# Patient Record
Sex: MALE | Race: WHITE | NOT HISPANIC OR LATINO | Employment: OTHER | URBAN - METROPOLITAN AREA
[De-identification: names, ages, dates, MRNs, and addresses within clinical notes are randomized per-mention and may not be internally consistent; named-entity substitution may affect disease eponyms.]

---

## 2018-01-08 ENCOUNTER — HOSPITAL ENCOUNTER (EMERGENCY)
Facility: CLINIC | Age: 43
Discharge: HOME | End: 2018-01-09
Attending: EMERGENCY MEDICINE | Admitting: EMERGENCY MEDICINE
Payer: COMMERCIAL

## 2018-01-08 DIAGNOSIS — R07.9 CHEST PAIN, UNSPECIFIED TYPE: ICD-10-CM

## 2018-01-08 LAB
ANION GAP SERPL CALCULATED.3IONS-SCNC: 9 MMOL/L (ref 3–14)
BASOPHILS # BLD AUTO: 0.1 10E9/L (ref 0–0.2)
BASOPHILS NFR BLD AUTO: 1 %
BUN SERPL-MCNC: 7 MG/DL (ref 7–30)
CALCIUM SERPL-MCNC: 9.2 MG/DL (ref 8.5–10.1)
CHLORIDE SERPL-SCNC: 104 MMOL/L (ref 94–109)
CO2 SERPL-SCNC: 24 MMOL/L (ref 20–32)
CREAT SERPL-MCNC: 0.54 MG/DL (ref 0.66–1.25)
D DIMER PPP FEU-MCNC: 0.6 UG/ML FEU (ref 0–0.5)
DIFFERENTIAL METHOD BLD: ABNORMAL
EOSINOPHIL # BLD AUTO: 0.2 10E9/L (ref 0–0.7)
EOSINOPHIL NFR BLD AUTO: 1.6 %
ERYTHROCYTE [DISTWIDTH] IN BLOOD BY AUTOMATED COUNT: 12.9 % (ref 10–15)
GFR SERPL CREATININE-BSD FRML MDRD: >90 ML/MIN/1.7M2
GLUCOSE SERPL-MCNC: 140 MG/DL (ref 70–99)
HCT VFR BLD AUTO: 48.4 % (ref 40–53)
HGB BLD-MCNC: 16.5 G/DL (ref 13.3–17.7)
IMM GRANULOCYTES # BLD: 0.1 10E9/L (ref 0–0.4)
IMM GRANULOCYTES NFR BLD: 0.4 %
LYMPHOCYTES # BLD AUTO: 2.3 10E9/L (ref 0.8–5.3)
LYMPHOCYTES NFR BLD AUTO: 19.2 %
MCH RBC QN AUTO: 31.5 PG (ref 26.5–33)
MCHC RBC AUTO-ENTMCNC: 34.1 G/DL (ref 31.5–36.5)
MCV RBC AUTO: 92 FL (ref 78–100)
MONOCYTES # BLD AUTO: 0.7 10E9/L (ref 0–1.3)
MONOCYTES NFR BLD AUTO: 6.1 %
NEUTROPHILS # BLD AUTO: 8.5 10E9/L (ref 1.6–8.3)
NEUTROPHILS NFR BLD AUTO: 71.7 %
NRBC # BLD AUTO: 0 10*3/UL
NRBC BLD AUTO-RTO: 0 /100
PLATELET # BLD AUTO: 233 10E9/L (ref 150–450)
POTASSIUM SERPL-SCNC: 3.6 MMOL/L (ref 3.4–5.3)
RBC # BLD AUTO: 5.24 10E12/L (ref 4.4–5.9)
SODIUM SERPL-SCNC: 137 MMOL/L (ref 133–144)
TROPONIN I SERPL-MCNC: <0.015 UG/L (ref 0–0.04)
WBC # BLD AUTO: 11.9 10E9/L (ref 4–11)

## 2018-01-08 PROCEDURE — 25000128 H RX IP 250 OP 636: Performed by: EMERGENCY MEDICINE

## 2018-01-08 PROCEDURE — 80048 BASIC METABOLIC PNL TOTAL CA: CPT | Performed by: EMERGENCY MEDICINE

## 2018-01-08 PROCEDURE — 85379 FIBRIN DEGRADATION QUANT: CPT | Performed by: EMERGENCY MEDICINE

## 2018-01-08 PROCEDURE — 84484 ASSAY OF TROPONIN QUANT: CPT | Performed by: EMERGENCY MEDICINE

## 2018-01-08 PROCEDURE — 99285 EMERGENCY DEPT VISIT HI MDM: CPT | Mod: 25

## 2018-01-08 PROCEDURE — 96361 HYDRATE IV INFUSION ADD-ON: CPT

## 2018-01-08 PROCEDURE — 96360 HYDRATION IV INFUSION INIT: CPT | Mod: 59

## 2018-01-08 PROCEDURE — 93005 ELECTROCARDIOGRAM TRACING: CPT

## 2018-01-08 PROCEDURE — 25000132 ZZH RX MED GY IP 250 OP 250 PS 637: Performed by: EMERGENCY MEDICINE

## 2018-01-08 PROCEDURE — 85025 COMPLETE CBC W/AUTO DIFF WBC: CPT | Performed by: EMERGENCY MEDICINE

## 2018-01-08 RX ORDER — NITROGLYCERIN 0.4 MG/1
0.4 TABLET SUBLINGUAL EVERY 5 MIN PRN
Status: DISCONTINUED | OUTPATIENT
Start: 2018-01-08 | End: 2018-01-09 | Stop reason: HOSPADM

## 2018-01-08 RX ADMIN — SODIUM CHLORIDE 500 ML: 9 INJECTION, SOLUTION INTRAVENOUS at 23:34

## 2018-01-08 RX ADMIN — NITROGLYCERIN 0.4 MG: 0.4 TABLET SUBLINGUAL at 23:35

## 2018-01-08 ASSESSMENT — ENCOUNTER SYMPTOMS
RHINORRHEA: 1
COUGH: 1
FEVER: 0
NAUSEA: 0
CHILLS: 0
SHORTNESS OF BREATH: 1
VOMITING: 0
SORE THROAT: 0

## 2018-01-08 NOTE — ED AVS SNAPSHOT
[facility]    [address]    [facility] [address]    Phone:  [phone]    Fax:  [phone]                                       Sp Plasencia   MRN: [id number]    Department:  [facility]   Date of Visit:  1/8/2018           After Visit Summary Signature Page     I have received my discharge instructions, and my questions have been answered. I have discussed any challenges I see with this plan with the nurse or doctor.    ..........................................................................................................................................  Patient/Patient Representative Signature      ..........................................................................................................................................  Patient Representative Print Name and Relationship to Patient    ..................................................               ................................................  Date                                            Time    ..........................................................................................................................................  Reviewed by Signature/Title    ...................................................              ..............................................  Date                                                            Time

## 2018-01-08 NOTE — ED AVS SNAPSHOT
[facility] Emergency Department    [address]    [facility] [address]    Phone:  [phone]    Fax:  [phone]                                       Sp Plasencia   MRN: [id number]    Department:  [facility] Emergency Department   Date of Visit:  1/8/2018           Patient Information     Date Of Birth          1975        Your diagnoses for this visit were:     Chest pain, unspecified type        You were seen by Tawana Franco MD.      Follow-up Information     Follow up with Clinic, [facility] In 2 days.    Contact information:    [address] [place] [id number]  [phone]          Discharge Instructions       Please follow up closely with your regular physician.     Please return to the ED if your symptoms worsen or if you develop new or concerning symptoms.     Discharge Instructions  Chest Pain    You have been seen today for chest pain or discomfort.  At this time, your doctor has found no signs that your chest pain is due to a serious or life-threatening condition, (or you have declined more testing and/or admission to the hospital). However, sometimes there is a serious problem that does not show up right away. Your evaluation today may not be complete and you may need further testing and evaluation.     You need to follow-up with your regular doctor within 3 days.    Return to the Emergency Department if:    Your chest pain changes, gets worse, starts to happen more often, or comes with less activity.    You are short of breath.    You get very weak or tired.    You pass out or faint.    You have any new symptoms, like fever, cough, numb legs, or you cough up blood.    You have anything else that worries you.    Until you follow-up with your regular doctor please do the following:    Take one aspirin daily unless you have an allergy or are told not to by your doctor.    If a stress test appointment has been made, go to  the appointment.    If you have questions, contact your regular doctor.    If your doctor today has told you to follow-up with your regular doctor, it is very important that you make an appointment with your clinic and go to the appointment.  If you do not follow-up with your primary doctor, it may result in missing an important development which could result in permanent injury or disability and/or lasting pain.  If there is any problem keeping your appointment, call your doctor or return to the Emergency Department.    If you were given a prescription for medicine here today, be sure to read all of the information (including the package insert) that comes with your prescription.  This will include important information about the medicine, its side effects, and any warnings that you need to know about.  The pharmacist who fills the prescription can provide more information and answer questions you may have about the medicine.  If you have questions or concerns that the pharmacist cannot address, please call or return to the Emergency Department.     Opioid Medication Information    Pain medications are among the most commonly prescribed medicines, so we are including this information for all our patients. If you did not receive pain medication or get a prescription for pain medicine, you can ignore it.     You may have been given a prescription for an opioid (narcotic) pain medicine and/or have received a pain medicine while here in the Emergency Department. These medicines can make you drowsy or impaired. You must not drive, operate dangerous equipment, or engage in any other dangerous activities while taking these medications. If you drive while taking these medications, you could be arrested for DUI, or driving under the influence. Do not drink any alcohol while you are taking these medications.     Opioid pain medications can cause addiction. If you have a history of chemical dependency of any type, you are at  a higher risk of becoming addicted to pain medications.  Only take these prescribed medications to treat your pain when all other options have been tried. Take it for as short a time and as few doses as possible. Store your pain pills in a secure place, as they are frequently stolen and provide a dangerous opportunity for children or visitors in your house to start abusing these powerful medications. We will not replace any lost or stolen medicine.  As soon as your pain is better, you should flush all your remaining medication.     Many prescription pain medications contain Tylenol  (acetaminophen), including Vicodin , Tylenol #3 , Norco , Lortab , and Percocet .  You should not take any extra pills of Tylenol  if you are using these prescription medications or you can get very sick.  Do not ever take more than 3000 mg of acetaminophen in any 24 hour period.    All opioids tend to cause constipation. Drink plenty of water and eat foods that have a lot of fiber, such as fruits, vegetables, prune juice, apple juice and high fiber cereal.  Take a laxative if you don t move your bowels at least every other day. Miralax , Milk of Magnesia, Colace , or Senna  can be used to keep you regular.      Remember that you can always come back to the Emergency Department if you are not able to see your regular doctor in the amount of time listed above, if you get any new symptoms, or if there is anything that worries you.          24 Hour Appointment Hotline       To make an appointment at any [facility], call [phone] ([phone]). If you don't have a family doctor or clinic, we will help you find one. [place] clinics are conveniently located to serve the needs of you and your family.          ED Discharge Orders     Exercise Stress Echocardiogram       Administration of IV contrast will be tailored to this examination per the appropriate written protocol listed in the Echocardiography department Protocol Book, or  by the supervising Cardiologist. This may result in an order change.    Use of contrast is at the discretion of the supervising Cardiologist.                     Review of your medicines      Our records show that you are taking the medicines listed below. If these are incorrect, please call your family doctor or clinic.        Dose / Directions Last dose taken    ADVIL 200 MG tablet   Dose:  200 mg   Generic drug:  ibuprofen        Take 200 mg by mouth every 4 hours as needed   Refills:  0        allopurinol 300 MG tablet   Commonly known as:  ZYLOPRIM   Dose:  300 mg        Take 300 mg by mouth daily   Refills:  0        amitriptyline 10 MG tablet   Commonly known as:  ELAVIL   Quantity:  90 tablet        Take 1 tablet at bedtime x 1 week, then 2 tablets at bedtime x 1 week, then 3 tablets at bedtime. For pain   Refills:  1        amLODIPine 5 MG tablet   Commonly known as:  NORVASC   Dose:  5 mg        Take 5 mg by mouth daily   Refills:  0        DAILY MULTIVITAMIN PO        Take by mouth daily   Refills:  0        FISH OIL        daily   Refills:  0        indomethacin 50 MG capsule   Commonly known as:  INDOCIN        Take by mouth 2 times daily (with meals)   Refills:  0        lisinopril 40 MG tablet   Commonly known as:  PRINIVIL/ZESTRIL   Dose:  40 mg        Take 40 mg by mouth daily   Refills:  0        omeprazole 20 MG tablet   Dose:  20 mg        Take 20 mg by mouth 2 times daily   Refills:  0        TRAMADOL HCL   Dose:  50 mg        50 mg   Refills:  0                Procedures and tests performed during your visit     Procedure/Test Number of Times Performed    Basic metabolic panel (BMP) 1    CBC + differential 1    Chest CT, IV contrast only - PE protocol 1    D dimer quantitative 1    EKG 12 lead 1    Troponin I 2      Orders Needing Specimen Collection     None      Pending Results     Date and Time Order Name Status Description    1/8/2018 2301 EKG 12 lead Preliminary             Pending Culture  Results     No orders found for last 3 day(s).            Pending Results Instructions     If you had any lab results that were not finalized at the time of your Discharge, you can call the ED Lab Result RN at [phone]. You will be contacted by this team for any positive Lab results or changes in treatment. The nurses are available 7 days a week from 10A to 6:30P.  You can leave a message 24 hours per day and they will return your call.        Test Results From Your Hospital Stay        1/8/2018 11:35 PM      Component Results     Component Value Ref Range & Units Status    WBC 11.9 (H) 4.0 - 11.0 10e9/L Final    RBC Count 5.24 4.4 - 5.9 10e12/L Final    Hemoglobin 16.5 13.3 - 17.7 g/dL Final    Hematocrit 48.4 40.0 - 53.0 % Final    MCV 92 78 - 100 fl Final    MCH 31.5 26.5 - 33.0 pg Final    MCHC 34.1 31.5 - 36.5 g/dL Final    RDW 12.9 10.0 - 15.0 % Final    Platelet Count 233 150 - 450 10e9/L Final    Diff Method Automated Method  Final    % Neutrophils 71.7 % Final    % Lymphocytes 19.2 % Final    % Monocytes 6.1 % Final    % Eosinophils 1.6 % Final    % Basophils 1.0 % Final    % Immature Granulocytes 0.4 % Final    Nucleated RBCs 0 0 /100 Final    Absolute Neutrophil 8.5 (H) 1.6 - 8.3 10e9/L Final    Absolute Lymphocytes 2.3 0.8 - 5.3 10e9/L Final    Absolute Monocytes 0.7 0.0 - 1.3 10e9/L Final    Absolute Eosinophils 0.2 0.0 - 0.7 10e9/L Final    Absolute Basophils 0.1 0.0 - 0.2 10e9/L Final    Abs Immature Granulocytes 0.1 0 - 0.4 10e9/L Final    Absolute Nucleated RBC 0.0  Final         1/8/2018 11:53 PM      Component Results     Component Value Ref Range & Units Status    Sodium 137 133 - 144 mmol/L Final    Potassium 3.6 3.4 - 5.3 mmol/L Final    Chloride 104 94 - 109 mmol/L Final    Carbon Dioxide 24 20 - 32 mmol/L Final    Anion Gap 9 3 - 14 mmol/L Final    Glucose 140 (H) 70 - 99 mg/dL Final    Urea Nitrogen 7 7 - 30 mg/dL Final    Creatinine 0.54 (L) 0.66 - 1.25 mg/dL Final    GFR Estimate >90  >60 mL/min/1.7m2 Final    Non  GFR Calc    GFR Estimate If Black >90 >60 mL/min/1.7m2 Final    African American GFR Calc    Calcium 9.2 8.5 - 10.1 mg/dL Final         1/8/2018 11:53 PM      Component Results     Component Value Ref Range & Units Status    Troponin I ES <0.015 0.000 - 0.045 ug/L Final    The 99th percentile for upper reference range is 0.045 ug/L.  Troponin values   in the range of 0.045 - 0.120 ug/L may be associated with risks of adverse   clinical events.           1/8/2018 11:45 PM      Component Results     Component Value Ref Range & Units Status    D Dimer 0.6 (H) 0.0 - 0.50 ug/ml FEU Final    This D-dimer assay is intended for use in conjunction with a clinical pretest   probability assessment model to exclude pulmonary embolism (PE) and deep   venous thrombosis (DVT) in outpatients suspected of PE or DVT. The cut-off   value is 0.5 ug/mL FEU.           1/9/2018  2:10 AM      Narrative     CT CHEST WITH CONTRAST  1/9/2018 1:21 AM     HISTORY: Chest pain. Elevated D-dimer.    COMPARISON: None.    TECHNIQUE: Following the uneventful administration of 80 mL Isovue-370  intravenous contrast, helical sections were acquired through the lungs  according to the pulmonary embolism protocol. Coronal reconstructions  were generated. Radiation dose for this scan was reduced using  automated exposure control, adjustment of the mA and/or kV according  to the patient's size, or iterative reconstruction technique.    FINDINGS: No visualized pulmonary embolus. The thoracic aorta is  normal in caliber without dissection.    A few linear opacities in bilateral lung bases likely represent  atelectasis. The lungs are otherwise clear. No pleural or pericardial  effusion. A few borderline and mildly enlarged periaortic lymph nodes.  For example, there is a 1.4 x 1.1 cm lymph node posterior to the  descending thoracic aorta (series 4 image 108).    Scan through the upper abdomen is unremarkable.         Impression     IMPRESSION:  1. No visualized pulmonary embolus.  2. No evidence of active pulmonary disease.  3. A few nonspecific borderline and mildly enlarged thoracic lymph  nodes.    JEWEL DIAZ MD         1/9/2018  2:38 AM      Component Results     Component Value Ref Range & Units Status    Troponin I ES <0.015 0.000 - 0.045 ug/L Final    The 99th percentile for upper reference range is 0.045 ug/L.  Troponin values   in the range of 0.045 - 0.120 ug/L may be associated with risks of adverse   clinical events.                  Clinical Quality Measure: Blood Pressure Screening     Your blood pressure was checked while you were in the emergency department today. The last reading we obtained was  BP: 151/86 . Please read the guidelines below about what these numbers mean and what you should do about them.  If your systolic blood pressure (the top number) is less than 120 and your diastolic blood pressure (the bottom number) is less than 80, then your blood pressure is normal. There is nothing more that you need to do about it.  If your systolic blood pressure (the top number) is 120-139 or your diastolic blood pressure (the bottom number) is 80-89, your blood pressure may be higher than it should be. You should have your blood pressure rechecked within a year by a primary care provider.  If your systolic blood pressure (the top number) is 140 or greater or your diastolic blood pressure (the bottom number) is 90 or greater, you may have high blood pressure. High blood pressure is treatable, but if left untreated over time it can put you at risk for heart attack, stroke, or kidney failure. You should have your blood pressure rechecked by a primary care provider within the next 4 weeks.  If your provider in the emergency department today gave you specific instructions to follow-up with your doctor or provider even sooner than that, you should follow that instruction and not wait for up to 4 weeks for your  "follow-up visit.        Thank you for choosing [facility]       Thank you for choosing [facility] for your care. Our goal is always to provide you with excellent care. Hearing back from our patients is one way we can continue to improve our services. Please take a few minutes to complete the written survey that you may receive in the mail after you visit with us. Thank you!        [employer] Information     [employer] lets you send messages to your doctor, view your test results, renew your prescriptions, schedule appointments and more. To sign up, go to [email or web address] . Click on \"Log in\" on the left side of the screen, which will take you to the Welcome page. Then click on \"Sign up Now\" on the right side of the page.     You will be asked to enter the access code listed below, as well as some personal information. Please follow the directions to create your username and password.     Your access code is: [email or web address]  Expires: 2018  2:41 AM     Your access code will  in 90 days. If you need help or a new code, please call your [facility] clinic or [phone].        Care EveryWhere ID     This is your Care EveryWhere ID. This could be used by other organizations to access your [facility] medical records  [id number]        Equal Access to Services     ROSIE FUENTES : Esthela bacao Real, waaxda luqadaha, qaybta kaalmada adeegyada, katherine simmons. So [facility] [phone].    ATENCIÓN: Si habla español, tiene a darden disposición servicios gratuitos de asistencia lingüística. Llame al [phone].    We comply with applicable federal civil rights laws and [place] laws. We do not discriminate on the basis of race, color, national origin, age, disability, sex, sexual orientation, or gender identity.            After Visit Summary       This is your record. Keep this with you and show to your community pharmacist(s) and doctor(s) at your next visit.                  "

## 2018-01-09 ENCOUNTER — APPOINTMENT (OUTPATIENT)
Dept: CT IMAGING | Facility: CLINIC | Age: 43
End: 2018-01-09
Attending: EMERGENCY MEDICINE
Payer: COMMERCIAL

## 2018-01-09 VITALS
DIASTOLIC BLOOD PRESSURE: 99 MMHG | WEIGHT: 250 LBS | RESPIRATION RATE: 18 BRPM | OXYGEN SATURATION: 92 % | BODY MASS INDEX: 33.13 KG/M2 | TEMPERATURE: 99.1 F | SYSTOLIC BLOOD PRESSURE: 169 MMHG | HEART RATE: 114 BPM | HEIGHT: 73 IN

## 2018-01-09 LAB
INTERPRETATION ECG - MUSE: NORMAL
TROPONIN I SERPL-MCNC: <0.015 UG/L (ref 0–0.04)

## 2018-01-09 PROCEDURE — 25000128 H RX IP 250 OP 636: Performed by: EMERGENCY MEDICINE

## 2018-01-09 PROCEDURE — 71260 CT THORAX DX C+: CPT

## 2018-01-09 PROCEDURE — 84484 ASSAY OF TROPONIN QUANT: CPT | Performed by: EMERGENCY MEDICINE

## 2018-01-09 RX ORDER — IOPAMIDOL 755 MG/ML
500 INJECTION, SOLUTION INTRAVASCULAR ONCE
Status: COMPLETED | OUTPATIENT
Start: 2018-01-09 | End: 2018-01-09

## 2018-01-09 RX ADMIN — IOPAMIDOL 80 ML: 755 INJECTION, SOLUTION INTRAVENOUS at 01:09

## 2018-01-09 RX ADMIN — SODIUM CHLORIDE 90 ML: 9 INJECTION, SOLUTION INTRAVENOUS at 01:09

## 2018-01-09 NOTE — ED NOTES
"Educated pt on why we have to keep the blood pressure cuff on. Pt replied, \"I understand, it just gets tight and my arm feels like its going to fall off.\"  "

## 2018-01-09 NOTE — ED PROVIDER NOTES
"  History     Chief Complaint:  Chest Pain    HPI   Sp Plasencia is a 42-year-old male with a history of hypertension and muscular dystrophy who presents for evaluation of chest pain.  The patient reports that the chest pain started last night while he was watching TV.  He states \"it feels like my lungs are on fire and I cannot swallow.\"  The patient states that he was originally concerned for acid reflux so he took some Tums.  He states that this did not help so he decided to take some aspirin last night. He reports that today the pain was mostly better but still present throughout most of the day.  He states that again tonight the pain came back and was more intense.  He took some more Tums and 2 full aspirin and on presentation states that his pain is still a 4/10.  The patient is unsure if the pain radiates anywhere else because of his muscular dystrophy and states that he has constant pain in his arms, neck, back.  He adds that he had a similar episode to this 5-6 years ago and had a cardiac workup that was negative, and was told that his symptoms were due to acid reflux.    Of note, the patient states that he has a concurrent cold right now with a productive cough, runny nose, congestion.       Cardiac Risk Factors      Sex:     Male   Tobacco:    POSITIVE   Hypertension:    POSITIVE  Diabetes:    Negative  Hyperlipidemia:   Negative   Family History:   Negative       PE/DVT Risk Factors     Personal History:   Negative  Recent Travel:   Negative   Recent Surg/Hospitalization:  Negative  Tobacco:    POSITIVE   Family History:   Negative  Hormone Use:   Negative   Cancer:    Negative  Trauma:    Negative      Allergies:  No known drug allergies.      Medications:    Elavil   Lisinopril   Omeprazole   Tramadol   Amlodipine   Indocin   Zyloprim     Past Medical History:    Fascioscapulohumeral Muscular Dystrophy   GERD   Hypertension     Past Surgical History:    History reviewed. No pertinent past surgical " "history.     Family History:    Hypertension     Social History:  Marital Status: Single  Presents to the ED alone  Tobacco Use: Couple of cigarettes per day, states a pack per week   PCP: Ji Lugo PA-C      Review of Systems   Constitutional: Negative for chills and fever.   HENT: Positive for congestion and rhinorrhea. Negative for sore throat.    Respiratory: Positive for cough and shortness of breath (states that this again is intermittent and chronic due to MD).    Cardiovascular: Positive for chest pain.   Gastrointestinal: Negative for nausea and vomiting.   All other systems reviewed and are negative.    Physical Exam   Patient Vitals for the past 24 hrs:   BP Temp Pulse Heart Rate Resp SpO2 Height Weight   01/09/18 0245 (!) 169/99 - - 108 - 92 % - -   01/09/18 0230 156/87 - - 104 - 92 % - -   01/09/18 0100 151/86 - - 101 - - - -   01/08/18 2359 - - - - - 92 % - -   01/08/18 2345 136/79 - - 108 - 91 % - -   01/08/18 2330 161/90 - - 105 - 93 % - -   01/08/18 2311 - 99.1  F (37.3  C) - - - - - -   01/08/18 2305 (!) 200/118 - 114 - 18 94 % 1.854 m (6' 1\") 113.4 kg (250 lb)     Physical Exam  Constitutional: The patient is oriented to person, place, and time. Alert and cooperative.  HENT:   Right Ear: External ear normal.   Left Ear: External ear normal.   Nose: Nose normal.   Mouth/Throat: Uvula is midline, oropharynx is clear and moist and mucous membranes are normal. No posterior oropharyngeal edema or erythema.   Eyes: Conjunctivae, EOM and lids are normal. Pupils are equal, round, and reactive to light.   Neck: Trachea normal. Normal range of motion. Neck supple.   Cardiovascular: tachycardia, regular rhythm, normal heart sounds, and intact distal pulses.    Pulmonary/Chest: Effort normal and breath sounds equal bilaterally. No crackles or wheezing.   Abdominal: Soft. No tenderness. No rebound and no guarding.   Musculoskeletal: Normal range of motion.  No extremity tenderness or " edema.  Neurological: Alert and Oriented. Strength 5/5 in upper and lower extremities bilaterally. Sensation intact to light touch throughout.  Skin: Skin is dry. No rash noted.          Emergency Department Course   ECG:  @ 2303  Indication: Chest pain   Vent. Rate 117 bpm. NH interval 126 ms. QRS duration 88 ms. QT/QTc 336/468 ms. P-R-T axis 63 0 57.   Sinus tachycardia. Otherwise normal ECG.  No significant change when compared to previous ECG from 12/9/13   Read @ 2321 by Dr. Franco.     Imaging:  Chest CT, IV Contrast Only - PE Protocol  1. No visualized pulmonary embolus.  2. No evidence of active pulmonary disease.  3. A few nonspecific borderline and mildly enlarged thoracic lymph  nodes.  Report per radiology: Clayton Galvan MD (01/09/18 02:09:23)    Radiographic findings were communicated with the patient who voiced understanding of the findings.    Laboratory:  Blood:  Initial blood draw collected at 2310  CBC:  WBC 11.9, HGB 16.5, , otherwise WNL   BMP: Glc 140, Creatinine 0.54, otherwise WNL   Troponin I: <0.015 (WNL)    D Dimer quantitative: 0.6       Repeat blood draw collected at 0204  Troponin I: <0.015 (WNL)       Interventions:  (2334) Normal Saline, 0.5 liter, IV bolus   (2335) Nitroglycerin, 0.4 mg, SL     Emergency Department Course:  Nursing notes and vitals reviewed.    (2312) I entered the room with my scribe, obtained the history, and performed an exam of the patient as documented above.    EKG was done, interpretation as above.     A peripheral IV was established. Blood was drawn from the patient. This was sent for laboratory testing, findings above.      The patient was sent for a chest x-ray while in the emergency department, findings above.      (0235) I went to update the patient on the findings thus far.     (0241) Update the patient on the negative repeat troponin. Return precautions discussed. Answered questions prior to discharge.     Findings and plan explained to  the patient. Patient discharged home with instructions regarding supportive care, medications, and reasons to return. The importance of close follow-up was reviewed.     I personally reviewed the laboratory results with the patient and answered all related questions prior to discharge.       Impression & Plan      HEART Score  Background  Calculates the overall risk of adverse event in patient's presenting with chest pain.  Based on 5 criteria (each assigned 0-2 points) including suspiciousness of history, EKG, age, risk factors and troponin.    Data  42 year old male  has FSHD (facioscapulohumeral muscular dystrophy) (H) on his problem list.   reports that he has been smoking Cigarettes.  He has been smoking about 1.00 pack per day. He has never used smokeless tobacco.  Criteria   0-2 points for each of 5 items (maximum of 10 points):  Score 0- History slightly suspicious for coronary syndrome  Score 0- EKG Normal  Score 0- Age <45 years old  Score 1- One to 2 risk factors for atherosclerotic disease  Score 0- Within normal limits for troponin levels  Interpretation  Risk of adverse outcome  Heart Score: 1  Total Score 0-3- Adverse Outcome Risk 2.5% - Supports early discharge with appropriate follow-up    Medical Decision Making:  Sp Plasencia is a 42-year-old male with a history of fascioscapulohumeral muscular dystrophy who presents to the ED for evaluation of chest pain. Upon presentation to the ED, the patient is nontoxic appearing. He is hypertensive and tachycardic, though his vital signs are otherwise within normal limits and stable. On exam, he is well appearing. He is alert, oriented, and neuro exam is nonfocal. Cardiopulmonary exam is unremarkable. Abdomen is soft and nontender throughout. The rest of his exam is as mentioned above.  Differential includes, but is not limited to, ACS, PE, pneumonia, pneumothorax, aortic dissection, esophageal rupture, GERD, pericarditis, or musculoskeletal chest wall  pain.  EKG was obtained and demonstrates sinus rhythm.  There are no concerning acute ischemic changes.  Labs were obtained and are as mentioned above.  Given that the patient's d-dimer was mildly elevated, CT of the chest was obtained.  This demonstrates no evidence of pulmonary embolism, aortic dissection, or other active pulmonary disease.  These results were discussed with the patient and he notes understanding.  Given the unremarkable chest CT I have low suspicion for pneumonia, pneumothorax, PE, or aortic dissection.  Given the unremarkable EKG and negative troponin despite greater than 6 hours of symptoms, this essentially rules out ACS.  In addition, the patient is low risk for ACS by HEART score.  His history and presentation are not consistent with pericarditis or esophageal rupture, therefore I feel these diagnoses are less likely.  Overall, the patient's workup here is relatively unremarkable.  Given that he is well appearing and with an unremarkable workup, I do feel that he is safe for discharge home.  I did discuss with the patient that I recommend close follow-up with an outpatient stress test and his primary care physician.  The patient notes understanding and agreement with this plan.  Return instructions were given.  He was stable/improved at the time of discharge.    Diagnosis:    ICD-10-CM    1. Chest pain, unspecified type R07.9 Exercise Stress Echocardiogram     Disposition:  discharged to home          [facility] EMERGENCY DEPARTMENT      Scribe disclosure:  BRENT, Arpit Rollins, am serving as a scribe on 1/8/2018 at 11:12 PM to personally document services performed by Tawana Franco MD based on my observations and the provider's statements to me.                Tawana Franco MD  01/12/18 0027

## 2018-01-09 NOTE — DISCHARGE INSTRUCTIONS
Please follow up closely with your regular physician.     Please return to the ED if your symptoms worsen or if you develop new or concerning symptoms.     Discharge Instructions  Chest Pain    You have been seen today for chest pain or discomfort.  At this time, your doctor has found no signs that your chest pain is due to a serious or life-threatening condition, (or you have declined more testing and/or admission to the hospital). However, sometimes there is a serious problem that does not show up right away. Your evaluation today may not be complete and you may need further testing and evaluation.     You need to follow-up with your regular doctor within 3 days.    Return to the Emergency Department if:    Your chest pain changes, gets worse, starts to happen more often, or comes with less activity.    You are short of breath.    You get very weak or tired.    You pass out or faint.    You have any new symptoms, like fever, cough, numb legs, or you cough up blood.    You have anything else that worries you.    Until you follow-up with your regular doctor please do the following:    Take one aspirin daily unless you have an allergy or are told not to by your doctor.    If a stress test appointment has been made, go to the appointment.    If you have questions, contact your regular doctor.    If your doctor today has told you to follow-up with your regular doctor, it is very important that you make an appointment with your clinic and go to the appointment.  If you do not follow-up with your primary doctor, it may result in missing an important development which could result in permanent injury or disability and/or lasting pain.  If there is any problem keeping your appointment, call your doctor or return to the Emergency Department.    If you were given a prescription for medicine here today, be sure to read all of the information (including the package insert) that comes with your prescription.  This will include  important information about the medicine, its side effects, and any warnings that you need to know about.  The pharmacist who fills the prescription can provide more information and answer questions you may have about the medicine.  If you have questions or concerns that the pharmacist cannot address, please call or return to the Emergency Department.     Opioid Medication Information    Pain medications are among the most commonly prescribed medicines, so we are including this information for all our patients. If you did not receive pain medication or get a prescription for pain medicine, you can ignore it.     You may have been given a prescription for an opioid (narcotic) pain medicine and/or have received a pain medicine while here in the Emergency Department. These medicines can make you drowsy or impaired. You must not drive, operate dangerous equipment, or engage in any other dangerous activities while taking these medications. If you drive while taking these medications, you could be arrested for DUI, or driving under the influence. Do not drink any alcohol while you are taking these medications.     Opioid pain medications can cause addiction. If you have a history of chemical dependency of any type, you are at a higher risk of becoming addicted to pain medications.  Only take these prescribed medications to treat your pain when all other options have been tried. Take it for as short a time and as few doses as possible. Store your pain pills in a secure place, as they are frequently stolen and provide a dangerous opportunity for children or visitors in your house to start abusing these powerful medications. We will not replace any lost or stolen medicine.  As soon as your pain is better, you should flush all your remaining medication.     Many prescription pain medications contain Tylenol  (acetaminophen), including Vicodin , Tylenol #3 , Norco , Lortab , and Percocet .  You should not take any extra pills  of Tylenol  if you are using these prescription medications or you can get very sick.  Do not ever take more than 3000 mg of acetaminophen in any 24 hour period.    All opioids tend to cause constipation. Drink plenty of water and eat foods that have a lot of fiber, such as fruits, vegetables, prune juice, apple juice and high fiber cereal.  Take a laxative if you don t move your bowels at least every other day. Miralax , Milk of Magnesia, Colace , or Senna  can be used to keep you regular.      Remember that you can always come back to the Emergency Department if you are not able to see your regular doctor in the amount of time listed above, if you get any new symptoms, or if there is anything that worries you.

## 2018-01-09 NOTE — ED NOTES
Pt in with C/O mid sternal chest pain onset last evening while watching TV. Pt reports pain is intermittent. Pt states he has had a cough and congestion for the past 8 weeks. Pt A&O x 4 on arrival ABC's intact. Pt reports he took ASA 650mg PTA

## 2018-12-06 ENCOUNTER — TRANSFERRED RECORDS (OUTPATIENT)
Dept: HEALTH INFORMATION MANAGEMENT | Facility: CLINIC | Age: 43
End: 2018-12-06

## 2018-12-06 LAB — HEP C HIM: NORMAL

## 2018-12-10 LAB
ALT SERPL-CCNC: 69 U/L (ref 13–69)
AST SERPL-CCNC: 157 U/L (ref 12–35)
CREAT SERPL-MCNC: 0.5 MG/DL (ref 0.6–1.3)
GLUCOSE SERPL-MCNC: 111 MG/DL (ref 60–115)
POTASSIUM SERPL-SCNC: 3.5 MMOL/L (ref 3.5–4.9)
TSH SERPL-ACNC: 2.1 UIU/ML (ref 0.27–4.2)

## 2018-12-17 ENCOUNTER — TRANSFERRED RECORDS (OUTPATIENT)
Dept: HEALTH INFORMATION MANAGEMENT | Facility: CLINIC | Age: 43
End: 2018-12-17

## 2018-12-17 LAB
EJECTION FRACTION: 63
EJECTION FRACTION: NORMAL %

## 2018-12-28 ENCOUNTER — TRANSFERRED RECORDS (OUTPATIENT)
Dept: HEALTH INFORMATION MANAGEMENT | Facility: CLINIC | Age: 43
End: 2018-12-28

## 2019-01-04 ENCOUNTER — TRANSFERRED RECORDS (OUTPATIENT)
Dept: HEALTH INFORMATION MANAGEMENT | Facility: CLINIC | Age: 44
End: 2019-01-04

## 2019-01-04 ENCOUNTER — HOSPITAL ENCOUNTER (INPATIENT)
Facility: CLINIC | Age: 44
LOS: 2 days | Discharge: HOME | DRG: 683 | End: 2019-01-06
Attending: EMERGENCY MEDICINE | Admitting: HOSPITALIST
Payer: COMMERCIAL

## 2019-01-04 DIAGNOSIS — E87.5 HYPERKALEMIA: ICD-10-CM

## 2019-01-04 DIAGNOSIS — N17.9 AKI (ACUTE KIDNEY INJURY) (H): Primary | ICD-10-CM

## 2019-01-04 DIAGNOSIS — L60.0 INGROWN LEFT GREATER TOENAIL: ICD-10-CM

## 2019-01-04 DIAGNOSIS — E87.1 HYPONATREMIA: ICD-10-CM

## 2019-01-04 LAB
ALBUMIN SERPL-MCNC: 4.1 G/DL (ref 3.4–5)
ALBUMIN UR-MCNC: NEGATIVE MG/DL
ALP SERPL-CCNC: 150 U/L (ref 40–150)
ALT SERPL W P-5'-P-CCNC: 88 U/L (ref 0–70)
ANION GAP SERPL CALCULATED.3IONS-SCNC: 10 MMOL/L (ref 3–14)
ANION GAP SERPL CALCULATED.3IONS-SCNC: 11 MMOL/L (ref 6–17)
ANION GAP SERPL CALCULATED.3IONS-SCNC: 8 MMOL/L (ref 3–14)
ANION GAP SERPL CALCULATED.3IONS-SCNC: 8 MMOL/L (ref 6–17)
APPEARANCE UR: CLEAR
AST SERPL W P-5'-P-CCNC: 135 U/L (ref 0–45)
BASOPHILS # BLD AUTO: 0.2 10E9/L (ref 0–0.2)
BASOPHILS NFR BLD AUTO: 1.4 %
BILIRUB DIRECT SERPL-MCNC: 0.6 MG/DL (ref 0–0.2)
BILIRUB SERPL-MCNC: 1.4 MG/DL (ref 0.2–1.3)
BILIRUB UR QL STRIP: NEGATIVE
BUN SERPL-MCNC: 33 MG/DL (ref 5–24)
BUN SERPL-MCNC: 34 MG/DL (ref 5–24)
BUN SERPL-MCNC: 34 MG/DL (ref 7–30)
BUN SERPL-MCNC: 35 MG/DL (ref 7–30)
CA-I BLD-SCNC: 4.6 MG/DL (ref 4.4–5.2)
CA-I BLD-SCNC: 4.8 MG/DL (ref 4.4–5.2)
CALCIUM SERPL-MCNC: 9.3 MG/DL (ref 8.5–10.1)
CALCIUM SERPL-MCNC: 9.7 MG/DL (ref 8.5–10.1)
CHLORIDE BLD-SCNC: 87 MMOL/L (ref 94–109)
CHLORIDE BLD-SCNC: 87 MMOL/L (ref 94–109)
CHLORIDE SERPL-SCNC: 87 MMOL/L (ref 94–109)
CHLORIDE SERPL-SCNC: 92 MMOL/L (ref 94–109)
CO2 BLD-SCNC: 23 MMOL/L (ref 20–32)
CO2 BLD-SCNC: 26 MMOL/L (ref 20–32)
CO2 SERPL-SCNC: 23 MMOL/L (ref 20–32)
CO2 SERPL-SCNC: 23 MMOL/L (ref 20–32)
COLOR UR AUTO: YELLOW
CREAT BLD-MCNC: 1.5 MG/DL (ref 0.66–1.25)
CREAT BLD-MCNC: 1.6 MG/DL (ref 0.66–1.25)
CREAT SERPL-MCNC: 1.32 MG/DL (ref 0.66–1.25)
CREAT SERPL-MCNC: 1.41 MG/DL (ref 0.66–1.25)
CREAT SERPL-MCNC: 1.5 MG/DL (ref 0.6–1.3)
DIFFERENTIAL METHOD BLD: ABNORMAL
EOSINOPHIL # BLD AUTO: 0.2 10E9/L (ref 0–0.7)
EOSINOPHIL NFR BLD AUTO: 2.2 %
ERYTHROCYTE [DISTWIDTH] IN BLOOD BY AUTOMATED COUNT: 12.7 % (ref 10–15)
GFR SERPL CREATININE-BSD FRML MDRD: 48 ML/MIN/{1.73_M2}
GFR SERPL CREATININE-BSD FRML MDRD: 51 ML/MIN/{1.73_M2}
GFR SERPL CREATININE-BSD FRML MDRD: 61 ML/MIN/{1.73_M2}
GFR SERPL CREATININE-BSD FRML MDRD: 66 ML/MIN/{1.73_M2}
GLUCOSE BLD-MCNC: 104 MG/DL (ref 70–99)
GLUCOSE BLD-MCNC: 108 MG/DL (ref 70–99)
GLUCOSE SERPL-MCNC: 101 MG/DL (ref 70–99)
GLUCOSE SERPL-MCNC: 115 MG/DL (ref 60–115)
GLUCOSE SERPL-MCNC: 120 MG/DL (ref 70–99)
GLUCOSE UR STRIP-MCNC: NEGATIVE MG/DL
HCT VFR BLD AUTO: 47 % (ref 40–53)
HCT VFR BLD CALC: 45 %PCV (ref 40–53)
HCT VFR BLD CALC: 53 %PCV (ref 40–53)
HGB BLD CALC-MCNC: 15.3 G/DL (ref 13.3–17.7)
HGB BLD CALC-MCNC: 18 G/DL (ref 13.3–17.7)
HGB BLD-MCNC: 16.1 G/DL (ref 13.3–17.7)
HGB UR QL STRIP: NEGATIVE
IMM GRANULOCYTES # BLD: 0.1 10E9/L (ref 0–0.4)
IMM GRANULOCYTES NFR BLD: 0.5 %
KETONES UR STRIP-MCNC: NEGATIVE MG/DL
LEUKOCYTE ESTERASE UR QL STRIP: NEGATIVE
LYMPHOCYTES # BLD AUTO: 1.2 10E9/L (ref 0.8–5.3)
LYMPHOCYTES NFR BLD AUTO: 11.1 %
MCH RBC QN AUTO: 31 PG (ref 26.5–33)
MCHC RBC AUTO-ENTMCNC: 34.3 G/DL (ref 31.5–36.5)
MCV RBC AUTO: 91 FL (ref 78–100)
MONOCYTES # BLD AUTO: 0.8 10E9/L (ref 0–1.3)
MONOCYTES NFR BLD AUTO: 6.9 %
MUCOUS THREADS #/AREA URNS LPF: PRESENT /LPF
NEUTROPHILS # BLD AUTO: 8.6 10E9/L (ref 1.6–8.3)
NEUTROPHILS NFR BLD AUTO: 77.9 %
NITRATE UR QL: NEGATIVE
NRBC # BLD AUTO: 0 10*3/UL
NRBC BLD AUTO-RTO: 0 /100
PH UR STRIP: 6 PH (ref 5–7)
PHQ9 SCORE: 5
PLATELET # BLD AUTO: 217 10E9/L (ref 150–450)
POTASSIUM BLD-SCNC: 5.6 MMOL/L (ref 3.4–5.3)
POTASSIUM BLD-SCNC: 6.6 MMOL/L (ref 3.4–5.3)
POTASSIUM SERPL-SCNC: 4.7 MMOL/L (ref 3.4–5.3)
POTASSIUM SERPL-SCNC: 5.4 MMOL/L (ref 3.4–5.3)
POTASSIUM SERPL-SCNC: 6 MMOL/L (ref 3.4–5.3)
POTASSIUM SERPL-SCNC: 6.3 MMOL/L (ref 3.5–4.9)
PROT SERPL-MCNC: 9 G/DL (ref 6.8–8.8)
RBC # BLD AUTO: 5.19 10E12/L (ref 4.4–5.9)
RBC #/AREA URNS AUTO: <1 /HPF (ref 0–2)
SODIUM BLD-SCNC: 121 MMOL/L (ref 133–144)
SODIUM BLD-SCNC: 121 MMOL/L (ref 133–144)
SODIUM SERPL-SCNC: 120 MMOL/L (ref 133–144)
SODIUM SERPL-SCNC: 123 MMOL/L (ref 133–144)
SOURCE: ABNORMAL
SP GR UR STRIP: 1.01 (ref 1–1.03)
UROBILINOGEN UR STRIP-MCNC: 0 MG/DL (ref 0–2)
WBC # BLD AUTO: 11.1 10E9/L (ref 4–11)
WBC #/AREA URNS AUTO: 1 /HPF (ref 0–5)

## 2019-01-04 PROCEDURE — 25000132 ZZH RX MED GY IP 250 OP 250 PS 637: Performed by: HOSPITALIST

## 2019-01-04 PROCEDURE — 93005 ELECTROCARDIOGRAM TRACING: CPT

## 2019-01-04 PROCEDURE — 36415 COLL VENOUS BLD VENIPUNCTURE: CPT | Performed by: HOSPITALIST

## 2019-01-04 PROCEDURE — 81001 URINALYSIS AUTO W/SCOPE: CPT | Performed by: EMERGENCY MEDICINE

## 2019-01-04 PROCEDURE — 96361 HYDRATE IV INFUSION ADD-ON: CPT

## 2019-01-04 PROCEDURE — 96365 THER/PROPH/DIAG IV INF INIT: CPT

## 2019-01-04 PROCEDURE — 80048 BASIC METABOLIC PNL TOTAL CA: CPT | Performed by: HOSPITALIST

## 2019-01-04 PROCEDURE — 84132 ASSAY OF SERUM POTASSIUM: CPT | Performed by: EMERGENCY MEDICINE

## 2019-01-04 PROCEDURE — 99207 ZZC CDG-MDM COMPONENT: MEETS LOW - DOWN CODED: CPT | Performed by: HOSPITALIST

## 2019-01-04 PROCEDURE — 25000132 ZZH RX MED GY IP 250 OP 250 PS 637: Performed by: EMERGENCY MEDICINE

## 2019-01-04 PROCEDURE — 85014 HEMATOCRIT: CPT

## 2019-01-04 PROCEDURE — 99285 EMERGENCY DEPT VISIT HI MDM: CPT | Mod: 25

## 2019-01-04 PROCEDURE — 25000128 H RX IP 250 OP 636: Performed by: HOSPITALIST

## 2019-01-04 PROCEDURE — 80048 BASIC METABOLIC PNL TOTAL CA: CPT | Performed by: EMERGENCY MEDICINE

## 2019-01-04 PROCEDURE — 12000000 ZZH R&B MED SURG/OB

## 2019-01-04 PROCEDURE — 80076 HEPATIC FUNCTION PANEL: CPT | Performed by: EMERGENCY MEDICINE

## 2019-01-04 PROCEDURE — 80047 BASIC METABLC PNL IONIZED CA: CPT

## 2019-01-04 PROCEDURE — 85025 COMPLETE CBC W/AUTO DIFF WBC: CPT | Performed by: EMERGENCY MEDICINE

## 2019-01-04 PROCEDURE — 25000128 H RX IP 250 OP 636: Performed by: EMERGENCY MEDICINE

## 2019-01-04 PROCEDURE — 99223 1ST HOSP IP/OBS HIGH 75: CPT | Performed by: HOSPITALIST

## 2019-01-04 RX ORDER — NICOTINE POLACRILEX 4 MG
15-30 LOZENGE BUCCAL
Status: DISCONTINUED | OUTPATIENT
Start: 2019-01-04 | End: 2019-01-04

## 2019-01-04 RX ORDER — AMOXICILLIN 250 MG
2 CAPSULE ORAL 2 TIMES DAILY PRN
Status: DISCONTINUED | OUTPATIENT
Start: 2019-01-04 | End: 2019-01-06 | Stop reason: HOSPADM

## 2019-01-04 RX ORDER — SODIUM CHLORIDE 9 MG/ML
INJECTION, SOLUTION INTRAVENOUS CONTINUOUS
Status: DISCONTINUED | OUTPATIENT
Start: 2019-01-04 | End: 2019-01-06

## 2019-01-04 RX ORDER — CELECOXIB 200 MG/1
200 CAPSULE ORAL 2 TIMES DAILY
Status: ON HOLD | COMMUNITY
End: 2019-01-05

## 2019-01-04 RX ORDER — ESOMEPRAZOLE MAGNESIUM 40 MG/1
40 CAPSULE, DELAYED RELEASE ORAL DAILY
Status: ON HOLD | COMMUNITY
End: 2020-02-12

## 2019-01-04 RX ORDER — PANTOPRAZOLE SODIUM 20 MG/1
20 TABLET, DELAYED RELEASE ORAL DAILY
Status: DISCONTINUED | OUTPATIENT
Start: 2019-01-04 | End: 2019-01-06 | Stop reason: HOSPADM

## 2019-01-04 RX ORDER — DEXTROSE MONOHYDRATE 25 G/50ML
25 INJECTION, SOLUTION INTRAVENOUS ONCE
Status: DISCONTINUED | OUTPATIENT
Start: 2019-01-04 | End: 2019-01-04

## 2019-01-04 RX ORDER — ACETAMINOPHEN 325 MG/1
650 TABLET ORAL EVERY 4 HOURS PRN
Status: DISCONTINUED | OUTPATIENT
Start: 2019-01-04 | End: 2019-01-06 | Stop reason: HOSPADM

## 2019-01-04 RX ORDER — ONDANSETRON 4 MG/1
4 TABLET, ORALLY DISINTEGRATING ORAL EVERY 6 HOURS PRN
Status: DISCONTINUED | OUTPATIENT
Start: 2019-01-04 | End: 2019-01-06 | Stop reason: HOSPADM

## 2019-01-04 RX ORDER — PROCHLORPERAZINE MALEATE 5 MG
10 TABLET ORAL EVERY 6 HOURS PRN
Status: DISCONTINUED | OUTPATIENT
Start: 2019-01-04 | End: 2019-01-06 | Stop reason: HOSPADM

## 2019-01-04 RX ORDER — IBUPROFEN 200 MG
800 TABLET ORAL 3 TIMES DAILY PRN
Status: ON HOLD | COMMUNITY
End: 2019-01-05

## 2019-01-04 RX ORDER — NALOXONE HYDROCHLORIDE 0.4 MG/ML
.1-.4 INJECTION, SOLUTION INTRAMUSCULAR; INTRAVENOUS; SUBCUTANEOUS
Status: DISCONTINUED | OUTPATIENT
Start: 2019-01-04 | End: 2019-01-06 | Stop reason: HOSPADM

## 2019-01-04 RX ORDER — ONDANSETRON 2 MG/ML
4 INJECTION INTRAMUSCULAR; INTRAVENOUS EVERY 6 HOURS PRN
Status: DISCONTINUED | OUTPATIENT
Start: 2019-01-04 | End: 2019-01-06 | Stop reason: HOSPADM

## 2019-01-04 RX ORDER — PROCHLORPERAZINE 25 MG
25 SUPPOSITORY, RECTAL RECTAL EVERY 12 HOURS PRN
Status: DISCONTINUED | OUTPATIENT
Start: 2019-01-04 | End: 2019-01-06 | Stop reason: HOSPADM

## 2019-01-04 RX ORDER — SODIUM POLYSTYRENE SULFONATE 15 G/60ML
15 SUSPENSION ORAL; RECTAL ONCE
Status: COMPLETED | OUTPATIENT
Start: 2019-01-04 | End: 2019-01-04

## 2019-01-04 RX ORDER — LANOLIN ALCOHOL/MO/W.PET/CERES
3 CREAM (GRAM) TOPICAL
Status: DISCONTINUED | OUTPATIENT
Start: 2019-01-04 | End: 2019-01-06 | Stop reason: HOSPADM

## 2019-01-04 RX ORDER — BISACODYL 10 MG
10 SUPPOSITORY, RECTAL RECTAL DAILY PRN
Status: DISCONTINUED | OUTPATIENT
Start: 2019-01-04 | End: 2019-01-06 | Stop reason: HOSPADM

## 2019-01-04 RX ORDER — SODIUM POLYSTYRENE SULFONATE 15 G/60ML
15 SUSPENSION ORAL; RECTAL ONCE
Status: DISCONTINUED | OUTPATIENT
Start: 2019-01-04 | End: 2019-01-04

## 2019-01-04 RX ORDER — POLYETHYLENE GLYCOL 3350 17 G/17G
17 POWDER, FOR SOLUTION ORAL 2 TIMES DAILY PRN
Status: DISCONTINUED | OUTPATIENT
Start: 2019-01-04 | End: 2019-01-06 | Stop reason: HOSPADM

## 2019-01-04 RX ORDER — DEXTROSE MONOHYDRATE 25 G/50ML
25-50 INJECTION, SOLUTION INTRAVENOUS
Status: DISCONTINUED | OUTPATIENT
Start: 2019-01-04 | End: 2019-01-04

## 2019-01-04 RX ORDER — ALLOPURINOL 300 MG/1
300 TABLET ORAL DAILY
Status: DISCONTINUED | OUTPATIENT
Start: 2019-01-04 | End: 2019-01-06 | Stop reason: HOSPADM

## 2019-01-04 RX ORDER — AMOXICILLIN 250 MG
1 CAPSULE ORAL 2 TIMES DAILY PRN
Status: DISCONTINUED | OUTPATIENT
Start: 2019-01-04 | End: 2019-01-06 | Stop reason: HOSPADM

## 2019-01-04 RX ADMIN — PANTOPRAZOLE SODIUM 20 MG: 20 TABLET, DELAYED RELEASE ORAL at 20:13

## 2019-01-04 RX ADMIN — ACETAMINOPHEN 650 MG: 325 TABLET, FILM COATED ORAL at 20:13

## 2019-01-04 RX ADMIN — CALCIUM GLUCONATE 1 G: 98 INJECTION, SOLUTION INTRAVENOUS at 16:56

## 2019-01-04 RX ADMIN — SODIUM CHLORIDE 1000 ML: 9 INJECTION, SOLUTION INTRAVENOUS at 14:48

## 2019-01-04 RX ADMIN — SODIUM POLYSTYRENE SULFONATE 15 G: 15 SUSPENSION ORAL; RECTAL at 18:30

## 2019-01-04 RX ADMIN — SODIUM CHLORIDE: 9 INJECTION, SOLUTION INTRAVENOUS at 19:37

## 2019-01-04 RX ADMIN — ALLOPURINOL 300 MG: 300 TABLET ORAL at 20:13

## 2019-01-04 ASSESSMENT — ENCOUNTER SYMPTOMS
ABDOMINAL PAIN: 1
FEVER: 0
CHILLS: 0
DIARRHEA: 0
VOMITING: 1
NAUSEA: 1
SHORTNESS OF BREATH: 0

## 2019-01-04 ASSESSMENT — MIFFLIN-ST. JEOR
SCORE: 2058.84
SCORE: 2115.09

## 2019-01-04 ASSESSMENT — ACTIVITIES OF DAILY LIVING (ADL): ADLS_ACUITY_SCORE: 15

## 2019-01-04 NOTE — ED NOTES
[facility]  ED Nurse Handoff Report    Sp Plasencia is a 43 year old male   ED Chief complaint: Abnormal Labs  . ED Diagnosis:   Final diagnoses:   Hyponatremia   Hyperkalemia     Allergies: No Known Allergies    Code Status: Full Code  Activity level - Baseline/Home:  Independent. Activity Level - Current:   Independent. Lift room needed: No. Bariatric: No   Needed: No   Isolation: No. Infection: Not Applicable.     Vital Signs:   Vitals:    01/04/19 1415 01/04/19 1450 01/04/19 1500 01/04/19 1550   BP: 113/56 113/77 119/77 108/59   Pulse: 103  98    Resp:    11   Temp:       TempSrc:       SpO2: 95% 96% 95% 98%   Weight:       Height:           Cardiac Rhythm:  ,      Pain level: 0-10 Pain Scale: 0  Patient confused: No. Patient Falls Risk: Yes.   Elimination Status: Has voided   Patient Report - Initial ComplaintPatient started on hydrochlorothiazide about 2 weeks ago to help with leg swelling and hypertension.  Seen at clinic today and sodium was 120 and potassium was 6.3.       Sent for fluids to replace sodium.       ABCs intact.  Alert and oriented x 3.    Patient has lost 30 lbs stated in last two weeks since being put on Hydrochlorothiazide, and was stopped today due to weight loss by primary MD    . Focused Assessment:  Cardiac - Cardiac WDL: -WDL except (Patient presents with stated dizziness when walking back to room, denies SOB, denies Chest pain, CMS intact in BL LE & UE, states he does not feel like his heart is racing, states he has felt a little dizzy and weak for the last few days to include currently, up to walk well on his own, no edema in LE BL  Tests Performed: Labs, EKG. Abnormal Results:   Abnormal Labs Reviewed   CBC WITH PLATELETS DIFFERENTIAL - Abnormal; Notable for the following components:       Result Value    WBC 11.1 (*)     Absolute Neutrophil 8.6 (*)     All other components within normal limits   BASIC METABOLIC PANEL - Abnormal; Notable for the following  components:    Sodium 120 (*)     Potassium 5.4 (*)     Chloride 87 (*)     Glucose 101 (*)     Urea Nitrogen 34 (*)     Creatinine 1.41 (*)     All other components within normal limits   HEPATIC PANEL - Abnormal; Notable for the following components:    Bilirubin Direct 0.6 (*)     Bilirubin Total 1.4 (*)     Protein Total 9.0 (*)     ALT 88 (*)      (*)     All other components within normal limits   ISTAT BASIC MET ICA HCT POCT - Abnormal; Notable for the following components:    Sodium 121 (*)     Potassium 5.6 (*)     Chloride 87 (*)     Glucose 108 (*)     Urea Nitrogen 34 (*)     Creatinine 1.6 (*)     GFR Estimate 48 (*)     GFR Estimate If Black 58 (*)     Hemoglobin 18.0 (*)     All other components within normal limits   .   Treatments provided: Fluid  Family Comments: Wife at bedside and will be present up Admission  OBS brochure/video discussed/provided to patient:  No  ED Medications:   Medications   glucose gel 15-30 g (not administered)     Or   dextrose 50 % injection 25-50 mL (not administered)     Or   glucagon injection 1 mg (not administered)   dextrose 50 % injection 25 g (not administered)   insulin (regular) (HumuLIN R/NovoLIN R) injection 10 Units (not administered)   0.9% sodium chloride BOLUS (1,000 mLs Intravenous New Bag 1/4/19 9319)     Drips infusing:  No  For the majority of the shift, the patient's behavior Yellow. Interventions performed were Reassure patient why he need to stay, explained situation. Doing well, just does not want to be here he stated.     Severe Sepsis OR Septic Shock Diagnosis Present: No      ED Nurse Name/Phone Number: Clinton Salcedo,   3:57 PM  RECEIVING UNIT ED HANDOFF REVIEW    Above ED Nurse Handoff Report was reviewed: Yes  Reviewed by: Marli Tracey on January 4, 2019 at 6:57 PM

## 2019-01-04 NOTE — ED TRIAGE NOTES
Patient started on hydrochlorothiazide about 2 weeks ago to help with leg swelling and hypertension.  Seen at clinic today and sodium was 120 and potassium was 6.3.      Sent for fluids to replace sodium.      ABCs intact.  Alert and oriented x 3.

## 2019-01-04 NOTE — H&P
[facility]  Hospitalist H&P    Name: Sp Plasencia      MRN: [id number]  YOB: 1975    Age: 43 year old  Date of admission: 1/4/2019  Primary care provider: [facility]            Assessment and Plan:   Sp Plasencia is a 43 year old male with a history of hypertension, gout, GERD, muscular dystrophy who presents with hyponatremia, hyperkalemia, and acute kidney injury related to recent adjustment in antihypertensive regimen.    1.  Acute kidney injury: Was given 1 L of IV fluids in the emergency department.  We will continue normal saline at 75 cc/h.  Recheck basic metabolic panel in the morning.  Avoid further nephrotoxic agents.  This is likely related to hydrochlorothiazide and lisinopril combination with ongoing NSAID use.    2.  Hyperkalemia: Likely related to lisinopril and acute kidney injury.  Repeat potassium was 6.0 and appears to be trending up.  He was given calcium intravenously in the emergency department for stabilization of the cardiac membrane.  EKG does show peaked T waves but no other concerning abnormalities.  We will give him 15 g of Kayexalate now.  We will recheck potassium later tonight to ensure its trending in the proper direction.    3.  Hypovolemic hyponatremia: Not symptomatic but I think we need to treat this is chronic given the duration of his antihypertensive use.  Again we will start normal saline 75 cc/h and check sodium levels every 4 hours.  The goal will be raising sodium by no more than 1 mmol/L every 2 hours.    4.  Hypertension: Blood pressure is currently stable.  He developed previous lower extremity edema related to amlodipine.  I would not provide diuretics given his current metabolic issues with 25 pound weight loss and ACE inhibitors might also be relatively contraindicated.  We will not provide any antihypertensive medications overnight until his labs have stabilized.    Code status: Full.  Admit to inpatient  "status.  Prophylaxis: PCD's.  Disposition: Home 1-2 days.    Addendum: Discussed with pharmacy, appears the patient had been taking amlodipine, hydrochlorothiazide/triamterene, and lisinopril the past 2 weeks.            Chief Complaint:   Lab abnormalities.         History of Present Illness:   Sp Plasencia is a 43 year old male who presents with lab abnormalities.  History is obtained from discussion with the patient at the bedside.  I also discussed the case with the ED provider.  The EMR was also reviewed.    The patient developed lower extremity edema which was thought related to amlodipine to this medication was discontinued about 2 weeks ago.  Evidently he was started on hydrochlorothiazide and possibly lisinopril as well.  He is not exactly clear about the other antihypertensive medication that he has been taking but reported taking a \"combination pill\".  He returned to clinic today for follow-up lab values.  He was noted to have profound hyponatremia and hyperkalemia with acute kidney injury and was directed to the emergency department for evaluation.  He furthermore reports 25 pound weight loss.  He otherwise has no other complaints at this time.            Past Medical History:     Past Medical History:   Diagnosis Date     Acid reflux      HTN (hypertension)      Muscular dystrophy              Past Surgical History:   No past surgical history on file.          Social History:     Social History     Tobacco Use     Smoking status: Current Some Day Smoker     Packs/day: 1.00     Types: Cigarettes     Smokeless tobacco: Never Used   Substance Use Topics     Alcohol use: Not on file             Family History:   The family history was fully reviewed and non-contributory in this case.         Allergies:   No Known Allergies          Medications:     Prior to Admission medications    Medication Sig Last Dose Taking? Auth Provider   allopurinol (ZYLOPRIM) 300 MG tablet Take 300 mg by mouth daily   Reported, " "Patient   amitriptyline (ELAVIL) 10 MG tablet Take 1 tablet at bedtime x 1 week, then 2 tablets at bedtime x 1 week, then 3 tablets at bedtime. For pain   Maurilio Woodson MD   amLODIPine (NORVASC) 5 MG tablet Take 5 mg by mouth daily   Reported, Patient   FISH OIL daily   Reported, Patient   ibuprofen (ADVIL) 200 MG tablet Take 200 mg by mouth every 4 hours as needed   Reported, Patient   indomethacin (INDOCIN) 50 MG capsule Take by mouth 2 times daily (with meals)   Reported, Patient   lisinopril (PRINIVIL,ZESTRIL) 40 MG tablet Take 40 mg by mouth daily   Reported, Patient   Multiple Vitamin (DAILY MULTIVITAMIN PO) Take by mouth daily   Reported, Patient   omeprazole 20 MG tablet Take 20 mg by mouth 2 times daily   Reported, Patient   TRAMADOL HCL 50 mg   Reported, Patient             Review of Systems:   A Comprehensive greater than 10 system review of systems was carried out.  Pertinent positives and negatives are noted above.  Otherwise negative for contributory information.           Physical Exam:   Blood pressure 105/60, pulse 96, temperature 97  F (36.1  C), temperature source Temporal, resp. rate 18, height 1.854 m (6' 1\"), weight 116.1 kg (256 lb), SpO2 94 %.  Wt Readings from Last 1 Encounters:   01/04/19 116.1 kg (256 lb)     Exam:  GENERAL: No apparent distress. Awake, alert, and fully oriented.  HEENT: Normocephalic, atraumatic. Extraocular movements intact.  CARDIOVASCULAR: Regular rate and rhythm without murmurs or rubs. No S3.  PULMONARY: Clear to auscultation bilaterally.  ABDOMINAL: Soft, non-tender, non-distended. Bowel sounds normoactive.   EXTREMITIES: No cyanosis or clubbing. No appreciable edema.  NEUROLOGICAL: CN 2-12 grossly intact, no focal neurological deficits.  DERMATOLOGICAL: No rash, ulcer, bruising, nor jaundice.          Data:   EKG:  Personally reviewed.   Rate 103 bpm. NY interval 140. QRS duration 80. QT/QTc 324/424. P-R-T axes 57 0 44. Sinus tachycardia. Otherwise " normal ECG. No significant change compared to ECG dated earlier today 1/4/19.    Laboratory:  Recent Labs   Lab 01/04/19  1614 01/04/19  1428 01/04/19  1415   WBC  --   --  11.1*   HGB 15.3 18.0* 16.1   HCT  --   --  47.0   MCV  --   --  91   PLT  --   --  217     Recent Labs   Lab 01/04/19  1652 01/04/19  1614 01/04/19  1428 01/04/19  1415   NA  --  121* 121* 120*   POTASSIUM 6.0* 6.6* 5.6* 5.4*   CHLORIDE  --  87* 87* 87*   CO2  --   --   --  23   ANIONGAP  --  8 11 10   GLC  --  104* 108* 101*   BUN  --  33* 34* 34*   CR  --   --   --  1.41*   GFRESTIMATED  --  51* 48* 61   GFRESTBLACK  --  62 58* 70   JOSE CARLOS  --   --   --  9.7     No results for input(s): CULT in the last 168 hours.    Imaging:  No results found for this or any previous visit (from the past 24 hour(s)).    Arnie Bazzi DO MPH  [facility] Hospitalist  [address] [place] [address]  Pager: [phone]  01/04/2019

## 2019-01-04 NOTE — ED PROVIDER NOTES
History     Chief Complaint:  Abnormal Labs    HPI   Sp Plasencia is a 43 year old male who presents with abnormal labs. A few months ago, the patient reports his primary care physician doubled his Amlodipine dose, but he started experiencing significant leg swelling. Thus, about two weeks ago, the patient's primary care physician switched him to hydrochlorothiazide. Since then, the patient reports he has lost about 30 pounds of water weight. Today, he notes he went in to his primary care physician for a routine follow-up where he had some lab-work done and was found to have a critically low sodium value, so his physician sent him to the ED for further evaluation. Here in the ED, he denies any chest pain, shortness of breath, diarrhea, or other acute symptoms. Of note, the patient has also been experiencing abdominal pain, poor appetite, and vomiting for months now. He notes he last vomited yesterday and states this was mostly acid as it typically is. The patient has undergone an extensive work-up in the past month, findings below. The patient also reports his Advil was switched to Celebrex at the same time he was started on hydrochlorothiazide.    12/6/18 Laboratory  BNP: 153    12/10/18 Laboratory  CBC: WBC 10.7, HGB 15.2,   CMP: Sodium 142, Potassium 3.5, Chloride 100, Ionized calcium 1.11, CO2 27, Glucose 111, BUN 6, Creatinine 0.5, Total protein 8.2, Albumin 4.7, Total bilirubin 1.0 (H), Direct bilirubin 0.8 (H),  (H), ALT 69, Alkaline phosphatase 148  TSH with reflex: 2.1    12/17/18 US Bilateral Lower Extremity:  Normal bilateral lower extremity venous ultrasound, no sign of deep vein thrombus.  Reading per radiology.    12/17/18 US Abdomen Limited  1. Hepatomegaly and fatty infiltration of the liver.  2. Small volume right upper quadrant ascites.  3. Mild gallbladder wall thickening without calculi. This can be seen in the setting of ascites.    12/17/18 Echocardiogram  1. Normal LV size,  "borderline wall thickness, normal global systolic function with an estimated EF of 60-65%.  2. Grade 1 pattern of LV diastolic filling.  3. Right ventricular cavity size is normal, global systolic RV function is normal.    1/4/19 Laboratory  BMP: Sodium 120 (LL), Potassium 6.3 (HH), Chloride 87 (L), BUN 34 (H), Creatinine 1.5 (H)     Allergies:  No known drug allergies    Medications:    Allopurinol  Amitriptyline  Celebrex  Indomethacin  Lisinopril  Omeprazole  Tramadol  Hydrochlorothiazide    Past Medical History:    Acid reflux  Hypertension  Facioscapulohumeral muscular dystrophy    Past Surgical History:    History reviewed. No pertinent surgical history.    Family History:    Hypertension  Non-Hodgkin's lymphoma    Social History:  Smoking status: Yes, 1 pack per day  PCP: [facility]  Presents to the ED with his spouse  Marital Status:  [2]     Review of Systems   Constitutional: Negative for chills and fever.   Respiratory: Negative for shortness of breath.    Cardiovascular: Negative for chest pain and leg swelling.   Gastrointestinal: Positive for abdominal pain, nausea and vomiting. Negative for diarrhea.   All other systems reviewed and are negative.    Physical Exam     Patient Vitals for the past 24 hrs:   BP Temp Temp src Pulse Heart Rate Resp SpO2 Height Weight   01/04/19 1550 108/59 -- -- -- 100 11 98 % -- --   01/04/19 1500 119/77 -- -- 98 98 -- 95 % -- --   01/04/19 1450 113/77 -- -- -- 96 -- 96 % -- --   01/04/19 1415 113/56 -- -- 103 104 -- 95 % -- --   01/04/19 1346 114/64 97  F (36.1  C) Temporal 109 -- 20 98 % 1.854 m (6' 1\") 116.1 kg (256 lb)     Physical Exam  General: Resting comfortably on the gurney  Eyes:  The pupils are equal and round    Conjunctivae and sclerae are normal  ENT:    Moist mucous membranes  Neck:  Normal range of motion  CV:  Tachycardic rate and rhythm    Skin warm and well perfused   Resp:  Lungs are clear    Non-labored    No " rales    No wheezing   GI:  Abdomen is soft, there is no rigidity    No distension    No rebound tenderness     No abdominal tenderness  MS:  No leg swelling  Skin:  No rash or acute skin lesions noted  Neuro:   Awake, alert.      Speech is normal and fluent.    Face is symmetric.     Moves all extremities equally  Psych: Normal affect.  Appropriate interactions.    Emergency Department Course   ECG (14:09:05):  Rate 103 bpm. VA interval 140. QRS duration 80. QT/QTc 324/424. P-R-T axes 57 0 44. Sinus tachycardia. Otherwise normal ECG. No significant change compared to ECG dated earlier today 1/4/19. Interpreted at 1505 by Itzel Odell MD.    Laboratory:  ISTAT [facility] (1431): Sodium 121 (L), potassium 5.6 (H), chloride 87 (L), glucose 108 (H), BUN 34 (H), Creatinine 1.6 (H), GFR 48 (L), HGB 18.0 (H), o/w WNL  BMP (1513): Sodium 120 (L), potassium 5.4 (H), chloride 87 (L), glucose 101 (H), BUN 34 (H), Creatinine 1.41 (H), o/w WNL   Hepatic panel: Bilirubin direct 0.6 (H), bilirubin total 1.4 (H), albumin 4.1, protein total 9.0 (H), alkaline phosphatase 88 (H),  (H)  ISTAT [facility] (1618): Sodium 121 (L), potassium 6.6 (HH), chloride 87 (L), glucose 104 (H), BUN 33 (H), Creatinine 1.5 (H), GFR 51 (L), o/w WNL  Potassium (1652): 6.0  CBC: WBC 11.1 (H), o/w WNL (HGB 16.1, )  UA: Mucous present, o/w negative    Interventions:  1448: NS 1L IV Bolus  1656: 1 g Calcium gluconate in 100 mL D5W IV    Emergency Department Course:  Past medical records, nursing notes, and vitals reviewed.  1442: I performed an exam of the patient and obtained history, as documented above.  ECG performed, results above.  IV inserted and blood drawn. UA performed, results above.    1540: I rechecked the patient. Explained findings to the patient and his spouse.    1638: I spoke to Dr. Bazzi of the hospitalist service who accepts the patient for admission.     Findings and plan explained to the patient who consents to admission.  Discussed the patient with Dr. Bazzi, who will admit the patient to a medical bed with telemetry for further monitoring, evaluation, and treatment.     Impression & Plan    Medical Decision Making:  Sp Plasencia is a 43 year old male who presents to the ED for evaluation of abnormal labs. Labs repeated and still abnormal confirming clinic labs. Has hyponatremia and hyperkalemia. Likely hypovolemic causing ASHWINI and hyponatremia and given significant weight loss in last 2 weeks after starting on hydrochlorothiazide. Given IV fluids. Repeated potassium in ED and increased to 6.0 on lab potassium so given calcium gluconate. Hospitalist will admit and recommended kayexelate if potassium 6 or higher which it was so this ordered. Has chronic elevation of LFTs and already had abdominal US in last few weeks. No abdominal pain on exam, did not think repeat US indicated at this time.    Diagnosis:    ICD-10-CM   1. Hyponatremia E87.1   2. Hyperkalemia E87.5     Disposition: Admitted to medicine with telemetry    Deepti Lord  1/4/2019   [facility] EMERGENCY DEPARTMENT    I, Deepti Lord, am serving as a scribe at 2:22 PM on 1/4/2019 to document services personally performed by Itzel Odell MD based on my observations and the provider's statements to me.      Itzel Odell MD  01/04/19 1535

## 2019-01-04 NOTE — PHARMACY-ADMISSION MEDICATION HISTORY
Admission medication history interview status for this patient is complete. See [facility] admission navigator for allergy information, prior to admission medications and immunization status.     Medication history interview source(s):Patient  Medication history resources (including written lists, pill bottles, clinic record): Called [facility]  Primary pharmacy: [facility] [address]     Changes made to PTA medication list:  Added: Celebrex, Nexium  Deleted: amitriptyline, fish oil, indomethacin, omeprazole, tramadol  Changed: ibuprofen dose increased to 800 mg per pt    Actions taken by pharmacist (provider contacted, etc):None     Additional medication history information:   As per pt interview, there are multiple acute issues regarding home meds;  ---> Wants to stop Celebrex and resume taking high-dose ibuprofen (both listed on med list currently)  ---> Recently stopped triamterene-hydrochlorothiazide 37.5-25 mg, per his doctor's advice after untoward effect  ---> Last took amlodipine yesterday, but stated will stop this also [did NOT remove from list yet]  ---> Recently got new Rx for pantoprazole 40 mg daily, but stated since this did not work properly, he stopped this and changed to OTC Nexium. Of note, has also been prescribed omeprazole and lansoprazole in the past.      Medication reconciliation/reorder completed by provider prior to medication history? No    Do you take OTC medications (eg tylenol, ibuprofen, fish oil, eye/ear drops, etc)? Y(Y/N)    For patients on insulin therapy: N (Y/N)    Prior to Admission medications    Medication Sig Last Dose Taking? Auth Provider   allopurinol (ZYLOPRIM) 300 MG tablet Take 300 mg by mouth daily 1/3/2019 at Unknown time Yes Reported, Patient   amLODIPine (NORVASC) 5 MG tablet Take 5 mg by mouth daily 1/3/2019 at Unknown time Yes Reported, Patient   celecoxib (CELEBREX) 200 MG capsule Take 200 mg by mouth 2 times daily 1/3/2019 at Unknown time Yes Unknown, Entered By  History   esomeprazole (NEXIUM) 20 MG DR capsule Take 20 mg by mouth daily Take 30-60 minutes before eating. 1/3/2019 at Unknown time Yes Unknown, Entered By History   ibuprofen (ADVIL/MOTRIN) 200 MG tablet Take 800 mg by mouth 3 times daily as needed 1/3/2019 at Unknown time Yes Unknown, Entered By History   lisinopril (PRINIVIL,ZESTRIL) 40 MG tablet Take 40 mg by mouth daily 1/3/2019 at Unknown time Yes Reported, Patient   Multiple Vitamin (DAILY MULTIVITAMIN PO) Take by mouth daily Past Week at Unknown time Yes Reported, Patient

## 2019-01-05 ENCOUNTER — APPOINTMENT (OUTPATIENT)
Dept: ULTRASOUND IMAGING | Facility: CLINIC | Age: 44
DRG: 683 | End: 2019-01-05
Payer: COMMERCIAL

## 2019-01-05 LAB
ANION GAP SERPL CALCULATED.3IONS-SCNC: 10 MMOL/L (ref 3–14)
ANION GAP SERPL CALCULATED.3IONS-SCNC: 8 MMOL/L (ref 3–14)
ANION GAP SERPL CALCULATED.3IONS-SCNC: 9 MMOL/L (ref 3–14)
ANION GAP SERPL CALCULATED.3IONS-SCNC: 9 MMOL/L (ref 3–14)
BUN SERPL-MCNC: 35 MG/DL (ref 7–30)
BUN SERPL-MCNC: 36 MG/DL (ref 7–30)
CALCIUM SERPL-MCNC: 8.8 MG/DL (ref 8.5–10.1)
CALCIUM SERPL-MCNC: 8.8 MG/DL (ref 8.5–10.1)
CALCIUM SERPL-MCNC: 9 MG/DL (ref 8.5–10.1)
CALCIUM SERPL-MCNC: 9 MG/DL (ref 8.5–10.1)
CHLORIDE SERPL-SCNC: 90 MMOL/L (ref 94–109)
CHLORIDE SERPL-SCNC: 92 MMOL/L (ref 94–109)
CHLORIDE SERPL-SCNC: 94 MMOL/L (ref 94–109)
CHLORIDE SERPL-SCNC: 97 MMOL/L (ref 94–109)
CO2 SERPL-SCNC: 22 MMOL/L (ref 20–32)
CO2 SERPL-SCNC: 22 MMOL/L (ref 20–32)
CO2 SERPL-SCNC: 23 MMOL/L (ref 20–32)
CO2 SERPL-SCNC: 24 MMOL/L (ref 20–32)
CREAT SERPL-MCNC: 1.15 MG/DL (ref 0.66–1.25)
CREAT SERPL-MCNC: 1.22 MG/DL (ref 0.66–1.25)
CREAT SERPL-MCNC: 1.26 MG/DL (ref 0.66–1.25)
CREAT SERPL-MCNC: 1.32 MG/DL (ref 0.66–1.25)
ERYTHROCYTE [DISTWIDTH] IN BLOOD BY AUTOMATED COUNT: 12.9 % (ref 10–15)
GFR SERPL CREATININE-BSD FRML MDRD: 66 ML/MIN/{1.73_M2}
GFR SERPL CREATININE-BSD FRML MDRD: 70 ML/MIN/{1.73_M2}
GFR SERPL CREATININE-BSD FRML MDRD: 72 ML/MIN/{1.73_M2}
GFR SERPL CREATININE-BSD FRML MDRD: 78 ML/MIN/{1.73_M2}
GLUCOSE SERPL-MCNC: 100 MG/DL (ref 70–99)
GLUCOSE SERPL-MCNC: 100 MG/DL (ref 70–99)
GLUCOSE SERPL-MCNC: 109 MG/DL (ref 70–99)
GLUCOSE SERPL-MCNC: 114 MG/DL (ref 70–99)
HCT VFR BLD AUTO: 43.5 % (ref 40–53)
HGB BLD-MCNC: 14.6 G/DL (ref 13.3–17.7)
MCH RBC QN AUTO: 31.3 PG (ref 26.5–33)
MCHC RBC AUTO-ENTMCNC: 33.6 G/DL (ref 31.5–36.5)
MCV RBC AUTO: 93 FL (ref 78–100)
PLATELET # BLD AUTO: 185 10E9/L (ref 150–450)
POTASSIUM SERPL-SCNC: 4.6 MMOL/L (ref 3.4–5.3)
POTASSIUM SERPL-SCNC: 4.8 MMOL/L (ref 3.4–5.3)
POTASSIUM SERPL-SCNC: 5 MMOL/L (ref 3.4–5.3)
POTASSIUM SERPL-SCNC: 5.1 MMOL/L (ref 3.4–5.3)
RBC # BLD AUTO: 4.66 10E12/L (ref 4.4–5.9)
SODIUM SERPL-SCNC: 122 MMOL/L (ref 133–144)
SODIUM SERPL-SCNC: 125 MMOL/L (ref 133–144)
SODIUM SERPL-SCNC: 126 MMOL/L (ref 133–144)
SODIUM SERPL-SCNC: 127 MMOL/L (ref 133–144)
WBC # BLD AUTO: 8.4 10E9/L (ref 4–11)

## 2019-01-05 PROCEDURE — 25000132 ZZH RX MED GY IP 250 OP 250 PS 637: Performed by: HOSPITALIST

## 2019-01-05 PROCEDURE — 99207 ZZC CDG-MDM COMPONENT: MEETS LOW - DOWN CODED: CPT | Performed by: INTERNAL MEDICINE

## 2019-01-05 PROCEDURE — 36415 COLL VENOUS BLD VENIPUNCTURE: CPT | Performed by: INTERNAL MEDICINE

## 2019-01-05 PROCEDURE — 25000125 ZZHC RX 250: Performed by: INTERNAL MEDICINE

## 2019-01-05 PROCEDURE — 36415 COLL VENOUS BLD VENIPUNCTURE: CPT | Performed by: HOSPITALIST

## 2019-01-05 PROCEDURE — 80048 BASIC METABOLIC PNL TOTAL CA: CPT | Performed by: HOSPITALIST

## 2019-01-05 PROCEDURE — 25000128 H RX IP 250 OP 636: Performed by: HOSPITALIST

## 2019-01-05 PROCEDURE — 76770 US EXAM ABDO BACK WALL COMP: CPT

## 2019-01-05 PROCEDURE — 85027 COMPLETE CBC AUTOMATED: CPT | Performed by: HOSPITALIST

## 2019-01-05 PROCEDURE — 12000000 ZZH R&B MED SURG/OB

## 2019-01-05 PROCEDURE — 80048 BASIC METABOLIC PNL TOTAL CA: CPT | Performed by: INTERNAL MEDICINE

## 2019-01-05 PROCEDURE — 99232 SBSQ HOSP IP/OBS MODERATE 35: CPT | Performed by: INTERNAL MEDICINE

## 2019-01-05 PROCEDURE — 25000132 ZZH RX MED GY IP 250 OP 250 PS 637: Performed by: INTERNAL MEDICINE

## 2019-01-05 RX ORDER — SODIUM CHLORIDE 1 G/1
1 TABLET ORAL 2 TIMES DAILY WITH MEALS
Status: DISCONTINUED | OUTPATIENT
Start: 2019-01-05 | End: 2019-01-06

## 2019-01-05 RX ORDER — BACITRACIN ZINC AND POLYMYXIN B SULFATE 500; 1000 [USP'U]/G; [USP'U]/G
OINTMENT TOPICAL DAILY
Status: DISCONTINUED | OUTPATIENT
Start: 2019-01-05 | End: 2019-01-06 | Stop reason: HOSPADM

## 2019-01-05 RX ORDER — ACETAMINOPHEN 325 MG/1
650 TABLET ORAL EVERY 4 HOURS PRN
Start: 2019-01-05 | End: 2019-02-04

## 2019-01-05 RX ORDER — TRAMADOL HYDROCHLORIDE 50 MG/1
50 TABLET ORAL EVERY 12 HOURS PRN
Status: DISCONTINUED | OUTPATIENT
Start: 2019-01-05 | End: 2019-01-06 | Stop reason: HOSPADM

## 2019-01-05 RX ADMIN — MELATONIN TAB 3 MG 3 MG: 3 TAB at 02:09

## 2019-01-05 RX ADMIN — ACETAMINOPHEN 650 MG: 325 TABLET, FILM COATED ORAL at 10:15

## 2019-01-05 RX ADMIN — ACETAMINOPHEN 650 MG: 325 TABLET, FILM COATED ORAL at 00:50

## 2019-01-05 RX ADMIN — TRAMADOL HYDROCHLORIDE 50 MG: 50 TABLET, FILM COATED ORAL at 21:26

## 2019-01-05 RX ADMIN — SODIUM CHLORIDE TAB 1 GM 1 G: 1 TAB at 16:39

## 2019-01-05 RX ADMIN — PANTOPRAZOLE SODIUM 20 MG: 20 TABLET, DELAYED RELEASE ORAL at 08:19

## 2019-01-05 RX ADMIN — ALLOPURINOL 300 MG: 300 TABLET ORAL at 08:19

## 2019-01-05 RX ADMIN — SODIUM CHLORIDE: 9 INJECTION, SOLUTION INTRAVENOUS at 22:56

## 2019-01-05 RX ADMIN — ACETAMINOPHEN 650 MG: 325 TABLET, FILM COATED ORAL at 16:39

## 2019-01-05 RX ADMIN — BACITRACIN ZINC AND POLYMYXIN B SULFATE: 500; 10000 OINTMENT TOPICAL at 18:54

## 2019-01-05 ASSESSMENT — ACTIVITIES OF DAILY LIVING (ADL)
ADLS_ACUITY_SCORE: 13

## 2019-01-05 ASSESSMENT — MIFFLIN-ST. JEOR: SCORE: 2056.12

## 2019-01-05 NOTE — PLAN OF CARE
"/71 (BP Location: Left arm)   Pulse 98   Temp 97.9  F (36.6  C) (Oral)   Resp 16   Ht 1.854 m (6' 1\")   Wt 110.2 kg (243 lb)   SpO2 96%   BMI 32.06 kg/m      Neuro: WDL  Pain: Generalized body aches/pain - tylenol PRN given with relief  Resp: WDL  Cardiac: Tele SR  GI/: Renal US was normal - WDL  Diet: Regular   Skin/Mobility: IND/SBA   Plan: Lab checks q6hr - (last draw) Na 125, K 5.1.  MD spoke with pt about risks of leaving - Pt knows if he left today he would need to sign an AMA form. Will continue w/ POC and continue to monitor.       "

## 2019-01-05 NOTE — PLAN OF CARE
Presentation/Diagnosis:Hyponatremia, hyperkalemia (K 4.1),  every 4 hour checks, ASHWINI  History:HTN, Gout, macular dystrophy.   Vitals:VSS, complained of general body aches and pains 6/10 tylenol given pain resolved.   Telemetry:SR  Respiratory:Wdl   Neuro:Wdl  Skin:WDL   LDAs:PIV right arm   Diet:Regular   Activity:SBA  Teaching:Pt educated on plan of care  Plan:Discharge today or tomorrow depending how sodium levels.

## 2019-01-05 NOTE — PLAN OF CARE
Vitals stable. LS clear on all lobes bilaterally. A&O X4. 02 98% on room air. Assist of 1 to SBA to bathroom. On IV fluid sodium chloride @75ML/hr. Right peripheral IV intact and infusing. Regular diet. K 4.7, . C/O generalized pain. Gave tylenol, but patient states it did not help. Paged for some more pain pill. Left great big toe nail removed today at clinic visit. Dressing to be intact. Patient states dressing should not be removed until tomorrow morning per doctor's instruction. Will continue to monitor.

## 2019-01-05 NOTE — PROGRESS NOTES
[facility]    Hospitalist Progress Note      Assessment & Plan   Sp Plasencia is a 43 year old male who was admitted on 1/4/2019. PMH significant for muscular dystrophy, GERD, gout, and hypertension. Presented to the ER due to abnormal labs from clinic. Patient had recently started hydrochlorothiazide/triamterene in addition to lisinopril for the past 2 weeks. Patient had roughly 20 lb weight loss and was found to have hyponatremia with Na 120 and ASHWINI.   Patient admitted for further evaluation and treatment.     Hyponatremia, hypovolemic  Likely secondary to recent addition of hydrochlorothiazide, which has been held.   Continuing NS at 75 ml/hour at this time and have added salt tabs.   Patient hopeful that he will be able to discharge tomorrow, but will continue monitoring sodium closely overnight and further determine in the morning.     Acute kidney injury  Hyperkalemia  Likely multifactorial secondary to new hydrochlorothiazide / triamterene + lisinopril with NSAID use and dehydration.   Urinalysis unremarkable.  No evidence of hydronephrosis.  Continue IV fluids. Suspect improvement will be slow and patient can likely follow outpatient in coming days for repeat BMP.  K has improved.   Avoid nephrotoxins (including NSAIDs) and renally dose medications.     Hypertension  BP is currently stable on no medications.  Developed edema to amlodipine.  Now avoiding hydrochlorothiazide with hyponatremia.  Avoiding ACEi with ASHWINI.  Will follow blood pressure while inpatient, but currently no requirement in setting of ASHWINI / hypovolemia.     Muscular dystrophy  Chronic pain  PTA NSAIDs discontinued. Tylenol and PRN tramadol at this time.    GERD  Continue PPI.    Gout  Continue allopurinol at PTA dose given improvement in ASHWINI.     Recent toenail removal  Have ordered daily toe soaks in warm water with bacitracin to be applied.       DVT Prophylaxis: SCDs and ambulation  Code Status: Full Code  Disposition:  Hopeful for discharge tomorrow; continue to follow Na and Cr with salt tabs added to regimen.     Aissatou Muller MD FACP  Hospitalist Service  [facility]  Text Page (7am - 6pm)      Interval History   No acute events. Had wanted to be discharged in the morning, however, labs have not improved significantly enough for discharge. Ultimately patient was agreeable with plan to stay overnight.   Does have chronic pain for which he cannot currently receive his typical NSAIDs given ASHWINI.   Have ordered PRN tramadol.   Also ordered daily toe soaks following toenail removal.       -Data reviewed today: I reviewed all new labs over the last 24 hours.        Physical Exam   Temp: 97.9  F (36.6  C) Temp src: Oral BP: 115/71 Pulse: 98 Heart Rate: 89 Resp: 16 SpO2: 96 % O2 Device: None (Room air)    Vitals:    01/04/19 1346 01/04/19 1923 01/05/19 0723   Weight: 116.1 kg (256 lb) 110.5 kg (243 lb 9.6 oz) 110.2 kg (243 lb)     Vital Signs with Ranges  Temp:  [97.9  F (36.6  C)-98.2  F (36.8  C)] 97.9  F (36.6  C)  Pulse:  [90-98] 98  Heart Rate:  [] 89  Resp:  [11-18] 16  BP: (103-125)/(59-80) 115/71  SpO2:  [94 %-98 %] 96 %  I/O last 3 completed shifts:  In: 779 [I.V.:779]  Out: -     Constitutional: Alert and oriented x3. Patient sitting up in bed. No acute distress. Non-toxic. Wife in room with patient later in afternoon.   HEENT: NCAT. EOMI. Moist oral mucosa.  Respiratory: Clear to auscultation bilaterally. No crackles or wheezes.  Cardiovascular: Regular rate and rhythm. No murmur.  GI: Soft, nontender, nondistended.    Musculoskeletal: Left great toe with lateral part of toenail removed 1 day earlier. Dressing removed by patient. Small amount of dried blood. No purulence, drainage, warmth, or erythema noted. Otherwise no gross deformities.   Neurologic: Alert and oriented x3. No focal neurologic deficits.       Medications     sodium chloride 75 mL/hr at 01/05/19 0613       allopurinol  300 mg Oral  Daily     pantoprazole  20 mg Oral Daily       Data   Recent Labs   Lab 01/05/19  1335 01/05/19  0748 01/05/19  0215  01/04/19  1614 01/04/19  1428 01/04/19  1415   WBC  --  8.4  --   --   --   --  11.1*   HGB  --  14.6  --   --  15.3 18.0* 16.1   MCV  --  93  --   --   --   --  91   PLT  --  185  --   --   --   --  217   * 126* 122*   < > 121* 121* 120*   POTASSIUM 5.1 4.6 4.8   < > 6.6* 5.6* 5.4*   CHLORIDE 94 92* 90*   < > 87* 87* 87*   CO2 22 24 23   < >  --   --  23   BUN 35* 36* 35*   < > 33* 34* 34*   CR 1.22 1.32* 1.26*   < >  --   --  1.41*   ANIONGAP 9 10 9   < > 8 11 10   JOSE CARLOS 9.0 8.8 8.8   < >  --   --  9.7   * 100* 109*   < > 104* 108* 101*   ALBUMIN  --   --   --   --   --   --  4.1   PROTTOTAL  --   --   --   --   --   --  9.0*   BILITOTAL  --   --   --   --   --   --  1.4*   ALKPHOS  --   --   --   --   --   --  150   ALT  --   --   --   --   --   --  88*   AST  --   --   --   --   --   --  135*    < > = values in this interval not displayed.       Recent Results (from the past 24 hour(s))   US Renal Complete    Narrative    US RENAL COMPLETE 1/5/2019 9:25 AM    HISTORY:  Acute kidney insufficiency.     COMPARISON: None    FINDINGS: The right kidney measures 12.3 x 5.7 x 5.3  cm with a  cortical thickness of 1.6  cm.  The left kidney measures 13.7 x 4.7 x  7.3  cm with a cortical thickness of 1.4  cm.     The kidneys are normal in appearance for the patient's age.     The bladder is distended and shows no gross abnormality.      Impression    IMPRESSION: Normal renal ultrasound for the patient's age.    FRANK SANTIAGO MD

## 2019-01-06 VITALS
WEIGHT: 244.4 LBS | OXYGEN SATURATION: 98 % | BODY MASS INDEX: 32.39 KG/M2 | HEIGHT: 73 IN | TEMPERATURE: 98.3 F | DIASTOLIC BLOOD PRESSURE: 62 MMHG | SYSTOLIC BLOOD PRESSURE: 126 MMHG | RESPIRATION RATE: 16 BRPM | HEART RATE: 98 BPM

## 2019-01-06 LAB
ANION GAP SERPL CALCULATED.3IONS-SCNC: 6 MMOL/L (ref 3–14)
ANION GAP SERPL CALCULATED.3IONS-SCNC: 6 MMOL/L (ref 3–14)
BUN SERPL-MCNC: 29 MG/DL (ref 7–30)
BUN SERPL-MCNC: 32 MG/DL (ref 7–30)
CALCIUM SERPL-MCNC: 8.5 MG/DL (ref 8.5–10.1)
CALCIUM SERPL-MCNC: 8.5 MG/DL (ref 8.5–10.1)
CHLORIDE SERPL-SCNC: 101 MMOL/L (ref 94–109)
CHLORIDE SERPL-SCNC: 97 MMOL/L (ref 94–109)
CO2 SERPL-SCNC: 26 MMOL/L (ref 20–32)
CO2 SERPL-SCNC: 27 MMOL/L (ref 20–32)
CREAT SERPL-MCNC: 1.01 MG/DL (ref 0.66–1.25)
CREAT SERPL-MCNC: 1.07 MG/DL (ref 0.66–1.25)
GFR SERPL CREATININE-BSD FRML MDRD: 85 ML/MIN/{1.73_M2}
GFR SERPL CREATININE-BSD FRML MDRD: >90 ML/MIN/{1.73_M2}
GLUCOSE SERPL-MCNC: 134 MG/DL (ref 70–99)
GLUCOSE SERPL-MCNC: 96 MG/DL (ref 70–99)
INTERPRETATION ECG - MUSE: NORMAL
POTASSIUM SERPL-SCNC: 4.9 MMOL/L (ref 3.4–5.3)
POTASSIUM SERPL-SCNC: 5 MMOL/L (ref 3.4–5.3)
SODIUM SERPL-SCNC: 129 MMOL/L (ref 133–144)
SODIUM SERPL-SCNC: 134 MMOL/L (ref 133–144)

## 2019-01-06 PROCEDURE — 36415 COLL VENOUS BLD VENIPUNCTURE: CPT | Performed by: HOSPITALIST

## 2019-01-06 PROCEDURE — 25000132 ZZH RX MED GY IP 250 OP 250 PS 637: Performed by: INTERNAL MEDICINE

## 2019-01-06 PROCEDURE — 99239 HOSP IP/OBS DSCHRG MGMT >30: CPT | Performed by: INTERNAL MEDICINE

## 2019-01-06 PROCEDURE — 80048 BASIC METABOLIC PNL TOTAL CA: CPT | Performed by: HOSPITALIST

## 2019-01-06 PROCEDURE — 25000132 ZZH RX MED GY IP 250 OP 250 PS 637: Performed by: HOSPITALIST

## 2019-01-06 RX ORDER — TRAMADOL HYDROCHLORIDE 50 MG/1
50 TABLET ORAL EVERY 12 HOURS PRN
Qty: 14 TABLET | Refills: 0 | Status: SHIPPED | OUTPATIENT
Start: 2019-01-06 | End: 2019-01-09

## 2019-01-06 RX ORDER — BACITRACIN ZINC AND POLYMYXIN B SULFATE 500; 1000 [USP'U]/G; [USP'U]/G
OINTMENT TOPICAL DAILY
Start: 2019-01-07 | End: 2019-01-14

## 2019-01-06 RX ADMIN — MELATONIN TAB 3 MG 3 MG: 3 TAB at 00:54

## 2019-01-06 RX ADMIN — SODIUM CHLORIDE TAB 1 GM 1 G: 1 TAB at 07:59

## 2019-01-06 RX ADMIN — PANTOPRAZOLE SODIUM 20 MG: 20 TABLET, DELAYED RELEASE ORAL at 08:00

## 2019-01-06 RX ADMIN — ALLOPURINOL 300 MG: 300 TABLET ORAL at 08:00

## 2019-01-06 RX ADMIN — ACETAMINOPHEN 650 MG: 325 TABLET, FILM COATED ORAL at 06:06

## 2019-01-06 ASSESSMENT — ACTIVITIES OF DAILY LIVING (ADL)
ADLS_ACUITY_SCORE: 13
ADLS_ACUITY_SCORE: 10
ADLS_ACUITY_SCORE: 10

## 2019-01-06 ASSESSMENT — MIFFLIN-ST. JEOR: SCORE: 2062.47

## 2019-01-06 NOTE — DISCHARGE SUMMARY
[facility]    Discharge Summary  Hospitalist    Date of Admission:  1/4/2019  Date of Discharge:  1/6/2019  Discharging Provider: Aissatou Muller MD  Date of Service (when I saw the patient): 01/06/19    Discharge Diagnoses   Hyponatremia  Acute kidney injury  Hyperkalemia  Hypertension  Muscular dystrophy  Chronic pain  GERD  Gout  Recent toenail removal for ingrown toenail (as outpatient)      History of Present Illness   Sp Plasencia is a 43 year old man who was admitted on 1/4/2019. PMH significant for muscular dystrophy, GERD, gout, and hypertension. Presented to the ER due to abnormal labs from clinic. Patient had recently started hydrochlorothiazide/triamterene in addition to lisinopril for the past 2 weeks. Patient had roughly 20 lb weight loss and was found to have hyponatremia with Na 120 and ASHWINI.   Patient admitted for further evaluation and treatment.     Hospital Course      Hyponatremia  Likely secondary to recent addition of hydrochlorothiazide, which has been discontinued.  Small hypovolemic component in addition to hydrochlorothiazide.   Gave NS at 75 ml/hour during stay.   Also ordered salt tabs on 1/5/19.  Na has appropriately corrected to 134. Will stop corrective therapies at this time and should return to normal with cessation of hydrochlorothiazide.      Acute kidney injury  Hyperkalemia  Likely multifactorial secondary to new hydrochlorothiazide / triamterene + lisinopril with NSAID use and dehydration.   Urinalysis unremarkable.  No evidence of hydronephrosis.  Hydrated with IV fluids. Max Cr 1.6 has improved to 1.01 at discharge, which is still slightly above baseline Cr 0.4 - 0.7.   Continue to hold hydrochlorothiazide / triamterene, lisinopril, and NSAIDs (celebrex and ibuprofen) on discharge.  Patient to follow with PCP in next 4 days for repeat BMP.  Giving patient short-term Rx for PRN tramadol as breakthrough over tylenol while unable to take PTA NSAIDs. PCP to  follow renal function and adjust pain regimen as needed.   K has improved and no events on telemetry. K is still 5.0 at time of discharge, but should continue to improve with renal function.      Hypertension  BP is currently stable on no medications. Patient states this will not continue once regular stressors return. Recommended that he keep log of home BP measurements to take to appointments.  Developed edema to amlodipine and will not continue on discharge.  Avoiding hydrochlorothiazide with hyponatremia.  Avoiding ACEi with ASHWINI.  BPs in past 24 hours have ranged from 115-126 / 62 - 75.   Discharging on no medications and patient should follow with PCP in next 4 days.     Muscular dystrophy  Chronic pain  PTA NSAIDs discontinued. Tylenol and PRN tramadol at this time.  Giving patient short-term Rx for PRN tramadol as breakthrough over tylenol while unable to take PTA NSAIDs. PCP to follow renal function and adjust pain regimen as needed.      GERD  Continue PPI.     Gout  Continue allopurinol at PTA dose given improvement in ASHWINI.      Recent toenail removal for ingrown toenail (as outpatient)  Have ordered daily toe soaks in warm water with bacitracin to be applied. Follow up with primary care as needed. No sign of infection at this time and patient does not need to take cephalexin prescribed by PCP at this time. If any changes, patient should discuss with PCP and consider filling prescription.     Aissatou Muller MD FACP  Hospitalist Service  [facility]      Pending Results   None    Code Status   Full Code       Primary Care Physician   [facility]    Physical Exam   Temp: 98.3  F (36.8  C) Temp src: Oral BP: 126/62   Heart Rate: 87 Resp: 16 SpO2: 98 % O2 Device: None (Room air)    Vitals:    01/04/19 1923 01/05/19 0723 01/06/19 0601   Weight: 110.5 kg (243 lb 9.6 oz) 110.2 kg (243 lb) 110.9 kg (244 lb 6.4 oz)     Vital Signs with Ranges  Temp:  [97.9  F (36.6   C)-98.3  F (36.8  C)] 98.3  F (36.8  C)  Heart Rate:  [85-94] 87  Resp:  [16] 16  BP: (119-126)/(62-75) 126/62  SpO2:  [95 %-98 %] 98 %  I/O last 3 completed shifts:  In: 1457 [P.O.:300; I.V.:1157]  Out: -   Constitutional: Alert and oriented x3. Patient sitting up in bed. No acute distress. Non-toxic.   HEENT: NCAT. EOMI. Moist oral mucosa.  Respiratory: Clear to auscultation bilaterally. No crackles or wheezes.  Cardiovascular: Regular rate and rhythm. No murmur.  Musculoskeletal: Left great toe with lateral part of toenail removed 2 days earlier. Dressing removed by patient. No drainage or dried blood present. Minimal swelling. No warmth. Healing well.    Neurologic: Alert and oriented x3. No focal neurologic deficits.       Discharge Disposition   Discharged to home  Condition at discharge: Stable    Consultations This Hospital Stay   None    Time Spent on this Encounter   I, Aissatou Muller, personally saw the patient today and spent greater than 30 minutes discharging this patient.    Discharge Orders      Reason for your hospital stay    Low sodium and acute kidney injury with high potassium levels.     Follow-up and recommended labs and tests     Follow up with primary care provider, [facility], within 4 days for hospital follow-up. Review medications and blood pressure. Check BMP to follow sodium, potassium, and kidney function. Follow toenail removal as needed. Particularly review kidney function and determine if OK to resume NSAIDs. Have given patient a short-term prescription for tramadol while unable to take NSAIDs.     Activity    Your activity upon discharge: activity as tolerated     Wound care and dressings    Instructions to care for your wound at home: daily warm water soaks, bacitracin, and dressing applications to toe. Follow primary care directions.     Full Code     Diet    Follow this diet upon discharge: Regular Diet     Discharge Medications   Current  Discharge Medication List      START taking these medications    Details   acetaminophen (TYLENOL) 325 MG tablet Take 2 tablets (650 mg) by mouth every 4 hours as needed for pain    Associated Diagnoses: ASHWINI (acute kidney injury) (H)      bacitracin-polymyxin b (POLYSPORIN) [id number] UNIT/GM external ointment Apply topically daily for 7 days . Use tube supplied at discharge.    Associated Diagnoses: Ingrown left greater toenail      traMADol (ULTRAM) 50 MG tablet Take 1 tablet (50 mg) by mouth every 12 hours as needed for breakthrough pain  Qty: 14 tablet, Refills: 0    Associated Diagnoses: ASHWINI (acute kidney injury) (H)         CONTINUE these medications which have NOT CHANGED    Details   allopurinol (ZYLOPRIM) 300 MG tablet Take 300 mg by mouth daily    Associated Diagnoses: FSHD (facioscapulohumeral muscular dystrophy)      esomeprazole (NEXIUM) 20 MG DR capsule Take 20 mg by mouth daily Take 30-60 minutes before eating.      Multiple Vitamin (DAILY MULTIVITAMIN PO) Take by mouth daily    Associated Diagnoses: FSHD (facioscapulohumeral muscular dystrophy)         STOP taking these medications       amLODIPine (NORVASC) 5 MG tablet Comments:   Reason for Stopping:         celecoxib (CELEBREX) 200 MG capsule Comments:   Reason for Stopping:         ibuprofen (ADVIL/MOTRIN) 200 MG tablet Comments:   Reason for Stopping:         lisinopril (PRINIVIL,ZESTRIL) 40 MG tablet Comments:   Reason for Stopping:             Allergies   No Known Allergies  Data   Most Recent 3 CBC's:  Recent Labs   Lab Test 01/05/19  0748 01/04/19  1614 01/04/19  1428 01/04/19  1415 01/08/18  2310   WBC 8.4  --   --  11.1* 11.9*   HGB 14.6 15.3 18.0* 16.1 16.5   MCV 93  --   --  91 92     --   --  217 233      Most Recent 3 BMP's:  Recent Labs   Lab Test 01/06/19  0558 01/06/19  0008 01/05/19  1814    129* 127*   POTASSIUM 5.0 4.9 5.0   CHLORIDE 101 97 97   CO2 27 26 22   BUN 29 32* 35*   CR 1.01 1.07 1.15   ANIONGAP 6 6 8   JOSE CARLOS  8.5 8.5 9.0   GLC 96 134* 100*     Most Recent 2 LFT's:  Recent Labs   Lab Test 01/04/19  1415   *   ALT 88*   ALKPHOS 150   BILITOTAL 1.4*       Most Recent TSH, T4 and A1c Labs:No lab results found.  Results for orders placed or performed during the hospital encounter of 01/04/19   US Renal Complete    Narrative    US RENAL COMPLETE 1/5/2019 9:25 AM    HISTORY:  Acute kidney insufficiency.     COMPARISON: None    FINDINGS: The right kidney measures 12.3 x 5.7 x 5.3  cm with a  cortical thickness of 1.6  cm.  The left kidney measures 13.7 x 4.7 x  7.3  cm with a cortical thickness of 1.4  cm.     The kidneys are normal in appearance for the patient's age.     The bladder is distended and shows no gross abnormality.      Impression    IMPRESSION: Normal renal ultrasound for the patient's age.    FRANK SANTIAGO MD

## 2019-01-06 NOTE — DISCHARGE SUMMARY
A&Ox4. VSS. Went through all discharge paperwork/education/medication material with patient and MD. Pt discharge w/ wife and all belongings.

## 2019-01-06 NOTE — PLAN OF CARE
A&OX4. LS clear to bilateral upper and lower lung fields. SBA to independent in room. Regular diet. Left big toes soaked in warm water and bacitracin applied. Patient tolerated the procedure well. Oxygen 97% on room air. K 5.0 and . C/O generalized pain. Ultram given. Will continue to monitor.

## 2019-01-06 NOTE — PLAN OF CARE
A&Ox4, up ad tip  LDA: PIV infusing  Vitals: stable, RA  Pain: generalized, PRN tylenol x1  Tele: SR w/peaked Ts  Skin: Toe wound CDI  Q6h BMP, watching Na & K trend  Plan: DC home this am  Will continue to monitor

## 2020-01-20 ENCOUNTER — TRANSFERRED RECORDS (OUTPATIENT)
Dept: HEALTH INFORMATION MANAGEMENT | Facility: CLINIC | Age: 45
End: 2020-01-20

## 2020-01-20 LAB — PHQ9 SCORE: 6

## 2020-01-22 ENCOUNTER — TRANSFERRED RECORDS (OUTPATIENT)
Dept: HEALTH INFORMATION MANAGEMENT | Facility: CLINIC | Age: 45
End: 2020-01-22

## 2020-01-27 LAB
ALT SERPL-CCNC: 63 IU/L (ref 0–44)
AST SERPL-CCNC: 176 IU/L (ref 0–40)
CREAT SERPL-MCNC: 0.67 MG/DL (ref 0.76–1.27)
GFR SERPL CREATININE-BSD FRML MDRD: 118 ML/MIN/1.73
GLUCOSE SERPL-MCNC: 95 MG/DL (ref 65–99)
POTASSIUM SERPL-SCNC: 4.3 MMOL/L (ref 3.5–5.2)

## 2020-01-31 ENCOUNTER — TRANSFERRED RECORDS (OUTPATIENT)
Dept: HEALTH INFORMATION MANAGEMENT | Facility: CLINIC | Age: 45
End: 2020-01-31

## 2020-02-05 ENCOUNTER — TRANSFERRED RECORDS (OUTPATIENT)
Dept: HEALTH INFORMATION MANAGEMENT | Facility: CLINIC | Age: 45
End: 2020-02-05

## 2020-02-06 ENCOUNTER — TRANSFERRED RECORDS (OUTPATIENT)
Dept: HEALTH INFORMATION MANAGEMENT | Facility: CLINIC | Age: 45
End: 2020-02-06

## 2020-02-11 ENCOUNTER — APPOINTMENT (OUTPATIENT)
Dept: CT IMAGING | Facility: CLINIC | Age: 45
End: 2020-02-11
Attending: EMERGENCY MEDICINE
Payer: COMMERCIAL

## 2020-02-11 ENCOUNTER — APPOINTMENT (OUTPATIENT)
Dept: ULTRASOUND IMAGING | Facility: CLINIC | Age: 45
End: 2020-02-11
Attending: EMERGENCY MEDICINE
Payer: COMMERCIAL

## 2020-02-11 ENCOUNTER — HOSPITAL ENCOUNTER (OUTPATIENT)
Facility: CLINIC | Age: 45
Setting detail: OBSERVATION
Discharge: HOME | End: 2020-02-12
Attending: EMERGENCY MEDICINE | Admitting: INTERNAL MEDICINE
Payer: COMMERCIAL

## 2020-02-11 DIAGNOSIS — F51.01 PRIMARY INSOMNIA: ICD-10-CM

## 2020-02-11 DIAGNOSIS — E80.6 HYPERBILIRUBINEMIA: ICD-10-CM

## 2020-02-11 DIAGNOSIS — R18.8 OTHER ASCITES: Primary | ICD-10-CM

## 2020-02-11 DIAGNOSIS — G71.02 FSHD (FACIOSCAPULOHUMERAL MUSCULAR DYSTROPHY) (H): ICD-10-CM

## 2020-02-11 DIAGNOSIS — K70.31 ALCOHOLIC CIRRHOSIS OF LIVER WITH ASCITES (H): ICD-10-CM

## 2020-02-11 LAB
ALBUMIN FLD-MCNC: 0.3 G/DL
ALBUMIN SERPL-MCNC: 2.1 G/DL (ref 3.4–5)
ALBUMIN UR-MCNC: 20 MG/DL
ALP SERPL-CCNC: 317 U/L (ref 40–150)
ALT SERPL W P-5'-P-CCNC: 64 U/L (ref 0–70)
AMMONIA PLAS-SCNC: 25 UMOL/L (ref 10–50)
AMYLASE FLD-CCNC: 13 U/L
ANION GAP SERPL CALCULATED.3IONS-SCNC: 7 MMOL/L (ref 3–14)
APAP SERPL-MCNC: <5 UG/ML (ref 10–30)
APAP SERPL-MCNC: NORMAL MG/L (ref 10–20)
APPEARANCE FLD: NORMAL
APPEARANCE UR: ABNORMAL
AST SERPL W P-5'-P-CCNC: 177 U/L (ref 0–45)
BACTERIA #/AREA URNS HPF: ABNORMAL /HPF
BASOPHILS # BLD AUTO: 0.2 10E9/L (ref 0–0.2)
BASOPHILS NFR BLD AUTO: 1.3 %
BILIRUB SERPL-MCNC: 18.9 MG/DL (ref 0.2–1.3)
BILIRUB UR QL STRIP: ABNORMAL
BUN SERPL-MCNC: 18 MG/DL (ref 7–30)
CALCIUM SERPL-MCNC: 9.2 MG/DL (ref 8.5–10.1)
CHLORIDE SERPL-SCNC: 101 MMOL/L (ref 94–109)
CO2 SERPL-SCNC: 25 MMOL/L (ref 20–32)
COLOR FLD: YELLOW
COLOR UR AUTO: ABNORMAL
CREAT SERPL-MCNC: 1.17 MG/DL (ref 0.66–1.25)
DIFFERENTIAL METHOD BLD: ABNORMAL
EOSINOPHIL # BLD AUTO: 0.2 10E9/L (ref 0–0.7)
EOSINOPHIL NFR BLD AUTO: 1.3 %
ERYTHROCYTE [DISTWIDTH] IN BLOOD BY AUTOMATED COUNT: 18.1 % (ref 10–15)
ETHANOL SERPL-MCNC: <0.01 G/DL
GFR SERPL CREATININE-BSD FRML MDRD: 75 ML/MIN/{1.73_M2}
GLUCOSE FLD-MCNC: 104 MG/DL
GLUCOSE SERPL-MCNC: 102 MG/DL (ref 70–99)
GLUCOSE UR STRIP-MCNC: NEGATIVE MG/DL
GRAM STN SPEC: NORMAL
HCT VFR BLD AUTO: 40.5 % (ref 40–53)
HGB BLD-MCNC: 14.3 G/DL (ref 13.3–17.7)
HGB UR QL STRIP: NEGATIVE
HYALINE CASTS #/AREA URNS LPF: 1 /LPF (ref 0–2)
IMM GRANULOCYTES # BLD: 0.1 10E9/L (ref 0–0.4)
IMM GRANULOCYTES NFR BLD: 0.4 %
INR PPP: 1.56 (ref 0.86–1.14)
KETONES UR STRIP-MCNC: ABNORMAL MG/DL
LEUKOCYTE ESTERASE UR QL STRIP: NEGATIVE
LIPASE SERPL-CCNC: 200 U/L (ref 73–393)
LYMPHOCYTES # BLD AUTO: 1.1 10E9/L (ref 0.8–5.3)
LYMPHOCYTES NFR BLD AUTO: 9.2 %
LYMPHOCYTES NFR FLD MANUAL: 55 %
MCH RBC QN AUTO: 32.7 PG (ref 26.5–33)
MCHC RBC AUTO-ENTMCNC: 35.3 G/DL (ref 31.5–36.5)
MCV RBC AUTO: 93 FL (ref 78–100)
MIXED CELL CASTS #/AREA URNS LPF: 4 /LPF
MONOCYTES # BLD AUTO: 0.6 10E9/L (ref 0–1.3)
MONOCYTES NFR BLD AUTO: 4.9 %
MONOS+MACROS NFR FLD MANUAL: 24 %
MUCOUS THREADS #/AREA URNS LPF: PRESENT /LPF
NEUTROPHILS # BLD AUTO: 9.9 10E9/L (ref 1.6–8.3)
NEUTROPHILS NFR BLD AUTO: 82.9 %
NEUTS BAND NFR FLD MANUAL: 9 %
NITRATE UR QL: NEGATIVE
NRBC # BLD AUTO: 0 10*3/UL
NRBC BLD AUTO-RTO: 0 /100
OTHER CELLS FLD MANUAL: 12 %
PH UR STRIP: 6 PH (ref 5–7)
PLATELET # BLD AUTO: 205 10E9/L (ref 150–450)
POTASSIUM SERPL-SCNC: 3.5 MMOL/L (ref 3.4–5.3)
PROT SERPL-MCNC: 7.9 G/DL (ref 6.8–8.8)
RBC # BLD AUTO: 4.37 10E12/L (ref 4.4–5.9)
RBC #/AREA URNS AUTO: 1 /HPF (ref 0–2)
SODIUM SERPL-SCNC: 133 MMOL/L (ref 133–144)
SOURCE: ABNORMAL
SP GR UR STRIP: 1.02 (ref 1–1.03)
SPECIMEN SOURCE FLD: NORMAL
SPECIMEN SOURCE: NORMAL
SQUAMOUS #/AREA URNS AUTO: <1 /HPF (ref 0–1)
UROBILINOGEN UR STRIP-MCNC: 8 MG/DL (ref 0–2)
WBC # BLD AUTO: 11.9 10E9/L (ref 4–11)
WBC # FLD AUTO: 190 /UL
WBC #/AREA URNS AUTO: 5 /HPF (ref 0–5)

## 2020-02-11 PROCEDURE — 96365 THER/PROPH/DIAG IV INF INIT: CPT | Mod: 59

## 2020-02-11 PROCEDURE — G0378 HOSPITAL OBSERVATION PER HR: HCPCS

## 2020-02-11 PROCEDURE — 99220 ZZC INITIAL OBSERVATION CARE,LEVL III: CPT | Performed by: INTERNAL MEDICINE

## 2020-02-11 PROCEDURE — 82945 GLUCOSE OTHER FLUID: CPT | Performed by: EMERGENCY MEDICINE

## 2020-02-11 PROCEDURE — 76700 US EXAM ABDOM COMPLETE: CPT

## 2020-02-11 PROCEDURE — 80299 QUANTITATIVE ASSAY DRUG: CPT | Performed by: EMERGENCY MEDICINE

## 2020-02-11 PROCEDURE — 80320 DRUG SCREEN QUANTALCOHOLS: CPT | Performed by: EMERGENCY MEDICINE

## 2020-02-11 PROCEDURE — 83690 ASSAY OF LIPASE: CPT | Performed by: EMERGENCY MEDICINE

## 2020-02-11 PROCEDURE — P9047 ALBUMIN (HUMAN), 25%, 50ML: HCPCS | Performed by: INTERNAL MEDICINE

## 2020-02-11 PROCEDURE — 87075 CULTR BACTERIA EXCEPT BLOOD: CPT | Mod: XS | Performed by: EMERGENCY MEDICINE

## 2020-02-11 PROCEDURE — 80329 ANALGESICS NON-OPIOID 1 OR 2: CPT | Performed by: EMERGENCY MEDICINE

## 2020-02-11 PROCEDURE — 36415 COLL VENOUS BLD VENIPUNCTURE: CPT | Performed by: EMERGENCY MEDICINE

## 2020-02-11 PROCEDURE — 85025 COMPLETE CBC W/AUTO DIFF WBC: CPT | Performed by: EMERGENCY MEDICINE

## 2020-02-11 PROCEDURE — 87015 SPECIMEN INFECT AGNT CONCNTJ: CPT | Performed by: EMERGENCY MEDICINE

## 2020-02-11 PROCEDURE — 96375 TX/PRO/DX INJ NEW DRUG ADDON: CPT

## 2020-02-11 PROCEDURE — 82042 OTHER SOURCE ALBUMIN QUAN EA: CPT | Performed by: EMERGENCY MEDICINE

## 2020-02-11 PROCEDURE — 99285 EMERGENCY DEPT VISIT HI MDM: CPT | Mod: 25

## 2020-02-11 PROCEDURE — 80053 COMPREHEN METABOLIC PANEL: CPT | Performed by: EMERGENCY MEDICINE

## 2020-02-11 PROCEDURE — 25000128 H RX IP 250 OP 636: Performed by: EMERGENCY MEDICINE

## 2020-02-11 PROCEDURE — 87040 BLOOD CULTURE FOR BACTERIA: CPT | Performed by: EMERGENCY MEDICINE

## 2020-02-11 PROCEDURE — 27210190 US PARACENTESIS

## 2020-02-11 PROCEDURE — 36415 COLL VENOUS BLD VENIPUNCTURE: CPT

## 2020-02-11 PROCEDURE — 85610 PROTHROMBIN TIME: CPT | Performed by: EMERGENCY MEDICINE

## 2020-02-11 PROCEDURE — 74177 CT ABD & PELVIS W/CONTRAST: CPT

## 2020-02-11 PROCEDURE — 87070 CULTURE OTHR SPECIMN AEROBIC: CPT | Mod: XS | Performed by: EMERGENCY MEDICINE

## 2020-02-11 PROCEDURE — 80307 DRUG TEST PRSMV CHEM ANLYZR: CPT | Performed by: EMERGENCY MEDICINE

## 2020-02-11 PROCEDURE — 89051 BODY FLUID CELL COUNT: CPT | Performed by: EMERGENCY MEDICINE

## 2020-02-11 PROCEDURE — 82140 ASSAY OF AMMONIA: CPT | Performed by: EMERGENCY MEDICINE

## 2020-02-11 PROCEDURE — 25000125 ZZHC RX 250: Performed by: EMERGENCY MEDICINE

## 2020-02-11 PROCEDURE — 25000132 ZZH RX MED GY IP 250 OP 250 PS 637: Performed by: INTERNAL MEDICINE

## 2020-02-11 PROCEDURE — 81001 URINALYSIS AUTO W/SCOPE: CPT | Performed by: EMERGENCY MEDICINE

## 2020-02-11 PROCEDURE — 25000128 H RX IP 250 OP 636: Performed by: INTERNAL MEDICINE

## 2020-02-11 PROCEDURE — 82150 ASSAY OF AMYLASE: CPT | Performed by: EMERGENCY MEDICINE

## 2020-02-11 PROCEDURE — 87205 SMEAR GRAM STAIN: CPT | Performed by: EMERGENCY MEDICINE

## 2020-02-11 RX ORDER — HYDROMORPHONE HYDROCHLORIDE 1 MG/ML
0.5 INJECTION, SOLUTION INTRAMUSCULAR; INTRAVENOUS; SUBCUTANEOUS ONCE
Status: COMPLETED | OUTPATIENT
Start: 2020-02-11 | End: 2020-02-11

## 2020-02-11 RX ORDER — ZOLPIDEM TARTRATE 5 MG/1
5 TABLET ORAL
Status: DISCONTINUED | OUTPATIENT
Start: 2020-02-11 | End: 2020-02-12 | Stop reason: HOSPADM

## 2020-02-11 RX ORDER — LISINOPRIL 40 MG/1
40 TABLET ORAL DAILY PRN
Status: ON HOLD | COMMUNITY
End: 2020-04-10

## 2020-02-11 RX ORDER — FUROSEMIDE 20 MG
20 TABLET ORAL DAILY PRN
Status: ON HOLD | COMMUNITY
End: 2020-02-12

## 2020-02-11 RX ORDER — SPIRONOLACTONE 50 MG/1
50 TABLET, FILM COATED ORAL DAILY
Status: ON HOLD | COMMUNITY
End: 2020-02-12

## 2020-02-11 RX ORDER — POLYETHYLENE GLYCOL 3350 17 G/17G
17 POWDER, FOR SOLUTION ORAL DAILY PRN
Status: DISCONTINUED | OUTPATIENT
Start: 2020-02-11 | End: 2020-02-12 | Stop reason: HOSPADM

## 2020-02-11 RX ORDER — ACETAMINOPHEN 650 MG/1
650 SUPPOSITORY RECTAL EVERY 4 HOURS PRN
Status: DISCONTINUED | OUTPATIENT
Start: 2020-02-11 | End: 2020-02-11

## 2020-02-11 RX ORDER — NALOXONE HYDROCHLORIDE 0.4 MG/ML
.1-.4 INJECTION, SOLUTION INTRAMUSCULAR; INTRAVENOUS; SUBCUTANEOUS
Status: DISCONTINUED | OUTPATIENT
Start: 2020-02-11 | End: 2020-02-12 | Stop reason: HOSPADM

## 2020-02-11 RX ORDER — ONDANSETRON 2 MG/ML
4 INJECTION INTRAMUSCULAR; INTRAVENOUS ONCE
Status: COMPLETED | OUTPATIENT
Start: 2020-02-11 | End: 2020-02-11

## 2020-02-11 RX ORDER — FUROSEMIDE 40 MG
40 TABLET ORAL DAILY
Status: DISCONTINUED | OUTPATIENT
Start: 2020-02-11 | End: 2020-02-12 | Stop reason: HOSPADM

## 2020-02-11 RX ORDER — IBUPROFEN 200 MG
400 TABLET ORAL EVERY 6 HOURS PRN
Status: ON HOLD | COMMUNITY
End: 2020-04-10

## 2020-02-11 RX ORDER — ALLOPURINOL 300 MG/1
300 TABLET ORAL DAILY
Status: DISCONTINUED | OUTPATIENT
Start: 2020-02-11 | End: 2020-02-12 | Stop reason: HOSPADM

## 2020-02-11 RX ORDER — CEFTRIAXONE 1 G/1
1 INJECTION, POWDER, FOR SOLUTION INTRAMUSCULAR; INTRAVENOUS ONCE
Status: COMPLETED | OUTPATIENT
Start: 2020-02-11 | End: 2020-02-11

## 2020-02-11 RX ORDER — ZOLPIDEM TARTRATE 5 MG/1
5 TABLET ORAL
Status: DISCONTINUED | OUTPATIENT
Start: 2020-02-11 | End: 2020-02-11

## 2020-02-11 RX ORDER — IOPAMIDOL 755 MG/ML
500 INJECTION, SOLUTION INTRAVASCULAR ONCE
Status: COMPLETED | OUTPATIENT
Start: 2020-02-11 | End: 2020-02-11

## 2020-02-11 RX ORDER — ONDANSETRON 2 MG/ML
4 INJECTION INTRAMUSCULAR; INTRAVENOUS EVERY 6 HOURS PRN
Status: DISCONTINUED | OUTPATIENT
Start: 2020-02-11 | End: 2020-02-12 | Stop reason: HOSPADM

## 2020-02-11 RX ORDER — LIDOCAINE 40 MG/G
CREAM TOPICAL
Status: DISCONTINUED | OUTPATIENT
Start: 2020-02-11 | End: 2020-02-11

## 2020-02-11 RX ORDER — SPIRONOLACTONE 25 MG/1
100 TABLET ORAL DAILY
Status: DISCONTINUED | OUTPATIENT
Start: 2020-02-11 | End: 2020-02-12

## 2020-02-11 RX ORDER — IBUPROFEN 400 MG/1
400 TABLET, FILM COATED ORAL EVERY 6 HOURS PRN
Status: DISCONTINUED | OUTPATIENT
Start: 2020-02-11 | End: 2020-02-12 | Stop reason: HOSPADM

## 2020-02-11 RX ORDER — ONDANSETRON 4 MG/1
4 TABLET, ORALLY DISINTEGRATING ORAL EVERY 6 HOURS PRN
Status: DISCONTINUED | OUTPATIENT
Start: 2020-02-11 | End: 2020-02-12 | Stop reason: HOSPADM

## 2020-02-11 RX ORDER — PANTOPRAZOLE SODIUM 40 MG/1
40 TABLET, DELAYED RELEASE ORAL
Status: DISCONTINUED | OUTPATIENT
Start: 2020-02-11 | End: 2020-02-12 | Stop reason: HOSPADM

## 2020-02-11 RX ORDER — LIDOCAINE HYDROCHLORIDE 10 MG/ML
10 INJECTION, SOLUTION EPIDURAL; INFILTRATION; INTRACAUDAL; PERINEURAL ONCE
Status: DISCONTINUED | OUTPATIENT
Start: 2020-02-11 | End: 2020-02-11

## 2020-02-11 RX ORDER — ACETAMINOPHEN 325 MG/1
650 TABLET ORAL EVERY 4 HOURS PRN
Status: DISCONTINUED | OUTPATIENT
Start: 2020-02-11 | End: 2020-02-11

## 2020-02-11 RX ORDER — ALBUMIN (HUMAN) 12.5 G/50ML
12.5 SOLUTION INTRAVENOUS ONCE
Status: COMPLETED | OUTPATIENT
Start: 2020-02-11 | End: 2020-02-11

## 2020-02-11 RX ADMIN — PANTOPRAZOLE SODIUM 40 MG: 40 TABLET, DELAYED RELEASE ORAL at 18:43

## 2020-02-11 RX ADMIN — ALBUMIN HUMAN 12.5 G: 0.25 SOLUTION INTRAVENOUS at 17:18

## 2020-02-11 RX ADMIN — ALLOPURINOL 300 MG: 300 TABLET ORAL at 18:43

## 2020-02-11 RX ADMIN — SODIUM CHLORIDE 65 ML: 9 INJECTION, SOLUTION INTRAVENOUS at 12:01

## 2020-02-11 RX ADMIN — SPIRONOLACTONE 100 MG: 25 TABLET ORAL at 18:42

## 2020-02-11 RX ADMIN — ONDANSETRON 4 MG: 2 INJECTION INTRAMUSCULAR; INTRAVENOUS at 11:06

## 2020-02-11 RX ADMIN — FUROSEMIDE 40 MG: 40 TABLET ORAL at 18:43

## 2020-02-11 RX ADMIN — TRAMADOL HYDROCHLORIDE 50 MG: 50 TABLET ORAL at 19:33

## 2020-02-11 RX ADMIN — CEFTRIAXONE 1 G: 1 INJECTION, POWDER, FOR SOLUTION INTRAMUSCULAR; INTRAVENOUS at 13:19

## 2020-02-11 RX ADMIN — HYDROMORPHONE HYDROCHLORIDE 0.5 MG: 1 INJECTION, SOLUTION INTRAMUSCULAR; INTRAVENOUS; SUBCUTANEOUS at 11:07

## 2020-02-11 RX ADMIN — IOPAMIDOL 100 ML: 755 INJECTION, SOLUTION INTRAVENOUS at 12:00

## 2020-02-11 RX ADMIN — ZOLPIDEM TARTRATE 5 MG: 5 TABLET, COATED ORAL at 21:37

## 2020-02-11 ASSESSMENT — ENCOUNTER SYMPTOMS
COLOR CHANGE: 1
BLOOD IN STOOL: 0
DYSURIA: 0
FEVER: 0
ABDOMINAL PAIN: 1
ABDOMINAL DISTENTION: 1

## 2020-02-11 ASSESSMENT — MIFFLIN-ST. JEOR
SCORE: 2037.51
SCORE: 2082.87

## 2020-02-11 NOTE — PROGRESS NOTES
Paracentesis complete. Consent obtained with Dr. Merchant.  Pt tolerated well, drained 5800 mls straw colored fluid. Site at RUQ. Site covered with bandage. Reviewed discharge instructions with pt. Pt transferred on cart back to ER. Report called to ER RN.

## 2020-02-11 NOTE — ED PROVIDER NOTES
History     Chief Complaint:  Abdominal Pain       HPI   Sp Plasencia is a 44 year old male who presents with abdominal pain. The patient reports having jaundice for the past 6 weeks and was diagnosed with Cirrhosis as well as esophageal varicies. He states that he has stopped drinking alcohol since he was diagnosed with Cirrhosis.  He states undergoing workup at [facility].   The patient complains of lower abdominal pain and increasing abdominal distension for the past week. He took 4 Advil at home for his pain today without significant improvement. He denies any fever, vomiting, dysuria, black or bloody stools, scrotal swelling, or sick contacts.  No reported tylenol usage, recent travel.    CT abdomen/Pelvis W/ 95CC [id number] 1/22/2020  Impression:   Hepatosplenomegaly with intra-abdominal /intrapelvic ascites and multiple upper abdominal ascites consistent with cirrhosis/portal hypertension.   Diffuse gallbladder wall thickening likely related to chronic liver disease. No calcified gallstones or biliary obstruction.   Innumerable mildly prominent retroperitoneal and mesenteric lymph nodes related to chronic liver disease.   A small amount of ascitic fluid extends into the right inguinal canal.   Diffuse heterogeneity of the hepatic parenchyma related to cirrhosis without intrahepatic mass.   Diffuse fatty infiltration also noted.   Small left pleural effusion   Please note that all CT scans at this facility use dose modulation, iterative reconstruction, and/or weight-based dosing when appropriate to reduce radiation dose to as low as reasonably achievable.   Dictated by Jerrod Frye MD @ Jan 22 2020 12:10PM  Reading per radiology     US/ABD COMP  Impression:   1. Cirrhotic liver with portal hypertension and ascites, including splenomegaly.   2. Diffuse thickening of the gallbladder wall, likely related to chronic liver disease and ascites. No gallstones.   3. Diffuse heterogeneity of the hepatic  1. Perioperative management and restrictions as per the recommendation of the surgeon. 2. The patient is to avoid NSAID's (nonsteroidal anti-inflammatory drugs) aspirin, vitamins, and supplements for the week preceding surgery. 3. All other chronic medications are to be continued unless an alternative plan was advised.       Coricidin HBP "echotexture without intrahepatic mass.   4. Nonvisualization of the pancreas due to overlying bowel gas.   5. Mild prominence of the common bile duct measuring up to 7 mm. Previously, the common bile duct measured 5-6 mm. This is probably within normal limits, although consider HIDA scan for further evaluation as indicated.   Reading per radiology       Allergies:  No Known Allergies     Medications:    allopurinol  esomeprazole     Past Medical History:    Esophageal varices   Acid reflux   HTN (hypertension)   Muscular dystrophy (H)   Gout   Cirrhosis   ETOH abuse     Past Surgical History:    History reviewed. No pertinent surgical history.     Family History:    Hypertension     Social History:  The patient was accompanied to the ED by spouse.  Smoking Status: current smoker  Smokeless Tobacco: Never Used  Alcohol Use: not currently  Drug Use: No  [facility]: [facility]       Review of Systems   Constitutional: Negative for fever.   Gastrointestinal: Positive for abdominal distention and abdominal pain (lower). Negative for blood in stool.   Genitourinary: Negative for dysuria and scrotal swelling.   Skin: Positive for color change (jaundice).   All other systems reviewed and are negative.    Physical Exam     Patient Vitals for the past 24 hrs:   BP Temp Temp src Pulse Heart Rate Resp SpO2 Height Weight   02/11/20 1130 115/62 -- -- 91 -- -- 98 % -- --   02/11/20 1115 124/69 -- -- 93 -- -- 96 % -- --   02/11/20 1100 107/57 -- -- 95 -- -- 100 % -- --   02/11/20 1045 123/76 -- -- -- -- -- -- -- --   02/11/20 1038 119/69 97.5  F (36.4  C) Oral 94 94 16 99 % 1.854 m (6' 1\") 113.4 kg (250 lb)        Physical Exam  Nursing note and vitals reviewed.  Constitutional: Well nourished.   Eyes: Scleral icterus.  Pupils are equal, round, and reactive to light.   ENT: Nose normal. Mucous membranes pink and moist.    Neck: Normal range of motion.  CVS: Normal rate, regular rhythm.  Normal heart sounds.  " No murmur.  Pulmonary: Lungs clear to auscultation bilaterally. No wheezes/rales/rhonchi.  GI: Abdomen distended.  Suprapubic tenderness. No rigidity or guarding.  No CVA tenderness  MSK: No calf tenderness; +1 LE edema  Neuro: Alert. Follows simple commands. No tremor  Skin: Skin is warm and dry. No rash noted.   Psychiatric: Normal affect.       Emergency Department Course     Imaging:  Radiology findings were communicated with the patient who voiced understanding of the findings.    US Abdomen Complete  Preliminary Result  IMPRESSION:   1. Cirrhotic configuration of the liver and probable hepatomegaly.  2. Moderate splenomegaly is likely related to portal hypertension.  3. Moderate to large amount of ascites.  4. Nonvisualization of the pancreas.    Reading per radiology       Abd/pelvis CT,  IV  contrast only TRAUMA / AAA  Final Result  IMPRESSION:   1.  Airspace consolidation in the right middle lobe may represent  pneumonia.  2.  Enlarged, cirrhotic liver with a large volume of ascites. Portal  venous collaterals compatible with portal venous hypertension.  3.  Splenomegaly, likely secondary to portal venous hypertension.  NITO RUIZ MD  Reading per radiology     Laboratory:  Laboratory findings were communicated with the patient and spouse who voiced understanding of the findings.    UA with micro: UrineBilin large (A) Urineketone trace (A) Protein Albumin Urine 20 (A) Urobilinogen mg/dL 8.0 (A) Bacteria few (A) Mucous urine present (A) cellular cast 4 (A) o/w wnl/negative       CBC:  WBC 11.9 (H), HGB 14.3, , o/w WNL     CMP: Glucose 102 (H), Bilirubin total 18.9 (HH), Albumin 2.1 (L), Alkphos 317 (H),  (H), Creatinine 1.17, o/w wnl      Lipase: 200    INR: 1.56 (H)    Ammonia: 25       Alcohol ethyl: <0.01      Blood Culture x2: Pending     Interventions:  1106 zofran 4 mg IV   1107 Dilaudid 0.5 mg IV   Rocephin 1 g IV    Emergency Department Course:  Past medical records, nursing notes,  and vitals reviewed.    1052 I performed an exam of the patient as documented above.    IV was inserted and blood was drawn for laboratory testing, results above.     The patient provided a urine sample here in the emergency department. This was sent for laboratory testing, findings above.     The patient was sent for a CT abdomen pelvis and abdomen US while in the emergency department, results above.       1227 I spoke to lab and they said the tylenol level is Icteric and cannot be properly run.     1237 I spoke with Dr. Merchant of the Interventional Radiology service from [facility] regarding patient's presentation, findings, and plan of care.      1237 Patient rechecked and updated.      1255 I spoke with Dr. Nolasco of the Hospitalist service from [facility] regarding patient's presentation, findings, and plan of care.       Findings and plan explained to the Patient who consents to admission. Discussed the patient with Dr. Nolasco, who will admit the patient to a observation unit bed for further monitoring, evaluation, and treatment.      Impression & Plan       Medical Decision Making:  Sp Plasencia is a 44 year old male who presents to the emergency department today with abdominal pain/distention.  Patient with extensive recent work-up for cirrhosis thought to be alcohol at outside hospital.  He presents today nontoxic, clinically well-hydrated.  I reviewed patient's recent CT abdomen as well as ultrasound.  Labs without evidence to suggest infection today.  He is noted to have transaminitis consistent with cirrhosis.  His bilirubin however is up trending since previous lab draw.  CT abdomen today shows moderate to large amount of ascites.  There is no evidence to suggest cholecystitis or other intra-abdominal catastrophe at this time.  Patient found to have nonspecific right lung opacity read as possible pneumonia though clinically lower suspicion at this time given no fever/cough/clear lungs.  Patient will  undergo a diagnostic and therapeutic paracentesis by IR.  He was covered empirically with a dose of Rocephin given history of esophageal varices and increasing abdominal pain today.  There is no evidence to suggest severe sepsis and patient remained hemodynamically stable.  Patient's Tylenol level was unable to be run due to icteric sample though patient denies any significant Tylenol ingestion.  He was accepted by hospitalist for admission.      Discharge Diagnosis:    ICD-10-CM    1. Alcoholic cirrhosis of liver with ascites (H) K70.31 UA with Microscopic     Blood culture   2. Hyperbilirubinemia E80.6        Disposition:  Admitted to Dr. Nolasco.    Scribe Disclosure:  Demario KENNEDY, am serving as a scribe at 10:52 AM on 2/11/2020 to document services personally performed by Beth Montana DO based on my observations and the provider's statements to me.      2/11/2020   Beth Montana DO McDonald, Lindsey E, DO  02/11/20 9010

## 2020-02-11 NOTE — PLAN OF CARE
ROOM # 207    Living Situation (if not independent, order SW consult): Home family  Facility name:  : [facility] -[phone]    Activity level at baseline: ind  Activity level on admit: ind      Patient registered to observation; given Patient Bill of Rights; given the opportunity to ask questions about observation status and their plan of care.  Patient has been oriented to the observation room, bathroom and call light is in place.    Discussed discharge goals and expectations with patient/family.

## 2020-02-11 NOTE — ED TRIAGE NOTES
"Pt presents to ED with c/o right sided abdominal pain. Pt reports he was diagnosed with cirrhosis of the liver in January, then developed a hernia about 1 week ago, hernia is inoperable due to cirrhosis. Pain continues to get worse. \"feels like someone is shoving knives in my gut and twisting them.\"  4 ibuprofen around 8 AM.   "

## 2020-02-11 NOTE — ED NOTES
[facility]  ED Nurse Handoff Report    Sp Plasencia is a 44 year old male   ED Chief complaint: Abdominal Pain  . ED Diagnosis:   Final diagnoses:   Alcoholic cirrhosis of liver with ascites (H)   Hyperbilirubinemia     Allergies: No Known Allergies    Code Status: Full Code  Activity level - Baseline/Home:  Independent. Activity Level - Current:   Stand by Assist. Lift room needed: No. Bariatric: No   Needed: No   Isolation: No. Infection: Not Applicable.     Vital Signs:   Vitals:    02/11/20 1230 02/11/20 1245 02/11/20 1300 02/11/20 1315   BP: 115/67 119/67 120/74 126/75   Pulse:  91 90 94   Resp:       Temp:       TempSrc:       SpO2:  98% 95% 96%   Weight:       Height:           Cardiac Rhythm:  ,      Pain level: 0-10 Pain Scale: 4  Patient confused: No. Patient Falls Risk: Yes.   Elimination Status: Has voided   Patient Report - Initial Complaint: Abdomen Pain. Focused Assessment: Sp Plasencia is a 44 year old male who presents with abdominal pain. The patient reports having jaundice for the past 6 weeks and was diagnosed with Cirrhosis as well as esophageal varicies. He states that he has stopped drinking alcohol since he was diagnosed with Cirrhosis.  He states undergoing workup at [facility].   The patient complains of lower abdominal pain and increasing abdominal distension for the past week. He took 4 Advil at home for his pain today without significant improvement. He denies any fever, vomiting, dysuria, black or bloody stools, scrotal swelling, or sick contacts.  No reported tylenol usage, recent travel.    11:13 Gastrointestinal Gastrointestinal - GI WDL: -WDL except; GI symptoms; appearance/characteristics  Abdominal Appearance: distended; taut; rounded  GI Signs/Symptoms: abdominal pain; nausea  AD     11:12 HEENT HEENT - HEENT WDL: -WDL except; eye  Left Eye Symptoms: sclera yellow  Right Eye Symptoms: sclera yellow       Skin Color/Condition Skin - Skin  WDL: color  Skin Color/Characteristics: yellow (jaundice )       Tests Performed: Abdomen CT, Paracentesis, Blood Work . Abnormal Results:   Labs Ordered and Resulted from Time of ED Arrival Up to the Time of Departure from the ED   CBC WITH PLATELETS DIFFERENTIAL - Abnormal; Notable for the following components:       Result Value    WBC 11.9 (*)     RBC Count 4.37 (*)     RDW 18.1 (*)     Absolute Neutrophil 9.9 (*)     All other components within normal limits   COMPREHENSIVE METABOLIC PANEL - Abnormal; Notable for the following components:    Glucose 102 (*)     Bilirubin Total 18.9 (*)     Albumin 2.1 (*)     Alkaline Phosphatase 317 (*)      (*)     All other components within normal limits   INR - Abnormal; Notable for the following components:    INR 1.56 (*)     All other components within normal limits   ROUTINE UA WITH MICROSCOPIC - Abnormal; Notable for the following components:    Bilirubin Urine Large (*)     Ketones Urine Trace (*)     Protein Albumin Urine 20 (*)     Urobilinogen mg/dL 8.0 (*)     Bacteria Urine Few (*)     Mucous Urine Present (*)     Cellular Cast 4 (*)     All other components within normal limits   LIPASE   AMMONIA   ACETAMINOPHEN LEVEL   ALCOHOL ETHYL   BLOOD CULTURE   BLOOD CULTURE     US Abdomen Complete   Preliminary Result   IMPRESSION:    1. Cirrhotic configuration of the liver and probable hepatomegaly.   2. Moderate splenomegaly is likely related to portal hypertension.   3. Moderate to large amount of ascites.   4. Nonvisualization of the pancreas.        Abd/pelvis CT,  IV  contrast only TRAUMA / AAA   Final Result   IMPRESSION:    1.  Airspace consolidation in the right middle lobe may represent   pneumonia.   2.  Enlarged, cirrhotic liver with a large volume of ascites. Portal   venous collaterals compatible with portal venous hypertension.   3.  Splenomegaly, likely secondary to portal venous hypertension.      NITO RUIZ MD      US Paracentesis     (Results Pending)       Treatments provided: Medication (see MAR)  Family Comments: Wife in room  OBS brochure/video discussed/provided to patient:  Yes  ED Medications:   Medications   cefTRIAXone (ROCEPHIN) 1 g vial to attach to  mL bag for ADULTS or NS 50 mL bag for PEDS (1 g Intravenous New Bag 2/11/20 1319)   HYDROmorphone (PF) (DILAUDID) injection 0.5 mg (0.5 mg Intravenous Given 2/11/20 1107)   ondansetron (ZOFRAN) injection 4 mg (4 mg Intravenous Given 2/11/20 1106)   iopamidol (ISOVUE-370) solution 500 mL (100 mLs Intravenous Given 2/11/20 1200)   sodium chloride 0.9 % bag 500mL for CT scan flush use (65 mLs As instructed Given 2/11/20 1201)     Drips infusing:  No  For the majority of the shift, the patient's behavior Green. Interventions performed were NA.     Severe Sepsis OR Septic Shock Diagnosis Present: No      ED Nurse Name/Phone Number: Nichole Yates RN,   1:26 PM    RECEIVING UNIT ED HANDOFF REVIEW    Above ED Nurse Handoff Report was reviewed: Yes  Reviewed by: Leana Díaz RN on February 11, 2020 at 2:38 PM

## 2020-02-11 NOTE — PHARMACY-ADMISSION MEDICATION HISTORY
Admission medication history interview status for this patient is complete. See [facility] admission navigator for allergy information, prior to admission medications and immunization status.     Medication history interview source(s):Patient  Medication history resources (including written lists, pill bottles, clinic record):None  Primary pharmacy:[facility]    Changes made to PTA medication list:  Added: lisinopril, spironolactone, furosemide, fish oil, advil  Deleted: none  Changed: Nexium from 20 mg to 40 mg    Actions taken by pharmacist (provider contacted, etc):None     Additional medication history information:Per pt, an doctor told him to stop taking lisinopril scheduled and to only take it as needed for high blood pressure.     Medication reconciliation/reorder completed by provider prior to medication history? No    Do you take OTC medications (eg tylenol, ibuprofen, fish oil, eye/ear drops, etc)? Y(Y/N)    For patients on insulin therapy: n (Y/N)    Time spent in this activity: 10 min    Prior to Admission medications    Medication Sig Last Dose Taking? Auth Provider   allopurinol (ZYLOPRIM) 300 MG tablet Take 300 mg by mouth daily 2/10/2020 at Unknown time Yes Reported, Patient   esomeprazole (NEXIUM) 40 MG DR capsule Take 40 mg by mouth daily Take 30-60 minutes before eating.  Past Week at Unknown time Yes Unknown, Entered By History   furosemide (LASIX) 20 MG tablet Take 20 mg by mouth daily as needed  at prn Yes Unknown, Entered By History   ibuprofen (ADVIL/MOTRIN) 200 MG tablet Take 400 mg by mouth every 6 hours as needed for mild pain  at prn Yes Unknown, Entered By History   lisinopril (PRINIVIL/ZESTRIL) 40 MG tablet Take 40 mg by mouth daily as needed (if needed for high blood pressure) Past Month at Unknown time Yes Unknown, Entered By History   Multiple Vitamin (DAILY MULTIVITAMIN PO) Take by mouth daily 2/10/2020 at Unknown time Yes Reported, Patient   Omega-3 Fatty Acids (FISH OIL PO) Take 1 capsule by  mouth daily 2/10/2020 at Unknown time Yes Unknown, Entered By History   spironolactone (ALDACTONE) 50 MG tablet Take 50 mg by mouth daily 2/10/2020 at Unknown time Yes Unknown, Entered By History

## 2020-02-11 NOTE — H&P
"Admitted:     02/11/2020      CHIEF COMPLAINT:  Abdominal pain, abdominal distention, jaundice, worsening bilirubin.      HISTORY OF PRESENT ILLNESS:  Mr. Plasencia is a 44-year-old male with a recent diagnosis of cirrhosis.  He reports he was diagnosed with cirrhosis approximately 6 weeks ago.  At that time, he began to develop jaundice.  He pointed this out to his primary care provider.  Lab work was performed, notable for an elevated bilirubin of 7.  This was checked in early 01/2020.  He reports that 1 year ago his bilirubin was 1.9.  With his elevated bilirubin, the patient proceeded to have lab work performed and referral to a gastroenterologist in the outpatient setting.  He reports that he underwent an abdominal ultrasound, abdominal CT scan, and ultimately underwent an EGD.  Reportedly, varices were found, although I do not have actual report to clarify this.  The patient reports that he was seen in the [facility].      The patient reports that he has had some GI evaluation done to rule out alternative causes of cirrhosis, but ultimately he admits alcohol is likely playing a role in his liver disease.  He is hesitant to tell me how much alcohol he was drinking the past \"due to liability reasons.\"  However, he does admit he was a daily drinker, drinking at least a half a liter of alcohol a day.  The patient works as a  for the past 2 decades.  He reports that he has not had any alcohol for the past 6 weeks.  He is concerned that his bilirubin continues to rise since that time.  Bilirubin was 7 in 01/2020, now is elevated to 19.        He has also progressively developed worsening abdominal pain and distention.  On presentation to the ER today, was found to have ascites and underwent a paracentesis.  He has never had a paracentesis previously.      The patient was taking Lasix and Aldactone at a dose of 20 mg Lasix and Aldactone 50 mg daily.  He does not use acetaminophen.      When " I asked the patient if he has a family history of alcoholism, he reported that he does not believe in that term, but admits he probably has some family members who drink too much.      On arrival to the ER today, vital signs included a blood pressure 125/75 with heart rate of 95.  Afebrile.  Saturation 99% on room air.  Workup there included labs and imaging.  Lab work notable for CBC with a white cell count of 12.  Normal hemoglobin and platelet count.  Ammonia is 25.  INR is elevated at 1.5.  Lipase normal.  Bilirubin is 19.  Albumin is 2.1.  AST is 177, ALT 64.  Acetaminophen level was canceled.  Alcohol level is undetectable.  Urinalysis shows no evidence of infection.      Imaging included an abdominal pelvic CT scan showing enlarged cirrhotic liver with large volume of ascites and portal venous collaterals compatible with portal venous hypertension.  Splenomegaly present.  Airspace consolidation of the right middle lobe may represent pneumonia.  This is per Radiology read.  Abdominal ultrasound was done showing cirrhotic configuration of the liver and probable hepatomegaly with moderate to large amount of ascites.  Paracentesis was done via ultrasound guidance, with 5.8 liters of straw-colored fluid drained.  Fluid was sent to the lab with multiple labs currently pending on the fluid analysis, but initially looks like it is likely transudative ascites.      Cell count, differential and Gram stain all pending on ascites.      I spoke with Dr. Montana of the ER.  She is concerned with the patient's rising bilirubin level and is requesting admission to the hospital.      PAST MEDICAL HISTORY:   1.  History of hypertension.   2.  History of muscular dystrophy.   3.  Recent diagnosis of cirrhosis 01/2020, approximately 6 weeks ago.   4.  Apparent esophageal varices noted on EGD, although I do not have documentation to confirm this currently.  This is per verbal report.      CURRENT MEDICATIONS:   1.  Allopurinol.  "  2.  Nexium.   3.  Lasix 20 mg daily as needed.   4.  Ibuprofen.   5.  Lisinopril.   6.  Multivitamin.   7.  Omega-3 fatty acid.   8.  Spironolactone 50 mg daily.      Diuretics were started recently, was diagnosed with liver disease.      ALLERGIES:  NONE.      FAMILY HISTORY:  Reviewed.  When I asked the patient about alcoholism in the family, he reports he does not believe in the diagnosis.  However, he does admit that several family members \"probably drink too much.\"      SOCIAL HISTORY:  The patient smokes cigarettes rarely.  Denies any street drug use.  Admits to alcohol use as mentioned above, which he stopped 6 weeks ago.      REVIEW OF SYSTEMS:  See HPI for details.  No fevers.  No chills.  No shortness of breath.  No cough.  No infectious symptoms.  Comprehensive greater than 10-point review of systems is otherwise negative besides that detailed above.      PHYSICAL EXAMINATION:   VITAL SIGNS:  Blood pressure is currently 120/70 with a heart rate of 97.  Afebrile, saturation 96% on room air.   GENERAL:  The patient appears nontoxic, no acute distress.  Appears awake, alert and oriented x3.  Mood and affect are normal.   HEENT:  Head is atraumatic.  Sclerae are jaundiced.   NECK:  Supple.  No cervical or supraclavicular lymphadenopathy.   HEART:  Regular rate and rhythm.  No significant murmurs.  No lower extremity edema.   LUNGS:  Clear to auscultation bilaterally.  No intercostal retractions.  No conversational dyspnea.   ABDOMEN:  Distended, but soft.  I cannot detect any significant organomegaly.  Nontender.  Bowel sounds present.   SKIN:  Notable for jaundice.  No rashes.  Skin is dry to touch.   NEUROLOGIC:  Cranial nerves II-XII are intact.  Moves all extremities appropriately.  Sensation intact to light touch in the upper and lower extremities bilaterally.   PSYCHIATRIC:  The patient awake, alert and oriented x3.  Mood and affect are normal and appropriate.      LABORATORY AND IMAGING DATA:  " Reviewed above in HPI.      IMPRESSION AND PLAN:  Mr. Plasencia is a 44-year-old male with a history of hypertension and muscular dystrophy.  He has a recent diagnosis of cirrhosis.  This diagnosis was made when he began to notice jaundice symptoms 6 weeks ago.  He has been evaluated by an outpatient gastroenterologist.  He reports he stopped drinking 6 weeks ago.  Etiology of cirrhosis is suspected to be alcohol related.  He is concerned because despite stopping alcohol use, his bilirubin has risen from 7 to 19 over the past 6 weeks.  He presents to the hospital today for abdominal distention and pain and found to have significant ascites.  He underwent a paracentesis, with nearly 6 liters of fluid removed.  This is his first paracentesis.  He was admitted to the observation unit following paracentesis given concerns about rising bilirubin and worsening juandice.      1.  Cirrhosis, likely alcohol induced.  He reports an outpatient clinic evaluation with [facility] since his diagnosis 6 weeks ago.   2.  Ascites, secondary to cirrhosis.  The patient required his first paracentesis today.  I will double his Lasix dose from 20 to 40 mg daily and double spironolactone from 50 mg to 100 mg per day.   3.  Chronic liver disease with a rising bilirubin and worsening juandice the past month.  We will have Gastroenterology see the patient to evaluate and see if any further workup is recommended.  Unclear why his bilirubin continues to rise.  I note that his AST>ALT in a ratio that would suggest possible ongoing alcohol use, but he denies any use for the past 6 weeks.  Alcohol level is undetectable today.    4.  Reported varices on recent EGD, though I do not have documentation to confirm this.   5.  Pulmonary infiltrate:  CT scan notable for infiltrate of the right lower lobe.  No infectious symptoms, and I suspect this is likely atelectasis due decreased diaphragmatic movement in the setting of significant ascites and  underlying muscle weakness from his muscular dystrophy history.  Hold off on antibiotic therapy.      Anticipate possible discharge tomorrow after GI evaluation, assuming liver function tests stabilize and no evidence of SBP.         JUDITH DAWSON MD             D: 2020   T: 2020   MT: MÓNICA      Name:     ADELIA MCMILLAN   MRN:      [id number]        Account:      [id number]   :      1975        Admitted:     2020                   Document: [id number]       cc: Vinod Stewart MD

## 2020-02-12 VITALS
SYSTOLIC BLOOD PRESSURE: 92 MMHG | RESPIRATION RATE: 16 BRPM | TEMPERATURE: 97.1 F | OXYGEN SATURATION: 93 % | BODY MASS INDEX: 31.81 KG/M2 | HEART RATE: 96 BPM | DIASTOLIC BLOOD PRESSURE: 42 MMHG | HEIGHT: 73 IN | WEIGHT: 240 LBS

## 2020-02-12 LAB
ALBUMIN SERPL-MCNC: 1.9 G/DL (ref 3.4–5)
ALP SERPL-CCNC: 271 U/L (ref 40–150)
ALT SERPL W P-5'-P-CCNC: 50 U/L (ref 0–70)
ANION GAP SERPL CALCULATED.3IONS-SCNC: 7 MMOL/L (ref 3–14)
AST SERPL W P-5'-P-CCNC: 134 U/L (ref 0–45)
BASOPHILS # BLD AUTO: 0.1 10E9/L (ref 0–0.2)
BASOPHILS NFR BLD AUTO: 1.1 %
BILIRUB SERPL-MCNC: 16.4 MG/DL (ref 0.2–1.3)
BUN SERPL-MCNC: 20 MG/DL (ref 7–30)
CALCIUM SERPL-MCNC: 8.6 MG/DL (ref 8.5–10.1)
CHLORIDE SERPL-SCNC: 105 MMOL/L (ref 94–109)
CO2 SERPL-SCNC: 24 MMOL/L (ref 20–32)
CREAT SERPL-MCNC: 1.05 MG/DL (ref 0.66–1.25)
DIFFERENTIAL METHOD BLD: ABNORMAL
EOSINOPHIL # BLD AUTO: 0.3 10E9/L (ref 0–0.7)
EOSINOPHIL NFR BLD AUTO: 2.5 %
ERYTHROCYTE [DISTWIDTH] IN BLOOD BY AUTOMATED COUNT: 17.9 % (ref 10–15)
GFR SERPL CREATININE-BSD FRML MDRD: 86 ML/MIN/{1.73_M2}
GLUCOSE SERPL-MCNC: 92 MG/DL (ref 70–99)
HCT VFR BLD AUTO: 36.5 % (ref 40–53)
HGB BLD-MCNC: 12.8 G/DL (ref 13.3–17.7)
IMM GRANULOCYTES # BLD: 0.1 10E9/L (ref 0–0.4)
IMM GRANULOCYTES NFR BLD: 0.4 %
LYMPHOCYTES # BLD AUTO: 1.6 10E9/L (ref 0.8–5.3)
LYMPHOCYTES NFR BLD AUTO: 13.4 %
MCH RBC QN AUTO: 31.8 PG (ref 26.5–33)
MCHC RBC AUTO-ENTMCNC: 35.1 G/DL (ref 31.5–36.5)
MCV RBC AUTO: 91 FL (ref 78–100)
MONOCYTES # BLD AUTO: 0.7 10E9/L (ref 0–1.3)
MONOCYTES NFR BLD AUTO: 6 %
NEUTROPHILS # BLD AUTO: 9.1 10E9/L (ref 1.6–8.3)
NEUTROPHILS NFR BLD AUTO: 76.6 %
NRBC # BLD AUTO: 0 10*3/UL
NRBC BLD AUTO-RTO: 0 /100
PLATELET # BLD AUTO: 185 10E9/L (ref 150–450)
POTASSIUM SERPL-SCNC: 3.7 MMOL/L (ref 3.4–5.3)
PROT SERPL-MCNC: 6.8 G/DL (ref 6.8–8.8)
RBC # BLD AUTO: 4.03 10E12/L (ref 4.4–5.9)
SODIUM SERPL-SCNC: 136 MMOL/L (ref 133–144)
WBC # BLD AUTO: 11.9 10E9/L (ref 4–11)

## 2020-02-12 PROCEDURE — 85025 COMPLETE CBC W/AUTO DIFF WBC: CPT | Performed by: INTERNAL MEDICINE

## 2020-02-12 PROCEDURE — 25800030 ZZH RX IP 258 OP 636: Performed by: PHYSICIAN ASSISTANT

## 2020-02-12 PROCEDURE — 25000131 ZZH RX MED GY IP 250 OP 636 PS 637: Performed by: INTERNAL MEDICINE

## 2020-02-12 PROCEDURE — 25000132 ZZH RX MED GY IP 250 OP 250 PS 637: Performed by: INTERNAL MEDICINE

## 2020-02-12 PROCEDURE — 99217 ZZC OBSERVATION CARE DISCHARGE: CPT | Performed by: PHYSICIAN ASSISTANT

## 2020-02-12 PROCEDURE — 25000128 H RX IP 250 OP 636: Performed by: PHYSICIAN ASSISTANT

## 2020-02-12 PROCEDURE — 25000132 ZZH RX MED GY IP 250 OP 250 PS 637: Performed by: PHYSICIAN ASSISTANT

## 2020-02-12 PROCEDURE — 36415 COLL VENOUS BLD VENIPUNCTURE: CPT | Performed by: INTERNAL MEDICINE

## 2020-02-12 PROCEDURE — P9047 ALBUMIN (HUMAN), 25%, 50ML: HCPCS | Performed by: PHYSICIAN ASSISTANT

## 2020-02-12 PROCEDURE — G0378 HOSPITAL OBSERVATION PER HR: HCPCS

## 2020-02-12 PROCEDURE — 80053 COMPREHEN METABOLIC PANEL: CPT | Performed by: INTERNAL MEDICINE

## 2020-02-12 RX ORDER — SPIRONOLACTONE 50 MG/1
50 TABLET, FILM COATED ORAL DAILY
Qty: 30 TABLET | Refills: 0 | Status: ON HOLD | OUTPATIENT
Start: 2020-02-12 | End: 2020-04-08

## 2020-02-12 RX ORDER — PREDNISONE 20 MG/1
40 TABLET ORAL DAILY
Qty: 40 TABLET | Refills: 0 | Status: ON HOLD | OUTPATIENT
Start: 2020-02-12 | End: 2020-04-08

## 2020-02-12 RX ORDER — ZOLPIDEM TARTRATE 5 MG/1
5 TABLET ORAL
Qty: 10 TABLET | Refills: 0 | Status: SHIPPED | OUTPATIENT
Start: 2020-02-12 | End: 2020-04-22

## 2020-02-12 RX ORDER — ESOMEPRAZOLE MAGNESIUM 40 MG/1
40 CAPSULE, DELAYED RELEASE ORAL DAILY
Qty: 30 CAPSULE | Refills: 0 | Status: ON HOLD | OUTPATIENT
Start: 2020-02-12 | End: 2020-04-10

## 2020-02-12 RX ORDER — SPIRONOLACTONE 25 MG/1
50 TABLET ORAL DAILY
Status: DISCONTINUED | OUTPATIENT
Start: 2020-02-13 | End: 2020-02-12 | Stop reason: HOSPADM

## 2020-02-12 RX ORDER — TRAMADOL HYDROCHLORIDE 50 MG/1
25-50 TABLET ORAL EVERY 6 HOURS PRN
Qty: 30 TABLET | Refills: 0 | Status: ON HOLD | OUTPATIENT
Start: 2020-02-12 | End: 2020-04-08

## 2020-02-12 RX ORDER — HYDROXYZINE HYDROCHLORIDE 25 MG/1
25 TABLET, FILM COATED ORAL EVERY 6 HOURS PRN
Status: DISCONTINUED | OUTPATIENT
Start: 2020-02-12 | End: 2020-02-12 | Stop reason: HOSPADM

## 2020-02-12 RX ORDER — PREDNISOLONE 5 MG/1
40 TABLET ORAL DAILY
Status: DISCONTINUED | OUTPATIENT
Start: 2020-02-12 | End: 2020-02-12

## 2020-02-12 RX ORDER — FUROSEMIDE 20 MG
40 TABLET ORAL DAILY
Qty: 30 TABLET | Refills: 0 | Status: ON HOLD | OUTPATIENT
Start: 2020-02-12 | End: 2020-04-10

## 2020-02-12 RX ORDER — ALLOPURINOL 300 MG/1
300 TABLET ORAL DAILY
Qty: 30 TABLET | Refills: 0 | Status: ON HOLD | OUTPATIENT
Start: 2020-02-12 | End: 2020-04-10

## 2020-02-12 RX ORDER — HYDROXYZINE HYDROCHLORIDE 25 MG/1
25-50 TABLET, FILM COATED ORAL EVERY 6 HOURS PRN
Qty: 30 TABLET | Refills: 0 | Status: SHIPPED | OUTPATIENT
Start: 2020-02-12 | End: 2020-02-18

## 2020-02-12 RX ORDER — ALBUMIN (HUMAN) 12.5 G/50ML
25 SOLUTION INTRAVENOUS ONCE
Status: COMPLETED | OUTPATIENT
Start: 2020-02-12 | End: 2020-02-12

## 2020-02-12 RX ORDER — IODINE/SODIUM IODIDE 2 %
TINCTURE TOPICAL 3 TIMES DAILY PRN
Status: DISCONTINUED | OUTPATIENT
Start: 2020-02-12 | End: 2020-02-12 | Stop reason: HOSPADM

## 2020-02-12 RX ORDER — HYDROXYZINE HYDROCHLORIDE 50 MG/1
50 TABLET, FILM COATED ORAL EVERY 6 HOURS PRN
Status: DISCONTINUED | OUTPATIENT
Start: 2020-02-12 | End: 2020-02-12 | Stop reason: HOSPADM

## 2020-02-12 RX ADMIN — PANTOPRAZOLE SODIUM 40 MG: 40 TABLET, DELAYED RELEASE ORAL at 07:03

## 2020-02-12 RX ADMIN — FUROSEMIDE 40 MG: 40 TABLET ORAL at 08:08

## 2020-02-12 RX ADMIN — SPIRONOLACTONE 100 MG: 25 TABLET ORAL at 08:07

## 2020-02-12 RX ADMIN — ALLOPURINOL 300 MG: 300 TABLET ORAL at 08:08

## 2020-02-12 RX ADMIN — SODIUM CHLORIDE 500 ML: 9 INJECTION, SOLUTION INTRAVENOUS at 12:10

## 2020-02-12 RX ADMIN — PREDNISOLONE SODIUM PHOSPHATE 40 MG: 15 TABLET, ORALLY DISINTEGRATING ORAL at 16:00

## 2020-02-12 RX ADMIN — ALBUMIN HUMAN 25 G: 0.25 SOLUTION INTRAVENOUS at 14:18

## 2020-02-12 RX ADMIN — HYDROXYZINE HYDROCHLORIDE 50 MG: 50 TABLET ORAL at 14:13

## 2020-02-12 RX ADMIN — TRAMADOL HYDROCHLORIDE 50 MG: 50 TABLET ORAL at 04:19

## 2020-02-12 NOTE — PLAN OF CARE
PRIMARY DIAGNOSIS: ASCITES  OUTPATIENT/OBSERVATION GOALS TO BE MET BEFORE DISCHARGE:  1. ADLs back to baseline: Yes    2. Activity and level of assistance: Ambulating independently.    3. Pain status: Improved-controlled with oral pain medications.    4. Return to near baseline physical activity: Yes    Temp: 98.4  F (36.9  C) Temp src: Oral BP: 107/51 Pulse: 94 Heart Rate: 94 Resp: 16 SpO2: 97 % O2 Device: None (Room air)        Patient is alert and oriented x 4. Vital signs stable. Complains of some abdominal pain. PRN tramadol given. Skin and sclera of eyes are jaundiced. Independent for transfers and cares. Abdomen is still slightly distended and rounded. Had paracentesis today which removed 5800 mL fluid. The fluid labs are still pending at this time.     Discharge Planner Nurse   Safe discharge environment identified: Yes  Barriers to discharge: No       Entered by: Sandrine Donald 02/11/2020 10:38 PM     Please review provider order for any additional goals.   Nurse to notify provider when observation goals have been met and patient is ready for discharge.

## 2020-02-12 NOTE — PLAN OF CARE
PRIMARY DIAGNOSIS: ASCITES  OUTPATIENT/OBSERVATION GOALS TO BE MET BEFORE DISCHARGE:  1. ADLs back to baseline: Yes    2. Activity and level of assistance: Ambulating independently.    3. Pain status: Improved-controlled with oral pain medications.    4. Return to near baseline physical activity: Yes    Temp: 97.1  F (36.2  C) Temp src: Oral BP: 108/65 Pulse: 94 Heart Rate: 96 Resp: 16 SpO2: 97 % O2 Device: None (Room air)        Patient is alert and oriented x 4. Vital signs stable. Complains of some abdominal pain. PRN tramadol given. Skin and sclera of eyes are jaundiced. Independent for transfers and cares. Abdomen is still slightly distended and rounded. Had paracentesis yesterday which removed 5800 mL fluid. The fluid labs are still pending at this time. Patient complaining of general body itching. On-call MD ordered calamine lotion TID for itching. Plan is to meet with GI this morning and discharge after if LFT's stabilize.     Discharge Planner Nurse   Safe discharge environment identified: Yes  Barriers to discharge: No       Entered by: Sandrine Donald 02/12/2020 4:27 AM     Please review provider order for any additional goals.   Nurse to notify provider when observation goals have been met and patient is ready for discharge.

## 2020-02-12 NOTE — PLAN OF CARE
PRIMARY DIAGNOSIS: ASCITES  OUTPATIENT/OBSERVATION GOALS TO BE MET BEFORE DISCHARGE:  1. ADLs back to baseline: Yes    2. Activity and level of assistance: Ambulating independently.    3. Pain status: Improved-controlled with oral pain medications.    4. Return to near baseline physical activity: Yes    Temp: 96.8  F (36  C) Temp src: Oral BP: 104/54 Pulse: 96 Heart Rate: 96 Resp: 16 SpO2: 96 % O2 Device: None (Room air)        Patient is alert and oriented x 4. Vital signs stable. Complains of some abdominal pain. PRN tramadol given. Skin and sclera of eyes are jaundiced. Independent for transfers and cares. Abdomen is still slightly distended and rounded. Had paracentesis yesterday which removed 5800 mL fluid. The fluid labs are still pending at this time. Patient complaining of general body itching. On-call MD ordered calamine lotion TID for itching. Plan is to meet with GI this morning and discharge after if LFT's stabilize.     Discharge Planner Nurse   Safe discharge environment identified: Yes  Barriers to discharge: No       Entered by: Leana Díaz 02/12/2020     VSS and on room air. Patient denies pain this morning. IV saline locked. Jaundice, sclera still yellow this AM. Bili-16.4, AST-134. Tolerating regular diet well. GI consult pending. Will continue to monitor and provide cares.     Please review provider order for any additional goals. Nurse to notify provider when observation goals have been met and patient is ready for discharge.

## 2020-02-12 NOTE — DISCHARGE SUMMARY
[facility]  Discharge Summary        Sp Plasencia MRN# [id number]   YOB: 1975 Age: 44 year old     Date of Admission:  2/11/2020  Date of Discharge:  2/12/2020  5:45 PM  Admitting Physician:  Ramiro Nolasco MD  Discharge Physician: Fariba Alba PA-C  Discharging Service: Hospitalist     Primary Provider: Vinod Stewart  Primary Care Physician Phone Number: [phone]         Discharge Diagnoses/Problem Oriented Hospital Course (Providers):    Sp Plasencia was admitted on 2/11/2020 by Ramiro Nolasco MD and I would refer you to their history and physical.  The following problems were addressed during his hospitalization:    1.  Abdominal pain in the setting of newly diagnosed liver cirrhosis.   2.  Significant development of intra-abdominal ascites contributing to #1, status post paracentesis of 5800 cc removal  3.  Cultures negative for intra-abdominal infection  4.  Rising bilirubin in the setting of liver disease, initiating steroid treatment as per GI.          Code Status:      Full Code        Brief Hospital Stay Summary Sent Home With Patient in AVS:        Reason for your hospital stay      You were admitted for concerns of increasing abdominal pain in the   setting of liver cirrhosis. You underwent paracentesis for fluid removal.   The fluid did not show any evidence of infection. Your diuretics have been   adjusted to help with fluid retention.   You will now take double dose of Lasix from 20 to 40 mg. Keep all your   other medication as is.   You were seen by Gastroenterology and they recommend that you be started   on oral steroids to help with the liver flare. You will continue this   medication until you are seen by the Liver Team of [facility].   They will set you up for next steps.    In terms of primary care physicians, I recommend that you see an Internal   Medicine physician, some to consider are: 1. Deanna Barahona ([facility] in    [place]), Dr. Laura Quiroz ([facility] in [place]).          Mr. Plasencia is a 44-year-old male with a history of hypertension and muscular dystrophy.  He has a recent diagnosis of cirrhosis.  This diagnosis was made when he began to notice jaundice symptoms 6 weeks ago.  He has been evaluated by an outpatient gastroenterologist.  He reports he stopped drinking 6 weeks ago.  Etiology of cirrhosis is suspected to be alcohol related.     EGD approximately 1 week ago with Dr. Prater and Dr. Bullard's group showed small esophageal varices, portal gastropathy. Also had lab work negative for HCV, HBV, HAV (immune), relatively normal ferritin (low 400s), negative LYDIA/ASMA. Did not see alpha-1 antitrypsin testing in recent labs.     He is concerned because despite stopping alcohol use, his bilirubin has risen from 7 to 19 over the past 6 weeks.  He presents to the hospital today for abdominal distention and pain and found to have significant ascites.  He underwent a paracentesis, with nearly 6 liters of fluid removed.  This is his first paracentesis.  He was admitted to the observation unit following paracentesis given concerns about rising bilirubin and worsening juandice.   On this admission, bilirubin elevated at 18.9. INR 1.56. AST/ALT low elevation in 2:1 pattern. DF 48, MELD 24. Had paracentesis with about 6 liters fluid removed, negative for SBP. Ultrasound with signs of cirrhosis, no vascular occlusive disease.      1.  Cirrhosis, likely alcohol induced.  He reports an outpatient clinic evaluation with [facility] since his diagnosis 6 weeks ago.  Patient was seen by gastroenterology.  They feel that he likely has progressive hepatitis.  He was started on 40 mg of prednisone with close follow-up of the liver team in 1 week.    2.  Ascites, secondary to cirrhosis.    Initially doubled his Lasix and spironolactone but he patient was somewhat hypotensive with systolic in the 80s.  Instead we increase his Lasix to 40  mg daily from 20 and A spironolactone at 50 mg daily.     3.  Reported varices on recent EGD, though I do not have documentation to confirm this.   4.  Pulmonary infiltrate:  CT scan notable for infiltrate of the right lower lobe.  No infectious symptoms, and I suspect this is likely atelectasis due decreased diaphragmatic movement in the setting of significant ascites and underlying muscle weakness from his muscular dystrophy history.    No significant pulmonary symptoms while he was in the hospital.  No antibiotic was indicated  Patient also does not have a strong internal medicine support.  Referral to internal medicine was made.         Important Results:      Recent Labs   Lab 02/12/20  0637 02/11/20  1057   WBC 11.9* 11.9*   HGB 12.8* 14.3   HCT 36.5* 40.5   MCV 91 93    205     Recent Labs   Lab 02/12/20  0637 02/11/20  1057    133   POTASSIUM 3.7 3.5   CHLORIDE 105 101   CO2 24 25   ANIONGAP 7 7   GLC 92 102*   BUN 20 18   CR 1.05 1.17   GFRESTIMATED 86 75   GFRESTBLACK >90 87   JOSE CARLOS 8.6 9.2     Recent Labs   Lab 02/11/20  1430 02/11/20  1318 02/11/20  1313   CULT Culture negative monitoring continues  Culture negative monitoring continues No growth after 2 days No growth after 2 days     Recent Labs   Lab 02/12/20  0637 02/11/20  1057   * 177*   ALT 50 64   ALKPHOS 271* 317*   BILITOTAL 16.4* 18.9*   NANDO  --  25     Recent Labs   Lab 02/11/20  1057   INR 1.56*       Recent Labs   Lab 02/11/20  1120   COLOR Dark Yellow   APPEARANCE Slightly Cloudy   URINEGLC Negative   URINEBILI Large*   URINEKETONE Trace*   SG 1.025   UBLD Negative   URINEPH 6.0   PROTEIN 20*   NITRITE Negative   LEUKEST Negative   RBCU 1   WBCU 5              Pending Results:        Unresulted Labs Ordered in the Past 30 Days of this Admission     Date and Time Order Name Status Description    2/11/2020 1420 Anaerobic bacterial culture Preliminary     2/11/2020 1420 Fluid Culture Aerobic Bacterial Preliminary      2/11/2020 1315 Blood culture Preliminary     2/11/2020 1233 Blood culture Preliminary             Discharge Instructions and Follow-Up:      Follow-up Appointments     Follow-up and recommended labs and tests       Follow up with primary care provider, Vinod Stewart, within 7 days for   hospital follow- up.    Follow up with [facility] and their liver specialist as directed.               Discharge Disposition:      Discharged to home         Discharge Medications:        Current Discharge Medication List      START taking these medications    Details   hydrOXYzine (ATARAX) 25 MG tablet Take 1-2 tablets (25-50 mg) by mouth every 6 hours as needed for itching or other (adjuvant pain)  Qty: 30 tablet, Refills: 0    Associated Diagnoses: Other ascites      predniSONE (DELTASONE) 20 MG tablet Take 2 tablets (40 mg) by mouth daily  Qty: 40 tablet, Refills: 0    Associated Diagnoses: Other ascites      traMADol (ULTRAM) 50 MG tablet Take 0.5-1 tablets (25-50 mg) by mouth every 6 hours as needed for moderate pain  Qty: 30 tablet, Refills: 0    Associated Diagnoses: Other ascites         CONTINUE these medications which have CHANGED    Details   allopurinol (ZYLOPRIM) 300 MG tablet Take 1 tablet (300 mg) by mouth daily  Qty: 30 tablet, Refills: 0    Associated Diagnoses: FSHD (facioscapulohumeral muscular dystrophy) (H)      esomeprazole (NEXIUM) 40 MG DR capsule Take 1 capsule (40 mg) by mouth daily Take 30-60 minutes before eating.  Qty: 30 capsule, Refills: 0    Associated Diagnoses: Other ascites      furosemide (LASIX) 20 MG tablet Take 2 tablets (40 mg) by mouth daily  Qty: 30 tablet, Refills: 0    Associated Diagnoses: Other ascites      spironolactone (ALDACTONE) 50 MG tablet Take 1 tablet (50 mg) by mouth daily  Qty: 30 tablet, Refills: 0    Associated Diagnoses: Other ascites         CONTINUE these medications which have NOT CHANGED    Details   ibuprofen (ADVIL/MOTRIN) 200 MG tablet Take 400 mg by mouth every 6  "hours as needed for mild pain      lisinopril (PRINIVIL/ZESTRIL) 40 MG tablet Take 40 mg by mouth daily as needed (if needed for high blood pressure)      Multiple Vitamin (DAILY MULTIVITAMIN PO) Take by mouth daily    Associated Diagnoses: FSHD (facioscapulohumeral muscular dystrophy) (H)      Omega-3 Fatty Acids (FISH OIL PO) Take 1 capsule by mouth daily               Allergies:       No Known Allergies        Consultations This Hospital Stay:      Consultation during this admission received from gastroenterology         Condition and Physical on Discharge:      Discharge condition: Stable   Vitals: Blood pressure 92/42, pulse 96, temperature 97.1  F (36.2  C), temperature source Oral, resp. rate 16, height 1.854 m (6' 1\"), weight 108.9 kg (240 lb), SpO2 93 %.  240 lbs 0 oz    GENERAL:  The patient appears nontoxic, no acute distress.  Appears awake, alert and oriented x3.  Mood and affect are normal.   HEENT:  Head is atraumatic.  Sclerae are jaundiced.   NECK:  Supple.  No cervical or supraclavicular lymphadenopathy.   HEART:  Regular rate and rhythm.  No significant murmurs.  No lower extremity edema.   LUNGS:  Clear to auscultation bilaterally.  No intercostal retractions.  No conversational dyspnea.   ABDOMEN:  Distended, but soft.  I cannot detect any significant organomegaly.  Nontender.  Bowel sounds present. No Capute medusa  SKIN:  Notable for jaundice.  No rashes.  Skin is dry to touch.   NEUROLOGIC:  Cranial nerves II-XII are intact.  Moves all extremities appropriately.  Sensation intact to light touch in the upper and lower extremities bilaterally.   PSYCHIATRIC:  The patient awake, alert and oriented x3.  Mood and affect are normal and appropriate.         Discharge Time:      <30mins        Image Results From This Hospital Stay (For Non-EPIC Providers):        Results for orders placed or performed during the hospital encounter of 02/11/20   Abd/pelvis CT,  IV  contrast only TRAUMA / AAA    " Narrative    CT ABDOMEN AND PELVIS WITH CONTRAST 2/11/2020 12:13 PM    CLINICAL HISTORY: Abdominal pain. History of cirrhosis.    TECHNIQUE: CT scan of the abdomen and pelvis was performed following  injection of IV contrast. Multiplanar reformats were obtained. Dose  reduction techniques were used.  CONTRAST: 100 mL Isovue-[id number]    COMPARISON: None.    FINDINGS:   LOWER CHEST: Degraded by respiratory motion. There is a small right  pleural effusion. Airspace consolidation with air bronchograms in the  right middle lobe.    HEPATOBILIARY: The liver is enlarged at 25.6 cm in length. There is  heterogeneous hepatic attenuation and a nodular surface, compatible  with cirrhosis. There is a moderate to large volume of ascites. No  focal liver lesions are demonstrated on this study performed with a  single phase of contrast enhancement. Portal venous collateral vessels  noted. The gallbladder is unremarkable.    PANCREAS: Normal.    SPLEEN: Enlarged at 18.8 cm in length.    ADRENAL GLANDS: Normal.    KIDNEYS/BLADDER: Normal.    BOWEL: Normal.    PELVIC ORGANS: Normal.    ADDITIONAL FINDINGS: None.    MUSCULOSKELETAL: Normal.      Impression    IMPRESSION:   1.  Airspace consolidation in the right middle lobe may represent  pneumonia.  2.  Enlarged, cirrhotic liver with a large volume of ascites. Portal  venous collaterals compatible with portal venous hypertension.  3.  Splenomegaly, likely secondary to portal venous hypertension.    NITO RUIZ MD   US Abdomen Complete    Narrative    ULTRASOUND ABDOMEN COMPLETE  2/11/2020 12:36 PM    HISTORY: Cirrhosis of the liver. Common duct dilatation reportedly  noted on outside ultrasound.    COMPARISON: CT of the abdomen and pelvis performed earlier today.    FINDINGS: Nodularity of the liver contour suggests cirrhosis of the  liver. The liver also appears enlarged. The hepatic parenchyma is  heterogeneous; however, no focal hepatic lesions are identified. The  gallbladder is  unremarkable, without evidence of cholelithiasis.  Patient is nontender over the gallbladder. No intra or extrahepatic  bile duct dilatation. The common duct could not be visualized in its  entirety. The pancreas is obscured by overlying bowel gas, and could  not be evaluated. The spleen is enlarged, measuring 15.9 cm in length.  Both kidneys are unremarkable. No hydronephrosis. Abdominal aorta and  IVC are segmentally seen, and are of normal caliber where visualized.  Moderate to large amount of ascites.      Impression    IMPRESSION:   1. Cirrhotic configuration of the liver and probable hepatomegaly.  2. Moderate splenomegaly is likely related to portal hypertension.  3. Moderate to large amount of ascites.  4. Nonvisualization of the pancreas.      CLINTON MALONEY MD   US Paracentesis    Narrative    US PARACENTESIS 2/11/2020 3:03 PM     HISTORY: High volume paracentesis with or without diagnostic fluid  analysis with labs to be drawn if ordered.    FINDINGS: Limited preprocedure ultrasound was performed, images show a  sufficient amount of ascites for paracentesis. An image was archived.  Written and oral informed consent was obtained. A pause for the cause  procedure to verify the correct patient and correct procedure. The  skin overlying the right lower quadrant was prepped and draped in the  usual sterile fashion. The subcutaneous tissues were anesthetized with  10 mL 1% lidocaine. Under direct ultrasound guidance the catheter was  advanced into the peritoneal space and 5.8 L of  straw colored fluid  was drained. The catheter was removed and a sterile dressing was  applied. There were no immediate complications. Ultrasound images were  permanently stored.  Patient left the ultrasound suite in satisfactory  condition.      Impression    IMPRESSION: Technically successful paracentesis without immediate  complications.    ONI BRADSHAW DO

## 2020-02-12 NOTE — PLAN OF CARE
"PRIMARY DIAGNOSIS: ASCITES  OUTPATIENT/OBSERVATION GOALS TO BE MET BEFORE DISCHARGE:  1. ADLs back to baseline: Yes    2. Activity and level of assistance: Ambulating independently.    3. Pain status: Improved-controlled with oral pain medications.    4. Return to near baseline physical activity: Yes    BP 92/42 (BP Location: Left arm)   Pulse 96   Temp 97.1  F (36.2  C) (Oral)   Resp 16   Ht 1.854 m (6' 1\")   Wt 108.9 kg (240 lb)   SpO2 93%   BMI 31.66 kg/m      Discharge Planner Nurse   Safe discharge environment identified: Yes  Barriers to discharge: No       Entered by: Leana Díaz 02/12/2020     VSS and on room air. Patient denies pain this morning. IV saline locked. Jaundice, sclera still yellow this AM. Bili-16.4, AST-134. Tolerating regular diet well. GI consulted, note in chart. Will continue to monitor and provide cares.     Please review provider order for any additional goals. Nurse to notify provider when observation goals have been met and patient is ready for discharge.   "

## 2020-02-12 NOTE — PLAN OF CARE
PRIMARY DIAGNOSIS: ASCITES  OUTPATIENT/OBSERVATION GOALS TO BE MET BEFORE DISCHARGE:  1. ADLs back to baseline: Yes    2. Activity and level of assistance: Ambulating independently.    3. Pain status: Improved-controlled with oral pain medications.    4. Return to near baseline physical activity: Yes    Temp: 97.5  F (36.4  C) Temp src: Oral BP: 108/75 Pulse: 94 Heart Rate: 93 Resp: 16 SpO2: 92 % O2 Device: None (Room air)        Patient is alert and oriented x 4. Vital signs stable. Complains of some abdominal pain. PRN tramadol given. Skin and sclera of eyes are jaundiced. Independent for transfers and cares. Abdomen is still slightly distended and rounded. Had paracentesis today which removed 5800 mL fluid. The fluid labs are still pending at this time.     Discharge Planner Nurse   Safe discharge environment identified: Yes  Barriers to discharge: No       Entered by: Sandrine Donald 02/11/2020 11:36 PM     Please review provider order for any additional goals.   Nurse to notify provider when observation goals have been met and patient is ready for discharge.

## 2020-02-12 NOTE — CONSULTS
GASTROENTEROLOGY CONSULTATION      Sp Plasencia  [address] [facility] [address]  44 year old male     Admission Date/Time: 2/11/2020  Primary Care Provider: Vinod Stewart     We were asked to see the patient in consultation by Dr. Nolasco for evaluation of liver cirrhosis, elevated liver enzymes.    CC: abdominal pain     HPI:  Sp Plasencia is a 44 year old male with past medical history of recently diagnosed liver cirrhosis felt secondary to alcohol, chronic alcohol use (sober over the last 6 weeks reportedly), admitted with lower abdominal pain and increasing abdominal distension.     Patient reportedly developed jaundice approximately 6 weeks ago. Reports imaging was done that showed ascites and liver cirrhosis. He was evaluated with Dr. Prater (gastroenterologist, non [facility]) and underwent biochemical workup for alternative causes for liver disease but alcohol was suspected as cause. He does not recall all the results but they did bring a copy of the labs that were drawn. This included iron levels, smooth muscle Ab, antimitochondrial Ab, hepatitis A/B/C serology. He underwent an EGD for variceal screening through Dr. Prater about 1 week ago. They brought a copy of the report showing small esophageal varices, too small to band and portal gastropathy. He was started on furosemide 20mg and spironolactone 50mg. Not following low sodium diet.     Patient reports he began experiencing progressively worsening abdominal pain recently, ongoing jaundice. Denies lower extremity edema, pruritis, rectal bleeding, melena, constipation, or confusion. He reports being sober for the last 6 weeks but has a 20 year history of consuming 1/2 liter of hard liquor a day.     Apparently bilirubin was in the 7 range in January. On presentation to the hospital it was significantly more elevated at 18.9 with elevated transaminases (alk phos 317, ) but notably normal ALT. INR elevated 1.56. HGB and platelets normal.  Creatinine is normal.     Abdominal ultrasound shows nodular appearing liver consistent with cirrhosis along with hepatomegaly, moderate splenomegaly, moderate to large volume ascites. 5.8L of ascitic removed on paracentesis 2/11. Fluid sent for gram stain/cell count. CT A/P with IV contrast showed similar findings as ultrasound with nodular liver, ascites, and splenomegaly. No liver lesions noted.        PAST MEDICAL HISTORY:  Patient Active Problem List    Diagnosis Date Noted     Ascites 02/11/2020     Priority: Medium     ASHWINI (acute kidney injury) (H) 01/04/2019     Priority: Medium     FSHD (facioscapulohumeral muscular dystrophy) (H) 12/11/2014     Priority: Medium          ROS: A comprehensive ten point review of systems was negative aside from those in mentioned in the HPI.       MEDICATIONS:   Prior to Admission medications    Medication Sig Start Date End Date Taking? Authorizing Provider   allopurinol (ZYLOPRIM) 300 MG tablet Take 300 mg by mouth daily   Yes Reported, Patient   esomeprazole (NEXIUM) 40 MG DR capsule Take 40 mg by mouth daily Take 30-60 minutes before eating.    Yes Unknown, Entered By History   furosemide (LASIX) 20 MG tablet Take 20 mg by mouth daily as needed   Yes Unknown, Entered By History   ibuprofen (ADVIL/MOTRIN) 200 MG tablet Take 400 mg by mouth every 6 hours as needed for mild pain   Yes Unknown, Entered By History   lisinopril (PRINIVIL/ZESTRIL) 40 MG tablet Take 40 mg by mouth daily as needed (if needed for high blood pressure)   Yes Unknown, Entered By History   Multiple Vitamin (DAILY MULTIVITAMIN PO) Take by mouth daily   Yes Reported, Patient   Omega-3 Fatty Acids (FISH OIL PO) Take 1 capsule by mouth daily   Yes Unknown, Entered By History   spironolactone (ALDACTONE) 50 MG tablet Take 50 mg by mouth daily   Yes Unknown, Entered By History        ALLERGIES: No Known Allergies     SOCIAL HISTORY:  Social History     Tobacco Use     Smoking status: Current Some Day Smoker  "    Packs/day: 1.00     Types: Cigarettes     Smokeless tobacco: Never Used   Substance Use Topics     Alcohol use: None     Drug use: None        FAMILY HISTORY:  No family history on file.     PHYSICAL EXAM:   /54 (BP Location: Right arm)   Pulse 96   Temp 96.8  F (36  C) (Oral)   Resp 16   Ht 1.854 m (6' 1\")   Wt 108.9 kg (240 lb)   SpO2 96%   BMI 31.66 kg/m       PHYSICAL EXAM:  General: alert, oriented, NAD  SKIN: no suspicious lesions, rashes, jaundice, or spider angiomas  HEAD: Normocephalic. No masses, lesions, tenderness or abnormalities  NECK: Neck supple. No adenopathy. Thyroid symmetric, normal size.  EYES: No scleral icterus  ENT: ENT exam normal, no neck nodes or sinus tenderness  RESPIRATORY: negative, Good diaphragmatic excursion. Lungs clear  CARDIOVASCULAR: negative, PMI normal. No lifts, heaves, or thrills. RRR. No murmurs, clicks gallops or rub  GASTROINTESTINAL: +BS, soft, NT, ND, no HSM, no masses/guarding/rebound  JOINT/EXTREMITIES: extremities normal- no gross deformities noted, gait normal and normal muscle tone  NEURO: Reflexes grossly normal and symmetric. Sensation grossly WNL.  PSYCH: no abnormal anxiety/depression  LYMPH: No anterior cervical, posterior cervical, or supraclavicular adenopathy     LABS:  I reviewed the patient's new clinical lab test results.   Recent Labs   Lab Test 02/12/20  0637 02/11/20  1057 01/05/19  0748   WBC 11.9* 11.9* 8.4   HGB 12.8* 14.3 14.6   MCV 91 93 93    205 185   INR  --  1.56*  --      Recent Labs   Lab Test 02/12/20  0637 02/11/20  1057 01/06/19  0558    133 134   POTASSIUM 3.7 3.5 5.0   CHLORIDE 105 101 101   CO2 24 25 27   BUN 20 18 29   ANIONGAP 7 7 6   JOSE CARLOS 8.6 9.2 8.5     Recent Labs   Lab Test 02/12/20  0637 02/11/20  1120 02/11/20  1057 01/04/19  1542 01/04/19  1415   ALBUMIN 1.9*  --  2.1*  --  4.1   BILITOTAL 16.4*  --  18.9*  --  1.4*   ALT 50  --  64  --  88*   *  --  177*  --  135*   ALKPHOS 271*  --  317*  " --  150   PROTEIN  --  20*  --  Negative  --    LIPASE  --   --  200  --   --         IMAGING  I personally reviewed the patient's new imaging results.    CT ABDOMEN AND PELVIS WITH CONTRAST 2/11/2020 12:13 PM     CLINICAL HISTORY: Abdominal pain. History of cirrhosis.     TECHNIQUE: CT scan of the abdomen and pelvis was performed following  injection of IV contrast. Multiplanar reformats were obtained. Dose  reduction techniques were used.  CONTRAST: 100 mL Isovue-370     COMPARISON: None.     FINDINGS:   LOWER CHEST: Degraded by respiratory motion. There is a small right  pleural effusion. Airspace consolidation with air bronchograms in the  right middle lobe.     HEPATOBILIARY: The liver is enlarged at 25.6 cm in length. There is  heterogeneous hepatic attenuation and a nodular surface, compatible  with cirrhosis. There is a moderate to large volume of ascites. No  focal liver lesions are demonstrated on this study performed with a  single phase of contrast enhancement. Portal venous collateral vessels  noted. The gallbladder is unremarkable.     PANCREAS: Normal.     SPLEEN: Enlarged at 18.8 cm in length.     ADRENAL GLANDS: Normal.     KIDNEYS/BLADDER: Normal.     BOWEL: Normal.     PELVIC ORGANS: Normal.     ADDITIONAL FINDINGS: None.     MUSCULOSKELETAL: Normal.                                                                      IMPRESSION:   1.  Airspace consolidation in the right middle lobe may represent  pneumonia.  2.  Enlarged, cirrhotic liver with a large volume of ascites. Portal  venous collaterals compatible with portal venous hypertension.  3.  Splenomegaly, likely secondary to portal venous hypertension.    ULTRASOUND ABDOMEN COMPLETE  2/11/2020 12:36 PM     HISTORY: Cirrhosis of the liver. Common duct dilatation reportedly  noted on outside ultrasound.     COMPARISON: CT of the abdomen and pelvis performed earlier today.     FINDINGS: Nodularity of the liver contour suggests cirrhosis of  the  liver. The liver also appears enlarged. The hepatic parenchyma is  heterogeneous; however, no focal hepatic lesions are identified. The  gallbladder is unremarkable, without evidence of cholelithiasis.  Patient is nontender over the gallbladder. No intra or extrahepatic  bile duct dilatation. The common duct could not be visualized in its  entirety. The pancreas is obscured by overlying bowel gas, and could  not be evaluated. The spleen is enlarged, measuring 15.9 cm in length.  Both kidneys are unremarkable. No hydronephrosis. Abdominal aorta and  IVC are segmentally seen, and are of normal caliber where visualized.  Moderate to large amount of ascites.                                                                      IMPRESSION:   1. Cirrhotic configuration of the liver and probable hepatomegaly.  2. Moderate splenomegaly is likely related to portal hypertension.  3. Moderate to large amount of ascites.  4. Nonvisualization of the pancreas.      US PARACENTESIS 2/11/2020 3:03 PM      HISTORY: High volume paracentesis with or without diagnostic fluid  analysis with labs to be drawn if ordered.     FINDINGS: Limited preprocedure ultrasound was performed, images show a  sufficient amount of ascites for paracentesis. An image was archived.  Written and oral informed consent was obtained. A pause for the cause  procedure to verify the correct patient and correct procedure. The  skin overlying the right lower quadrant was prepped and draped in the  usual sterile fashion. The subcutaneous tissues were anesthetized with  10 mL 1% lidocaine. Under direct ultrasound guidance the catheter was  advanced into the peritoneal space and 5.8 L of  straw colored fluid  was drained. The catheter was removed and a sterile dressing was  applied. There were no immediate complications. Ultrasound images were  permanently stored.  Patient left the ultrasound suite in satisfactory  condition.                                                                   IMPRESSION: Technically successful paracentesis without immediate  Complications.      Alcohol hepatitis scores:  AH Scores for your patient   Brianrachel Discriminant Function:48.1  Severe disease ?32    MELD: 24  Severe disease ?21    Prognosis   From Maddrey and Alka scores   28 day survival: 78% steroid treated, 52% untreated   84 day survival: 59% steroid treated, 38% untreated     From ABIC (steroid treated)   90 day survival: 67.5%     90 day survival by MELD   61%        CONSULTATION ASSESSMENT AND PLAN:    44 year old male with past medical history of recently diagnosed liver cirrhosis felt secondary to alcohol, chronic alcohol use (sober over the last 6 weeks reportedly), admitted with lower abdominal pain and increasing abdominal distension. Evaluation showing liver cirrhosis by imaging, ascites, and significantly elevated total bilirubin with AST>ALT pattern.     Has undergone outpatient evaluation with outside GI provider, Dr. Prater for liver disease with EGD approximately 1 week ago showing small esophageal varices, portal gastropathy and lab evaluation. Now s/p paracentesis.     1. Liver cirrhosis with signs of decompensation - ascites, portal gastropathy, esophageal varices. Clinical history most concerning for alcohol related cirrhosis. Total bilirubin has significantly elevated on admission at 18.9. Potentially this could still represent alcohol related hepatitis despite his reported sobriety over the last 6 weeks. Portal venous thrombosis also considered. DF 48, MELD 24.   --Is a candidate for steroid for treatment of ETOH hepatitis based on DF.   --Possible US with Doppler of hepatic vessels. Will discuss with Dr. Armendariz.   --Will need liver clinic outpatient close follow up after discharge. Patient has appt with [facility] for second opinion later this month. Otherwise, can follow up with Dr. Prater.   --Follow LFTs/INR.   --Continue to avoid alcohol.     2. Ascites likely  related to #1. Paracentesis 2/11 with 5.8 L removed. He did receive IV albumin x1. Creatinine normal. Previously started on low dose furosemide and spironolactone outpatient by Dr. Prater.   --Continue diuretics, but increase furosemide to 40mg/day and spironolactone to 100mg/day.   --Counseled on low sodium diet.   --Cell count pending to r/o SBP.   --Repeat paracentesis as needed.     3. Esophageal varices per endoscopy report. No signs of GI bleeding.     Dr. Armendariz with see patient independently and provide additional recommendations if needed.     Thank you for asking us to participate in the care of this patient.    Gabriella Nowak, PAC  [facility] ([facility])

## 2020-02-12 NOTE — PLAN OF CARE
"PRIMARY DIAGNOSIS: CIRRHOSIS OF LIVER/VARICES  OUTPATIENT/OBSERVATION GOALS TO BE MET BEFORE DISCHARGE:  ADLs back to baseline: Yes    Activity and level of assistance: Ambulating independently.    Pain status: Pain free.    Return to near baseline physical activity: Yes     Discharge Planner Nurse   Safe discharge environment identified: Yes  Barriers to discharge: No       Entered by: Leana Díaz 02/11/2020     /70   Pulse 94   Temp 97.3  F (36.3  C) (Oral)   Resp 18   Ht 1.854 m (6' 1\")   Wt 108.9 kg (240 lb)   SpO2 96%   BMI 31.66 kg/m      VSS and on room air. Patient had parecentesis 5800 pulled off abdomen. Patient up independent. Regular diet. ALbumin given, tolerated well. See Dr. Nolasco's note. Will continue to monitor and provide cares.     Please review provider order for any additional goals. Nurse to notify provider when observation goals have been met and patient is ready for discharge.  "

## 2020-02-16 ENCOUNTER — NURSE TRIAGE (OUTPATIENT)
Dept: NURSING | Facility: CLINIC | Age: 45
End: 2020-02-16

## 2020-02-16 ENCOUNTER — HOSPITAL ENCOUNTER (EMERGENCY)
Facility: CLINIC | Age: 45
Discharge: HOME | End: 2020-02-16
Attending: EMERGENCY MEDICINE | Admitting: EMERGENCY MEDICINE
Payer: COMMERCIAL

## 2020-02-16 VITALS
TEMPERATURE: 100 F | DIASTOLIC BLOOD PRESSURE: 89 MMHG | BODY MASS INDEX: 30.95 KG/M2 | WEIGHT: 234.57 LBS | OXYGEN SATURATION: 100 % | RESPIRATION RATE: 16 BRPM | SYSTOLIC BLOOD PRESSURE: 158 MMHG

## 2020-02-16 DIAGNOSIS — Z51.89 VISIT FOR WOUND CHECK: ICD-10-CM

## 2020-02-16 LAB
BACTERIA SPEC CULT: NO GROWTH
SPECIMEN SOURCE: NORMAL

## 2020-02-16 PROCEDURE — 12020 TX SUPFC WND DEHSN SMPL CLSR: CPT

## 2020-02-16 PROCEDURE — 99283 EMERGENCY DEPT VISIT LOW MDM: CPT | Mod: 25

## 2020-02-16 RX ORDER — LIDOCAINE HYDROCHLORIDE 10 MG/ML
INJECTION, SOLUTION INFILTRATION; PERINEURAL
Status: DISCONTINUED
Start: 2020-02-16 | End: 2020-02-16 | Stop reason: HOSPADM

## 2020-02-16 ASSESSMENT — ENCOUNTER SYMPTOMS
WOUND: 1
ABDOMINAL PAIN: 0

## 2020-02-16 NOTE — TELEPHONE ENCOUNTER
Wife is caller ; reports that patient had a paracentesis done at [facility] 4 days ago   Today is leaking fluid from site after  Glue has come loose;   Fluid is clear and straw colored without odor   Triage protocol reviewed   Patient is transferring acare  To [facility] but has not been seen yet    Post paracentesis discharge instructions  Provided in Epic master instructions cited;    When to seek medical advice  Call your healthcare provider if you have any of the following after the procedure:    A fever of 100.4 F (38 C) or higher    Trouble breathing    Pain that doesn't go away even after taking pain medicine    Belly pain not caused by having the skin punctured    Bleeding from the puncture site    More than a small amount of fluid leaking from the puncture site    Swollen belly    Signs of infection at the puncture site. These include increased pain, redness, or swelling, warmth, or bad-smelling drainage.    Blood in your urine    Feeling dizzy or lightheaded, or fainting   ----------------------------------    Advised that small amount as they describe is normal and may continue; Apply  Gauze over site to  Catch fluid   Travis lif  Amount increases greatly an or if Signs of infection     Follow up  With [facility] in 48 hrs as planned    Wife understands ane will  Call or go to ED for any new or worsening symptoms    Yesica Colindres RN  FNA         Additional Information    Negative: Sounds like a life-threatening emergency to the triager    [1] Post-op AND [2] incision symptoms or questions    Negative: [1] Major abdominal surgical incision AND [2] wound gaping open AND [3] visible internal organs    Negative: Sounds like a life-threatening emergency to the triager    Negative: [1] Bleeding from incision AND [2] won't stop after 10 minutes of direct pressure    Negative: [1] Widespread rash AND [2] bright red, sunburn-like    Negative: Severe pain in the incision    Negative: [1] Suture came out early AND [2] wound  gaping AND [3] < 48 hours since sutures placed    Negative: [1] Incision gaping open AND [2] length of opening > 2 inches (5 cm)    Negative: Patient sounds very sick or weak to the triager    Negative: Sounds like a serious complication to the triager    Negative: Fever > 100.5 F (38.1 C)    Negative: [1] Incision looks infected (spreading redness, pain) AND [2] fever > 99.5 F (37.5 C)    Negative: [1] Incision looks infected (spreading redness, pain) AND [2] large red area (> 2 in. or 5 cm)    Negative: [1] Incision looks infected (spreading redness, pain) AND [2] face wound    Negative: [1] Red streak runs from the incision AND [2] longer than 1 inch (2.5 cm)    Negative: [1] Pus or bad-smelling fluid draining from incision AND [2] no fever    Negative: [1] Post-op pain AND [2] not controlled with pain medications    Negative: Dressing soaked with blood or body fluid (e.g., drainage)    Negative: [1] Caller has URGENT question AND [2] triager unable to answer question    Negative: [1] Small red area or streak AND [2] no fever    Negative: [1] Clear or blood-tinged fluid draining from wound AND [2] no fever    Negative: [1] 48 hours since surgery AND [2] wound is becoming more tender    Negative: [1] Incision gaping open AND [2] on the face AND [3] > 48 hours since sutures placed    Negative: [1] Incision gaping open AND [3] length of opening > 1/2 inch (1 cm) AND [3] > 48 hours since sutures placed    Negative: Suture or staple removal is overdue    Negative: [1] Suture or staple came out early AND [2] caller wants wound checked    Negative: [1] Caller has NON-URGENT question AND [2] triager unable to answer question    Negative: Pimple where a stitch comes through the skin    Negative: Suture or staple came out early    Negative: Suture removal date, questions about    Negative: Skin tape (e.g., Steri-strips) removal, questions about    Negative: Sutured or stapled surgical incision, questions about    Negative:  Surgical incision after sutures or staples removed, questions about    Negative: Numbness at incision site, questions about    Negative: Wet to dry dressing (packing), questions about    Commented on: All Negative - Home Care     Leaking from paracentesis  site    Protocols used: POST-HOSPITALIZATION FOLLOW-UP CALL-A-AH, POST-OP INCISION SYMPTOMS-A-AH

## 2020-02-17 LAB
BACTERIA SPEC CULT: NO GROWTH
BACTERIA SPEC CULT: NO GROWTH
SPECIMEN SOURCE: NORMAL
SPECIMEN SOURCE: NORMAL

## 2020-02-17 NOTE — ED PROVIDER NOTES
History     Chief Complaint:    Wound Check     HPI   Sp Plasencia is a 44 year old male who presents with wound check. Per chart review the patient was admitted on 02/11/2020 for abdominal pain, which was related to his liver cirrhosis. Results listed below. He reports to have had serous drainage and left his bandage on until removing it yesterday . He states that it has continued to drain since removal of the bandage. He notes that his pain and discomfort is far lower than before the drainage.        02/11/2020 Diagnosis from ED  1.  Abdominal pain in the setting of newly diagnosed liver cirrhosis.   2.  Significant development of intra-abdominal ascites contributing to #1, status post paracentesis of 5800 cc removal  3.  Cultures negative for intra-abdominal infection  4.  Rising bilirubin in the setting of liver disease, initiating steroid treatment as per GI.        Allergies:  No Known Drug Allergies    Medications:    Zyloprim  Nexium   Lasix  Atarax  Prinivil   Deltasone  Aldactone  Ultram   Ambien     Past Medical History:    Acid reflux  Hypertension   (SH) Muscular dystrophy   Ascites  Acute kidney injury  Liver cirrhosis     Past Surgical History:    Surgical history reviewed. No pertinent surgical history.     Family History:    Family history reviewed. No pertinent family history.     Social History:  The patient was accompanied to the ED by his wife.  Smoking Status: Current Everyday Smoker   Type: Cigarettes    Packs/Day: 1.00  Smokeless Tobacco: Never Used  PCP: Vinod Stewart  Marital Status:       Review of Systems   Gastrointestinal: Negative for abdominal pain.   Skin: Positive for wound.         Physical Exam   First Vitals:   Patient Vitals for the past 24 hrs:   BP Temp Temp src Heart Rate Resp SpO2 Weight   02/16/20 1854 (!) 158/89 100  F (37.8  C) Tympanic 91 16 100 % 106.4 kg (234 lb 9.1 oz)       Physical Exam  Constitutional:       Appearance: He is well-developed.    Cardiovascular:      Rate and Rhythm: Normal rate and regular rhythm.      Heart sounds: Normal heart sounds. No murmur. No friction rub. No gallop.    Pulmonary:      Effort: Pulmonary effort is normal. No respiratory distress.      Breath sounds: Normal breath sounds. No wheezing or rales.   Abdominal:      General: Bowel sounds are normal. There is distension.      Palpations: Abdomen is soft. There is no mass.      Tenderness: There is no abdominal tenderness.      Comments: RLQ puncture wound draining yellow fluid.   Skin:     General: Skin is warm and dry.      Findings: No rash.   Neurological:      Mental Status: He is alert.   Psychiatric:         Mood and Affect: Mood normal.           Emergency Department Course     [facility]    -Laceration Repair  Date/Time: 2/16/2020 8:37 PM  Performed by: Toyin Pearson MD  Authorized by: Toyin Pearson MD       ANESTHESIA (see MAR for exact dosages):     Anesthesia method:  Local infiltration    Local anesthetic:  Lidocaine 1% WITH epi  LACERATION DETAILS     Location:  Trunk    Trunk location:  RLQ abd    Length (cm):  0.5    REPAIR TYPE:     Repair type:  Simple        TREATMENT:     Area cleansed with:  Betadine    Amount of cleaning:  Standard    SKIN REPAIR     Repair method:  Sutures    Suture size:  4-0    Wound skin closure material used: vicryl.    Suture technique:  Simple interrupted (2)    APPROXIMATION     Approximation:  Close    POST-PROCEDURE DETAILS     Dressing:  Non-adherent dressing      PROCEDURE   Patient Tolerance:  Patient tolerated the procedure well with no immediate complications              Emergency Department Course:    1904 Nursing notes and vitals reviewed.    1918 I performed an exam of the patient as documented above.     2000 I performed the wound closure procedure as documented above.    2058 Findings and plan explained to the Patient. Patient discharged home with instructions regarding supportive care,  medications, and reasons to return. The importance of close follow-up was reviewed.       Impression & Plan     Medical Decision Making:  Patient came in for continual leakage through repair periorchitis of abdomen. I did place two micro 4x4 stiches there and across pattern to stop the oozing. Initial first stitch did not stop it completely. We then placed 2 sutures over it and made sure that it stopped oozing afterwards. The patient is comfortable with the plan to follow up. I did instruct him that this is an absorbable suture so it does not need to come out and he is to keep the dressing on until tomorrow. He already has follow up with primary doctor as well as his a new GI physician. He is asked to return with any concerns.      Diagnosis:    ICD-10-CM    1. Visit for wound check Z51.89        Disposition:  The patient is discharged to home.      Scribe Disclosure:  I, Andre Dumont, am serving as a scribe at 7:41 PM on 2/16/2020 to document services personally performed by Toyin Pearson MD based on my observations and the provider's statements to me.        [facility] EMERGENCY DEPARTMENT       Toyin Pearson MD  02/17/20 0035

## 2020-02-17 NOTE — ED TRIAGE NOTES
Pt was here Tuesday for a paracentesis.  Removed the bandage as directed yesterday and since then pt having leaking from wound site.  Has soaked several shirts and towels.

## 2020-02-18 ENCOUNTER — OFFICE VISIT (OUTPATIENT)
Dept: INTERNAL MEDICINE | Facility: CLINIC | Age: 45
End: 2020-02-18
Payer: COMMERCIAL

## 2020-02-18 VITALS
WEIGHT: 235 LBS | HEIGHT: 73 IN | HEART RATE: 73 BPM | BODY MASS INDEX: 31.14 KG/M2 | SYSTOLIC BLOOD PRESSURE: 102 MMHG | RESPIRATION RATE: 18 BRPM | OXYGEN SATURATION: 100 % | DIASTOLIC BLOOD PRESSURE: 50 MMHG | TEMPERATURE: 98 F

## 2020-02-18 DIAGNOSIS — K70.31 ASCITES DUE TO ALCOHOLIC CIRRHOSIS (H): Primary | ICD-10-CM

## 2020-02-18 DIAGNOSIS — R49.0 HOARSE VOICE QUALITY: ICD-10-CM

## 2020-02-18 DIAGNOSIS — R18.8 OTHER ASCITES: ICD-10-CM

## 2020-02-18 DIAGNOSIS — H93.8X2 EAR PRESSURE, LEFT: ICD-10-CM

## 2020-02-18 LAB
BACTERIA SPEC CULT: NORMAL
Lab: NORMAL
SPECIMEN SOURCE: NORMAL

## 2020-02-18 PROCEDURE — 99495 TRANSJ CARE MGMT MOD F2F 14D: CPT | Performed by: INTERNAL MEDICINE

## 2020-02-18 PROCEDURE — 80053 COMPREHEN METABOLIC PANEL: CPT | Performed by: INTERNAL MEDICINE

## 2020-02-18 PROCEDURE — 36415 COLL VENOUS BLD VENIPUNCTURE: CPT | Performed by: INTERNAL MEDICINE

## 2020-02-18 RX ORDER — TRAMADOL HYDROCHLORIDE 300 MG/1
1 CAPSULE ORAL EVERY 24 HOURS
COMMUNITY
End: 2020-02-18

## 2020-02-18 RX ORDER — LISINOPRIL 40 MG/1
40 TABLET ORAL
Status: ON HOLD | COMMUNITY
Start: 2016-09-09 | End: 2020-04-08

## 2020-02-18 RX ORDER — HYDROXYZINE HYDROCHLORIDE 25 MG/1
25-50 TABLET, FILM COATED ORAL EVERY 6 HOURS PRN
Qty: 90 TABLET | Refills: 0 | Status: ON HOLD | OUTPATIENT
Start: 2020-02-18 | End: 2020-04-10

## 2020-02-18 RX ORDER — ALLOPURINOL 300 MG/1
300 TABLET ORAL
Status: ON HOLD | COMMUNITY
Start: 2016-04-11 | End: 2020-04-08

## 2020-02-18 ASSESSMENT — MIFFLIN-ST. JEOR: SCORE: 2014.83

## 2020-02-18 NOTE — NURSING NOTE
"/40   Pulse 73   Temp 98  F (36.7  C) (Oral)   Resp 18   Ht 1.854 m (6' 1\")   Wt 106.6 kg (235 lb)   SpO2 100%   BMI 31.00 kg/m    Patient is being seen for a hospital follow up for liver problems.  "

## 2020-02-18 NOTE — PROGRESS NOTES
Subjective     Sp Plasencia is a 44 year old male who presents to clinic with his wife today for the following health issues:    Patient is present with his wife today.  Patient is being seen for a hospital follow up for liver problems.  HPI     Hospital Follow-up Visit:    Hospital/Nursing Home/IP Rehab Facility: [facility]  Date of Admission: 02/11/2020  Date of Discharge: 02/12/2020  Reason(s) for Admission: liver problems            Problems taking medications regularly:  None       Medication changes since discharge:     Details   hydrOXYzine (ATARAX) 25 MG tablet Take 1-2 tablets (25-50 mg) by mouth every 6 hours as needed for itching or other (adjuvant pain)  Qty: 30 tablet, Refills: 0     Associated Diagnoses: Other ascites       predniSONE (DELTASONE) 20 MG tablet Take 2 tablets (40 mg) by mouth daily  Qty: 40 tablet, Refills: 0     Associated Diagnoses: Other ascites       traMADol (ULTRAM) 50 MG tablet Take 0.5-1 tablets (25-50 mg) by mouth every 6 hours as needed for moderate pain  Qty: 30 tablet, Refills: 0     Associated Diagnoses: Other ascites            Problems adhering to non-medication therapy:  None    Summary of hospitalization:  [facility] discharge summary reviewed  Diagnostic Tests/Treatments reviewed.  Follow up needed: Gastroenterology   Other Healthcare Providers Involved in Patient s Care:         None  Update since discharge: Stable.     Post Discharge Medication Reconciliation: discharge medications reconciled and changed, per note/orders (see AVS).  Plan of care communicated with patient and family     Coding guidelines for this visit:  Type of Medical   Decision Making Face-to-Face Visit       within 7 Days of discharge Face-to-Face Visit        within 14 days of discharge   Moderate Complexity [id number]   High Complexity [id number]          Patient here for hospital follow-up due to abdominal pain in the setting of newly diagnosed liver cirrhosis. He  "underwent paracentesis of 5800 cc removal due to significant development of intra-abdominal ascites. His diuretics were adjusted and he started steroids. He returned to the emergency room on  due to wound check and had two stiches placed.     Wt Readings from Last 4 Encounters:   20 106.6 kg (235 lb)   20 106.4 kg (234 lb 9.1 oz)   20 108.9 kg (240 lb)   19 110.9 kg (244 lb 6.4 oz)     Patient has lost fifteen pounds since the paracentesis. He continues to have a lot of generalized abdominal pain. Earlier today, the pain was at a 9.5/10. Currently, the pain is at a 6/10. In addition, his skin is very itchy, which makes it difficult to sleep.     Prior to his diagnosis of liver cirrhosis, he was drinking a \"fair amount.\" He has stopped drinking and is doing well with this.  Denies tylenol use. Of note, his father  two weeks ago due to liver and kidney failure.     BP Readings from Last 3 Encounters:   20 102/50   20 (!) 158/89   20 92/42     Patient with low blood pressure today. Of note, he restarted the lisinopril on his own due to elevated blood pressure.     Ear Pressure   Patient's left ear feels plugged for fourteen hours per day. In addition, he has a hoarse voice. These symptoms started the same time of his liver issues.     Recent Labs   Lab Test 20  0637 20  1057  19  1415  12/10/18   ALT 50 64  --  88*  --  69   CR 1.05 1.17   < > 1.41*   < > 0.5*   GFRESTIMATED 86 75   < > 61  --   --    GFRESTBLACK >90 87   < > 70  --   --    POTASSIUM 3.7 3.5   < > 5.4*   < > 3.5   TSH  --   --   --   --   --  2.100    < > = values in this interval not displayed.      Reviewed and updated as needed this visit by Provider  Meds         Review of Systems   ROS COMP: CONSTITUTIONAL:POSITIVE  for weight loss   INTEGUMENTARY/SKIN: POSITIVE for itchiness   ENT/MOUTH: POSITIVE for left ear pain and voice changes   GI: as noted above, POSITIVE for abdominal " "pain   PSYCHIATRIC: NEGATIVE for changes in mood or affect    This document serves as a record of the services and decisions personally performed and made by Deanna Barahona MD. It was created on her behalf by Franny Gonzalez, a trained medical scribe. The creation of this document is based on the provider's statements to the medical scribe.  Franny Gonzalez 2:55 PM February 18, 2020      Objective    /50   Pulse 73   Temp 98  F (36.7  C) (Oral)   Resp 18   Ht 1.854 m (6' 1\")   Wt 106.6 kg (235 lb)   SpO2 100%   BMI 31.00 kg/m    Body mass index is 31 kg/m .  Physical Exam   GENERAL: healthy, alert and no distress  HENT: ear canals and TM's normal, nose and mouth without ulcers or lesions  NECK: no adenopathy, no asymmetry, masses, or scars  RESP: lungs clear to auscultation - no rales, rhonchi or wheezes  CV: regular rate and rhythm, normal S1 S2, no S3 or S4, no murmur, click or rub, no peripheral edema and peripheral pulses strong  ABDOMEN: soft, nontender, no hepatosplenomegaly, no masses and bowel sounds normal  SKIN: jaundice    Diagnostic Test Results:  Labs reviewed in Epic  No results found for this or any previous visit (from the past 24 hour(s)).        Assessment & Plan     (K70.31) Ascites due to alcoholic cirrhosis (H)  (primary encounter diagnosis)  Comment: Patient has a follow-up scheduled with gastroenterology. Rechecking labs today.  Plan: Comprehensive metabolic panel  -counseled patient to monitor his weight for fluid gain  Monitor for worsening abdominal pain, fevers, chills  -recommend hydroxyzine for itching  -congratulated patient on his sobriety and told him to contact our clinic if he needs help staying sober          (H93.8X2) Ear pressure, left  Comment: Normal exam today. Referred to ENT.   Plan: OTOLARYNGOLOGY REFERRAL          (R49.0) Hoarse voice quality  Comment: Normal exam today. Referred to ENT.   Plan: OTOLARYNGOLOGY REFERRAL          (R18.8) Other " "ascites  Comment/Plan: hydrOXYzine 25 MG PO tablet     Recommended he take the Ambien to help with sleep. Recommended patient HOLD the lisinopril, which he restarted on his own, and continue to monitor his blood pressure at home. Information provided about counseling.        Tobacco Cessation:   reports that he has been smoking cigarettes. He has been smoking about 1.00 pack per day. He has never used smokeless tobacco.  Tobacco Cessation Action Plan: Information offered: Patient not interested at this time      BMI:   Estimated body mass index is 31 kg/m  as calculated from the following:    Height as of this encounter: 1.854 m (6' 1\").    Weight as of this encounter: 106.6 kg (235 lb).   Weight management plan: Discussed healthy diet and exercise guidelines    FUTURE APPOINTMENTS:       - Follow-up visit in:   Return in about 6 months (around 8/18/2020) for Routine Visit.    The information in this document, created by the medical scribe for me, accurately reflects the services I personally performed and the decisions made by me. I have reviewed and approved this document for accuracy prior to leaving the patient care area.  February 18, 2020 3:19 PM    Deanna Barahona MD  [facility]        "

## 2020-02-19 LAB
ALBUMIN SERPL-MCNC: 2.3 G/DL (ref 3.4–5)
ALP SERPL-CCNC: 336 U/L (ref 40–150)
ALT SERPL W P-5'-P-CCNC: 83 U/L (ref 0–70)
ANION GAP SERPL CALCULATED.3IONS-SCNC: 9 MMOL/L (ref 3–14)
AST SERPL W P-5'-P-CCNC: 191 U/L (ref 0–45)
BILIRUB SERPL-MCNC: 14.3 MG/DL (ref 0.2–1.3)
BUN SERPL-MCNC: 18 MG/DL (ref 7–30)
CALCIUM SERPL-MCNC: 9.4 MG/DL (ref 8.5–10.1)
CHLORIDE SERPL-SCNC: 98 MMOL/L (ref 94–109)
CO2 SERPL-SCNC: 25 MMOL/L (ref 20–32)
CREAT SERPL-MCNC: 0.93 MG/DL (ref 0.66–1.25)
GFR SERPL CREATININE-BSD FRML MDRD: >90 ML/MIN/{1.73_M2}
GLUCOSE SERPL-MCNC: 107 MG/DL (ref 70–99)
POTASSIUM SERPL-SCNC: 4.1 MMOL/L (ref 3.4–5.3)
PROT SERPL-MCNC: 7.8 G/DL (ref 6.8–8.8)
SODIUM SERPL-SCNC: 132 MMOL/L (ref 133–144)

## 2020-02-20 ENCOUNTER — TELEPHONE (OUTPATIENT)
Dept: INTERNAL MEDICINE | Facility: CLINIC | Age: 45
End: 2020-02-20

## 2020-02-20 NOTE — TELEPHONE ENCOUNTER
Patient called to get the lab results from 2/18/20. He said it is ok to share these with his wife also because she helps him keep track of his medical stuff.

## 2020-02-26 ENCOUNTER — TRANSFERRED RECORDS (OUTPATIENT)
Dept: HEALTH INFORMATION MANAGEMENT | Facility: CLINIC | Age: 45
End: 2020-02-26

## 2020-02-26 ENCOUNTER — MEDICAL CORRESPONDENCE (OUTPATIENT)
Dept: HEALTH INFORMATION MANAGEMENT | Facility: CLINIC | Age: 45
End: 2020-02-26

## 2020-02-27 ENCOUNTER — MYC MEDICAL ADVICE (OUTPATIENT)
Dept: INTERNAL MEDICINE | Facility: CLINIC | Age: 45
End: 2020-02-27

## 2020-03-02 ENCOUNTER — TRANSFERRED RECORDS (OUTPATIENT)
Dept: HEALTH INFORMATION MANAGEMENT | Facility: CLINIC | Age: 45
End: 2020-03-02

## 2020-03-30 ENCOUNTER — MEDICAL CORRESPONDENCE (OUTPATIENT)
Dept: HEALTH INFORMATION MANAGEMENT | Facility: CLINIC | Age: 45
End: 2020-03-30

## 2020-03-31 DIAGNOSIS — K70.9: Primary | ICD-10-CM

## 2020-04-01 ENCOUNTER — HOSPITAL ENCOUNTER (OUTPATIENT)
Dept: LAB | Facility: CLINIC | Age: 45
End: 2020-04-01
Attending: INTERNAL MEDICINE
Payer: COMMERCIAL

## 2020-04-01 ENCOUNTER — HOSPITAL ENCOUNTER (OUTPATIENT)
Facility: CLINIC | Age: 45
Discharge: HOME | End: 2020-04-01
Admitting: PHYSICIAN ASSISTANT
Payer: COMMERCIAL

## 2020-04-01 ENCOUNTER — HOSPITAL ENCOUNTER (OUTPATIENT)
Dept: ULTRASOUND IMAGING | Facility: CLINIC | Age: 45
End: 2020-04-01
Attending: INTERNAL MEDICINE
Payer: COMMERCIAL

## 2020-04-01 VITALS
SYSTOLIC BLOOD PRESSURE: 128 MMHG | OXYGEN SATURATION: 94 % | HEART RATE: 109 BPM | RESPIRATION RATE: 16 BRPM | TEMPERATURE: 97.8 F | DIASTOLIC BLOOD PRESSURE: 46 MMHG

## 2020-04-01 DIAGNOSIS — K70.9: ICD-10-CM

## 2020-04-01 DIAGNOSIS — K70.9 ALCOHOLIC LIVER DISEASE (H): ICD-10-CM

## 2020-04-01 LAB
AFP SERPL-MCNC: 3.5 UG/L (ref 0–8)
ALBUMIN FLD-MCNC: 0.2 G/DL
ALBUMIN SERPL-MCNC: 2.2 G/DL (ref 3.4–5)
ALP SERPL-CCNC: 414 U/L (ref 40–150)
ALT SERPL W P-5'-P-CCNC: 66 U/L (ref 0–70)
ANION GAP SERPL CALCULATED.3IONS-SCNC: 7 MMOL/L (ref 3–14)
APPEARANCE FLD: NORMAL
AST SERPL W P-5'-P-CCNC: 135 U/L (ref 0–45)
BILIRUB SERPL-MCNC: 20.7 MG/DL (ref 0.2–1.3)
BUN SERPL-MCNC: 18 MG/DL (ref 7–30)
CALCIUM SERPL-MCNC: 8.4 MG/DL (ref 8.5–10.1)
CHLORIDE SERPL-SCNC: 98 MMOL/L (ref 94–109)
CO2 SERPL-SCNC: 24 MMOL/L (ref 20–32)
COLOR FLD: YELLOW
CREAT SERPL-MCNC: 1 MG/DL (ref 0.66–1.25)
ERYTHROCYTE [DISTWIDTH] IN BLOOD BY AUTOMATED COUNT: 16.6 % (ref 10–15)
GFR SERPL CREATININE-BSD FRML MDRD: >90 ML/MIN/{1.73_M2}
GLUCOSE SERPL-MCNC: 110 MG/DL (ref 70–99)
HCT VFR BLD AUTO: 37.7 % (ref 40–53)
HGB BLD-MCNC: 13.4 G/DL (ref 13.3–17.7)
INR PPP: 1.64 (ref 0.86–1.14)
LYMPHOCYTES NFR FLD MANUAL: 35 %
MCH RBC QN AUTO: 32.2 PG (ref 26.5–33)
MCHC RBC AUTO-ENTMCNC: 35.5 G/DL (ref 31.5–36.5)
MCV RBC AUTO: 91 FL (ref 78–100)
MONOS+MACROS NFR FLD MANUAL: 16 %
NEUTS BAND NFR FLD MANUAL: 17 %
OTHER CELLS FLD MANUAL: 32 %
PLATELET # BLD AUTO: 180 10E9/L (ref 150–450)
POTASSIUM SERPL-SCNC: 3.7 MMOL/L (ref 3.4–5.3)
PROT SERPL-MCNC: 6.3 G/DL (ref 6.8–8.8)
RBC # BLD AUTO: 4.16 10E12/L (ref 4.4–5.9)
SODIUM SERPL-SCNC: 129 MMOL/L (ref 133–144)
SPECIMEN SOURCE FLD: NORMAL
SPECIMEN SOURCE FLD: NORMAL
WBC # BLD AUTO: 11.5 10E9/L (ref 4–11)
WBC # FLD AUTO: 186 /UL

## 2020-04-01 PROCEDURE — 25000128 H RX IP 250 OP 636: Performed by: INTERNAL MEDICINE

## 2020-04-01 PROCEDURE — 87070 CULTURE OTHR SPECIMN AEROBIC: CPT

## 2020-04-01 PROCEDURE — 82103 ALPHA-1-ANTITRYPSIN TOTAL: CPT | Performed by: INTERNAL MEDICINE

## 2020-04-01 PROCEDURE — 89051 BODY FLUID CELL COUNT: CPT

## 2020-04-01 PROCEDURE — 25000125 ZZHC RX 250: Performed by: PHYSICIAN ASSISTANT

## 2020-04-01 PROCEDURE — 82105 ALPHA-FETOPROTEIN SERUM: CPT | Performed by: INTERNAL MEDICINE

## 2020-04-01 PROCEDURE — 36415 COLL VENOUS BLD VENIPUNCTURE: CPT | Performed by: INTERNAL MEDICINE

## 2020-04-01 PROCEDURE — 49083 ABD PARACENTESIS W/IMAGING: CPT

## 2020-04-01 PROCEDURE — 40000863 ZZH STATISTIC RADIOLOGY XRAY, US, CT, MAR, NM

## 2020-04-01 PROCEDURE — 85610 PROTHROMBIN TIME: CPT | Performed by: INTERNAL MEDICINE

## 2020-04-01 PROCEDURE — 80053 COMPREHEN METABOLIC PANEL: CPT | Performed by: INTERNAL MEDICINE

## 2020-04-01 PROCEDURE — P9047 ALBUMIN (HUMAN), 25%, 50ML: HCPCS | Performed by: INTERNAL MEDICINE

## 2020-04-01 PROCEDURE — 85027 COMPLETE CBC AUTOMATED: CPT | Performed by: INTERNAL MEDICINE

## 2020-04-01 PROCEDURE — 99207 ZZC NON-BILLABLE SERV PER CHARTING: CPT | Performed by: NURSE PRACTITIONER

## 2020-04-01 PROCEDURE — 82042 OTHER SOURCE ALBUMIN QUAN EA: CPT

## 2020-04-01 RX ORDER — NICOTINE POLACRILEX 4 MG
15-30 LOZENGE BUCCAL
Status: CANCELLED | OUTPATIENT
Start: 2020-04-01

## 2020-04-01 RX ORDER — LIDOCAINE 40 MG/G
CREAM TOPICAL
Status: DISCONTINUED | OUTPATIENT
Start: 2020-04-01 | End: 2020-04-01 | Stop reason: HOSPADM

## 2020-04-01 RX ORDER — ALBUMIN (HUMAN) 12.5 G/50ML
25 SOLUTION INTRAVENOUS ONCE
Status: COMPLETED | OUTPATIENT
Start: 2020-04-01 | End: 2020-04-01

## 2020-04-01 RX ORDER — NICOTINE POLACRILEX 4 MG
15-30 LOZENGE BUCCAL
Status: DISCONTINUED | OUTPATIENT
Start: 2020-04-01 | End: 2020-04-01 | Stop reason: HOSPADM

## 2020-04-01 RX ORDER — DEXTROSE MONOHYDRATE 25 G/50ML
25-50 INJECTION, SOLUTION INTRAVENOUS
Status: DISCONTINUED | OUTPATIENT
Start: 2020-04-01 | End: 2020-04-01 | Stop reason: HOSPADM

## 2020-04-01 RX ORDER — DEXTROSE MONOHYDRATE 25 G/50ML
25-50 INJECTION, SOLUTION INTRAVENOUS
Status: CANCELLED | OUTPATIENT
Start: 2020-04-01

## 2020-04-01 RX ORDER — LIDOCAINE HYDROCHLORIDE 10 MG/ML
10 INJECTION, SOLUTION EPIDURAL; INFILTRATION; INTRACAUDAL; PERINEURAL ONCE
Status: COMPLETED | OUTPATIENT
Start: 2020-04-01 | End: 2020-04-01

## 2020-04-01 RX ORDER — ALBUMIN (HUMAN) 12.5 G/50ML
12.5 SOLUTION INTRAVENOUS ONCE
Status: CANCELLED | OUTPATIENT
Start: 2020-04-01 | End: 2020-04-01

## 2020-04-01 RX ORDER — ALBUMIN (HUMAN) 12.5 G/50ML
12.5 SOLUTION INTRAVENOUS ONCE
Status: DISCONTINUED | OUTPATIENT
Start: 2020-04-01 | End: 2020-04-01 | Stop reason: HOSPADM

## 2020-04-01 RX ORDER — LIDOCAINE 40 MG/G
CREAM TOPICAL
Status: CANCELLED | OUTPATIENT
Start: 2020-04-01

## 2020-04-01 RX ADMIN — ALBUMIN HUMAN 25 G: 0.25 SOLUTION INTRAVENOUS at 16:00

## 2020-04-01 RX ADMIN — ALBUMIN HUMAN 25 G: 0.25 SOLUTION INTRAVENOUS at 15:41

## 2020-04-01 RX ADMIN — LIDOCAINE HYDROCHLORIDE 10 ML: 10 INJECTION, SOLUTION EPIDURAL; INFILTRATION; INTRACAUDAL; PERINEURAL at 14:51

## 2020-04-01 NOTE — DISCHARGE INSTRUCTIONS

## 2020-04-01 NOTE — PROGRESS NOTES
"Care Suites Procedure Nursing Note    Patient Information  Name: Sp Plasencia  Age: 44 year old    Procedure  Procedure: Paracentesis   Procedure start time: 1410  Procedure complete time:   Concerns/abnormal assessment: Increased ascites per patient. Abdomen \"tight\"   INR 1.64   Plts.180  Plan/Other: Paracentesis as ordered- Albumin PRN.     Nivia Lomeli RN     "

## 2020-04-01 NOTE — PROGRESS NOTES
Care Suites Procedure Nursing Note    Patient Information  Name: Sp Plasencia  Age: 44 year old    Procedure  Procedure: Paracentesis  Procedure start time: 14:55  Procedure complete time: 15:18  Concerns/abnormal assessment: None    Pt tolerated well. VSS. 6,000 cc austin fluid removed from abdomen w/o difficulty. Dermabond and bandaid applied to site - CDI.     Will give Albumin 50 g per [facility] order.    Pt back to Care Suites. Detailed report given to RN.

## 2020-04-01 NOTE — PROGRESS NOTES
Patient fell/tripped on his way out of the hospital.  I was right beside him and he fell onto his knees and hands.  He said he has muscular dystrophy and has trouble with his left foot at times.  He had declined a wheel chair ride when I asked him earlier.  Nurse practitioner came to assess patient (please see note).  Patient said he felt fine, was not dizzy.  Discharged patient via wheelchair to his wife's vehicle.

## 2020-04-01 NOTE — PROGRESS NOTES
Care Suites Admission Nursing Note    Patient Information  Name: Sp Plasencia  Age: 44 year old  Reason for admission: Paracentesis  Care Suites arrival time: ~1300    Patient Admission/Assessment   Pre-procedure assessment complete: Yes  If abnormal assessment/labs, provider notified: No  NPO: N/A  Medications held per instructions/orders: Yes  Consent: deferred  Patient oriented to room: Yes  Education/questions answered: Yes, discharge instructions reviewed and questions answered.    Plan/other: Procedure ~1400    Discharge Planning  Accompanied by:Spouse-waiting here in the car  Discharge location: [place]    Gricelda Fields RN         PATIENT WELLNESS SCREENING      1. In the last month, have you been in contact with someone who was confirmed or suspected to have Coronavirus/COVID-19? No    2. Do you have the following symptoms?   Fever? No   Cough? No   Shortness of breath? No   Skin rash? No              Nausea, vomiting, diarrhea? No    3. Have you traveled internationally in the last month? No

## 2020-04-01 NOTE — PROGRESS NOTES
Care Suites Discharge Nursing Note    Patient Information  Name: Sp Plasencia  Age: 44 year old    Discharge Education:  Discharge instructions reviewed: Yes  Patient/patient representative verbalizes understanding: Yes  Patient discharging on new medications: No  Medication education completed: Yes    Discharge Plans:   Discharge location: home  Discharge ride contacted: Yes  Approximate discharge time: 16:20    Discharge Criteria:  Discharge criteria met and vital signs stable: Yes    Patient Belongs:  Patient belongings returned to patient: Yes

## 2020-04-01 NOTE — PROVIDER NOTIFICATION
Called Dr. Mcnally's office to give an update on labs ordered. Genia the pt coordinator used the communication messenger to alert Dr. Mcnally the bilirubin results.

## 2020-04-01 NOTE — PROGRESS NOTES
Brief House NP Note  4/1/2020  9996    This patient was in the care suites post paracentesis (6L removed, w/ albumin infused post procedure). He was transferring to the wheelchair on discharge when he stumbled (per patient, d/t his underlying muscular dystrophy he typically has issues with his left foot). He kneeled down onto his knees, did not lose consciousness, nor hit his head. Nursing was beside the patient when he fell. He denies any preceding dizziness/lightheadedness, chest pain, dyspnea.     On my arrival, the patient is alert, awake, conversant with no injuries or pain. He deferred a formal exam and opted to leave (wife was waiting to drive home). I offered the patient evaluated in the ED, which he declined. He was able to easily to stand and ambulated independently to the wheelchair prior to leaving. I encouraged the patient to seek emergency care should any injuries become apparent later in the evening.     Non billing visit.      YVONNE Kumari [facility] [place]  Hospitalist Service  House Officer  Pager: [phone] (5o - 6p)

## 2020-04-02 LAB — A1AT SERPL-MCNC: 257 MG/DL (ref 90–200)

## 2020-04-06 LAB
BACTERIA SPEC CULT: NO GROWTH
SPECIMEN SOURCE: NORMAL

## 2020-04-08 ENCOUNTER — HOSPITAL ENCOUNTER (INPATIENT)
Facility: CLINIC | Age: 45
LOS: 2 days | Discharge: HOME | DRG: 433 | End: 2020-04-10
Attending: EMERGENCY MEDICINE | Admitting: INTERNAL MEDICINE
Payer: COMMERCIAL

## 2020-04-08 DIAGNOSIS — G71.02 FSHD (FACIOSCAPULOHUMERAL MUSCULAR DYSTROPHY) (H): ICD-10-CM

## 2020-04-08 DIAGNOSIS — R11.2 NON-INTRACTABLE VOMITING WITH NAUSEA, UNSPECIFIED VOMITING TYPE: ICD-10-CM

## 2020-04-08 DIAGNOSIS — K76.82 HEPATIC ENCEPHALOPATHY (H): ICD-10-CM

## 2020-04-08 DIAGNOSIS — R18.8 OTHER ASCITES: ICD-10-CM

## 2020-04-08 LAB
ALBUMIN SERPL-MCNC: 2.5 G/DL (ref 3.4–5)
ALP SERPL-CCNC: 450 U/L (ref 40–150)
ALT SERPL W P-5'-P-CCNC: 65 U/L (ref 0–70)
AMMONIA PLAS-SCNC: 182 UMOL/L (ref 10–50)
ANION GAP SERPL CALCULATED.3IONS-SCNC: 9 MMOL/L (ref 3–14)
AST SERPL W P-5'-P-CCNC: 133 U/L (ref 0–45)
BASOPHILS # BLD AUTO: 0.1 10E9/L (ref 0–0.2)
BASOPHILS NFR BLD AUTO: 0.8 %
BILIRUB SERPL-MCNC: 24.9 MG/DL (ref 0.2–1.3)
BUN SERPL-MCNC: 39 MG/DL (ref 7–30)
CALCIUM SERPL-MCNC: 9.3 MG/DL (ref 8.5–10.1)
CHLORIDE SERPL-SCNC: 95 MMOL/L (ref 94–109)
CO2 SERPL-SCNC: 22 MMOL/L (ref 20–32)
CREAT SERPL-MCNC: 2.38 MG/DL (ref 0.66–1.25)
DIFFERENTIAL METHOD BLD: ABNORMAL
EOSINOPHIL # BLD AUTO: 0.2 10E9/L (ref 0–0.7)
EOSINOPHIL NFR BLD AUTO: 1.7 %
ERYTHROCYTE [DISTWIDTH] IN BLOOD BY AUTOMATED COUNT: 15.5 % (ref 10–15)
ETHANOL SERPL-MCNC: <0.01 G/DL
GFR SERPL CREATININE-BSD FRML MDRD: 32 ML/MIN/{1.73_M2}
GLUCOSE SERPL-MCNC: 115 MG/DL (ref 70–99)
HCT VFR BLD AUTO: 38.6 % (ref 40–53)
HGB BLD-MCNC: 13.9 G/DL (ref 13.3–17.7)
IMM GRANULOCYTES # BLD: 0.1 10E9/L (ref 0–0.4)
IMM GRANULOCYTES NFR BLD: 0.6 %
INR PPP: 1.44 (ref 0.86–1.14)
LACTATE BLD-SCNC: 1.8 MMOL/L (ref 0.7–2)
LIPASE SERPL-CCNC: 205 U/L (ref 73–393)
LYMPHOCYTES # BLD AUTO: 1.8 10E9/L (ref 0.8–5.3)
LYMPHOCYTES NFR BLD AUTO: 13.9 %
MCH RBC QN AUTO: 32.5 PG (ref 26.5–33)
MCHC RBC AUTO-ENTMCNC: 36 G/DL (ref 31.5–36.5)
MCV RBC AUTO: 90 FL (ref 78–100)
MONOCYTES # BLD AUTO: 0.9 10E9/L (ref 0–1.3)
MONOCYTES NFR BLD AUTO: 6.7 %
NEUTROPHILS # BLD AUTO: 9.9 10E9/L (ref 1.6–8.3)
NEUTROPHILS NFR BLD AUTO: 76.3 %
NRBC # BLD AUTO: 0 10*3/UL
NRBC BLD AUTO-RTO: 0 /100
PLATELET # BLD AUTO: 207 10E9/L (ref 150–450)
POTASSIUM SERPL-SCNC: 3.9 MMOL/L (ref 3.4–5.3)
PROCALCITONIN SERPL-MCNC: 0.38 NG/ML
PROT SERPL-MCNC: 6.6 G/DL (ref 6.8–8.8)
RBC # BLD AUTO: 4.28 10E12/L (ref 4.4–5.9)
SODIUM SERPL-SCNC: 126 MMOL/L (ref 133–144)
WBC # BLD AUTO: 13 10E9/L (ref 4–11)

## 2020-04-08 PROCEDURE — 96375 TX/PRO/DX INJ NEW DRUG ADDON: CPT

## 2020-04-08 PROCEDURE — 99285 EMERGENCY DEPT VISIT HI MDM: CPT | Mod: 25

## 2020-04-08 PROCEDURE — 25000128 H RX IP 250 OP 636: Performed by: INTERNAL MEDICINE

## 2020-04-08 PROCEDURE — 96374 THER/PROPH/DIAG INJ IV PUSH: CPT

## 2020-04-08 PROCEDURE — 84145 PROCALCITONIN (PCT): CPT | Performed by: INTERNAL MEDICINE

## 2020-04-08 PROCEDURE — 99223 1ST HOSP IP/OBS HIGH 75: CPT | Mod: AI | Performed by: INTERNAL MEDICINE

## 2020-04-08 PROCEDURE — 80320 DRUG SCREEN QUANTALCOHOLS: CPT | Performed by: EMERGENCY MEDICINE

## 2020-04-08 PROCEDURE — 83605 ASSAY OF LACTIC ACID: CPT | Performed by: INTERNAL MEDICINE

## 2020-04-08 PROCEDURE — 36415 COLL VENOUS BLD VENIPUNCTURE: CPT | Performed by: INTERNAL MEDICINE

## 2020-04-08 PROCEDURE — 83690 ASSAY OF LIPASE: CPT | Performed by: EMERGENCY MEDICINE

## 2020-04-08 PROCEDURE — 96361 HYDRATE IV INFUSION ADD-ON: CPT

## 2020-04-08 PROCEDURE — 25000132 ZZH RX MED GY IP 250 OP 250 PS 637: Performed by: INTERNAL MEDICINE

## 2020-04-08 PROCEDURE — 25000132 ZZH RX MED GY IP 250 OP 250 PS 637: Performed by: EMERGENCY MEDICINE

## 2020-04-08 PROCEDURE — 12000000 ZZH R&B MED SURG/OB

## 2020-04-08 PROCEDURE — 85610 PROTHROMBIN TIME: CPT | Performed by: EMERGENCY MEDICINE

## 2020-04-08 PROCEDURE — 85025 COMPLETE CBC W/AUTO DIFF WBC: CPT | Performed by: EMERGENCY MEDICINE

## 2020-04-08 PROCEDURE — 25800030 ZZH RX IP 258 OP 636: Performed by: INTERNAL MEDICINE

## 2020-04-08 PROCEDURE — C9113 INJ PANTOPRAZOLE SODIUM, VIA: HCPCS | Performed by: INTERNAL MEDICINE

## 2020-04-08 PROCEDURE — 93005 ELECTROCARDIOGRAM TRACING: CPT

## 2020-04-08 PROCEDURE — 82140 ASSAY OF AMMONIA: CPT | Performed by: EMERGENCY MEDICINE

## 2020-04-08 PROCEDURE — 80053 COMPREHEN METABOLIC PANEL: CPT | Performed by: EMERGENCY MEDICINE

## 2020-04-08 PROCEDURE — 25000128 H RX IP 250 OP 636: Performed by: EMERGENCY MEDICINE

## 2020-04-08 PROCEDURE — 84145 PROCALCITONIN (PCT): CPT | Performed by: EMERGENCY MEDICINE

## 2020-04-08 PROCEDURE — 25800030 ZZH RX IP 258 OP 636: Performed by: EMERGENCY MEDICINE

## 2020-04-08 RX ORDER — LACTULOSE 10 G/15ML
20 SOLUTION ORAL 2 TIMES DAILY
Status: DISCONTINUED | OUTPATIENT
Start: 2020-04-08 | End: 2020-04-09

## 2020-04-08 RX ORDER — SPIRONOLACTONE 50 MG/1
50 TABLET, FILM COATED ORAL
Status: ON HOLD | COMMUNITY
End: 2020-04-10

## 2020-04-08 RX ORDER — LIDOCAINE 40 MG/G
CREAM TOPICAL
Status: DISCONTINUED | OUTPATIENT
Start: 2020-04-08 | End: 2020-04-10 | Stop reason: HOSPADM

## 2020-04-08 RX ORDER — ONDANSETRON 2 MG/ML
4 INJECTION INTRAMUSCULAR; INTRAVENOUS ONCE
Status: COMPLETED | OUTPATIENT
Start: 2020-04-08 | End: 2020-04-08

## 2020-04-08 RX ORDER — SODIUM CHLORIDE 9 MG/ML
INJECTION, SOLUTION INTRAVENOUS CONTINUOUS
Status: DISCONTINUED | OUTPATIENT
Start: 2020-04-08 | End: 2020-04-10

## 2020-04-08 RX ORDER — PROCHLORPERAZINE MALEATE 10 MG
10 TABLET ORAL EVERY 6 HOURS PRN
Status: DISCONTINUED | OUTPATIENT
Start: 2020-04-08 | End: 2020-04-10 | Stop reason: HOSPADM

## 2020-04-08 RX ORDER — TRAMADOL HYDROCHLORIDE 50 MG/1
50 TABLET ORAL DAILY
COMMUNITY
End: 2020-07-10

## 2020-04-08 RX ORDER — CEFTRIAXONE 1 G/1
1 INJECTION, POWDER, FOR SOLUTION INTRAMUSCULAR; INTRAVENOUS ONCE
Status: COMPLETED | OUTPATIENT
Start: 2020-04-08 | End: 2020-04-08

## 2020-04-08 RX ORDER — LACTULOSE 10 G/15ML
20 SOLUTION ORAL ONCE
Status: COMPLETED | OUTPATIENT
Start: 2020-04-08 | End: 2020-04-08

## 2020-04-08 RX ORDER — CEFTRIAXONE 2 G/1
2 INJECTION, POWDER, FOR SOLUTION INTRAMUSCULAR; INTRAVENOUS EVERY 24 HOURS
Status: DISCONTINUED | OUTPATIENT
Start: 2020-04-09 | End: 2020-04-10 | Stop reason: HOSPADM

## 2020-04-08 RX ORDER — ALLOPURINOL 300 MG/1
300 TABLET ORAL DAILY
Status: DISCONTINUED | OUTPATIENT
Start: 2020-04-09 | End: 2020-04-10 | Stop reason: HOSPADM

## 2020-04-08 RX ORDER — PROCHLORPERAZINE 25 MG
25 SUPPOSITORY, RECTAL RECTAL EVERY 12 HOURS PRN
Status: DISCONTINUED | OUTPATIENT
Start: 2020-04-08 | End: 2020-04-10 | Stop reason: HOSPADM

## 2020-04-08 RX ORDER — NALOXONE HYDROCHLORIDE 0.4 MG/ML
.1-.4 INJECTION, SOLUTION INTRAMUSCULAR; INTRAVENOUS; SUBCUTANEOUS
Status: DISCONTINUED | OUTPATIENT
Start: 2020-04-08 | End: 2020-04-10 | Stop reason: HOSPADM

## 2020-04-08 RX ORDER — HYDROXYZINE HYDROCHLORIDE 25 MG/1
25-50 TABLET, FILM COATED ORAL EVERY 6 HOURS PRN
Status: DISCONTINUED | OUTPATIENT
Start: 2020-04-08 | End: 2020-04-10 | Stop reason: HOSPADM

## 2020-04-08 RX ORDER — PANTOPRAZOLE SODIUM 40 MG/1
40 TABLET, DELAYED RELEASE ORAL DAILY
Status: DISCONTINUED | OUTPATIENT
Start: 2020-04-09 | End: 2020-04-10 | Stop reason: HOSPADM

## 2020-04-08 RX ADMIN — CEFTRIAXONE 1 G: 1 INJECTION, POWDER, FOR SOLUTION INTRAMUSCULAR; INTRAVENOUS at 16:08

## 2020-04-08 RX ADMIN — SODIUM CHLORIDE: 9 INJECTION, SOLUTION INTRAVENOUS at 17:48

## 2020-04-08 RX ADMIN — PANTOPRAZOLE SODIUM 40 MG: 40 INJECTION, POWDER, FOR SOLUTION INTRAVENOUS at 17:41

## 2020-04-08 RX ADMIN — SODIUM CHLORIDE 500 ML: 9 INJECTION, SOLUTION INTRAVENOUS at 15:17

## 2020-04-08 RX ADMIN — SODIUM CHLORIDE 250 ML: 9 INJECTION, SOLUTION INTRAVENOUS at 20:12

## 2020-04-08 RX ADMIN — LACTULOSE 20 G: 20 SOLUTION ORAL at 15:23

## 2020-04-08 RX ADMIN — ONDANSETRON 4 MG: 2 INJECTION INTRAMUSCULAR; INTRAVENOUS at 15:18

## 2020-04-08 RX ADMIN — CEFTRIAXONE 1 G: 1 INJECTION, POWDER, FOR SOLUTION INTRAMUSCULAR; INTRAVENOUS at 17:42

## 2020-04-08 RX ADMIN — LACTULOSE 20 G: 20 SOLUTION ORAL at 20:13

## 2020-04-08 ASSESSMENT — ENCOUNTER SYMPTOMS
WEAKNESS: 1
SORE THROAT: 0
SHORTNESS OF BREATH: 0
FEVER: 0
NAUSEA: 1
VOMITING: 1
ABDOMINAL PAIN: 1
FATIGUE: 1
CONSTIPATION: 0
COUGH: 0

## 2020-04-08 ASSESSMENT — ACTIVITIES OF DAILY LIVING (ADL): ADLS_ACUITY_SCORE: 12

## 2020-04-08 NOTE — PLAN OF CARE
A&O x4. VSS. HR: 116. Denies pain. Skin tact. Jaundice in color. LS: Clear. BS: audible and normoactive. Abdomen displays significant ascites. Pt denies nausea and vomiting. No edema noted. Radial and dorsalis pulses 2+. WBC: 13.0. Na: 126. Cr: 2.38. NS infusing @ 50 mL hour. ABX and IV protonix administered. GI consult requested. Paracentesis scheduled for tomorrow AM. 2Gr Na diet. Up Ax2 with GB. Discharge TBD. Will continue with plan of care.

## 2020-04-08 NOTE — ED TRIAGE NOTES
Pt arrives c/o nausea, vomiting, and abdominal pain. Pt reports abdominal pain is from paracentesis. No c/o SOB. Pt also notes he was sent here by primary care doctor to get labs done.

## 2020-04-08 NOTE — PHARMACY-ADMISSION MEDICATION HISTORY
Admission medication history interview status for this patient is complete. See [facility] admission navigator for allergy information, prior to admission medications and immunization status.     Medication history interview source(s):Patient  Medication history resources (including written lists, pill bottles, clinic record):None  Primary pharmacy: [facility]    Changes made to PTA medication list:  Added: none  Deleted: Prednisone 20mg  Changed: Omega-3 every day to once weekly, Spironolactone 50mg every day to twice weekly, Tramadol 50mg Q6H to once daily    Actions taken by pharmacist (provider contacted, etc):None     Additional medication history information:None    Medication reconciliation/reorder completed by provider prior to medication history?  No     For patients on insulin therapy: No    Prior to Admission medications    Medication Sig Last Dose Taking? Auth Provider   allopurinol (ZYLOPRIM) 300 MG tablet Take 1 tablet (300 mg) by mouth daily 4/7/2020 at Unknown time Yes Fariba Alba PA-C   esomeprazole (NEXIUM) 40 MG DR capsule Take 1 capsule (40 mg) by mouth daily Take 30-60 minutes before eating. 4/7/2020 at Unknown time Yes Fariba Alba PA-C   furosemide (LASIX) 20 MG tablet Take 2 tablets (40 mg) by mouth daily 4/7/2020 at Unknown time Yes Fariba Alba PA-C   hydrOXYzine 25 MG PO tablet Take 1-2 tablets (25-50 mg) by mouth every 6 hours as needed for itching or other (adjuvant pain) 4/7/2020 at Unknown time Yes Deanna Barahona MD   ibuprofen (ADVIL/MOTRIN) 200 MG tablet Take 400 mg by mouth every 6 hours as needed for mild pain  at PRN Yes Unknown, Entered By History   lisinopril (PRINIVIL/ZESTRIL) 40 MG tablet Take 40 mg by mouth daily as needed (if needed for high blood pressure)  at Most days Yes Unknown, Entered By History   Multiple Vitamin (DAILY MULTIVITAMIN PO) Take 1 tablet by mouth daily  4/7/2020 at Unknown time Yes Reported, Patient   Omega-3 Fatty Acids (FISH  OIL PO) Take 1 capsule by mouth Once weekly Past Week at Unknown time Yes Unknown, Entered By History   spironolactone (ALDACTONE) 50 MG tablet Take 50 mg by mouth Twice weekly Past Week at Unknown time Yes Unknown, Entered By History   traMADol (ULTRAM) 50 MG tablet Take 50 mg by mouth daily 4/7/2020 at Unknown time Yes Unknown, Entered By History   zolpidem (AMBIEN) 5 MG tablet Take 1 tablet (5 mg) by mouth nightly as needed for sleep 4/7/2020 at Unknown time Yes Fariba Alba PA-C

## 2020-04-08 NOTE — H&P
[facility]  Hospitalist Admission Note  Name: Sp Plasencia    MRN: [id number]  YOB: 1975    Age: 44 year old  Date of admission: 4/8/2020  Primary care provider: Vinod Stewart    Chief Complaint:  Abdominal pain, vomiting    Assessment and Plan:   Abdominal pain, vomiting, possible SBP: 2-3 days of some diffuse abdominal pain along with numerous episodes of vomiting.  Does report able to keep down some food and water.  Has had some bowel movements during this time so doubt obstruction.  Gastroenteritis possible, but denies diarrhea.  Does have leukocytosis and did have paracentesis on 4/1/2020 so possibility of SBP.  He does not have any significant tenderness on exam or peritoneal findings, although has significant amount of ascites.  Overall feels very fatigued and worn out.  Does not have any new cough or shortness of breath to suspect COVID19.  Does have history of esophageal varices, but denies any hematemesis and he is not anemic.  Does not have any history of gallstones and despite worsening liver function I do think we need to repeat imaging at this time, but could consider CT if worsening abdominal pain or decompensating.  -Ordered diagnostic and therapeutic paracentesis with labs, this will not be done till tomorrow  -In the meantime empiric ceftriaxone 2 g every 24 hours in case of SBP  -Dose of IV Protonix now followed by his oral PPI tomorrow  -Compazine for nausea as needed which may have less effect on his prolonged QTC which is close to 500  -No acetaminophen now given liver failure.  If having severe pain later on could try low-dose oxycodone versus Dilaudid  -Add on lipase and procalcitonin    Hepatic encephalopathy, cirrhosis secondary to alcohol: Slowed responses in the ED.  Oriented to time and location.  At this time appears mildly encephalopathic and he does report some foggy thinking.  Ammonia level elevated at 182.  Also possibility of SBP up there.  He is not  on lactulose or rifaximin at baseline.  He does have known cirrhosis secondary to alcohol.  Also has esophageal varices.  He was started on daily steroids  2 months ago he said he completed 2 weeks and then was tapered off after follow-up with [facility].  Unfortunately liver labs looking much worse currently with a bilirubin of 24.9 up from 20.7, , albumin 2.5, INR pending.  He also has ASHWINI.  Possibility of SBP as etiology for worsening liver failure as above.  Denies any alcohol use since January 1.  He is taking Lasix 40 mg daily and spironolactone 50 mg daily at home.  -Consult [facility]  -Paracentesis as above and empiric SBP treatment until then  -Start lactulose for presumed mild hepatic encephalopathy, titrate to 3-4 stools  -Daily CMP and INR    ASHWINI, hyponatremia: Creatinine up to 2.38 from baseline of 1.  BUN also elevated and sodium low at 126.  Suspect ASHWINI due to hypovolemia as he has been vomiting and still taking his Lasix and Aldactone.  -Received 500 mL normal saline in the ED.  Continue with low rate of normal saline at 50 ml/hr. if not improving will stop IV fluids and start albumin  -Daily BMP  -Hold spironolactone and Aldactone    HTN: PTA on lisinopril 40 mg daily.  Blood pressure here 104-120 systolic in the ED.  Looks hypovolemic  And also has ASHWINI so holding lisinopril.    Gout: resume pta allopurinol.    Tobacco dependence: Only smoking a few cigarettes here and there.  Declines nicotine replacement.    Facioscapulohumeral muscular dystrophy: Appears to have a prescription for tramadol.  Currently mildly encephalopathic so holding off on this for now.      DVT Prophylaxis: Pneumatic Compression Devices  Code Status: Full Code  FEN: 2 g restriction, normal saline at 50 ml/hr  Discharge Dispo: Home  Estimated Disch Date / # of Days until Disch: Admit inpatient for hepatic cephalopathy and possible SBP.  Anticipate minimal 2-3 night hospitalization      History of  Present Illness:  Sp Plasencia is a 44 year old male with PMH including cirrhosis secondary to alcohol, esophageal varices, HTN, tobacco dependence, and fascioscapulohumeral muscular dystrophy who presents with vomiting and abdominal pain.  The patient was hospitalized 2 months ago with alcohol hepatitis and was started on steroids at that time.  He underwent paracentesis and followed up with [facility].  He reports taking steroids for 2 weeks and then tapering off.  He underwent a paracentesis 7 days ago and had 6 L removed.  Cultures negative for SBP at that time.  He said he is been getting paracentesis whenever he feels like his ascites is filling up.  For the past 2 to 3 days he has had numerous episodes of vomiting along with some diffuse abdominal pain that is moderate in intensity.  He has vomited up to 8-10 times per day.  He feels quite weak and fatigued overall for the past 48 hours.  Denies any hematemesis.  He has been having bowel movements, but has not had any diarrhea, melena, hematochezia.  He is not chronically on lactulose or rifaximin.  He does feel like his thinking is a little bit foggy.  Denies any new cough or shortness of breath from baseline.  Denies any sick contacts or positive contact with people who have been exposed to or tested positive for COVID19.  He has not noticed any decrease in urine output.  He has been able to eat and drink some despite the vomiting and take his medications.    History obtained from patient, medical record, and from Dr. Martinez in the emergency department.  Blood pressure 104//60.  Tachycardic to the 110s.  Temperature 97.7  F.  Oxygen 99% on room air.  Patient is mildly encephalopathic.  He has a leukocytosis to 13.0, hemoglobin is 13.9, and platelets are 207.  His ammonia level is quite elevated at 182 and he was given lactulose for this.  He has an ASHWINI with creatinine 2.38, BUN 39, and sodium 126.  He received 500 mL normal  saline.  LFTs worsening with bilirubin 24.9, albumin 2.5, alk phos 450, and .  He also received Zofran for nausea.  EKG does show prolonged QTC at 500 along with sinus tachycardia.  Recommend admission for hepatic encephalopathy and possible SBP.  I recommend he receive IV ceftriaxone.  Too late in the day for IR to do paracentesis so this will be done tomorrow.  [facility] to be consulted.     Past Medical History reviewed:  Past Medical History:   Diagnosis Date     Acid reflux      Ascites      Esophageal varices (H)      Gout      HTN (hypertension)      Jaundice      Liver cirrhosis (H)      Muscular dystrophy (H)      Smoker      Past Surgical History reviewed:  No past surgical history on file.  Social History reviewed:  Social History     Tobacco Use     Smoking status: Current Some Day Smoker     Packs/day: 1.00     Types: Cigarettes     Smokeless tobacco: Never Used   Substance Use Topics     Alcohol use: Not Currently     Social History     Social History Narrative     Not on file     Family History reviewed:  Family History   Problem Relation Age of Onset     Hypertension Father      Allergies:  No Known Allergies  Medications:  Prior to Admission medications    Medication Sig Last Dose Taking? Auth Provider   allopurinol (ZYLOPRIM) 300 MG tablet Take 1 tablet (300 mg) by mouth daily   Fariba Alba PA-C   allopurinol 300 MG PO tablet Take 300 mg by mouth   Reported, Patient   esomeprazole (NEXIUM) 40 MG DR capsule Take 1 capsule (40 mg) by mouth daily Take 30-60 minutes before eating.   Fariba Alba PA-C   furosemide (LASIX) 20 MG tablet Take 2 tablets (40 mg) by mouth daily   Fariba Alba PA-C   hydrOXYzine 25 MG PO tablet Take 1-2 tablets (25-50 mg) by mouth every 6 hours as needed for itching or other (adjuvant pain)   Deanna Barahona MD   ibuprofen (ADVIL/MOTRIN) 200 MG tablet Take 400 mg by mouth every 6 hours as needed for mild pain   Unknown, Entered By History    lisinopril (PRINIVIL/ZESTRIL) 40 MG tablet Take 40 mg by mouth daily as needed (if needed for high blood pressure)   Unknown, Entered By History   lisinopril 40 MG PO tablet Take 40 mg by mouth   Reported, Patient   Multiple Vitamin (DAILY MULTIVITAMIN PO) Take by mouth daily   Reported, Patient   Omega-3 Fatty Acids (FISH OIL PO) Take 1 capsule by mouth daily   Unknown, Entered By History   predniSONE (DELTASONE) 20 MG tablet Take 2 tablets (40 mg) by mouth daily   Alba, Fariba Hogan PA-C   spironolactone (ALDACTONE) 50 MG tablet Take 1 tablet (50 mg) by mouth daily   Alba, Fariba Hogan PA-C   traMADol (ULTRAM) 50 MG tablet Take 0.5-1 tablets (25-50 mg) by mouth every 6 hours as needed for moderate pain   Alba, Fariba Hogan PA-C   zolpidem (AMBIEN) 5 MG tablet Take 1 tablet (5 mg) by mouth nightly as needed for sleep   Alba, Fariba Hogan PA-C     Review of Systems:  A Comprehensive greater than 10 system review of systems was carried out.  Pertinent positives and negatives are noted above.  Otherwise negative.     Physical Exam:  Blood pressure 104/56, pulse 107, temperature 97.7  F (36.5  C), temperature source Oral, resp. rate 18, SpO2 97 %.  Wt Readings from Last 1 Encounters:   02/18/20 106.6 kg (235 lb)     Exam:  Constitutional: Awake, NAD   Eyes: Scleral icterus present  HEENT: atraumatic, dry mucous membranes  Respiratory: no respiratory distress, lungs cta bilaterally, no crackles or wheeze  Cardiovascular: Regular tachycardia.  1/6 systolic murmur heard best over right upper sternal border  GI: At least moderate distention, nontender to palpation in all quadrants without guarding or rebound tenderness, bowel sounds faint  Skin: Jaundiced  Musculoskeletal/extremities: atraumatic, no major deformities.  No significant lower extremity edema  Neurologic: Alert, oriented to place and time, very slow to answer questions and taking a long time to think about them.  Appears mildly encephalopathic.  No  tremor.  Mild asterixis present  Psychiatric: calm, cooperative, flat affect    Lab and imaging data personally reviewed:  Labs:  Recent Labs   Lab 04/08/20  1351   WBC 13.0*   HGB 13.9   HCT 38.6*   MCV 90        Recent Labs   Lab 04/08/20  1351   *   POTASSIUM 3.9   CHLORIDE 95   CO2 22   ANIONGAP 9   *   BUN 39*   CR 2.38*   GFRESTIMATED 32*   GFRESTBLACK 37*   JOSE CARLOS 9.3   PROTTOTAL 6.6*   ALBUMIN 2.5*   BILITOTAL 24.9*   ALKPHOS 450*   *   ALT 65     Ammonia 182    Imaging:  None obtained    Anam Castillo MD  Hospitalist  [facility]

## 2020-04-08 NOTE — ED NOTES
[facility]  ED Nurse Handoff Report    Sp Plasencia is a 44 year old male   ED Chief complaint: Abdominal Pain and Nausea & Vomiting  . ED Diagnosis:   Final diagnoses:   Hepatic encephalopathy (H)   Non-intractable vomiting with nausea, unspecified vomiting type     Allergies: No Known Allergies    Code Status: Prior  Activity level - Baseline/Home:  Independent. Activity Level - Current:   Stand by Assist. Lift room needed: No. Bariatric: No   Needed: No   Isolation: No. Infection: Not Applicable.     Vital Signs:   Vitals:    04/08/20 1339 04/08/20 1341 04/08/20 1500   BP:  126/60 104/56   Pulse:   107   Resp: 18     Temp: 97.7  F (36.5  C)     TempSrc: Oral     SpO2: 99%  97%       Cardiac Rhythm:  ,      Pain level:    Patient confused: No. Patient Falls Risk: Yes.   Elimination Status: Has voided   Patient Report - Initial Complaint: Pt arrives c/o nausea, vomiting, and abdominal pain. Pt reports abdominal pain is from paracentesis. No c/o SOB. Pt also notes he was sent here by primary care doctor to get labs done. . Focused Assessment: Gastrointestinal - GI WDL: -WDL except; GI symptoms; nausea and vomiting  Nausea/Vomiting Signs/Symptoms: nausea continuous  GI Signs/Symptoms: abdominal discomfort; nausea; vomiting    Tests Performed: Labs. Abnormal Results:   Labs Ordered and Resulted from Time of ED Arrival Up to the Time of Departure from the ED   COMPREHENSIVE METABOLIC PANEL - Abnormal; Notable for the following components:       Result Value    Sodium 126 (*)     Glucose 115 (*)     Urea Nitrogen 39 (*)     Creatinine 2.38 (*)     GFR Estimate 32 (*)     GFR Estimate If Black 37 (*)     Bilirubin Total 24.9 (*)     Albumin 2.5 (*)     Protein Total 6.6 (*)     Alkaline Phosphatase 450 (*)      (*)     All other components within normal limits   CBC WITH PLATELETS DIFFERENTIAL - Abnormal; Notable for the following components:    WBC 13.0 (*)     RBC Count 4.28 (*)      Hematocrit 38.6 (*)     RDW 15.5 (*)     Absolute Neutrophil 9.9 (*)     All other components within normal limits   AMMONIA - Abnormal; Notable for the following components:    Ammonia 182 (*)     All other components within normal limits   ALCOHOL ETHYL   INR   LIPASE   PERIPHERAL IV CATHETER     .   Treatments provided: Fluids, Antibiotics, Lactulose, Antinausea   Family Comments: NA  OBS brochure/video discussed/provided to patient:  Yes  ED Medications:   Medications   0.9% sodium chloride BOLUS (500 mLs Intravenous New Bag 4/8/20 1517)   cefTRIAXone (ROCEPHIN) 1 g vial to attach to  mL bag for ADULTS or NS 50 mL bag for PEDS (has no administration in time range)   ondansetron (ZOFRAN) injection 4 mg (4 mg Intravenous Given 4/8/20 1518)   lactulose (CHRONULAC) solution 20 g (20 g Oral Given 4/8/20 1523)     Drips infusing:  No  For the majority of the shift, the patient's behavior Green. Interventions performed were NA.    Sepsis treatment initiated: No       ED Nurse Name/Phone Number: Shala Whiteside RN,   3:45 PM  RECEIVING UNIT ED HANDOFF REVIEW    Above ED Nurse Handoff Report was reviewed: No  Reviewed by: Alivia Boyd RN on April 8, 2020 at 4:43 PM

## 2020-04-08 NOTE — ED PROVIDER NOTES
History     Chief Complaint:  Abdominal Pain; Nausea & Vomiting    HPI   Sp Plasencia is a 44 year old male with a history of HTN, liver cirrhosis, ASHWINI who presents to the emergency department for evaluation of abdominal pain, nausea, and vomiting. He states he had a paracentesis performed on 4/1 and has since experienced some vague abdominal pain. In the last 2 days, he's developed nausea and vomiting as well. The patient contacted his clinic regarding this pain today and was instructed to present to the ED. He denies any shortness of breath, fever, cough, or rhinorrhea, as well as any constipation. He further reports generalized weakness and fatigue the past 1.5-2 days.    Allergies:  NKDA     Medications:    Allopurinol  Nexium  Lasix  Hydroxyzine  Lisinopril  Deltasone  Aldactone  Ultram  Ambien    Past Medical History:    HTN  Jaundice  Liver cirrhosis  Muscular dystrophy  ASHWINI  FSHD    Past Surgical History:    The patient does not have any pertinent past surgical history.     Family History:    HTN    Social History:  Presents with family.  Current every day smoker, 1 ppd.  Negative for alcohol use.  Marital Status:   [2]     Review of Systems   Constitutional: Positive for fatigue. Negative for fever.   HENT: Negative for sore throat.    Respiratory: Negative for cough and shortness of breath.    Gastrointestinal: Positive for abdominal pain, nausea and vomiting. Negative for constipation.   Neurological: Positive for weakness (generalized).   All other systems reviewed and are negative.      Physical Exam     Patient Vitals for the past 24 hrs:   BP Temp Temp src Pulse Heart Rate Resp SpO2   04/08/20 1600 112/55 -- -- 105 107 -- 99 %   04/08/20 1530 111/53 -- -- 110 110 -- 100 %   04/08/20 1500 104/56 -- -- 107 105 -- 97 %   04/08/20 1341 126/60 -- -- -- -- -- --   04/08/20 1339 -- 97.7  F (36.5  C) Oral -- 61 18 99 %     Physical Exam  Nursing note and vitals reviewed.  Constitutional:  Cooperative.   HENT:   Mouth/Throat: Moist mucous membranes.   Eyes: EOMI, nonicteric sclera  Cardiovascular: tachycardic, regular rhythm, no murmurs, rubs, or gallops  Pulmonary/Chest: Effort normal and breath sounds normal. No respiratory distress. No wheezes. No rales.   Abdominal: Soft. Nontender, distended, no guarding or rigidity.   Musculoskeletal: Normal range of motion.   Neurological: Alert. Mildly encephalopathic -> slow to respond.  Moves all extremities spontaneously.   Skin: Skin is warm and dry. No rash noted.       Emergency Department Course   ECG:  Time: 1342  Vent. Rate 110 bpm. TN interval 120. QRS duration 92. QT/QTc 374/506. P-R-T axis 61 -6 17.  Sinus tachycardia with premature atrial complexes.  Otherwise normal ECG.  Read time: 1350    Laboratory:  Laboratory findings were communicated with the patient who voiced understanding of the findings.    CBC: WBC: 13.0 (H), HGB: 13.9, PLT: 207  CMP: Glucose 115 (H), Na 126 (L), Urea Nitrogen 39 (H), Creatinine 2.38 (H), GFR Estimate 32 (L), Bilirubin Total 24.9 (HH), Albumin 2.5 (L), Protein Total 6.6 (L), Alkaline Phosphatase 450 (H),  (H) o/w WNL     Ammonia: 182 (HH)    Lipase: 205    INR: 1.44 (H)    Alcohol level blood: <0.01    Interventions:  1517  mL IV Bolus  1518 Zofran 4 mg IV  1523 Chronulac 20 g PO  1608 Rocephin 1 g IV    Emergency Department Course:  Past medical records, nursing notes, and vitals reviewed.    1430 I performed an exam of the patient as documented above.     EKG obtained in the ED, see results above.     IV was inserted and blood was drawn for laboratory testing, results above.    1540 I spoke with Dr. Castillo, hospitalist, who agreed to admit the patient.    1550 I rechecked the patient and discussed the results of his workup thus far.     Findings and plan explained to the Patient who consents to admission. Discussed the patient with Dr. Castillo, who will admit the patient to a med/surg bed for further  monitoring, evaluation, and treatment.    I personally reviewed the laboratory results with the Patient and answered all related questions prior to admission.     Impression & Plan     Medical Decision Making:  Sp Plasencia is a 44 year old male who presents with nausea and vomiting.  He also appears encephalopathic on exam.  His abdominal exam is negative for tenderness, guarding, or rigidity.  I doubt SBP at this time.  Would not repeat imaging given lack of abdominal findings.  For now, will treat symptomatically.  Ammonia also elevated and lactulose started.  Given electrolyte derangement, ASHWINI, and encephalopathy, admission indicated.  Dr. Castillo from our hospitalist service accepts  For admission.  All patient's questions were answered and he is in agreement with the plan.  Patient admitted in stable condition.    Diagnosis:    ICD-10-CM   1. Hepatic encephalopathy (H)  K72.90   2. Non-intractable vomiting with nausea, unspecified vomiting type  R11.2       Disposition:  Admitted to Dr. Castillo.    Scribe Disclosure:  I, Will Bravo, am serving as a scribe at 1:51 PM on 4/8/2020 to document services personally performed by Ryan Martinez MD based on my observations and the provider's statements to me.     [facility] EMERGENCY DEPARTMENT     Ryan Martinez MD  04/08/20 4003

## 2020-04-09 ENCOUNTER — APPOINTMENT (OUTPATIENT)
Dept: ULTRASOUND IMAGING | Facility: CLINIC | Age: 45
DRG: 433 | End: 2020-04-09
Attending: INTERNAL MEDICINE
Payer: COMMERCIAL

## 2020-04-09 LAB
ALBUMIN FLD-MCNC: 0.2 G/DL
ALBUMIN SERPL-MCNC: 2.4 G/DL (ref 3.4–5)
ALP SERPL-CCNC: 404 U/L (ref 40–150)
ALT SERPL W P-5'-P-CCNC: 63 U/L (ref 0–70)
AMMONIA PLAS-SCNC: 39 UMOL/L (ref 10–50)
ANION GAP SERPL CALCULATED.3IONS-SCNC: 9 MMOL/L (ref 3–14)
APPEARANCE FLD: NORMAL
AST SERPL W P-5'-P-CCNC: 124 U/L (ref 0–45)
BASOPHILS # BLD AUTO: 0.2 10E9/L (ref 0–0.2)
BASOPHILS NFR BLD AUTO: 1.1 %
BILIRUB SERPL-MCNC: 22.3 MG/DL (ref 0.2–1.3)
BUN SERPL-MCNC: 35 MG/DL (ref 7–30)
CALCIUM SERPL-MCNC: 9.1 MG/DL (ref 8.5–10.1)
CHLORIDE SERPL-SCNC: 99 MMOL/L (ref 94–109)
CO2 SERPL-SCNC: 22 MMOL/L (ref 20–32)
COLOR FLD: YELLOW
CREAT SERPL-MCNC: 1.8 MG/DL (ref 0.66–1.25)
DIFFERENTIAL METHOD BLD: ABNORMAL
EOSINOPHIL # BLD AUTO: 0.3 10E9/L (ref 0–0.7)
EOSINOPHIL NFR BLD AUTO: 2 %
ERYTHROCYTE [DISTWIDTH] IN BLOOD BY AUTOMATED COUNT: 15.8 % (ref 10–15)
GFR SERPL CREATININE-BSD FRML MDRD: 45 ML/MIN/{1.73_M2}
GLUCOSE SERPL-MCNC: 108 MG/DL (ref 70–99)
HCT VFR BLD AUTO: 37.7 % (ref 40–53)
HGB BLD-MCNC: 13.6 G/DL (ref 13.3–17.7)
IMM GRANULOCYTES # BLD: 0.1 10E9/L (ref 0–0.4)
IMM GRANULOCYTES NFR BLD: 0.7 %
INR PPP: 1.61 (ref 0.86–1.14)
INTERPRETATION ECG - MUSE: NORMAL
LYMPHOCYTES # BLD AUTO: 1 10E9/L (ref 0.8–5.3)
LYMPHOCYTES NFR BLD AUTO: 7.9 %
LYMPHOCYTES NFR FLD MANUAL: 32 %
MCH RBC QN AUTO: 31.8 PG (ref 26.5–33)
MCHC RBC AUTO-ENTMCNC: 36.1 G/DL (ref 31.5–36.5)
MCV RBC AUTO: 88 FL (ref 78–100)
MONOCYTES # BLD AUTO: 1.1 10E9/L (ref 0–1.3)
MONOCYTES NFR BLD AUTO: 8.3 %
MONOS+MACROS NFR FLD MANUAL: 33 %
NEUTROPHILS # BLD AUTO: 10.5 10E9/L (ref 1.6–8.3)
NEUTROPHILS NFR BLD AUTO: 80 %
NEUTS BAND NFR FLD MANUAL: 16 %
NRBC # BLD AUTO: 0 10*3/UL
NRBC BLD AUTO-RTO: 0 /100
OTHER CELLS FLD MANUAL: 19 %
PLATELET # BLD AUTO: 217 10E9/L (ref 150–450)
POTASSIUM SERPL-SCNC: 3.6 MMOL/L (ref 3.4–5.3)
PROT FLD-MCNC: 0.6 G/DL
PROT SERPL-MCNC: 6.5 G/DL (ref 6.8–8.8)
RBC # BLD AUTO: 4.28 10E12/L (ref 4.4–5.9)
SODIUM SERPL-SCNC: 130 MMOL/L (ref 133–144)
SPECIMEN SOURCE FLD: NORMAL
WBC # BLD AUTO: 13.1 10E9/L (ref 4–11)
WBC # FLD AUTO: 180 /UL

## 2020-04-09 PROCEDURE — 36415 COLL VENOUS BLD VENIPUNCTURE: CPT | Performed by: INTERNAL MEDICINE

## 2020-04-09 PROCEDURE — 87015 SPECIMEN INFECT AGNT CONCNTJ: CPT | Performed by: INTERNAL MEDICINE

## 2020-04-09 PROCEDURE — 85610 PROTHROMBIN TIME: CPT | Performed by: INTERNAL MEDICINE

## 2020-04-09 PROCEDURE — 25000132 ZZH RX MED GY IP 250 OP 250 PS 637: Performed by: INTERNAL MEDICINE

## 2020-04-09 PROCEDURE — 84157 ASSAY OF PROTEIN OTHER: CPT | Performed by: INTERNAL MEDICINE

## 2020-04-09 PROCEDURE — 12000000 ZZH R&B MED SURG/OB

## 2020-04-09 PROCEDURE — 25800030 ZZH RX IP 258 OP 636: Performed by: INTERNAL MEDICINE

## 2020-04-09 PROCEDURE — 99233 SBSQ HOSP IP/OBS HIGH 50: CPT | Performed by: INTERNAL MEDICINE

## 2020-04-09 PROCEDURE — 89051 BODY FLUID CELL COUNT: CPT | Performed by: INTERNAL MEDICINE

## 2020-04-09 PROCEDURE — 25000125 ZZHC RX 250: Performed by: RADIOLOGY

## 2020-04-09 PROCEDURE — 82140 ASSAY OF AMMONIA: CPT | Performed by: INTERNAL MEDICINE

## 2020-04-09 PROCEDURE — 0W9G3ZZ DRAINAGE OF PERITONEAL CAVITY, PERCUTANEOUS APPROACH: ICD-10-PCS | Performed by: RADIOLOGY

## 2020-04-09 PROCEDURE — 49083 ABD PARACENTESIS W/IMAGING: CPT

## 2020-04-09 PROCEDURE — 25000128 H RX IP 250 OP 636: Performed by: INTERNAL MEDICINE

## 2020-04-09 PROCEDURE — 85025 COMPLETE CBC W/AUTO DIFF WBC: CPT | Performed by: INTERNAL MEDICINE

## 2020-04-09 PROCEDURE — 80053 COMPREHEN METABOLIC PANEL: CPT | Performed by: INTERNAL MEDICINE

## 2020-04-09 PROCEDURE — 87205 SMEAR GRAM STAIN: CPT | Performed by: INTERNAL MEDICINE

## 2020-04-09 PROCEDURE — 87070 CULTURE OTHR SPECIMN AEROBIC: CPT | Performed by: INTERNAL MEDICINE

## 2020-04-09 PROCEDURE — 82042 OTHER SOURCE ALBUMIN QUAN EA: CPT | Performed by: INTERNAL MEDICINE

## 2020-04-09 RX ORDER — NICOTINE POLACRILEX 4 MG
15-30 LOZENGE BUCCAL
Status: DISCONTINUED | OUTPATIENT
Start: 2020-04-09 | End: 2020-04-10 | Stop reason: HOSPADM

## 2020-04-09 RX ORDER — DEXTROSE MONOHYDRATE 25 G/50ML
25-50 INJECTION, SOLUTION INTRAVENOUS
Status: DISCONTINUED | OUTPATIENT
Start: 2020-04-09 | End: 2020-04-10 | Stop reason: HOSPADM

## 2020-04-09 RX ORDER — TRAMADOL HYDROCHLORIDE 50 MG/1
50 TABLET ORAL DAILY
Status: DISCONTINUED | OUTPATIENT
Start: 2020-04-09 | End: 2020-04-10

## 2020-04-09 RX ORDER — LACTULOSE 10 G/15ML
20 SOLUTION ORAL DAILY
Status: DISCONTINUED | OUTPATIENT
Start: 2020-04-10 | End: 2020-04-10 | Stop reason: HOSPADM

## 2020-04-09 RX ADMIN — LACTULOSE 20 G: 20 SOLUTION ORAL at 08:24

## 2020-04-09 RX ADMIN — TRAMADOL HYDROCHLORIDE 50 MG: 50 TABLET, FILM COATED ORAL at 16:38

## 2020-04-09 RX ADMIN — Medication 1 MG: at 01:50

## 2020-04-09 RX ADMIN — HYDROXYZINE HYDROCHLORIDE 25 MG: 25 TABLET, FILM COATED ORAL at 01:51

## 2020-04-09 RX ADMIN — HYDROXYZINE HYDROCHLORIDE 25 MG: 25 TABLET, FILM COATED ORAL at 20:47

## 2020-04-09 RX ADMIN — CEFTRIAXONE 2 G: 2 INJECTION, POWDER, FOR SOLUTION INTRAMUSCULAR; INTRAVENOUS at 16:09

## 2020-04-09 RX ADMIN — PANTOPRAZOLE SODIUM 40 MG: 40 TABLET, DELAYED RELEASE ORAL at 08:24

## 2020-04-09 RX ADMIN — LIDOCAINE HYDROCHLORIDE 10 ML: 10 INJECTION, SOLUTION EPIDURAL; INFILTRATION; INTRACAUDAL; PERINEURAL at 13:15

## 2020-04-09 RX ADMIN — SODIUM CHLORIDE: 9 INJECTION, SOLUTION INTRAVENOUS at 10:03

## 2020-04-09 RX ADMIN — ALLOPURINOL 300 MG: 300 TABLET ORAL at 08:24

## 2020-04-09 ASSESSMENT — ACTIVITIES OF DAILY LIVING (ADL)
ADLS_ACUITY_SCORE: 13
ADLS_ACUITY_SCORE: 15
ADLS_ACUITY_SCORE: 15
ADLS_ACUITY_SCORE: 13
ADLS_ACUITY_SCORE: 15
ADLS_ACUITY_SCORE: 13

## 2020-04-09 NOTE — PLAN OF CARE
A&O, slow to respond, flat affect. BP soft-250 mL bolus given per orders, tachycardic, other VSS. LS clear. Abdominal distention and discomfort present, denies pain and nausea this shift. Had BM this shift, lactulose given. NS infusing at 50 mL/hr. Up with assist of 1 with gait belt and walker, unsteady gait with ambulation. GI following. Plan for paracentesis tomorrow. GI following.     /42   Pulse 107   Temp 97.6  F (36.4  C) (Oral)   Resp 20   Wt 109 kg (240 lb 4.8 oz)   SpO2 96%   BMI 31.70 kg/m

## 2020-04-09 NOTE — PROCEDURES
[facility]    Procedure: US paracentesis    Date/Time: 4/9/2020 2:41 PM  Performed by: Gerry Berg MD  Authorized by: Gerry Breg MD     UNIVERSAL PROTOCOL   Site Marked: NA  Prior Images Obtained and Reviewed:  Yes  Required items: Required blood products, implants, devices and special equipment available    Patient identity confirmed:  Verbally with patient, arm band, provided demographic data and hospital-assigned identification number  Patient was reevaluated immediately before administering moderate or deep sedation or anesthesia  Confirmation Checklist:  Patient's identity using two indicators, relevant allergies, procedure was appropriate and matched the consent or emergent situation and correct equipment/implants were available  Time out: Immediately prior to the procedure a time out was called    Universal Protocol: the Joint Commission Universal Protocol was followed    Preparation: Patient was prepped and draped in usual sterile fashion    ESBL (mL):  3         ANESTHESIA    Anesthesia: Local infiltration  Local Anesthetic:  Lidocaine 1% without epinephrine      SEDATION    Patient Sedated: No    See dictated procedure note for full details.  Findings: US paracentesis left abdomen.    Specimens: none    Complications: None    Condition: Stable    PROCEDURE   Patient Tolerance:  Patient tolerated the procedure well with no immediate complications    Length of time physician/provider present for 1:1 monitoring during sedation: 0

## 2020-04-09 NOTE — CONSULTS
[facility] Consultation    We were asked to see this patient in consultation by Dr. Castillo for evaluation of ascites.    HPI: Sp Plasencia is a 44 year old male with EtOH cirrhosis and ascites admitted on 4/8/2020 with abdominal pain and vomiting. His last paracentesis was on 4/1/20 with removal of 6 L. He noticed worsening abdominal distension. He denies diarrhea, melena, hematochezia, fever or cough. He quit EtOH in Jan 2020. He was recently treated with a tapering course of steroids for alcoholic hepatitis. He was hospitalized in Feb. He was started on lactulose and ceftriaxone. He is scheduled for paracentesis today. His diuretics were held due to ASHWINI. He denies abdominal pain today. He has been tolerating diet with no N/V. He had a regular BM today.       ROS:   no chest pain, no SOB, all other ROS negative except for what is noted in HPI  Past Medical History:   Diagnosis Date     Acid reflux      Ascites      Esophageal varices (H)      Gout      HTN (hypertension)      Jaundice      Liver cirrhosis (H)      Muscular dystrophy (H)      Smoker      SHx:  Smokes tobacco, no EtOH since Jan 2020  FHx:  No fh/o CRC  Home medications:   Medications Prior to Admission   Medication Sig Dispense Refill Last Dose     allopurinol (ZYLOPRIM) 300 MG tablet Take 1 tablet (300 mg) by mouth daily 30 tablet 0 4/7/2020 at Unknown time     esomeprazole (NEXIUM) 40 MG DR capsule Take 1 capsule (40 mg) by mouth daily Take 30-60 minutes before eating. 30 capsule 0 4/7/2020 at Unknown time     furosemide (LASIX) 20 MG tablet Take 2 tablets (40 mg) by mouth daily 30 tablet 0 4/7/2020 at Unknown time     hydrOXYzine 25 MG PO tablet Take 1-2 tablets (25-50 mg) by mouth every 6 hours as needed for itching or other (adjuvant pain) 90 tablet 0 4/7/2020 at Unknown time     ibuprofen (ADVIL/MOTRIN) 200 MG tablet Take 400 mg by mouth every 6 hours as needed for mild pain    at PRN     lisinopril (PRINIVIL/ZESTRIL) 40 MG tablet  Take 40 mg by mouth daily as needed (if needed for high blood pressure)    at Most days     Multiple Vitamin (DAILY MULTIVITAMIN PO) Take 1 tablet by mouth daily    4/7/2020 at Unknown time     Omega-3 Fatty Acids (FISH OIL PO) Take 1 capsule by mouth Once weekly   Past Week at Unknown time     spironolactone (ALDACTONE) 50 MG tablet Take 50 mg by mouth Twice weekly   Past Week at Unknown time     traMADol (ULTRAM) 50 MG tablet Take 50 mg by mouth daily   4/7/2020 at Unknown time     zolpidem (AMBIEN) 5 MG tablet Take 1 tablet (5 mg) by mouth nightly as needed for sleep 10 tablet 0 4/7/2020 at Unknown time     Allergies:No Known Allergies    PHYSICAL EXAM:   Appearance:   well developed well nourished male in no acute distress, alert and oriented X 3  /54 (BP Location: Right arm)   Pulse 107   Temp 95  F (35  C) (Oral)   Resp 18   Wt 105.8 kg (233 lb 4.8 oz)   SpO2 98%   BMI 30.78 kg/m    Eyes:    + slceral icterus  Ears, nose, throat:  within normal limits for age  Lymph nodes (cervical):   not enlarged, nontender  Respirations:   unlabored  Lungs:  Clear to auscultation  Heart:  regular rate and rhythm  Musculo-skelatal (lower extremities):   no edema, no joint swelling, no erythema, no increased warmth  Gastrointestinal:   distended, +BS, soft, nt, + ascites  Skin:   clear, without lesions  Neuro: no asterixis   Ext: b/l pitting edema    LABS:    [unfilled]  [unfilled]  [unfilled]  [unfilled]  Radiology:     Consultation Assessment & Plan:     Decompensated EtOH cirrhosis with ascites and hepatic encephalopathy now with abdominal pain, vomiting, worsening ascites, leukocytosis, ASHWINI, jaundice and encephalopathy.     1- Decompensated cirrhosis: Quit EtOH in Jan 2020. MELD 32. No evidence of overt GI bleeding. HE with elevated ammonia, improving. He is on lactulose. No evidence of liver mass in most recent imaging. EGD 2 months ago showed small esophageal varices and PHG. Hyperbilirubinemia  and jaundice are due to decompensation of liver disease.   2- Ascites : 2/2 portal hypertension. Last paracentesis was on 4-1-20 with removal of 6 L. There is concern for SBP now d/t current presentation, HE and ASHWINI. He is scheduled for paracentesis today. He is on ceftriaxone.   3- ASHWINI: Pre-renal vs ATN vs HRS.  4- Abdominal pain, vomiting: ? Gastroenteritis, improving today.     -- Continue to hold diuretics.  -- Proceed with paracentesis to r/o SBP.  -- Monitor kidney function. Check urine studies and FeNa. Consider nephrology consult.   -- Continue lactulose, titrate to achieve 2 soft BMs a day.  -- Sodium restricted diet < 2 gr, protein < 0.6 gr/kg  -- Check AFP.  -- No steroids ( possible underlying systemic infection or SBP).   -- Evaluate for liver transplantation as outpatient.        Thank you for the opportunity in letting us participate in the care of Sp Plasencia.    Bryan Riggins MD

## 2020-04-09 NOTE — PLAN OF CARE
VSS tachycardic (low 100's) afebrile A&Ox4 forgetful and impulsive. Ax1 w/ walker & gb unsteady gait. Pt does not use call light and frequently sets off alarms. Slow to respond w/ flat affect. Pt stated several times that he wants to go home. Abdominal distention present, denies pain and nausea this shift. . Plan for paracentesis today. Will cont POC.   Destini Davalos RN on 4/9/2020 at 6:39 AM

## 2020-04-09 NOTE — PROGRESS NOTES
Patient tolerated paracentesis well.  5600 mL of straw colored fluid drained done by Dr Berg  Dressing clean dry and intact with no drainage at time of transfer.  NO albumin given. Patient back to nursing unit via cart, report called to receiving RN.  Samples obtained and sent to lab.

## 2020-04-09 NOTE — PROGRESS NOTES
[facility]  Hospitalist Progress Note  Anam Castillo MD 04/09/20    Reason for Stay (Diagnosis): Hepatic encephalopathy, ASHWINI, possible SBP         Assessment and Plan:      Summary of Stay: Sp Plasencia is a 44 year old male with PMH including cirrhosis secondary to alcohol, esophageal varices, HTN, tobacco dependence, and fascioscapulohumeral muscular dystrophy who presented with vomiting and abdominal pain for 48 hours and appeared encephalopathic on exam.  He was admitted on 4/8/2020 with ASHWINI, hyponatremia, leukocytosis concerning for SBP, and hepatic encephalopathy.  His bilirubin is higher than previous at 24.  He was started on IV ceftriaxone empirically for possible SBP.  His diuretics were held and he is receiving some gentle IV hydration for his ASHWINI and hyponatremia which are improving.  [facility] was consulted.  IR consulted today for diagnostic and therapeutic paracentesis.  Started on lactulose for hepatic encephalopathy which seems to be improving.    Problem List/Assessment and Plan:   Abdominal pain, vomiting, possible SBP: 2-3 days of some diffuse abdominal pain along with numerous episodes of vomiting.  Does report able to keep down some food and water.  Has had some bowel movements during this time so doubt obstruction.  Gastroenteritis possible, but denies diarrhea.  Does have leukocytosis and did have paracentesis on 4/1/2020 so possibility of SBP.  He does not have any significant tenderness on exam or peritoneal findings, although has significant amount of ascites.  Overall feels very fatigued and worn out.  Does not have any new cough or shortness of breath to suspect COVID19.  Does have history of esophageal varices, but denies any hematemesis and he is not anemic.  Does not have any history of gallstones and despite worsening liver function I do think we need to repeat imaging at this time, but could consider CT if worsening abdominal pain or decompensating.  -IR  consulted for diagnostic and therapeutic paracentesis with labs   -empiric ceftriaxone 2 g every 24 hours in case of SBP  -Continue oral Nexium daily  -Compazine for nausea as needed which may have less effect on his prolonged QTC which is close to 500  -No acetaminophen now given liver failure.  If having severe pain later on could try low-dose oxycodone versus Dilaudid     Hepatic encephalopathy, cirrhosis secondary to alcohol: Slowed responses in the ED.  Oriented to time and location.  At this time appears mildly encephalopathic and he does report some foggy thinking.  Ammonia level was elevated at 182.  Also possibility of SBP.  He is not on lactulose or rifaximin at baseline.  He does have known cirrhosis secondary to alcohol.  Also has esophageal varices.  He was started on daily steroids  2 months ago he said he completed 2 weeks and then was tapered off after follow-up with [facility].  Unfortunately liver labs looking much worse currently with a bilirubin of 24.9 up from 20.7, , albumin 2.5, INR elevated at 1.6.  He also has ASHWINI.  Possibility of SBP as etiology for worsening liver failure as above.  Denies any alcohol use since January 1.  He is taking Lasix 40 mg daily and spironolactone 50 mg daily at home.  -Initial MELD score of 31, down to 29 today  -Consulted [facility]  -Paracentesis as above and empiric SBP treatment until then  -Started lactulose for hepatic encephalopathy, titrate to 2-3 stools.  Encephalopathy improving  -Daily CMP and INR  -Holding diuretics for ASHWINI     ASHWINI, hyponatremia: Creatinine up to 2.38 from baseline of 1.  BUN also elevated and sodium low at 126.  Suspect ASHWINI due to hypovolemia as he has been vomiting and still taking his Lasix and Aldactone.  -Continue with low rate of normal saline at 50 ml/hr and hold PTA diuretics  -Daily BMP     HTN: PTA on lisinopril 40 mg daily.  Blood pressure here 104-120 systolic in the ED.  Looks hypovolemic  And also  has ASHWINI so holding lisinopril.     Gout: resumed pta allopurinol.     Tobacco dependence: Only smoking a few cigarettes here and there.  Declines nicotine replacement.     Facioscapulohumeral muscular dystrophy: Appears to have a prescription for tramadol.  Currently mildly encephalopathic so holding off on this for now.        DVT Prophylaxis: Pneumatic Compression Devices  Code Status: Full Code  FEN: 2 g restriction, normal saline at 50 ml/hr  Discharge Dispo: Home  Estimated Disch Date / # of Days until Disch:  Patient already hoping for discharge, however not ready medically.  1-2 days if labs and encephalopathy improving           Interval History (Subjective):      Encephalopathy a little bit better today.  He denies any abdominal pain.  Nausea improved and no vomiting overnight.  Oral intake remains poor.  Afebrile.  Agreeable to paracentesis today.                  Physical Exam:      Last Vital Signs:  /54 (BP Location: Right arm)   Pulse 107   Temp 95  F (35  C) (Oral)   Resp 18   Wt 105.8 kg (233 lb 4.8 oz)   SpO2 98%   BMI 30.78 kg/m        Intake/Output Summary (Last 24 hours) at 4/9/2020 1138  Last data filed at 4/9/2020 0943  Gross per 24 hour   Intake 419.17 ml   Output 350 ml   Net 69.17 ml       Constitutional: Awake, NAD   Eyes: Scleral icterus present  HEENT:  MMM  Respiratory:  lungs cta bilaterally, no crackles or wheeze  Cardiovascular: Regular tachycardia.  1/6 systolic murmur heard best over right upper sternal border  GI: At least moderate distention, nontender to palpation in all quadrants without guarding or rebound tenderness, bowel sounds faint  Skin: Jaundiced  Musculoskeletal/extremities:   No significant lower extremity edema  Neurologic: Alert, oriented to place and time, seems less encephalopathic  Psychiatric: calm, cooperative, flat affect         Medications:      All current medications were reviewed with changes reflected in problem list.         Data:      All  new lab and imaging data was reviewed.   Labs:  No results for input(s): CULT in the last 168 hours.  Recent Labs   Lab 04/09/20  0723 04/08/20  1351   WBC 13.1* 13.0*   HGB 13.6 13.9   HCT 37.7* 38.6*   MCV 88 90    207     Recent Labs   Lab 04/09/20  0723 04/08/20  1351   * 126*   POTASSIUM 3.6 3.9   CHLORIDE 99 95   CO2 22 22   ANIONGAP 9 9   * 115*   BUN 35* 39*   CR 1.80* 2.38*   GFRESTIMATED 45* 32*   GFRESTBLACK 52* 37*   JOSE CARLOS 9.1 9.3   PROTTOTAL 6.5* 6.6*   ALBUMIN 2.4* 2.5*   BILITOTAL 22.3* 24.9*   ALKPHOS 404* 450*   * 133*   ALT 63 65     Lactic acid 1.8  Ammonia level 39    Imaging:   Ultrasound paracentesis today      Anam Castillo MD

## 2020-04-10 VITALS
BODY MASS INDEX: 29.47 KG/M2 | SYSTOLIC BLOOD PRESSURE: 132 MMHG | OXYGEN SATURATION: 99 % | WEIGHT: 223.4 LBS | HEART RATE: 108 BPM | DIASTOLIC BLOOD PRESSURE: 56 MMHG | RESPIRATION RATE: 18 BRPM | TEMPERATURE: 97.5 F

## 2020-04-10 LAB
ALBUMIN SERPL-MCNC: 2.3 G/DL (ref 3.4–5)
ALP SERPL-CCNC: 354 U/L (ref 40–150)
ALT SERPL W P-5'-P-CCNC: 58 U/L (ref 0–70)
ANION GAP SERPL CALCULATED.3IONS-SCNC: 7 MMOL/L (ref 3–14)
AST SERPL W P-5'-P-CCNC: 101 U/L (ref 0–45)
BILIRUB SERPL-MCNC: 21.2 MG/DL (ref 0.2–1.3)
BUN SERPL-MCNC: 25 MG/DL (ref 7–30)
CALCIUM SERPL-MCNC: 8.6 MG/DL (ref 8.5–10.1)
CHLORIDE SERPL-SCNC: 102 MMOL/L (ref 94–109)
CO2 SERPL-SCNC: 21 MMOL/L (ref 20–32)
CREAT SERPL-MCNC: 1.17 MG/DL (ref 0.66–1.25)
ERYTHROCYTE [DISTWIDTH] IN BLOOD BY AUTOMATED COUNT: 15.8 % (ref 10–15)
GFR SERPL CREATININE-BSD FRML MDRD: 75 ML/MIN/{1.73_M2}
GLUCOSE SERPL-MCNC: 87 MG/DL (ref 70–99)
HCT VFR BLD AUTO: 37.6 % (ref 40–53)
HGB BLD-MCNC: 13.2 G/DL (ref 13.3–17.7)
LACTATE BLD-SCNC: 1.3 MMOL/L (ref 0.7–2)
MCH RBC QN AUTO: 31.7 PG (ref 26.5–33)
MCHC RBC AUTO-ENTMCNC: 35.1 G/DL (ref 31.5–36.5)
MCV RBC AUTO: 90 FL (ref 78–100)
PLATELET # BLD AUTO: 181 10E9/L (ref 150–450)
POTASSIUM SERPL-SCNC: 3.4 MMOL/L (ref 3.4–5.3)
PROT SERPL-MCNC: 6.3 G/DL (ref 6.8–8.8)
RBC # BLD AUTO: 4.16 10E12/L (ref 4.4–5.9)
SODIUM SERPL-SCNC: 130 MMOL/L (ref 133–144)
WBC # BLD AUTO: 11.3 10E9/L (ref 4–11)

## 2020-04-10 PROCEDURE — 25000132 ZZH RX MED GY IP 250 OP 250 PS 637: Performed by: INTERNAL MEDICINE

## 2020-04-10 PROCEDURE — 36415 COLL VENOUS BLD VENIPUNCTURE: CPT | Performed by: INTERNAL MEDICINE

## 2020-04-10 PROCEDURE — 99239 HOSP IP/OBS DSCHRG MGMT >30: CPT | Performed by: INTERNAL MEDICINE

## 2020-04-10 PROCEDURE — 25800030 ZZH RX IP 258 OP 636: Performed by: INTERNAL MEDICINE

## 2020-04-10 PROCEDURE — 83605 ASSAY OF LACTIC ACID: CPT | Performed by: INTERNAL MEDICINE

## 2020-04-10 PROCEDURE — 80053 COMPREHEN METABOLIC PANEL: CPT | Performed by: INTERNAL MEDICINE

## 2020-04-10 PROCEDURE — 85027 COMPLETE CBC AUTOMATED: CPT | Performed by: INTERNAL MEDICINE

## 2020-04-10 RX ORDER — HYDROXYZINE HYDROCHLORIDE 25 MG/1
25-50 TABLET, FILM COATED ORAL EVERY 6 HOURS PRN
Qty: 90 TABLET | Refills: 0 | Status: SHIPPED | OUTPATIENT
Start: 2020-04-10 | End: 2020-09-21

## 2020-04-10 RX ORDER — ESOMEPRAZOLE MAGNESIUM 40 MG/1
40 CAPSULE, DELAYED RELEASE ORAL DAILY
Qty: 30 CAPSULE | Refills: 0 | Status: SHIPPED | OUTPATIENT
Start: 2020-04-10 | End: 2021-07-05

## 2020-04-10 RX ORDER — ALLOPURINOL 300 MG/1
300 TABLET ORAL DAILY
Qty: 30 TABLET | Refills: 0 | Status: SHIPPED | OUTPATIENT
Start: 2020-04-10 | End: 2021-01-04

## 2020-04-10 RX ORDER — TRAMADOL HYDROCHLORIDE 50 MG/1
50 TABLET ORAL 2 TIMES DAILY PRN
Status: DISCONTINUED | OUTPATIENT
Start: 2020-04-10 | End: 2020-04-10 | Stop reason: HOSPADM

## 2020-04-10 RX ORDER — ZOLPIDEM TARTRATE 5 MG/1
5 TABLET ORAL
Status: DISCONTINUED | OUTPATIENT
Start: 2020-04-10 | End: 2020-04-10 | Stop reason: HOSPADM

## 2020-04-10 RX ADMIN — ZOLPIDEM TARTRATE 5 MG: 5 TABLET, COATED ORAL at 00:52

## 2020-04-10 RX ADMIN — ALLOPURINOL 300 MG: 300 TABLET ORAL at 08:18

## 2020-04-10 RX ADMIN — LACTULOSE 20 G: 20 SOLUTION ORAL at 08:18

## 2020-04-10 RX ADMIN — HYDROXYZINE HYDROCHLORIDE 50 MG: 25 TABLET, FILM COATED ORAL at 10:24

## 2020-04-10 RX ADMIN — HYDROXYZINE HYDROCHLORIDE 25 MG: 25 TABLET, FILM COATED ORAL at 04:06

## 2020-04-10 RX ADMIN — PANTOPRAZOLE SODIUM 40 MG: 40 TABLET, DELAYED RELEASE ORAL at 08:18

## 2020-04-10 RX ADMIN — SODIUM CHLORIDE: 9 INJECTION, SOLUTION INTRAVENOUS at 05:50

## 2020-04-10 ASSESSMENT — ACTIVITIES OF DAILY LIVING (ADL)
ADLS_ACUITY_SCORE: 15
ADLS_ACUITY_SCORE: 14

## 2020-04-10 NOTE — PROGRESS NOTES
Cross Cx:   Patient requesting home Ambien. It was held on admission due to hepatic encephalopathy. Discussed with nursing. Patent's mentation is improving and is significantly better tahn yesterday. Will resume Ambien PTA with day team to assess if medication should be continued.

## 2020-04-10 NOTE — PLAN OF CARE
A&O x4. VSS. Pt c/o generalized pain 6/10. Tramadol administered with relief of pain. PRN atarax administered for itching. NS infusing @ 50 mL/hr. Cr: 1.80. Na: 130. LS: clear. Paracentesis insertion site clean, dry, and intact. Up Ax1 with GB. 2 Gr Na diet. GI and IR following. See notes. Expected discharge in 2-3 days. Will continue with plan of care.

## 2020-04-10 NOTE — PLAN OF CARE
dischged after instructions  Reviewed with understanding and acceptance of plan acknowledged . Written papers given of instructions and education.  Filled meds given to pt

## 2020-04-10 NOTE — DISCHARGE SUMMARY
[facility]  Discharge Summary  Name: Sp Plasencia    MRN: [id number]  YOB: 1975    Age: 44 year old  Date of Discharge:  4/10/2020  1:53 PM  Date of Admission: 2020  Primary Care Provider: Vinod Stewart  Discharge Physician:  Anam Castillo MD  Discharging Service:  Hospitalist      Discharge Diagnoses:  Hepatic encephalopathy  ASHWINI  Hyponatremia  Leukocytosis  Cirrhosis secondary to alcohol  HTN  Gout  Tobacco dependence  Chronic pain secondary to fascioscapulohumeral muscular dystrophy     Hospital Course:  Summary of Stay:  Sp Plasencia is a 44 year old male with PMH including cirrhosis secondary to alcohol, esophageal varices, HTN, tobacco dependence, and fascioscapulohumeral muscular dystrophy who presented with vomiting and abdominal pain for 48 hours and appeared encephalopathic on exam.  He was admitted on 2020 with ASHWINI, hyponatremia, leukocytosis concerning for SBP, and hepatic encephalopathy.  His bilirubin is higher than previous at 24.  He was started on IV ceftriaxone empirically for possible SBP.  His diuretics were held and he is receiving some gentle IV hydration for his ASHWINI and hyponatremia which are improving.  [facility] was consulted.  IR consulted and he underwent paracentesis on  with 5.6 L fluid removal.  Fluid studies not suggestive of SBP so IV ceftriaxone was stopped.  Hepatic encephalopathy improved with lactulose and a few bowel movements.  ASHWINI resolved and hyponatremia improving up to 130 on day of discharge with holding his diuretics and lisinopril.  Discontinuing lisinopril as blood pressure remains around 120.  Diuretics discontinued for now per GI recommendations.  He will follow-up in GI/liver clinic next week.  Prescribed lactulose on discharge and discussed how to use this medication with the patient and his spouse to prevent encephalopathy.     Problem List/Assessment and Plan:   Abdominal pain, vomitin-3 days of some diffuse  abdominal pain along with numerous episodes of vomiting.  Had some bowel movements during this time so doubt obstruction.  Gastroenteritis possible, but denies diarrhea.  He did have a leukocytosis and did have paracentesis on 4/1/2020 so possibility of SBP and empirically received IV ceftriaxone for this.  Paracentesis was obtained that showed only 180 WBCs, 16% neutrophils so antibiotics stopped as this was not consistent with SBP.  Does not have any new cough or shortness of breath to suspect COVID19.  Does have history of esophageal varices, but denies any hematemesis and he is not anemic.  Symptoms resolved and tolerating regular diet.    Hepatic encephalopathy, cirrhosis secondary to alcohol: Slowed responses in the ED.  Oriented to time and location.  On admission he was mildly encephalopathic and he does report some foggy thinking.  Ammonia level was elevated at 182.  SBP was ruled out.  He is not on lactulose or rifaximin at baseline.  He does have known cirrhosis secondary to alcohol.  Also has esophageal varices.  He was started on daily steroids  2 months ago he said he completed 2 weeks and then was tapered off after follow-up with [facility].  Unfortunately liver labs looking much worse currently with a bilirubin of 24.9 up from 20.7, , albumin 2.5, INR elevated at 1.6.  He also has ASHWINI.  Possibility of SBP as etiology for worsening liver failure as above.  Denies any alcohol use since January 1.  He is taking Lasix 40 mg daily and spironolactone 50 mg daily at home.  Initial MELD score of 31, down to 29 next day.  -Underwent  paracentesis with 5.6 L removal on 4/9.  WBCs 180 and 16% neutrophils not consistent with SBP so ceftriaxone stopped.  -Consulted [facility], recommend stopping spironolactone and Lasix for now and he will follow-up in liver clinic next week.  Also recommended to continue lactulose initially prescribed at twice daily.  I discussed the role of lactulose  with the patient and his spouse and to titrate to 3 stools per day if able.  If worsening confusion recommend increasing lactulose, his wife voiced understanding over the phone.  -Refilled his hydroxyzine for itching     ASHWINI, hyponatremia: Creatinine up to 2.38 from baseline of 1.  BUN also elevated and sodium low at 126.  Suspect ASHWINI due to hypovolemia as he has been vomiting and still taking his Lasix and Aldactone.  -Sodium up to 130 on discharge and creatinine at baseline.  Lisinopril, furosemide, and spironolactone discontinued for now     HTN: PTA on lisinopril 40 mg daily.  Blood pressure here 104-120 systolic in the ED.  Looks hypovolemic lisinopril and diuretics held here.  Blood pressure remains 120 systolic or lower.  Discontinued lisinopril and diuretics on discharge.     Gout: resumed pta allopurinol.     Tobacco dependence: Only smoking a few cigarettes here and there.  Declines nicotine replacement.     Facioscapulohumeral muscular dystrophy: uses tramadol as needed at home, he can continue this for now as long as he is not becoming more confused, however I declined refilling this medication for chronic use.        Discharge Disposition:  Discharged to home     Allergies:  No Known Allergies     Discharge Medications:   Discharge Medication List as of 4/10/2020  1:13 PM      START taking these medications    Details   lactulose (CEPHULAC) 20 GM packet Take 1 packet (20 g) by mouth 2 times daily, Disp-60 packet,R-1, E-Prescribe         CONTINUE these medications which have CHANGED    Details   allopurinol (ZYLOPRIM) 300 MG tablet Take 1 tablet (300 mg) by mouth daily, Disp-30 tablet,R-0, E-Prescribe      esomeprazole (NEXIUM) 40 MG DR capsule Take 1 capsule (40 mg) by mouth daily Take 30-60 minutes before eating., Disp-30 capsule,R-0, E-Prescribe      hydrOXYzine (ATARAX) 25 MG tablet Take 1-2 tablets (25-50 mg) by mouth every 6 hours as needed for itching or other (adjuvant pain), Disp-90 tablet,R-0,  E-Prescribe         CONTINUE these medications which have NOT CHANGED    Details   Multiple Vitamin (DAILY MULTIVITAMIN PO) Take 1 tablet by mouth daily , Historical      Omega-3 Fatty Acids (FISH OIL PO) Take 1 capsule by mouth Once weekly, Historical      traMADol (ULTRAM) 50 MG tablet Take 50 mg by mouth daily, Historical      zolpidem (AMBIEN) 5 MG tablet Take 1 tablet (5 mg) by mouth nightly as needed for sleep, Disp-10 tablet, R-0, Local Print         STOP taking these medications       furosemide (LASIX) 20 MG tablet Comments:   Reason for Stopping:         ibuprofen (ADVIL/MOTRIN) 200 MG tablet Comments:   Reason for Stopping:         lisinopril (PRINIVIL/ZESTRIL) 40 MG tablet Comments:   Reason for Stopping:         spironolactone (ALDACTONE) 50 MG tablet Comments:   Reason for Stopping:                Condition on Discharge:  Discharge condition: Stable   Discharge vitals: Blood pressure 132/56, pulse 108, temperature 97.5  F (36.4  C), temperature source Axillary, resp. rate 18, weight 101.3 kg (223 lb 6.4 oz), SpO2 99 %.   Code status on discharge: Full Code     History of Illness:  See detailed admission note for full details.    Physical Exam:  Blood pressure 132/56, pulse 108, temperature 97.5  F (36.4  C), temperature source Axillary, resp. rate 18, weight 101.3 kg (223 lb 6.4 oz), SpO2 99 %.  Wt Readings from Last 1 Encounters:   04/10/20 101.3 kg (223 lb 6.4 oz)     Constitutional: Awake, NAD   Eyes: Scleral icterus present  HEENT:  MMM  Respiratory: no respiratory distress, lungs cta bilaterally, no crackles or wheeze  Cardiovascular: Slight regular tachycardia without murmur  GI: Moderately distended, nontender to palpation in all quadrants, bowel sounds present  Skin: Jaundiced  Musculoskeletal/extremities: Trace bilateral lower extremity edema  Neurologic: Alert, oriented to place and time, speech is clear although slow at times.  Improved from admission.  Psychiatric: calm, cooperative, flat  affect    Procedures other than Imaging:  Paracentesis - 5.6 L removed     Imaging:  Results for orders placed or performed during the hospital encounter of 04/08/20   US Paracentesis    Narrative    ULTRASOUND PARACENTESIS April 9, 2020 1:48 PM       HISTORY: High volume paracentesis with or without diagnostic fluid  analysis with labs to be drawn if ordered. Total paracentesis volume  as much as possible.       PROCEDURE: Written informed consent was obtained from the patient  prior to the procedure. The risks and benefits including bleeding,  infection and abdominal organ injury were discussed and the patient  wished to continue. Initial ultrasound images demonstrated a large  left lower quadrant fluid collection. A permanent image was saved. The  skin overlying this collection was marked, prepped, draped and  anesthetized in usual sterile fashion utilizing 10 mL of lidocaine 1%.  The paracentesis catheter was then placed into the peritoneal fluid  collection with return of 5600 mL of clear yellow fluid. Estimated  blood loss during the procedure was less than 5 mL. No specimens  collected. Patient tolerated the procedure well. The patient will  receive intravenous albumin on the usual sliding scale as needed per  protocol.      With continuous ultrasound guidance, an 8 French needle and catheter  was advanced into the ascites of the left lower quadrant and  ultrasound image stored for documentation.      Impression    IMPRESSION: Ultrasound-guided paracentesis. 5.6 L removed.    DUSTY JACK MD        Consultations:  Consultation during this admission received from gastroenterology and interventional radiology.       Recent Lab Results:  Recent Labs   Lab 04/10/20  0618 04/09/20  0723 04/08/20  1351   WBC 11.3* 13.1* 13.0*   HGB 13.2* 13.6 13.9   HCT 37.6* 37.7* 38.6*   MCV 90 88 90    217 207     Recent Labs   Lab 04/09/20  1320   CULT Culture negative monitoring continues     Recent Labs   Lab  04/10/20  0618 04/09/20  0723 04/08/20  1351   * 130* 126*   POTASSIUM 3.4 3.6 3.9   CHLORIDE 102 99 95   CO2 21 22 22   ANIONGAP 7 9 9   GLC 87 108* 115*   BUN 25 35* 39*   CR 1.17 1.80* 2.38*   GFRESTIMATED 75 45* 32*   GFRESTBLACK 87 52* 37*   JOSE CARLOS 8.6 9.1 9.3   PROTTOTAL 6.3* 6.5* 6.6*   ALBUMIN 2.3* 2.4* 2.5*   BILITOTAL 21.2* 22.3* 24.9*   ALKPHOS 354* 404* 450*   * 124* 133*   ALT 58 63 65     Recent Labs   Lab 04/09/20  0723 04/08/20  1351   INR 1.61* 1.44*     Peritoneal fluid:     Ref. Range 4/9/2020 13:20   Body Fluid Analysis Source Unknown Paracentesis   Color Fluid Unknown Yellow   Appearance Fluid Unknown Cloudy   WBC Fluid Latest Units: /uL 180   % Lymphocytes Fluid Latest Units: % 32   % Mono/Macro Fluid Latest Units: % 33   % Neutrophils Fluid Latest Units: % 16   % Other Cells Fluid Latest Units: % 19   Albumin Fluid Latest Units: g/dL 0.2   Protein Total Fluid Latest Units: g/dL 0.6   Protein Total Fluid Source Unknown Paracentesis          Pending Results:    Unresulted Labs Ordered in the Past 30 Days of this Admission     Date and Time Order Name Status Description    4/8/2020 1713 Fluid Culture Aerobic Bacterial Preliminary          These results will be followed up by patient's primary care provider.    Discharge Instructions and Follow-Up:   Discharge Procedure Orders   Reason for your hospital stay   Order Comments: You were hospitalized for an acute kidney injury that was likely from dehydration and taking her diuretics.  Initial concern for possibly infected ascites so you were started on IV antibiotics.  You underwent a paracentesis had 5.6 L removed, but this did not show signs of infection so antibiotics have been stopped.  Your kidney injury is improved with holding diuretics and your lisinopril and you will continue to hold these until follow-up in liver clinic next week.  You had some confusion initially that is improved with starting lactulose which works by  stimulating bowel movements.  This confusion is due to the liver not functioning properly.  You were prescribed lactulose on discharge and you should take this at least once a day, but up to 3 times per day to stimulate 3 bowel movements per day to prevent confusion.  As your diuretics have been stopped you should stick to a less than 2000 mg sodium diet per day to prevent rapid reaccumulation of fluid in your belly.     Follow-up and recommended labs and tests    Order Comments: Follow-up in [facility] liver clinic to be arranged for next week.     Activity   Order Comments: Your activity upon discharge: activity as tolerated     Order Specific Question Answer Comments   Is discharge order? Yes      Full Code     Order Specific Question Answer Comments   Code status determined by: Discussion with patient/legal decision maker      Diet   Order Comments: Follow this diet upon discharge: Orders Placed This Encounter      2 Gram Sodium Diet     Order Specific Question Answer Comments   Is discharge order? Yes          I, Anam Castillo MD, personally saw the patient today and spent greater than 30 minutes discharging this patient.    Anam Castillo MD

## 2020-04-10 NOTE — PROVIDER NOTIFICATION
Pt normally takes Ambien at night, and would like it tonight. He had a hard time sleeping last night. Thank you.   Destini Davalos RN on 4/10/2020 at 00:35 AM

## 2020-04-10 NOTE — PLAN OF CARE
VSS tachycardic (low 100's) afebrile A&Ox4 forgetful at times & impulsive, but improving. Ax1 w/ walker & gb unsteady gait. Slow to respond w/ flat affect. Abdominal distention present, denies pain and nausea this shift. 5mg of Ambien given before bed. Pt states that he wants to go home. Possible discharge today/tomorrow contingent upon labs improving. Will cont POC.

## 2020-04-10 NOTE — PROGRESS NOTES
GI progress note:     Decompensated EtOH cirrhosis with ascites and hepatic encephalopathy now with abdominal pain, vomiting, worsening ascites, leukocytosis, ASHWINI, jaundice and encephalopathy.        1- Ascites : 2/2 portal hypertension. Underwent paracentesis yesterday with removal of 5 L. Fluid analysis is not consistent with SBP.   3- ASHWINI: Resolved ( Cr: 2.38 --> 1.17)  4- Abdominal pain, vomiting: resolved.      -- Can be discharged home today.  -- I will set up follow up with liver clinic next week ( I spoke with Dr. Mcnally).   -- Continue to hold diuretics.   -- Would continue lactulose BID.   -- Sodium restricted diet < 2 gr, protein < 0.6 gr/kg  -- Will start evaluation for liver transplantation as outpatient.   GI will sign off.                          S:Feels better today, denies abdominal pain or vomiting.     Physical Exam:  GEN:NAD  HEENT: Scelera icteric  CVS: RRR  Lungs: non-labored respiration  Abd: mid distension, NT  Ext: No deformity.         Bryan Riggins MD  GI

## 2020-04-12 ENCOUNTER — HOSPITAL ENCOUNTER (EMERGENCY)
Facility: CLINIC | Age: 45
Discharge: HOME | End: 2020-04-12
Attending: PHYSICIAN ASSISTANT | Admitting: PHYSICIAN ASSISTANT
Payer: COMMERCIAL

## 2020-04-12 VITALS
TEMPERATURE: 98.5 F | RESPIRATION RATE: 16 BRPM | OXYGEN SATURATION: 98 % | HEART RATE: 101 BPM | SYSTOLIC BLOOD PRESSURE: 140 MMHG | DIASTOLIC BLOOD PRESSURE: 80 MMHG

## 2020-04-12 DIAGNOSIS — Z51.89 VISIT FOR WOUND CHECK: ICD-10-CM

## 2020-04-12 LAB
GRAM STN SPEC: ABNORMAL
SPECIMEN SOURCE: ABNORMAL

## 2020-04-12 PROCEDURE — 99283 EMERGENCY DEPT VISIT LOW MDM: CPT

## 2020-04-12 PROCEDURE — 12001 RPR S/N/AX/GEN/TRNK 2.5CM/<: CPT

## 2020-04-12 RX ORDER — LIDOCAINE HYDROCHLORIDE AND EPINEPHRINE 10; 10 MG/ML; UG/ML
INJECTION, SOLUTION INFILTRATION; PERINEURAL
Status: DISCONTINUED
Start: 2020-04-12 | End: 2020-04-12 | Stop reason: HOSPADM

## 2020-04-12 ASSESSMENT — ENCOUNTER SYMPTOMS
COUGH: 1
WOUND: 1
ABDOMINAL PAIN: 1
DIARRHEA: 1

## 2020-04-12 NOTE — ED TRIAGE NOTES
Patient states he is here because he has draining from parencentisis site that was drained a few days ago. ABC intact alert and no distress. Patient states he has symptoms of cough abdominal pain and diarrhea but its related to his chronic illness.

## 2020-04-12 NOTE — ED AVS SNAPSHOT
[facility] Emergency Department  [address]  [facility] [address]  Phone:  [phone]  Fax:  [phone]                                    Sp Plasencia   MRN: [id number]    Department:  [facility] Emergency Department   Date of Visit:  4/12/2020           After Visit Summary Signature Page    I have received my discharge instructions, and my questions have been answered. I have discussed any challenges I see with this plan with the nurse or doctor.    ..........................................................................................................................................  Patient/Patient Representative Signature      ..........................................................................................................................................  Patient Representative Print Name and Relationship to Patient    ..................................................               ................................................  Date                                   Time    ..........................................................................................................................................  Reviewed by Signature/Title    ...................................................              ..............................................  Date                                               Time          22EPIC Rev 08/18

## 2020-04-12 NOTE — ED PROVIDER NOTES
History     Chief Complaint:  Wound Check     The history is provided by the patient.      Sp Plasencia is a 44 year old male with a history of hepatic encephalopathy s/p paracentesis who presents to the emergency department for evaluation of a wound check. Three days ago, the patient underwent a paracentesis. Since then, he has had draining from the incision spot. Patient reports a cough, abdominal pain, and diarrhea but notes this is due to his chronic illness and no recent change. Reports abdominal pain has improved since paracentesis.     Allergies:  No Known Drug Allergies     Medications:    Zyloprim  Nexium  Atarax  Lactulose  Ultram  Ambien     Past Medical History:    Hepatic encephalopathy  Ascites  ASHWINI  FSHD   GERD  Gout  Hypertension  Liver cirrhosis     Past Surgical History:    The patient does not have any pertinent past surgical history.    Family History:    Hypertension     Social History:  Positive for tobacco use.  Negative for alcohol use.  Negative for drug use.  Marital Status:        Review of Systems   Respiratory: Positive for cough (not new).    Gastrointestinal: Positive for abdominal pain (not new) and diarrhea (not new).   Skin: Positive for wound.   All other systems reviewed and are negative.      Physical Exam     Patient Vitals for the past 24 hrs:   BP Temp Temp src Pulse Resp SpO2   04/12/20 1443 (!) 158/83 98.5  F (36.9  C) Oral 106 20 100 %       Physical Exam  General: Alert, interactive. Jaundice.   Head:  Scalp is atraumatic.  Eyes:  No scleral icterus.             Neck:  Normal range of motion. There is no rigidity.   CV:  Tachycardic rate and regular rhythm. No murmur. 2+ radial pulses  Resp:  Breath sounds are clear bilaterally. Non-labored, no retractions or accessory muscle use.  GI:  Abdomen is distended, no tenderness. LLQ puncture wound draining yellow fluid, no surrounding erythema. Areas of surrounding skin removed from recent surgical tape.    MS:  Normal range of motion.   Skin:  Warm and dry. See GI  Neuro:  Strength and sensation grossly intact.   Psych:  Awake. Alert.  Appropriate interactions.     Emergency Department Course     Procedure: Laceration Repair        WOUND: A puncture wound to the LLQ.       ANESTHESIA:  Local using lidocaine 1% w/ epinephrine total of 1 mLs      PREPARATION:  Alcohol swab      CLOSURE:  Wound was closed with one layer.  Skin closed with 2 x 5.0 Vicryl Rapide Sutures using interrupted sutures. Draining resolved.     Emergency Department Course:  Past medical records, nursing notes, and vitals reviewed.    1500 I performed an exam of the patient as documented above.     1600 I rechecked the patient and discussed the results of his workup thus far.     Findings and plan explained to the Patient. Patient discharged home with instructions regarding supportive care, medications, and reasons to return. The importance of close follow-up was reviewed.     I personally answered all related questions prior to discharge.     Impression & Plan   Medical Decision Making:  Sp Plasencia is a 44 year old male with a medical history including cirrhosis status post recent paracentesis presents emergency department with the paracentesis site oozing yellow liquid.  He has no other new concerns and reports abdominal pain is improved since paracentesis.  2 Vicryl rapide sutures were placed and bruising resolved.  Discussed that these sutures will absorb on their own.  There is no surrounding erythema to suggest cellulitis.  Plan for close follow-up with his GI provider and return for any new/patient agrees with this plan all questions and concerns addressed.    Diagnosis:    ICD-10-CM    1. Visit for wound check  Z51.89        Disposition:  Discharged to home.    Scribe Disclosure:  IAlma, am serving as a scribe at 2:56 PM on 4/12/2020 to document services personally performed by Pina Peterson PA-C, based on my  observations and the provider's statements to me.      Pina Peterson PA-C  04/12/20 1821

## 2020-04-14 ENCOUNTER — TRANSFERRED RECORDS (OUTPATIENT)
Dept: HEALTH INFORMATION MANAGEMENT | Facility: CLINIC | Age: 45
End: 2020-04-14

## 2020-04-15 ENCOUNTER — TRANSFERRED RECORDS (OUTPATIENT)
Dept: HEALTH INFORMATION MANAGEMENT | Facility: CLINIC | Age: 45
End: 2020-04-15

## 2020-04-15 ENCOUNTER — REFERRAL (OUTPATIENT)
Dept: TRANSPLANT | Facility: CLINIC | Age: 45
End: 2020-04-15

## 2020-04-15 ENCOUNTER — MEDICAL CORRESPONDENCE (OUTPATIENT)
Dept: HEALTH INFORMATION MANAGEMENT | Facility: CLINIC | Age: 45
End: 2020-04-15

## 2020-04-15 DIAGNOSIS — K70.31 ALCOHOLIC CIRRHOSIS OF LIVER WITH ASCITES (H): Primary | ICD-10-CM

## 2020-04-15 DIAGNOSIS — K70.30 ALCOHOLIC CIRRHOSIS (H): ICD-10-CM

## 2020-04-15 NOTE — LETTER
LIVER TRANSPLANT  EVALUATION SCHEDULE    Patient:   Sp Plasencia  MR#:    [id number]  Coordinator:  Marlen      [phone]  :     Perla                 [phone]  [facility]  Date(s):    May 5, 2020    This is your evaluation schedule, please follow dates and times.  You will   receive reminder phone calls for other tests, but please follow this schedule  only!  If you have any questions about dates and times, please call us on  number listed above.  Thank you, Transplant Services         Day/Date:    Tuesday, May 5, 2020  Time Location Activity   9:00 AM Telephone Visit- A Telephone Call will be placed to:  [phone] Appointment with Ophelia Almodovar     10:00 AM Telephone Visit- A Telephone Call will be placed to:  [phone] Appointment with Dr. Winters,  Hepatologist

## 2020-04-15 NOTE — LETTER
4/22/2020    Sp Plasencia  [address] [place] [id number]          Dear Sp,    Thank you for your interest in the Transplant Center at [facility], [facility]. We look forward to being a part of your care team and assisting you through the transplant process.    As we discussed, your transplant coordinator is Marlen Quesada (Liver).  You may call your coordinator at any time with questions or concerns call [phone].    Please complete the following.    1. Fill out and return the enclosed forms    Authorization for Electronic Communication    Authorization to Discuss Protected Health Information    Authorization for Release of Protected Health Information    Authorization for Care Everywhere Release of Information    2. Sign up for:    [employer], access to your electronic medical record (see enclosed pamphlet)    [email or web address], a transplant education website    You can use these tools to learn more about your transplant, communicate with your care team, and track your medical details      Sincerely,  Solid Organ Transplant  [facility], [facility]    cc: Referring Physician and PCP

## 2020-04-16 ENCOUNTER — TELEPHONE (OUTPATIENT)
Dept: MEDSURG UNIT | Facility: CLINIC | Age: 45
End: 2020-04-16

## 2020-04-16 LAB
BACTERIA SPEC CULT: NO GROWTH
SPECIMEN SOURCE: NORMAL

## 2020-04-16 RX ORDER — ALBUMIN (HUMAN) 12.5 G/50ML
12.5 SOLUTION INTRAVENOUS ONCE
Status: CANCELLED | OUTPATIENT
Start: 2020-04-16 | End: 2020-04-16

## 2020-04-16 RX ORDER — LIDOCAINE 40 MG/G
CREAM TOPICAL
Status: CANCELLED | OUTPATIENT
Start: 2020-04-16

## 2020-04-16 RX ORDER — NICOTINE POLACRILEX 4 MG
15-30 LOZENGE BUCCAL
Status: CANCELLED | OUTPATIENT
Start: 2020-04-16

## 2020-04-16 RX ORDER — DEXTROSE MONOHYDRATE 25 G/50ML
25-50 INJECTION, SOLUTION INTRAVENOUS
Status: CANCELLED | OUTPATIENT
Start: 2020-04-16

## 2020-04-17 ENCOUNTER — HOSPITAL ENCOUNTER (OUTPATIENT)
Facility: CLINIC | Age: 45
Discharge: HOME | End: 2020-04-17
Admitting: RADIOLOGY
Payer: COMMERCIAL

## 2020-04-17 ENCOUNTER — HOSPITAL ENCOUNTER (OUTPATIENT)
Dept: ULTRASOUND IMAGING | Facility: CLINIC | Age: 45
End: 2020-04-17
Attending: INTERNAL MEDICINE
Payer: COMMERCIAL

## 2020-04-17 VITALS
DIASTOLIC BLOOD PRESSURE: 54 MMHG | TEMPERATURE: 98.6 F | RESPIRATION RATE: 18 BRPM | SYSTOLIC BLOOD PRESSURE: 116 MMHG | HEART RATE: 111 BPM | OXYGEN SATURATION: 98 %

## 2020-04-17 DIAGNOSIS — K70.9 ALCOHOLIC LIVER DISEASE (H): ICD-10-CM

## 2020-04-17 PROCEDURE — 27210282 US PARACENTESIS

## 2020-04-17 PROCEDURE — 40000863 ZZH STATISTIC RADIOLOGY XRAY, US, CT, MAR, NM

## 2020-04-17 PROCEDURE — 25000125 ZZHC RX 250: Performed by: RADIOLOGY

## 2020-04-17 PROCEDURE — 25000128 H RX IP 250 OP 636: Performed by: INTERNAL MEDICINE

## 2020-04-17 PROCEDURE — P9047 ALBUMIN (HUMAN), 25%, 50ML: HCPCS | Performed by: INTERNAL MEDICINE

## 2020-04-17 RX ORDER — LIDOCAINE HYDROCHLORIDE 10 MG/ML
10 INJECTION, SOLUTION EPIDURAL; INFILTRATION; INTRACAUDAL; PERINEURAL ONCE
Status: COMPLETED | OUTPATIENT
Start: 2020-04-17 | End: 2020-04-17

## 2020-04-17 RX ORDER — ALBUMIN (HUMAN) 12.5 G/50ML
12.5 SOLUTION INTRAVENOUS DAILY PRN
Status: DISCONTINUED | OUTPATIENT
Start: 2020-04-17 | End: 2020-04-17 | Stop reason: HOSPADM

## 2020-04-17 RX ORDER — OMEGA-3 FATTY ACIDS/FISH OIL 300-1000MG
600 CAPSULE ORAL 3 TIMES DAILY
COMMUNITY
End: 2021-07-05

## 2020-04-17 RX ADMIN — LIDOCAINE HYDROCHLORIDE 10 ML: 10 INJECTION, SOLUTION EPIDURAL; INFILTRATION; INTRACAUDAL; PERINEURAL at 10:07

## 2020-04-17 RX ADMIN — ALBUMIN HUMAN 12.5 G: 0.25 SOLUTION INTRAVENOUS at 10:36

## 2020-04-17 RX ADMIN — ALBUMIN HUMAN 12.5 G: 0.25 SOLUTION INTRAVENOUS at 10:49

## 2020-04-17 NOTE — DISCHARGE INSTRUCTIONS

## 2020-04-17 NOTE — PROGRESS NOTES
Care Suites Discharge Nursing Note    Patient Information  Name: Sp Plasencia  Age: 44 year old    Discharge Education:  Discharge instructions reviewed: Yes  Additional education/resources provided: NA  Patient/patient representative verbalizes understanding: Yes  Patient discharging on new medications: N/A  Medication education completed: N/A    Discharge Plans:   Discharge location: home  Discharge ride contacted: Yes  Approximate discharge time: ~1120    Discharge Criteria:  Discharge criteria met and vital signs stable: Yes    Patient Belongs:  Patient belongings returned to patient: Yes    Gricelda Fields RN

## 2020-04-17 NOTE — IR NOTE
ULTRASOUND GUIDED PARACENTESIS   4/17/2020 11:12 AM     HISTORY: Alcoholic liver disease.    PROCEDURE:   Informed consent was obtained from the patient prior to the procedure with discussion including the possible risks of bleeding, infection and organ injury . Using 5 mL of 1% lidocaine for local anesthesia, sterile technique, and sonographic guidance with permanent image documentation, I placed an 8F paracentesis catheter into the peritoneal fluid collection. This was used to aspirate 5400 mL of yellow, serous fluid in vacuum bottles, and some of this was sent for any laboratory studies that had been ordered. There were no immediate complications.  Intravenous albumen replacement was performed according to protocol. As requested by patient, stitch was placed at the catheter entry site using 3-0 Vicryl. This was supplemented with Dermabond.    IMPRESSION:  Ultrasound guided paracentesis.    REJI NASCIMENTO MD

## 2020-04-17 NOTE — PROGRESS NOTES
.PATIENT WELLNESS SCREENING      1. In the last month, have you been in contact with someone who was confirmed or suspected to have Coronavirus/COVID-19? No    2. Do you have the following symptoms?   Fever? No   Cough? No   Shortness of breath? Yes: Pt abdomen is full/distended and this is why pt is SOB.  Pt baseline.     Skin rash? No    3. Have you traveled internationally in the last month? No  Care Suites Admission Nursing Note    Patient Information  Name: Sp Plasencia  Age: 44 year old  Reason for admission: Paracentesis  Care Suites arrival time: ~0915    Patient Admission/Assessment   Pre-procedure assessment complete: Yes  If abnormal assessment/labs, provider notified: No  NPO: N/A  Medications held per instructions/orders: Yes  Consent: deferred  Patient oriented to room: Yes  Education/questions answered: Yes  Plan/other: Procedure ~0950    Discharge Planning  Accompanied by: Self  Discharge name/phone number:Dorene is waiting here in the car.  Discharge location: home    Gricelda Fields RN

## 2020-04-17 NOTE — PROGRESS NOTES
Care Suites Post Procedure Note    Patient Information  Name: Sp Plasencia  Age: 44 year old    Post Procedure  Procedure: paracentesis  Time patient returned to Care Suites:   Concerns/abnormal assessment: none  If abnormal assessment, provider notified: N/A    Paracentesis complete.  5,400 ml removed from mid abdomen med austin in color.  Tolerated procedure well, VSS.  Sutures placed by Dr Parr per pt request.  Pt reports leaking resulting in ED visit for sutures placed.  Dermabond also placed to site.  No dressing as pt had skin breakdown with prev dressing/band aid.      Jocelynn Dean RN

## 2020-04-20 ENCOUNTER — MYC MEDICAL ADVICE (OUTPATIENT)
Dept: INTERNAL MEDICINE | Facility: CLINIC | Age: 45
End: 2020-04-20

## 2020-04-20 VITALS — WEIGHT: 225 LBS | BODY MASS INDEX: 29.82 KG/M2 | HEIGHT: 73 IN

## 2020-04-20 ASSESSMENT — MIFFLIN-ST. JEOR: SCORE: 1969.47

## 2020-04-20 NOTE — TELEPHONE ENCOUNTER
Patient was asked the following questions during liver intake call.     Referring Provider: Dr Carin Mcnally   Referring Diagnosis: Alcoholic Cirrhosis of the Liver   PCP: Dr. Daenna Crabtree     1)Do you know why you have liver disease: Yes             If Alcoholic Cirrhosis is present when was your last drink: 1/2020             Have you ever been through treatment for alcohol: No  2) Presence of Ascites: Yes Paracentesis: Yes  3) Presence of Hepatic Encephalopathy: Yes Medications: Yes  4) History of GI Bleeding: No  5) Oxygen Use: None  6) EGD: Yes Where: [facility] in [place] When: 3/2020  7) Colonoscopy: No  8) MELD Score: 25  9) Insurance information: [employer]       Policy simmons: Self      Subscriber/policy/ID number: [id number]      Group Number: [id number]    Referral intake process completed.  Patient is aware that after financial approval is received, medical records will be requested.   Patient confirmed for a callback from transplant coordinator on 4/22/2020.  Tentative evaluation date TBD.    Confirmed coordinator will discuss evaluation process in more detail at the time of their call.   Patient is aware of the need to arrange age appropriate cancer screening, vaccinations, and dental care.  Reminded patient to complete questionnaire, complete medical records release, and review packet prior to evaluation visit .   Assessed patient for special needs (ie--wheelchair, assistance, guardian, and ):  No   Patient instructed to call [phone] with questions.     XIOMARA Angulo, LPN   Solid Organ Transplant

## 2020-04-22 ENCOUNTER — TRANSFERRED RECORDS (OUTPATIENT)
Dept: HEALTH INFORMATION MANAGEMENT | Facility: CLINIC | Age: 45
End: 2020-04-22

## 2020-04-22 ENCOUNTER — TELEPHONE (OUTPATIENT)
Dept: INTERNAL MEDICINE | Facility: CLINIC | Age: 45
End: 2020-04-22

## 2020-04-22 ENCOUNTER — MEDICAL CORRESPONDENCE (OUTPATIENT)
Dept: HEALTH INFORMATION MANAGEMENT | Facility: CLINIC | Age: 45
End: 2020-04-22

## 2020-04-22 ENCOUNTER — VIRTUAL VISIT (OUTPATIENT)
Dept: INTERNAL MEDICINE | Facility: CLINIC | Age: 45
End: 2020-04-22
Payer: COMMERCIAL

## 2020-04-22 DIAGNOSIS — F51.01 PRIMARY INSOMNIA: ICD-10-CM

## 2020-04-22 DIAGNOSIS — F41.1 GAD (GENERALIZED ANXIETY DISORDER): ICD-10-CM

## 2020-04-22 DIAGNOSIS — F32.0 MILD MAJOR DEPRESSION (H): Primary | ICD-10-CM

## 2020-04-22 DIAGNOSIS — F41.1 GAD (GENERALIZED ANXIETY DISORDER): Primary | ICD-10-CM

## 2020-04-22 DIAGNOSIS — G71.02 FSHD (FACIOSCAPULOHUMERAL MUSCULAR DYSTROPHY) (H): ICD-10-CM

## 2020-04-22 PROBLEM — K70.30 ALCOHOLIC CIRRHOSIS (H): Status: ACTIVE | Noted: 2020-04-22

## 2020-04-22 LAB
ALT SERPL-CCNC: 43 U/L (ref 0–78)
AST SERPL-CCNC: 87 U/L (ref 0–37)
CREAT SERPL-MCNC: 1.49 MG/DL (ref 0.7–1.3)
GLUCOSE SERPL-MCNC: 122 MG/DL (ref 74–100)
INR PPP: 1.4 (ref 0.9–1.1)

## 2020-04-22 PROCEDURE — 99214 OFFICE O/P EST MOD 30 MIN: CPT | Mod: TEL | Performed by: INTERNAL MEDICINE

## 2020-04-22 RX ORDER — TRAMADOL HYDROCHLORIDE 50 MG/1
50 TABLET ORAL DAILY
Status: CANCELLED | OUTPATIENT
Start: 2020-04-22

## 2020-04-22 RX ORDER — ZOLPIDEM TARTRATE 5 MG/1
5 TABLET ORAL
Qty: 30 TABLET | Refills: 0 | Status: SHIPPED | OUTPATIENT
Start: 2020-04-22 | End: 2021-03-03

## 2020-04-22 RX ORDER — ESCITALOPRAM OXALATE 5 MG/1
5 TABLET ORAL DAILY
Qty: 30 TABLET | Refills: 0 | Status: SHIPPED | OUTPATIENT
Start: 2020-04-22 | End: 2020-10-13

## 2020-04-22 RX ORDER — ALLOPURINOL 300 MG/1
300 TABLET ORAL DAILY
Qty: 30 TABLET | Refills: 0 | Status: CANCELLED | OUTPATIENT
Start: 2020-04-22

## 2020-04-22 NOTE — TELEPHONE ENCOUNTER
"Referring Provider: Dr Carin Mcnally   PCP: Dr. Vinod Stewart, [facility]    ED 1/2020 ASHWINI followed up with PCP 1/4/20 noted elevated LFT with subsequent imaging on 1/22/20 showing cirrhosis/portal HTN.   Hospitalized 2/11/20 abd/cirrhosis and 4/8/20-ASHWINI and HE    45 year old with a Laennec cirrhosis diagnosed in 1/2020, steroids in 2/20 and tapered off. Quit on his own, will get chem dep eval but adamantly says \"Its a done deal\", not going to drink again. Reports never been told not to drink.     MELD 28 from 4/10/20. 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 Not currently working.  ADL's: independent  But restricted 100 ft if something to hold onto, legs would get too weak     PMH:  Fascioscapulohumeral Muscular Dystrophy, GERD ,Hypertension ,Acute kidney injury , gout     Surgical History: None    Nicotine:  8 yrs history and less than daily smoking     Alcohol/non prescribed drugs: Quit early 1/2020, reports a DUI 15-20 yrs ago and went to a 6 hr class.    Plan: Will contact patient with appointment times once confirmed.    Contacted patient and introduced myself as their Transplant Coordinator, also introduced the role of the Transplant Coordinator in the transplant process.  Explained the purpose of this call including reviewing next steps and answering questions.  Confirmed Referring Provider, Dialysis Center, and Primary Care Physician. Notified patient of the importance of continued communication with referring providers and primary care physicians.  Reviewed components of transplant evaluation process " including necessary appointments, tests, and procedures.    Answered questions for patient regarding evaluation, provided my name and contact information and requested they call with any additional questions.  Notified patient that the schedule will be mailed.

## 2020-04-22 NOTE — TELEPHONE ENCOUNTER
Please tell patient I will start him on lexapro 5mg and I checked with the pharmacist.    He needs to follow up in 1 month

## 2020-04-22 NOTE — TELEPHONE ENCOUNTER
Left message asking Sp for him and his wife to be available on Tuesday 5/5/20 for hepatology and  visit. He will be contacted with the exact time and instructions for virtual appointments. Ask to call this RN to set up CD assessment which will be needed.    He is encouraged to call with any questions or concerns.    Orders placed

## 2020-04-22 NOTE — PROGRESS NOTES
"Sp Plasencia is a 45 year old male who is being evaluated via a billable telephone visit.      The patient has been notified of following:     \"This telephone visit will be conducted via a call between you and your physician/provider. We have found that certain health care needs can be provided without the need for a physical exam.  This service lets us provide the care you need with a short phone conversation.  If a prescription is necessary we can send it directly to your pharmacy.  If lab work is needed we can place an order for that and you can then stop by our lab to have the test done at a later time.    Telephone visits are billed at different rates depending on your insurance coverage. During this emergency period, for some insurers they may be billed the same as an in-person visit.  Please reach out to your insurance provider with any questions.    If during the course of the call the physician/provider feels a telephone visit is not appropriate, you will not be charged for this service.\"    Patient has given verbal consent for Telephone visit?  Yes    How would you like to obtain your AVS? Mail a copy    Subjective     Sp Plasencia is a 45 year old male who presents to clinic today for the following health issues:    HPI  Patient is being considered for a liver transplant.   He is interested in seeing a counselor at this time.  He is also interested in a medication for anxiety and depression.   He has not tried medication in the past for anxiety and depression.  The patient reports that his family is not on a medication.      He is hoping to have an anti-anxiety.  He is not sure if he feels depressed.   ORLANDO today of   7-4-2-0-0-0-3-1= 7      PHQ9 today of   1-1-3-3-1-0-1-1-1-0= 12  No thoughts of harming self      Cirrhosis.  Following with hepatology regularly and having paracentesis every 1-2 weeks.  He reports that his abdomen is swelling again.  He was on lasix and spirnolactone in the past and " stopped secondary to kidney function.     Insomnia.  He is taking ambien, which is helping him sleep.  Goes to bed and wakes up at the same time.  Not exercising regularly.   Drinking caffeine- 1 cup every few days.  No alcohol use.  Sober since January 1, 2020.             Patient Active Problem List   Diagnosis     FSHD (facioscapulohumeral muscular dystrophy) (H)     ASHWINI (acute kidney injury) (H)     Ascites     Hepatic encephalopathy (H)     Chest pain, unspecified     Gout, unspecified     HTN (hypertension)     Smoker     History reviewed. No pertinent surgical history.    Social History     Tobacco Use     Smoking status: Former Smoker     Packs/day: 1.00     Types: Cigarettes     Smokeless tobacco: Never Used   Substance Use Topics     Alcohol use: Not Currently     Comment: 1/1/2020     Family History   Problem Relation Age of Onset     Hypertension Father            Reviewed and updated as needed this visit by Provider         Review of Systems   ROS COMP: CONSTITUTIONAL: NEGATIVE for fever, chills, change in weight  RESP: NEGATIVE for significant cough or SOB  CV: NEGATIVE for chest pain, palpitations or peripheral edema  Psych: POS anxiety and depression       Objective   Reported vitals:  There were no vitals taken for this visit.   Recently 132/56 on 4/10 in ER  Patient has not weighed himself  healthy, alert and no distress  PSYCH: Alert and oriented times 3; coherent speech, normal   rate and volume, able to articulate logical thoughts, able   to abstract reason, no tangential thoughts, no hallucinations   or delusions  His affect is normal and pleasant  RESP: No cough, no audible wheezing, able to talk in full sentences  Remainder of exam unable to be completed due to telephone visits    Diagnostic Test Results:  Labs reviewed in Epic        Assessment/Plan:    (F32.0) Mild major depression (H)  (primary encounter diagnosis)  Comment: no suicidal ideation  Plan:lexapro 5mg- had checked with pharmacy  on dosing  Frances counselor    (F41.1) ORLANDO (generalized anxiety disorder)  (primary encounter diagnosis)  Comment:   Plan: lexapro 5mg-checked with pharmacy on dosing  Recommend appt with counselor            (F51.01) Primary insomnia  Comment:   Plan: zolpidem (AMBIEN) 5 MG tablet          Cirrhosis.  Patient following with hepatology team.          Recommend follow up in 1 month    No follow-ups on file.      Phone call duration:  28 minutes    Deanna Barahona MD

## 2020-04-22 NOTE — TELEPHONE ENCOUNTER
Please help me with patient who has cirrhosis with ascites finding ant-anxiety/depression medication that is safe for him.    uptodate said zoloft recommended not to use    prozac did not comment on cirrhosis with ascites (only without ascites)    Please help with safe med and dose    Is lexapro a safe one for cirrhosis?  And what dose would I start? lexapro 5mg?

## 2020-04-23 NOTE — TELEPHONE ENCOUNTER
Left a voicemail asking patient to call the clinic back. Please advise patient of message below, and assist in scheduling a follow up visit.

## 2020-04-28 ENCOUNTER — MEDICAL CORRESPONDENCE (OUTPATIENT)
Dept: HEALTH INFORMATION MANAGEMENT | Facility: CLINIC | Age: 45
End: 2020-04-28

## 2020-04-28 NOTE — TELEPHONE ENCOUNTER
Patient advised.  He states he has the escitalopram in his cupboard but is not ready to take it yet.  He will follow up as directed but states he is not yet ready to start taking medication for anxiety or depression but will let MD know when he is.  Patient was not forthcoming with further information. RN did advise him that medication can take several weeks to start working so he shouldn't wait until symptoms get bad.  LOWELL Monk R.N.

## 2020-04-29 ENCOUNTER — HOSPITAL ENCOUNTER (OUTPATIENT)
Dept: ULTRASOUND IMAGING | Facility: CLINIC | Age: 45
End: 2020-04-29
Attending: INTERNAL MEDICINE
Payer: COMMERCIAL

## 2020-04-29 ENCOUNTER — TRANSFERRED RECORDS (OUTPATIENT)
Dept: HEALTH INFORMATION MANAGEMENT | Facility: CLINIC | Age: 45
End: 2020-04-29

## 2020-04-29 VITALS — HEART RATE: 108 BPM | DIASTOLIC BLOOD PRESSURE: 60 MMHG | RESPIRATION RATE: 18 BRPM | SYSTOLIC BLOOD PRESSURE: 129 MMHG

## 2020-04-29 DIAGNOSIS — I10 ESSENTIAL (PRIMARY) HYPERTENSION: ICD-10-CM

## 2020-04-29 DIAGNOSIS — Z71.3 DIETARY COUNSELING AND SURVEILLANCE: ICD-10-CM

## 2020-04-29 DIAGNOSIS — K70.9 ALCOHOLIC LIVER DISEASE (H): ICD-10-CM

## 2020-04-29 LAB
ALBUMIN SERPL-MCNC: 2.2 G/DL (ref 3.4–5)
ALP SERPL-CCNC: 274 U/L (ref 40–150)
ALT SERPL W P-5'-P-CCNC: 50 U/L (ref 0–70)
ANION GAP SERPL CALCULATED.3IONS-SCNC: 7 MMOL/L (ref 3–14)
AST SERPL W P-5'-P-CCNC: 93 U/L (ref 0–45)
BILIRUB SERPL-MCNC: 10.5 MG/DL (ref 0.2–1.3)
BUN SERPL-MCNC: 16 MG/DL (ref 7–30)
CALCIUM SERPL-MCNC: 8.5 MG/DL (ref 8.5–10.1)
CHLORIDE SERPL-SCNC: 93 MMOL/L (ref 94–109)
CO2 SERPL-SCNC: 25 MMOL/L (ref 20–32)
CREAT SERPL-MCNC: 0.89 MG/DL (ref 0.66–1.25)
GFR SERPL CREATININE-BSD FRML MDRD: >90 ML/MIN/{1.73_M2}
GLUCOSE SERPL-MCNC: 93 MG/DL (ref 70–99)
INR PPP: 1.55 (ref 0.86–1.14)
POTASSIUM SERPL-SCNC: 3.7 MMOL/L (ref 3.4–5.3)
PROT SERPL-MCNC: 6.3 G/DL (ref 6.8–8.8)
SODIUM SERPL-SCNC: 125 MMOL/L (ref 133–144)

## 2020-04-29 PROCEDURE — P9047 ALBUMIN (HUMAN), 25%, 50ML: HCPCS

## 2020-04-29 PROCEDURE — 85610 PROTHROMBIN TIME: CPT | Performed by: INTERNAL MEDICINE

## 2020-04-29 PROCEDURE — 49083 ABD PARACENTESIS W/IMAGING: CPT

## 2020-04-29 PROCEDURE — 76705 ECHO EXAM OF ABDOMEN: CPT

## 2020-04-29 PROCEDURE — 80053 COMPREHEN METABOLIC PANEL: CPT | Performed by: INTERNAL MEDICINE

## 2020-04-29 PROCEDURE — 25000125 ZZHC RX 250: Performed by: RADIOLOGY

## 2020-04-29 PROCEDURE — 25000128 H RX IP 250 OP 636

## 2020-04-29 RX ORDER — ALBUMIN (HUMAN) 12.5 G/50ML
50 SOLUTION INTRAVENOUS ONCE
Status: COMPLETED | OUTPATIENT
Start: 2020-04-29 | End: 2020-04-29

## 2020-04-29 RX ORDER — ALBUMIN (HUMAN) 12.5 G/50ML
SOLUTION INTRAVENOUS
Status: COMPLETED
Start: 2020-04-29 | End: 2020-04-29

## 2020-04-29 RX ADMIN — ALBUMIN (HUMAN) 25 G: 12.5 SOLUTION INTRAVENOUS at 08:50

## 2020-04-29 RX ADMIN — ALBUMIN HUMAN 25 G: 0.25 SOLUTION INTRAVENOUS at 08:50

## 2020-04-29 RX ADMIN — LIDOCAINE HYDROCHLORIDE 10 ML: 10 INJECTION, SOLUTION EPIDURAL; INFILTRATION; INTRACAUDAL; PERINEURAL at 08:50

## 2020-04-29 NOTE — PROGRESS NOTES
Patient tolerated paracentesis well.  6400 mL of clear austin fluid drained done by Dr Merchant  Dressing dermabond and sutures per Dr. Merchant with no drainage.  25 grams albumin given. Patient states understanding discharge instructions, taking P.O and home per wheelchair in stable condition.

## 2020-04-29 NOTE — TELEPHONE ENCOUNTER
RECORDS RECEIVED FROM: Internal - PRE LIVER EVAL   Appt Date: 05.05.2020   NOTES STATUS DETAILS   OFFICE NOTE from referring provider Internal  04.15.2020 Consult Marlen Quesada RN [facility]     OFFICE NOTES from other specialists Internal 02.18.2020 Deanna Barahona MD    DISCHARGE SUMMARY from hospital Internal 04.17.2020 04.08.2020 02.11.2020   MEDICATION LIST Internal / CE    LIVER BIOSPY (IF APPLICABLE)      PATHOLOGY REPORTS  N/A    IMAGING     ENDOSCOPY (IF AVAILABLE) N/A    COLONOSCOPY (IF AVAILABLE) N/A    ULTRASOUND LIVER Internal  Received 02.11.2020 01.22.2020 [facility]   CT OF ABDOMEN Internal  Received 02.11.2020 01.22.2020 [facility]   MRI OF LIVER N/A    FIBROSCAN, US ELASTOGRAPHY, FIBROSIS SCAN, MR ELASTOGRAPHY N/A    LABS     HEPATIC PANEL (LIVER PANEL) Internal 01.04.2019   BASIC METABOLIC PANEL Internal 01.06.2019   COMPLETE METABOLIC PANEL Internal 04.10.2020   COMPLETE BLOOD COUNT (CBC) Internal 04.10.2020   INTERNATIONAL NORMALIZED RATIO (INR) Internal 04.29.2020   HEPATITIS C ANTIBODY Received 12.06.2018   HEPATITIS C VIRAL LOAD/PCR N/A    HEPATITIS C GENOTYPE N/A    HEPATITIS B SURFACE ANTIGEN N/A    HEPATITIS B SURFACE ANTIBODY N/A    HEPATITIS B DNA QUANT LEVEL N/A    HEPATITIS B CORE ANTIBODY N/A

## 2020-04-29 NOTE — PROCEDURES
[facility]    Procedure: Paracentesis    Date/Time: 4/29/2020 9:06 AM  Performed by: Jocelynn Merchant DO  Authorized by: Jocelynn Merchant DO     UNIVERSAL PROTOCOL   Site Marked: Yes  Prior Images Obtained and Reviewed:  Yes  Required items: Required blood products, implants, devices and special equipment available    Patient identity confirmed:  Verbally with patient, arm band, provided demographic data and hospital-assigned identification number  Patient was reevaluated immediately before administering moderate or deep sedation or anesthesia  Confirmation Checklist:  Patient's identity using two indicators, relevant allergies, procedure was appropriate and matched the consent or emergent situation and correct equipment/implants were available  Time out: Immediately prior to the procedure a time out was called    Universal Protocol: the Joint Commission Universal Protocol was followed    Preparation: Patient was prepped and draped in usual sterile fashion           ANESTHESIA    Anesthesia: Local infiltration  Local Anesthetic:  Lidocaine 1% without epinephrine      SEDATION    Patient Sedated: No    See dictated procedure note for full details.  Findings: paracentesis    Specimens: none    Complications: None    Condition: Stable    Plan: Repeat as needed    PROCEDURE   Patient Tolerance:  Patient tolerated the procedure well with no immediate complications    Length of time physician/provider present for 1:1 monitoring during sedation: 0

## 2020-04-30 ENCOUNTER — HOSPITAL ENCOUNTER (OUTPATIENT)
Dept: BEHAVIORAL HEALTH | Facility: CLINIC | Age: 45
Discharge: HOME | End: 2020-04-30
Attending: PSYCHIATRY & NEUROLOGY | Admitting: PSYCHIATRY & NEUROLOGY
Payer: COMMERCIAL

## 2020-04-30 PROCEDURE — 90791 PSYCH DIAGNOSTIC EVALUATION: CPT | Mod: 95 | Performed by: COUNSELOR

## 2020-04-30 ASSESSMENT — PATIENT HEALTH QUESTIONNAIRE - PHQ9: SUM OF ALL RESPONSES TO PHQ QUESTIONS 1-9: 7

## 2020-04-30 NOTE — PROGRESS NOTES
"The patient has been notified of the following:     \"We have found that certain health care needs can be provided without the need for a face to face visit.  This service lets us provide the care you need with a phone conversation.      I will have full access to your [facility] medical record during this entire phone call.   I will be taking notes for your medical record.     Since this is like an office visit, we will bill your insurance company for this service.  If your insurance denies the charge we will appeal and/or write off the cost of the service.  The Governor's executive order may result in expanded health insurance coverage for this service, which would be paid by your insurance.       There are potential benefits and risks of telephone visits (e.g. limits to patient confidentiality) that differ from in-person visits.?  Confidentiality still applies for telephone services, and nobody will record the visit.  It is important to be in a quiet, private space that is free of distractions (including cell phone or other devices) during the visit.??     If during the course of the call I believe a telephone visit is not appropriate, you will not be charged for this service\"    Consent has been obtained for this service by care team member: Yes    Phone visit start time:  10:04am  Phone visit end time:  10:57am    Adult Dual Diagnosis Day Treatment  Evaluator Name:  KASANDRA Vallejo, [facility]      PATIENT'S NAME: Sp Plasencia  PREFERRED NAME: Sp  PREFERRED PRONOUNS:   He/Him    MRN:   [id number]  :   1975   ACCT. NUMBER: [id number]  DATE OF SERVICE: 20  START TIME: 10:04am  END TIME: 10:57am  PREFERRED PHONE: [phone]    May we leave a program related message: Yes    STANDARD ADULT DIAGNOSTIC ASSESSMENT    Identifying Information:  Patient is a 45 year old, .  The pronoun use throughout this assessment reflects the patient's chosen pronoun.  Patient was referred for an " "assessment by [facility] Liver Transplant Team. Patient attended the session alone.      Chief Complaint:   The reason for seeking services at this time is: \" I'm not sure \". He is not sure what this evaluation is for because he has several evaluations today. He has an appointment next Wednesday for the transplant group, which he thinks will help.  explained this evaluation was for dual diagnosis program for alcohol use and adjustment to his medical diagnosis.  The problem(s) began: He stated he didn't know what problem the  was asking about. The only diagnosis he is aware of is liver failure. He stated that alcohol or mood or anxiety diagnoses don't actually exist. He stated he prefers to spend his time on things that are real, not made up. He stated there is no point in treating something he doesn't have. He wants that physical therapy for muscular dystrophy because that is real and it is a good use of time. He stated he trusts his doctors for the medications they have him on (Ambien and Tramadol) and he prefers to follow what his doctors say rather than a hypothetical program and recommendation, meaning he won't stop taking Tramadol or Ambien because they are prescribed by his doctor for actual medical concerns.  did not ask about the current prescriptions of Lexapro and Hydroxyzine (which were recently prescribed).    Does the client have any condition that is currently presenting as a potential to harm themselves or others (severe withdrawal, serious medical condition, severe emotional/behavioral problem)? No.  Proceed with assessment.    Social/Family History:  Patient reported they grew up in [place] and he was raised by biological parents, who were  for 30 or 40 years until his father's recent death. It was around the same time he was told he was having problems with alcohol. Patient is the oldest and has one brother and two sisters. Patient reported that childhood was good. " "He denied all forms of abuse.     The patient describes their cultural background as [place].  Cultural influences and impact on patient's life structure, values, norms, and healthcare: None.  Contextual influences on patient's health include: None.    These factors will be addressed in the Preliminary Treatment plan.  Patient identified their preferred language to be English. Patient reported they does not need the assistance of an  or other support involved in therapy.     Patient reported had no significant delays in developmental tasks.   Patient's highest education level was BS in Marketing and Economics. Patient identified the following learning problems: none reported.  Modifications will not be used to assist communication in therapy. Patient reports they are  able to understand written materials.    Patient's current relationship status is  for about 15 years. Patient identified their sexual orientation as heterosexual.  Patient reported having three child(sven), approximately ages 12, 10, and 6. His wife drinks \"socially,\" or probably weekly.     Patient's current living/housing situation involves staying in own home/apartment.  He lives with his wife and 3 children and housing is stable. Patient identified probably his wife as part of their support system, stating he doesn't need a support system, but his wife has \"been on me\" about taking medications.     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 Patient reports their finances are obtained through employment and spouse.  Patient does identify finances as a current stressor due to the \"Communist walls and the shutdown.\"      Patient reported he got a DUI in 2001, and he completed a 6 hour alcohol course to get his license back. About 14 years ago, he was in [facility] and arrested, but it was thrown out of court.  Patient denies being on probation / parole / " under the jurisdiction of the court.    Patient's Strengths and Limitations:  Patient identified the following strengths or resources that will help them succeed in treatment: work ethic, intelligence. Things that may interfere with the patient's success in treatment include: he doesn't think he needs treatment.   _______________________________________________  Personal and Family Medical History:   Patient did not report a family history of mental health concerns.  Patient reports family history includes Hypertension in his father..     Patient reported the following previous diagnoses which include(s): None. Patient has not received mental health services in the past and is not currently receiving any. Psychiatric Hospitalizations: None.  Patient denies a history of civil commitment.      Patient has had a physical exam to rule out medical causes for current symptoms.  Date of last physical exam was within the past year. Client was encouraged to follow up with PCP if symptoms were to develop. The patient has a [place] Primary Care Provider, who is named Deanna Barahona.. Patient reports the following current medical concerns: muscular dystrophy.  There are not significant appetite / nutritional concerns / weight changes.   Patient does not report a history of head injury / trauma / cognitive impairment.      Current Outpatient Medications   Medication     allopurinol (ZYLOPRIM) 300 MG tablet     escitalopram (LEXAPRO) 5 MG tablet     esomeprazole (NEXIUM) 40 MG DR capsule     hydrOXYzine (ATARAX) 25 MG tablet     ibuprofen (ADVIL/MOTRIN) 200 MG capsule     lactulose (CEPHULAC) 20 GM packet     Multiple Vitamin (DAILY MULTIVITAMIN PO)     Omega-3 Fatty Acids (FISH OIL PO)     traMADol (ULTRAM) 50 MG tablet     zolpidem (AMBIEN) 5 MG tablet     Medication Adherence:   Patient reports started on Tramadol about 6 months ago. He has muscular dystrophy that causes pain. Prior to 6 months ago, he felt he was taking  too many Advil. So his doctor put him on the lowest dose of Tramadol, he thinks it 5mg or 10mg. He stated he generally takes one pil and seldom takes a second pill. He was prescribed Ambien about 3 months ago. He takes it most nights to get to sleep. The liver problems cause confusion of night and day, so he needs Ambien to sleep. He stated once the liver transplant is occurs, then he thinks his medications will change. He stated he is on the transplant list and it should occur in a few months.     Patient Allergies:    Allergies   Allergen Reactions     Adhesive Tape Blisters     Skin breakdown, open sores       Medical History:    Past Medical History:   Diagnosis Date     Acid reflux      Ascites      Esophageal varices (H)      FSHD (facioscapulohumeral muscular dystrophy) (H)      Gout      HTN (hypertension)      Jaundice      Liver cirrhosis (H)      Smoker        Current Mental Status Exam:   Appearance:  Unable to assess   Eye Contact:  Unable to assess   Psychomotor:  Unable to assess       Gait / station:  Unable to assess   Attitude / Demeanor: Guarded  Danis  Speech      Rate / Production: Normal/ Responsive      Volume:  Normal  volume      Language:  intact  Mood:   Irritable   Affect:   Unable to assess    Thought Content: Clear   Thought Process: Logical       Associations: No loosening of associations  Insight:   Poor  and Denial of Disorder  Judgment:  Intact   Orientation:  All  Attention/concentration: Good    Rating Scales:    PHQ 4/30/2020   PHQ-9 Total Score 7   Q9: Thoughts of better off dead/self-harm past 2 weeks Not at all     Clinical Global Impressions  First:  Considering your total clinical experience with this particular patient population, how severe are the patient's symptoms at this time?: 5 (4/30/2020 10:30 AM)  Most recent:  Compared to the patient's condition at the START of treatment, this patient's condition is: 4 (4/30/2020 10:30 AM)    Substance Use:  Patient reported no  "family history of chemical health issues. Patient stated his father drank, but he never saw his father drunk. Patient remembers bring a beer to his grandfather.  His brother stopped drinking 4 years ago. His sisters drink like a normal person. Patient has not ever been to detox. In 2001, he got a DUI and sat through a 6 hour class to get his license back. Patient is not currently receiving any chemical dependency treatment.     Patient stated he snuck his father's beers in high school, and he started drinking more in college. In 2003, he bought a bar and started drinking almost daily until he was told about the health concerns. He initially drank beer, but then didn't taste good so he changed to cocktails. He realized at some point he started to worry about diabetes, so he switched to zero calorie pop and vodka, because it was healthier. He reported drinking about 6 drinks per day. He bartended in the mornings, so he didn't drink then. When he finished bartending and the other employees came in, then patient sat drank with clients from about approximately Noon to 5pm, consuming about one drink a hour. Then when he got home and put the kids in bed at 9pm, he'd have one drink before bed. Last Use: 01/01/2020. He stated that drinks fit in well with work and was \"mandatory\" for work; it was very accepted and expected. He still owns the bar. In 01/2020, he let his staff know he would not be around much for the next 6 months, but he plans to go back to the bar \"in a limited capacity.\"   Patient smokes a pack per month.   Patient denies using marijuana.  Patient started drinking coffee about once or twice per week in the past 6 months.  Patient denies cocaine/crack use.  Patient denies meth/amphetamine use.  Patient denies use of heroin  Patient reported Tramadol was prescribed about 6 months ago. He reported taking one per day.   Patient denies inhalant use  Patient denies use of benzodiazepines.  Patient denies use of " "hallucinogens.  Patient was prescribed Ambien about 3 months ago. He takes it most nights to get to sleep. The liver problems cause confusion of night and day, so he needs Ambien to sleep  Patient denies use of over the counter drugs.  Patient denies use of other substances.    CAGE-AID Total Score 4/30/2020   Total Score 1     Patient reported the following problems as a result of their substance use: None.  Patient is not concerned about substance use. When administering the CAGE, patient stated that at times he thought he was drinking too much, so he regulated and cut down. He denied being criticized by others because it was part of the social environment at the bar. At one point, he realized he was drinking away profits, but he denied feeling guilty about his alcohol consumption.     Patient stated, \"I have a good handle on it.\" When his doctor told him to stop drinking, he stopped. He stated he drank more than he planned as often as he drank less than he planned. He stated he wouldn't drink if he needed to  kids or had an appointment after work. He reported this current period is his longest period of abstinence. When  suggested tolerance, he denied tolerance, stating the reason he switched to vodka was due to lessening caloric intake. He stated it discourteous to make him do this. He will talk to doctors about the problem that doesn't exist.  stated she will talk with Marlen, on the [facility] Transplant team. Patient stated Marlen  is not qualified to discuss anything that he and  just talked about because she has only talked with him over the phone and it wasn't about alcohol.      Patient denied experiencing withdrawal symptoms within the past 12 months. Patient denied urges to use Alcohol.  Patient reports he has had successful attempts to cut down or control use of Alcohol, like when he realized he was drinking away profits.  Patient reports longest period of abstinence is currently. " He denied he will return to alcohol use after the liver transplant. Patient denied needing to use more Alcohol to achieve the same effect and denied a diminished effect with use of same amount of Alcohol.       Patient does report a great deal of time is spent in activities necessary to obtain, use, or recover from Alcohol effects, but it was part of his job  Patient does not report important social, occupational, or recreational activities are given up or reduced because of Alcohol use.  He denied alcohol use was continued despite knowledge of having a persistent or recurrent physical or psychological problem that is likely to have caused or exacerbated by use.      Patient does not have other addictive behaviors he is concerned about.    Significant Losses / Trauma / Abuse / Neglect Issues:   Patient did not serve in the . Concerns for possible neglect are not present. There denied significant loss, trauma, abuse or neglect issues, but his father  a few months ago.    Safety Assessment:   Current Safety Concerns:    [place] Suicide Severity Rating (Short Version) 2020   Over the past 2 weeks have you felt down, depressed, or hopeless? no no no no   Over the past 2 weeks have you had thoughts of killing yourself? no no no no   Have you ever attempted to kill yourself? no no no no     Patient denies current homicidal ideation and behaviors.  Patient denies current self-injurious ideation and behaviors.     Patient denied risk behaviors associated with substance use.  Patient denies any high risk behaviors associated with mental health symptoms.  Patient reports the following current concerns for their personal safety: None.    Patient reports there are firearms in the house. The firearms are secured in a locked space.     History of Safety Concerns:  Patient denied a history of homicidal ideation.     Patient denied a history of personal safety concerns.    Patient  denied a history of assaultive behaviors.    Patient denied a history of assaultive behaviors.     Patient reported a history unsafe motor vehicle operation associated with substance use.  Patient denies any history of high risk behaviors associated with mental health symptoms.    Patient reports the following protective factors: forward/future oriented thinking, abstinence from substances, effective problem-solving skills, committment to well-being, healthy fear of risky behaviors or pain, strong sense of self-worth/esteem and sense of personal control or determination    Risk Plan:  See Preliminary Treatment Plan for Safety and Risk Management Plan    Review of Symptoms per patient report:  Depression: Change in sleep, Lack of interest, Change in energy level and Feeling sad, down, or depressed  Tosin:  No Symptoms  Psychosis: No Symptoms  Anxiety: No Symptoms  Panic:  No symptoms  Post Traumatic Stress Disorder:  No Symptoms   Eating Disorder: No Symptoms  ADD / ADHD:  No symptoms  Conduct Disorder: No symptoms  Autism Spectrum Disorder: No symptoms  Obsessive Compulsive Disorder: No Symptoms  Substance Use:  daily use     Patient reports the following compulsive behaviors and treatment history: None, other than he buys extra spices for cooking.    Diagnostic Criteria:     A. The development of emotional or behavioral symptoms in response to an identifiable stressor(s) occurring within 3 months of the onset of the stressor(s)  B. These symptoms or behaviors are clinically significant, as evidenced by one or both of the following:       - Significant impairment in social, occupational, or other important areas of functioning  C. The stress-related disturbance does not meet criteria for another disorder & is not not an exacerbation of another mental disorder  D. The symptoms do not represent normal bereavement  E. Once the stressor or its consequences have terminated, the symptoms do not persist for more than an  additional 6 months   OP BEH BELKIS CRITERIA: Substance is often taken in larger amounts or over a longer period than was intended.  Met for:  Alcohol, There is persistent desire or unsuccessful efforts to cut down or control use of the substance.  Met for:  Alcohol,  A great deal of time is spent in activities necessary to obtain the substance, use the substance, or recover from its effects.  Met for:  Alcohol, Recurrent use of the substance in which it is physically hazardous.  Met for:  Alcohol, Tolerance:  either a need for markedly increased amounts of the substance to achieve the desired effect or a markedly diminished effect with continued use of the dame amount of the substance.  Met for:  Alcohol    Functional Status:  Patient reports the following functional impairments: chronic disease management, social interactions and work / vocational responsibilities.       WHODAS 2.0 Total Score 4/30/2020   Total Score 25     Clinical Summary:  1. Reason for assessment: [facility] liver transplant team.  2. Psychosocial, Cultural and Contextual Factors: None identified  .  3. As evidenced by self report and criteria, client meets the following DSM5 Diagnoses:   (Sustained by DSM5 Criteria Listed Above)  Adjustment Disorders  309.0 (F43.21) With depressed mood.  Other Diagnoses that is relevant to services: Substance-Related & Addictive Disorders Alcohol Use Disorder   303.90 (F10.20) Severe In early remission, .  4. R/O: 296.21 (F32.0) Major Depressive Disorder, Single Episode, Mild due to previous diagnoses and medications listed in medical record.  5. Provisional Diagnosis:  None .  6. Prognosis: Maintain Current Status / Prevent Deterioration.  7. Likely consequences of symptoms if not treated: Unknown.  8. 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 He is good at trivia and solving problems. People don't like to play him in trivia. He is raising his children to be independent and teaches them to garden,  raise animals, and they do their own zeina.    Recommendations:     1. Plan for Safety and Risk Management:Recommended that patient call 911 or go to the local ED should there be a change in any of these risk factors..  Report to child / adult protection services was NA.     2. Patient's identified no concerns with a cultural influence.     3. Initial Treatment will focus on: Adjustment Difficulties related to: medical diagnoses, and Alcohol Use.     4. Resources/Service Plan:       services are not indicated.     Modifications to assist communication are not indicated.     Additional disability accommodations are not indicated.      5. Collaboration:  Collaboration / coordination of treatment will be initiated with the following support professionals: [facility] Transplant Team     6.  Referrals:  The following referral(s) will be initiated: Intensive Outpatient Chemical Health Treatment . Next Scheduled Appointment: None.    7. BELKIS: Recommendations:  Continue to abstain from alcohol and complete co-occurring substance use treatment.  Patient reports they are not willing to follow the recommendation of treatment, but is willing to continue to abstain from drinking. Patient does not have a history of opiate use.    8. Records were reviewed at time of assessment.  Information in this assessment was obtained from the medical record and provided by patient who is a good historian.   Patient will have open access to their mental health medical record.      Eval type:  Dual Diagnosis.

## 2020-05-01 ENCOUNTER — MEDICAL CORRESPONDENCE (OUTPATIENT)
Dept: HEALTH INFORMATION MANAGEMENT | Facility: CLINIC | Age: 45
End: 2020-05-01

## 2020-05-04 ENCOUNTER — MYC MEDICAL ADVICE (OUTPATIENT)
Dept: INTERNAL MEDICINE | Facility: CLINIC | Age: 45
End: 2020-05-04

## 2020-05-05 ENCOUNTER — COMMITTEE REVIEW (OUTPATIENT)
Dept: TRANSPLANT | Facility: CLINIC | Age: 45
End: 2020-05-05

## 2020-05-05 ENCOUNTER — PRE VISIT (OUTPATIENT)
Dept: GASTROENTEROLOGY | Facility: CLINIC | Age: 45
End: 2020-05-05

## 2020-05-05 ENCOUNTER — TELEPHONE (OUTPATIENT)
Dept: TRANSPLANT | Facility: CLINIC | Age: 45
End: 2020-05-05

## 2020-05-05 ENCOUNTER — VIRTUAL VISIT (OUTPATIENT)
Dept: BEHAVIORAL HEALTH | Facility: CLINIC | Age: 45
End: 2020-05-05
Payer: COMMERCIAL

## 2020-05-05 ENCOUNTER — VIRTUAL VISIT (OUTPATIENT)
Dept: GASTROENTEROLOGY | Facility: CLINIC | Age: 45
End: 2020-05-05
Attending: INTERNAL MEDICINE
Payer: COMMERCIAL

## 2020-05-05 ENCOUNTER — VIRTUAL VISIT (OUTPATIENT)
Dept: TRANSPLANT | Facility: CLINIC | Age: 45
End: 2020-05-05
Attending: INTERNAL MEDICINE
Payer: COMMERCIAL

## 2020-05-05 DIAGNOSIS — Z91.199 NO-SHOW FOR APPOINTMENT: Primary | ICD-10-CM

## 2020-05-05 DIAGNOSIS — Z76.82 ORGAN TRANSPLANT CANDIDATE: Primary | ICD-10-CM

## 2020-05-05 DIAGNOSIS — G71.02 FASCIOSCAPULOHUMERAL MUSCULAR DYSTROPHY (H): ICD-10-CM

## 2020-05-05 DIAGNOSIS — K70.31 ALCOHOLIC CIRRHOSIS OF LIVER WITH ASCITES (H): ICD-10-CM

## 2020-05-05 DIAGNOSIS — K70.9 ALCOHOL LIVER DAMAGE (H): Primary | ICD-10-CM

## 2020-05-05 DIAGNOSIS — K70.30 ALCOHOLIC CIRRHOSIS (H): ICD-10-CM

## 2020-05-05 DIAGNOSIS — K70.31 ALCOHOLIC CIRRHOSIS OF LIVER WITH ASCITES (H): Primary | ICD-10-CM

## 2020-05-05 RX ORDER — ACETAMINOPHEN 325 MG/1
325-650 TABLET ORAL EVERY 6 HOURS PRN
COMMUNITY
End: 2020-10-13

## 2020-05-05 RX ORDER — ONDANSETRON 4 MG/1
TABLET, FILM COATED ORAL
COMMUNITY
Start: 2020-04-16 | End: 2020-10-13

## 2020-05-05 NOTE — LETTER
5/5/2020         RE: Sp Plasencia  [address]  [facility] [address]        Dear Colleague,    Thank you for referring your patient, Sp Plasencia, to the [facility] SOLID ORGAN TRANSPLANT. Please see a copy of my visit note below.    Psychosocial Assessment  Sp Plasencia participated in a telephone interview as part of his evaluation as a potential liver transplant recipient.  His wife also participated in the call.   Living Situation: Sp Plasencia is a [age] year old man who lives in [place], [place] with his wife, Dorene, and their three children, who are 13,11 & 6 years old. Their house has two stories. There is a bedroom on the main level. He is currently using the living room, as he cannot navigate the stairs.   Education/Employment:  Mr. Plasencia has a degree in business. 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   Financial /Income: Mr. Plasencia is currently furloughed, as his restaurant is closed due to the coronavirus. He has applied for financial assistance from the government for his business, as well as unemployment benefits. He is also working on an application for Social Security Disability. His wife provides additional income from her employment as a .   Health Insurance:  Mr. Plasencia has [employer] insurance. The family out-of-pocket maximum is $4,500, which includes medications. He also has an HSA. This writer talked with Sp and Dorene about the financial risks of transplant, particularly about the high cost of transplant related medications and the importance of maintaining adequate health insurance coverage.  Family/Social Support: Mr. Plasencia's wife of fifteen years, Dorene, is his main caregiver. He also reported that his three siblings and his children are helpful. This writer stressed the importance of having a stable and involved support network before and after transplant.  Provided Sp  with education about the relationship  "between a stable support system and better surgical and post-transplant outcomes compared to patients with a limited support system.  I will e-mail him a copy of our caregiver agreement to review.   Functional Status: Mr. Plasencia has muscular dystrophy, and reports that his mobility has been progressively declining since the age of [age]. He has a walker that he rarely uses. He has a lift chair at home to help him stand from sitting. He has an ATV/golf cart for getting around his yard. He was driving himself to work, but could only stand for a few hours. As previously mentioned, he is unable to navigate the stairs to go to the second level of his house.   Chemical Dependency:  Mr. Plasencia informed me that he stopped drinking alcohol in January 2020 when diagnosed with liver disease. He plans to continue being abstinent, as he can practice \"mind over matter\". His prior use pattern was consuming about five mixed drinks per day, often with customers at his bar.He has already completed a substance use assessment that can be reviewed in his medical record. It recommended that he complete an outpatient treatment program, which he has refused to do. This is in part because it would involve a group five days a week, and he prefers to be treated as an individual.   Mental Health: Mr. Plasencia informed me that he has been prescribed Lexapro and has filled the prescription, but is not taking it. He is taking Ambien to help him sleep. He was seen in an emergency room on 3/16 for a panic attack. Mr. Plasencia has been seen by the psychiatrist at [facility], Dr. Machado once, as well as Gerry Quintana, a health psychologist. Please see their notes for additional information.   Adjustment to Illness:  Mr. Plasencia has been coping with muscular dystrophy since he was a teenager. He has managed to have a normal live and a family. Recently he has had more difficulty coping as his health has changed, including a new diagnosis of liver disease. He is " an independent person, and wishes to maintain as much independence as possible. He has difficulty sleeping, and has complaints related to his mobility and muscle tone. His current goal is to focus on his physical health and strengthening, not mental and/or chemical health concerns. This writer provided Sp  with supportive counseling throughout this interview.  This writer also encouraged him to attend the liver transplant support group for additional support and encouragement.   Impression/Recommendations:   Sp and Dorene verbalized understanding the psychosocial risks of transplant and teaching provided during this evaluation.  Finances are adequate, but tight. Once he begins to receive unemployment or disability benefits, this will improve his financial situation.   His wife is his main support person. It will be important for them to mobilize others, such as his siblings, for caregiver support and respite.   Mr. Plasencia has been abstinent from alcohol for four months, so does not yet meet transplant listing criteria from this perspective. He has completed a substance use evaluation, which recommended that he participate in an outpatient treatment program. Mr. Plasencia is not in agreement with doing so. This is partly due to the time involved with group sessions. He may be willing to participate in a more individualized program.    would benefit from ongoing psychotherapy, either with  or a clinician closer to his home. This would be useful for his ongoing adjustment to illness needs, as well anxiety, for which he has been seen in the ED.   Mr. Plasencia does not currently meet transplant criteria from a psychosocial perspective. Once he has maintained abstinence for six months, begun a substance use treatment program and continued psychotherapy, an updated psychosocial assessment will be important. He would likely meet criteria at such a time.    Teaching completed during assessment:  1.      Housing and relocation needs post transplant.  2.     Caregiver needs post transplant.  3.     Financial issues related to transplant.  4.     Risks of alcohol use post transplant.  5.     Common psychosocial stressors pre/post transplant.        6.     Liver Transplant support group availability.        7.     [employer] Directive             Psychosocial Risks of Transplant Reviewed:  1.     Increased stress related to your emotional, family, social, employment, or   financial situation.  2.     Affect on work and/or disability benefits.  3.     Affect on future health and life insurance.  4.     Transplant outcome expectations may not be met.  5.     Mental Health risks: anxiety, depression, PTSD, guilt, grief and chronic fatigue.     KASANDRA Rodriguez, LICДМИТРИЙ    Again, thank you for allowing me to participate in the care of your patient.        Sincerely,        LATA Russell

## 2020-05-05 NOTE — TELEPHONE ENCOUNTER
Left message letting Sp know he will be contacted to set up an in clinic appointment with the transplant surgeon and to see neurology. He should call this RN as needed.

## 2020-05-05 NOTE — PROGRESS NOTES
"Sp Plasencia is a 45 year old male who is being evaluated via a billable telephone visit during the COVID-19 pandemic and clinic response to limit in-person visits.  The patient has been notified of following:   \"This telephone visit will be conducted via a call between you and your physician/provider. We have found that certain health care needs can be provided without the need for a physical exam.  This service lets us provide the care you need with a short phone conversation.  If a prescription is necessary we can send it directly to your pharmacy.  If lab work is needed we can place an order for that and you can then stop by our lab to have the test done at a later time.  If during the course of the call the physician/provider feels a telephone visit is not appropriate, you will not be charged for this service.\"   Consent has been obtained for this service by 1 care team member: Yes  I have reviewed and updated the patient's Past Medical History, Social History, Family History and Medication List.      Phone call contact time  Call Started at 10:10  Call Ended at 11:25  On phone patient    [facility] Liver Transplant Evaluation    Requesting Provider and Health System: Carin Mcnally MD , [place]. Vinod Stewart MD, PCP [place]   Liver Disease:  ETOH cirrhosis    A/P  45 year old male with ETOH cirrhosis. MELD 29 ABO unknown    CIRRHOSIS. 2/2 ETOH. Sober for 4 months  ASCITES.Refractory. Unable to be on diuretics 2/2 kidney function.  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 UTD US 2/11/20  VARICEAL SCREENING. UTD EGD 1/2020 small varices    FASCIOSCAPULOHUMERAL MUSCULAR DYSTROPHY LV with neuro 2013. Will request he see neurology here    INGUINAL HERNIA Unable to examine but severe per patient. Discussed with him that would not be operable under current medical condition.    SOCIAL SUPPORT. Good per his report  FUNCTIONAL STATUS. Impairment with weakness. Independent in ADLs  LIVER " "TRANSPLANT CANDIDACY.   Will need to see in person.  May get better with longer sobriety.  Issues are  1. Insight into alcohol use  2. Fascioscapulohumeral Muscular Dystrophy    Perla Winters MD  Hepatology/Liver Transplantation  Medical Director, Liver Transplantation  [facility]  ========================================================================    Subjective:  45 y M with ETOH cirrhosis. First diagnosed with liver disease in January 2020 when he presented with ASHWINI and elevated LFTs. His wife noted that he was jaundiced.     Weakness  He complains of muscle mass loss, feels weaker. Sometimes needs help getting up from a chair. He has a lift chair. He is getting help from his kids and wife. He has lost about 25-30 pounds currently 225. He is driving. He drives a pickup and can get in and out without assistance but has a touch time. He cannot crouch down to pick something up due to weakness, transition from crouching to standing. No PT. \"I have a really big mobility problem.\"  Hospitalized  1/4-1/6/19 ASHWINI thought 2/2 ACE + hydrochlorothiazide/triamterene + NSAIDs  2/11-2/12/20 new ascites, AH treated with prednisone for a couple weeks. BIli peaked at 19 at that time.   4/8-4/10/20 ASHWINI creat 2.4 -> 1.2 HE.  Bili peak 25 on 4/8/20.  AUD  Last ETOH 1/1/20  DUI 2001  Was about 6 drinks/d  Saw CD evaluator here 4/30/20. Her note to our transplant team: he denied having a problem with alcohol, depression, and anxiety (\"those are made up diagnoses\")  Ascites  Para q 10 d  No diuretics: stopped 2/2 concern for kidneys  Last time he was on diuretics was beginning of April  Creat at that time was 2.38, now 0.89  Follows low Na diet. He said he will back off   HE  On lactulose and rifaximin. One hospitalization for this and one episode last week he got confused at night after no BM for 36 hours.    HCC screening US 4/29/20  EGD 1/2020 small varices  COLONOSCOPY no record  KIDNEY FUNCTION 2 episodes of " ASHWINI  BONE DENSITY no record    Portal vein assessment: decreased flow US 4/29/20  Diabetes: no  Abdominal surgery: no  HCV neg  HBV neg  HIV neg    MELD-Na score: 29 at 4/29/2020  8:47 AM  MELD score: 21 at 4/29/2020  8:47 AM  Calculated from:  Serum Creatinine: 0.89 mg/dL (Rounded to 1 mg/dL) at 4/29/2020  8:47 AM  Serum Sodium: 125 mmol/L at 4/29/2020  8:47 AM  Total Bilirubin: 10.5 mg/dL at 4/29/2020  8:47 AM  INR(ratio): 1.55 at 4/29/2020  8:47 AM  Age: 45 years     Lab Test 04/10/20  0618   WBC 11.3*   RBC 4.16*   HGB 13.2*   HCT 37.6*   MCV 90   MCH 31.7   MCHC 35.1   RDW 15.8*        Lab Test 04/29/20  0847   PROTTOTAL 6.3*   ALBUMIN 2.2*   BILITOTAL 10.5*   ALKPHOS 274*   AST 93*   ALT 50     PAST MEDICAL HISTORY  Fascioscapulohumeral Muscular Dystrophy LV with neuro 2013  GERD  HTN  Gout  SOCIAL HISTORY    Lives with wife and 3 children  Currently is not working. Owns a bar/restaurant On disability   Smoking: Has smoked for about 8 years  Alcohol: see above  FAMILY HISTORY  M alive and well, lymphoma 25 years  F d liver failure last year age 67  3 sibs A&W  3 kids A&W  No known family members with fascioscapulohumeral muscular dystrophy  ROS trouble sleeping, change in appetite. 10 point ROS neg other than the symptoms noted above in the HPI.

## 2020-05-05 NOTE — PROGRESS NOTES
"Sp Plasencia is a 45 year old male who is being evaluated via a billable telephone visit.      The patient has been notified of following:     \"This telephone visit will be conducted via a call between you and your physician/provider. We have found that certain health care needs can be provided without the need for a physical exam.  This service lets us provide the care you need with a short phone conversation.  If a prescription is necessary we can send it directly to your pharmacy.  If lab work is needed we can place an order for that and you can then stop by our lab to have the test done at a later time.    Telephone visits are billed at different rates depending on your insurance coverage. During this emergency period, for some insurers they may be billed the same as an in-person visit.  Please reach out to your insurance provider with any questions.    If during the course of the call the physician/provider feels a telephone visit is not appropriate, you will not be charged for this service.\"    Patient has given verbal consent for Telephone visit?  Yes    What phone number would you like to be contacted at? [phone]    How would you like to obtain your AVS? MyChart    Phone call duration: *** minutes    {signature options:301761}      Originating Location (pt. Location): {video visit patient location:677395::\"Home\"}    Distant Location (provider location):  [facility]     Platform used for Video Visit: {Virtual Visit Platforms:045018::\"AmWell\"}    {signature options:968179}      "

## 2020-05-05 NOTE — COMMITTEE REVIEW
Abdominal Patient Discussion Note Transplant Coordinator: Marlen Quesada  Transplant Surgeon:       Referring Physician: Carin Mcnally    Committee Review Members:  Nutrition Sandrine Haddad RD   Pharmacy Yimi Najera, [facility]   Transplant Arnie Patel MD, Jr Stephane Saha RN, Perla Winters MD, Tahira Norman, APRN CNP, Marlen Quesada RN, Darren Adkins MD, Clinton Welch MD, Sasha Guzman RN, Alma Norris MD, Mando Raymond MD, Thomas M. Leventhal, MD, Sandra Puckett MD, MD       Additional Discussion Notes and Findings:   -Patient has Fascioscapulohumeral Muscular Dystrophy with extreme weakness and muscle wasting. To see surgeon in clinic and neurology.  - CD assessment recommended extensive outpatient program. Patient needs to work on PT,  to check on other program options

## 2020-05-08 ENCOUNTER — TELEPHONE (OUTPATIENT)
Dept: TRANSPLANT | Facility: CLINIC | Age: 45
End: 2020-05-08

## 2020-05-08 ENCOUNTER — HOSPITAL ENCOUNTER (OUTPATIENT)
Dept: ULTRASOUND IMAGING | Facility: CLINIC | Age: 45
Discharge: HOME | End: 2020-05-08
Attending: INTERNAL MEDICINE | Admitting: INTERNAL MEDICINE
Payer: COMMERCIAL

## 2020-05-08 VITALS — SYSTOLIC BLOOD PRESSURE: 122 MMHG | OXYGEN SATURATION: 100 % | DIASTOLIC BLOOD PRESSURE: 69 MMHG

## 2020-05-08 DIAGNOSIS — K70.30 ALCOHOLIC CIRRHOSIS (H): Primary | ICD-10-CM

## 2020-05-08 DIAGNOSIS — K70.9: ICD-10-CM

## 2020-05-08 DIAGNOSIS — K70.9 ALCOHOL LIVER DAMAGE (H): ICD-10-CM

## 2020-05-08 DIAGNOSIS — K70.9 ALCOHOLIC LIVER DISEASE (H): ICD-10-CM

## 2020-05-08 LAB
ALBUMIN SERPL-MCNC: 2.4 G/DL (ref 3.4–5)
ALP SERPL-CCNC: 212 U/L (ref 40–150)
ALT SERPL W P-5'-P-CCNC: 41 U/L (ref 0–70)
ANION GAP SERPL CALCULATED.3IONS-SCNC: 7 MMOL/L (ref 3–14)
AST SERPL W P-5'-P-CCNC: 82 U/L (ref 0–45)
BILIRUB SERPL-MCNC: 7.6 MG/DL (ref 0.2–1.3)
BUN SERPL-MCNC: 12 MG/DL (ref 7–30)
CALCIUM SERPL-MCNC: 8.5 MG/DL (ref 8.5–10.1)
CHLORIDE SERPL-SCNC: 98 MMOL/L (ref 94–109)
CO2 SERPL-SCNC: 25 MMOL/L (ref 20–32)
CREAT SERPL-MCNC: 1.09 MG/DL (ref 0.66–1.25)
ERYTHROCYTE [DISTWIDTH] IN BLOOD BY AUTOMATED COUNT: 15.2 % (ref 10–15)
GFR SERPL CREATININE-BSD FRML MDRD: 82 ML/MIN/{1.73_M2}
GLUCOSE SERPL-MCNC: 92 MG/DL (ref 70–99)
HCT VFR BLD AUTO: 29.7 % (ref 40–53)
HGB BLD-MCNC: 10.3 G/DL (ref 13.3–17.7)
INR PPP: 1.59 (ref 0.86–1.14)
MCH RBC QN AUTO: 31.8 PG (ref 26.5–33)
MCHC RBC AUTO-ENTMCNC: 34.7 G/DL (ref 31.5–36.5)
MCV RBC AUTO: 92 FL (ref 78–100)
PLATELET # BLD AUTO: 172 10E9/L (ref 150–450)
POTASSIUM SERPL-SCNC: 3.5 MMOL/L (ref 3.4–5.3)
PROT SERPL-MCNC: 6.2 G/DL (ref 6.8–8.8)
RBC # BLD AUTO: 3.24 10E12/L (ref 4.4–5.9)
SODIUM SERPL-SCNC: 130 MMOL/L (ref 133–144)
WBC # BLD AUTO: 6.7 10E9/L (ref 4–11)

## 2020-05-08 PROCEDURE — 85610 PROTHROMBIN TIME: CPT | Performed by: INTERNAL MEDICINE

## 2020-05-08 PROCEDURE — 80053 COMPREHEN METABOLIC PANEL: CPT | Performed by: INTERNAL MEDICINE

## 2020-05-08 PROCEDURE — 49083 ABD PARACENTESIS W/IMAGING: CPT

## 2020-05-08 PROCEDURE — P9047 ALBUMIN (HUMAN), 25%, 50ML: HCPCS

## 2020-05-08 PROCEDURE — 25000128 H RX IP 250 OP 636

## 2020-05-08 PROCEDURE — 85027 COMPLETE CBC AUTOMATED: CPT | Performed by: INTERNAL MEDICINE

## 2020-05-08 RX ORDER — DEXTROSE MONOHYDRATE 25 G/50ML
25-50 INJECTION, SOLUTION INTRAVENOUS
Status: DISCONTINUED | OUTPATIENT
Start: 2020-05-08 | End: 2020-05-09 | Stop reason: HOSPADM

## 2020-05-08 RX ORDER — ALBUMIN (HUMAN) 12.5 G/50ML
SOLUTION INTRAVENOUS
Status: COMPLETED
Start: 2020-05-08 | End: 2020-05-08

## 2020-05-08 RX ORDER — NICOTINE POLACRILEX 4 MG
15-30 LOZENGE BUCCAL
Status: DISCONTINUED | OUTPATIENT
Start: 2020-05-08 | End: 2020-05-09 | Stop reason: HOSPADM

## 2020-05-08 RX ORDER — DEXTROSE MONOHYDRATE 25 G/50ML
25-50 INJECTION, SOLUTION INTRAVENOUS
Status: CANCELLED | OUTPATIENT
Start: 2020-05-08

## 2020-05-08 RX ORDER — ALBUMIN (HUMAN) 12.5 G/50ML
12.5 SOLUTION INTRAVENOUS ONCE
Status: COMPLETED | OUTPATIENT
Start: 2020-05-08 | End: 2020-05-08

## 2020-05-08 RX ORDER — ALBUMIN (HUMAN) 12.5 G/50ML
50 SOLUTION INTRAVENOUS ONCE
Status: DISCONTINUED | OUTPATIENT
Start: 2020-05-08 | End: 2020-05-09 | Stop reason: HOSPADM

## 2020-05-08 RX ORDER — ALBUMIN (HUMAN) 12.5 G/50ML
SOLUTION INTRAVENOUS
Status: DISPENSED
Start: 2020-05-08 | End: 2020-05-08

## 2020-05-08 RX ORDER — NICOTINE POLACRILEX 4 MG
15-30 LOZENGE BUCCAL
Status: CANCELLED | OUTPATIENT
Start: 2020-05-08

## 2020-05-08 RX ORDER — LIDOCAINE 40 MG/G
CREAM TOPICAL
Status: DISCONTINUED | OUTPATIENT
Start: 2020-05-08 | End: 2020-05-09 | Stop reason: HOSPADM

## 2020-05-08 RX ADMIN — ALBUMIN (HUMAN) 50 G: 12.5 SOLUTION INTRAVENOUS at 09:37

## 2020-05-08 RX ADMIN — ALBUMIN HUMAN 50 G: 0.25 SOLUTION INTRAVENOUS at 09:37

## 2020-05-08 NOTE — TELEPHONE ENCOUNTER
Spoke to Dorene and was questioning the next step of the evaluation. Sp did not recall a voicemail left by this RN.  Plan to have them come to the clinic for a surgeon and neurologist appointment and she agrees to call this RN if no appointments have been made by next Wednesday. They have dietary questions so order placed for dietitian virtual visit.

## 2020-05-08 NOTE — PROCEDURES
[facility]    Procedure: US paracentesis    Date/Time: 5/8/2020 9:19 AM  Performed by: Gerry Berg MD  Authorized by: Gerry Berg MD     UNIVERSAL PROTOCOL   Site Marked: NA  Prior Images Obtained and Reviewed:  Yes  Required items: Required blood products, implants, devices and special equipment available    Patient identity confirmed:  Verbally with patient, arm band, provided demographic data and hospital-assigned identification number  Patient was reevaluated immediately before administering moderate or deep sedation or anesthesia  Confirmation Checklist:  Patient's identity using two indicators, relevant allergies, procedure was appropriate and matched the consent or emergent situation and correct equipment/implants were available  Time out: Immediately prior to the procedure a time out was called    Universal Protocol: the Joint Commission Universal Protocol was followed    Preparation: Patient was prepped and draped in usual sterile fashion    ESBL (mL):  2         ANESTHESIA    Anesthesia: Local infiltration  Local Anesthetic:  Lidocaine 1% without epinephrine      SEDATION    Patient Sedated: No    See dictated procedure note for full details.  Findings: US guided left abdominal paracentesis with vicryl suture, per patient request.    Specimens: none    Complications: None    Condition: Stable    PROCEDURE   Patient Tolerance:  Patient tolerated the procedure well with no immediate complications    Length of time physician/provider present for 1:1 monitoring during sedation: 0

## 2020-05-08 NOTE — PROGRESS NOTES
Pt was in Radiology today for a paracentesis. Procedure performed by Dr Rosario. Procedure was tolerated well, vitals remained stable. 6400 cc's of clear austin colored fluid removed. Albumin instilled per protocol 50 Grams.Protocol is 25 grams for every 3L (over 5Liters). A 3-0 Vicryl stitch was placed by MD. Pt left department in stable satisfactory condition. There is no evidence of bleeding upon discharge.

## 2020-05-13 DIAGNOSIS — Z11.59 ENCOUNTER FOR SCREENING FOR OTHER VIRAL DISEASES: Primary | ICD-10-CM

## 2020-05-13 NOTE — TELEPHONE ENCOUNTER
Spoke with the patient and confirmed Transplant Surgeon appointments on 5/21/21.  Informed patient an itinerary can be accessed on [employer], and will be sent via mail.

## 2020-05-14 ENCOUNTER — PRE VISIT (OUTPATIENT)
Dept: NEUROLOGY | Facility: CLINIC | Age: 45
End: 2020-05-14

## 2020-05-14 DIAGNOSIS — K70.9 ALCOHOL LIVER DAMAGE (H): Primary | ICD-10-CM

## 2020-05-14 NOTE — TELEPHONE ENCOUNTER
FUTURE VISIT INFORMATION      FUTURE VISIT INFORMATION:    Date: 5/28/2020    Time: 9am    Location: [facility]  REFERRAL INFORMATION:    Referring provider:  Dr. Winters     Referring providers clinic:  [facility]atology     Reason for visit/diagnosis  Fascioscapulohume Muscular Dystrophy     RECORDS REQUESTED FROM:       Clinic name Comments Records Status Imaging Status   Interal Dr. Winters 5/5/2020 [facility] N/A

## 2020-05-15 ENCOUNTER — VIRTUAL VISIT (OUTPATIENT)
Dept: TRANSPLANT | Facility: CLINIC | Age: 45
End: 2020-05-15
Attending: INTERNAL MEDICINE
Payer: COMMERCIAL

## 2020-05-15 ENCOUNTER — OFFICE VISIT (OUTPATIENT)
Dept: URGENT CARE | Facility: URGENT CARE | Age: 45
End: 2020-05-15
Attending: INTERNAL MEDICINE
Payer: COMMERCIAL

## 2020-05-15 DIAGNOSIS — K70.30 ALCOHOLIC CIRRHOSIS (H): Primary | ICD-10-CM

## 2020-05-15 DIAGNOSIS — Z11.59 ENCOUNTER FOR SCREENING FOR OTHER VIRAL DISEASES: ICD-10-CM

## 2020-05-15 PROCEDURE — U0003 INFECTIOUS AGENT DETECTION BY NUCLEIC ACID (DNA OR RNA); SEVERE ACUTE RESPIRATORY SYNDROME CORONAVIRUS 2 (SARS-COV-2) (CORONAVIRUS DISEASE [COVID-19]), AMPLIFIED PROBE TECHNIQUE, MAKING USE OF HIGH THROUGHPUT TECHNOLOGIES AS DESCRIBED BY CMS-2020-01-R: HCPCS | Mod: 90 | Performed by: INTERNAL MEDICINE

## 2020-05-15 PROCEDURE — 99000 SPECIMEN HANDLING OFFICE-LAB: CPT | Performed by: INTERNAL MEDICINE

## 2020-05-15 PROCEDURE — 99207 ZZC NO BILLABLE SERVICE THIS VISIT: CPT

## 2020-05-15 NOTE — PROGRESS NOTES
"Sp Plasencia is a 45 year old male who is being evaluated via a billable telephone visit.      The patient has been notified of following:     \"This telephone visit will be conducted via a call between you and your physician/provider. We have found that certain health care needs can be provided without the need for a physical exam.  This service lets us provide the care you need with a short phone conversation.  If a prescription is necessary we can send it directly to your pharmacy.  If lab work is needed we can place an order for that and you can then stop by our lab to have the test done at a later time.    Telephone visits are billed at different rates depending on your insurance coverage. During this emergency period, for some insurers they may be billed the same as an in-person visit.  Please reach out to your insurance provider with any questions.    If during the course of the call the physician/provider feels a telephone visit is not appropriate, you will not be charged for this service.\"    Patient has given verbal consent for Telephone visit?  yes    Phone call duration: 15 minutes    Outpatient MNT: Liver Transplant Evaluation    Current BMI: 29.7 (HT 73 in,  lbs/102 kg)- data from 4/20  BMI is within recommendation of <40 for liver transplant     Time Spent: 15 minutes  Visit Type: Initial  Referring Physician: Mandi   Pt accompanied by: self     Medical dx associated with RD referral  - Etoh cirrhosis     History of previous txp: none     Nutrition Assessment  Pt reports poor taste and smell. He is eating <2000 mg/day of sodium, yet also eating very little food overall d/t poor appetite, partially affected by ascites.     Appetite: declining     Vitamins, Supplements, Pertinent Meds: MVI, occasional fish oil   Herbal Medicines/Supplements: none     Diet Recall  Breakfast 2 eggs    Lunch None    Dinner Beef/pork/ayala with potato and asparagus    Snacks Cereal at HS    Beverages Water, 40 " oz Gatorade/day    Dining out Unknown      Physical Activity  Yard work      Anthropometrics  Height:   73 in   BMI:    29.7    Weight Status:Overweight BMI 25-29.9   Weight:  225 lbs (4/20)            IBW (lb): 184  % IBW: 122    Wt Hx: Pt reports some TONNY. He reports + ascites, for which he gets tapped q 10 days. He reports muscle loss, which is also worsened with having muscular dystrophy.     Adj/dosing BW: 194 lbs/88 kg       Malnutrition  % Intake: <75% for > 7 days (non-severe malnutrition)  % Weight Loss: difficult to determine with likelihood of diuresis and dry weight loss  Subcutaneous Fat Loss: unable to determine   Muscle Loss: yes, but to unknown extent   Fluid Accumulation/Edema: yes, but to unknown extent   Malnutrition Diagnosis: Unable to determine due to inability to visualize pt     Estimated Nutrition Needs  Energy  2157-5621     (25-30 kcal/kg for maintenance)     Protein      (1-1.2 g/kg for increased needs)           Fluid  1 ml/kcal or per MD        Micronutrient   Na+: <2000 mg/day            Nutrition Diagnosis  Food and nutrition related knowledge deficit r/t pre liver transplant eval AEB pt verbalized not hearing pre/post transplant diet guidelines.    Nutrition Intervention  Nutrition education provided:  Discussed sodium intake (low sodium foods and drinks, seasoning food without salt and tips for low sodium diet). Advised against over restricting sodium intake as pt is consuming minimal PO in general.     Reviewed adequate protein intake. Encouraged receiving protein from both animal and plant based sources. Encouraged pt to try a protein powder or pre-made protein drink. He does report that liquids are better tolerated than solids. Provided a few brands/examples and to consume either at HS or b/t BF and D.     Pt inquiring about taking vit D and zinc. No vit D or zinc levels on file to determine if deficient, but vit D should be okay as well as a trial of zinc x 2 weeks to see  if this does help improve dysgeusia.     Reviewed post txp diet guidelines in brief (will review in further detail post txp):  (1) Review of proper food safety measures d/t immunosuppressant therapy post-op and increased risk for food-borne illness    (2) Avoid the following post txp d/t risk for rejection, unknown effects on the organs, and/or potential interactions with immunosuppressants:  - Herbal, Chinese, holistic, chiropractic, natural, alternative medicines and supplements  - Detoxes and cleanses  - Weight loss pills  - Protein powders or other products with extracts or herbs (ie green tea extract)    (3) Med regimen and possible side effects    **Pt does report he is certified serv safe    Patient Understanding: Pt verbalized understanding of education provided.  Expected Compliance: Good  Follow-Up Plans: PRN     Nutrition Goals  1. Limit Na+ <2000mg/day  2. Pt to verbalize understanding of 3 aspects of post txp education provided  3. Minimum of 88 g protein/day     Provided pt with contact info.   Sandrine Haddad RD, LD  [facility] [phone]

## 2020-05-16 LAB
SARS-COV-2 RNA SPEC QL NAA+PROBE: NOT DETECTED
SPECIMEN SOURCE: NORMAL

## 2020-05-18 ENCOUNTER — HOSPITAL ENCOUNTER (OUTPATIENT)
Dept: LAB | Facility: CLINIC | Age: 45
End: 2020-05-18
Attending: INTERNAL MEDICINE
Payer: COMMERCIAL

## 2020-05-18 ENCOUNTER — HOSPITAL ENCOUNTER (OUTPATIENT)
Dept: ULTRASOUND IMAGING | Facility: CLINIC | Age: 45
End: 2020-05-18
Attending: INTERNAL MEDICINE
Payer: COMMERCIAL

## 2020-05-18 VITALS
OXYGEN SATURATION: 99 % | RESPIRATION RATE: 18 BRPM | DIASTOLIC BLOOD PRESSURE: 73 MMHG | HEART RATE: 91 BPM | SYSTOLIC BLOOD PRESSURE: 143 MMHG

## 2020-05-18 DIAGNOSIS — Z11.59 ENCOUNTER FOR SCREENING FOR OTHER VIRAL DISEASES: Primary | ICD-10-CM

## 2020-05-18 DIAGNOSIS — K70.9 ALCOHOL LIVER DAMAGE (H): Primary | ICD-10-CM

## 2020-05-18 DIAGNOSIS — K70.9 ALCOHOLIC LIVER DISEASE (H): ICD-10-CM

## 2020-05-18 DIAGNOSIS — K70.9 ALCOHOL LIVER DAMAGE (H): ICD-10-CM

## 2020-05-18 LAB
ALBUMIN SERPL-MCNC: 2.6 G/DL (ref 3.4–5)
ALP SERPL-CCNC: 167 U/L (ref 40–150)
ALT SERPL W P-5'-P-CCNC: 32 U/L (ref 0–70)
ANION GAP SERPL CALCULATED.3IONS-SCNC: 5 MMOL/L (ref 3–14)
AST SERPL W P-5'-P-CCNC: 67 U/L (ref 0–45)
BILIRUB SERPL-MCNC: 4.6 MG/DL (ref 0.2–1.3)
BUN SERPL-MCNC: 18 MG/DL (ref 7–30)
CALCIUM SERPL-MCNC: 8.5 MG/DL (ref 8.5–10.1)
CHLORIDE SERPL-SCNC: 104 MMOL/L (ref 94–109)
CO2 SERPL-SCNC: 28 MMOL/L (ref 20–32)
CREAT SERPL-MCNC: 1.13 MG/DL (ref 0.66–1.25)
ERYTHROCYTE [DISTWIDTH] IN BLOOD BY AUTOMATED COUNT: 15.2 % (ref 10–15)
GFR SERPL CREATININE-BSD FRML MDRD: 79 ML/MIN/{1.73_M2}
GLUCOSE SERPL-MCNC: 105 MG/DL (ref 70–99)
HCT VFR BLD AUTO: 30.3 % (ref 40–53)
HGB BLD-MCNC: 10.2 G/DL (ref 13.3–17.7)
INR PPP: 1.52 (ref 0.86–1.14)
MCH RBC QN AUTO: 31.5 PG (ref 26.5–33)
MCHC RBC AUTO-ENTMCNC: 33.7 G/DL (ref 31.5–36.5)
MCV RBC AUTO: 94 FL (ref 78–100)
PLATELET # BLD AUTO: 181 10E9/L (ref 150–450)
POTASSIUM SERPL-SCNC: 2.9 MMOL/L (ref 3.4–5.3)
PROT SERPL-MCNC: 6.4 G/DL (ref 6.8–8.8)
RBC # BLD AUTO: 3.24 10E12/L (ref 4.4–5.9)
SODIUM SERPL-SCNC: 137 MMOL/L (ref 133–144)
WBC # BLD AUTO: 6.7 10E9/L (ref 4–11)

## 2020-05-18 PROCEDURE — P9047 ALBUMIN (HUMAN), 25%, 50ML: HCPCS

## 2020-05-18 PROCEDURE — 85027 COMPLETE CBC AUTOMATED: CPT | Performed by: INTERNAL MEDICINE

## 2020-05-18 PROCEDURE — 85610 PROTHROMBIN TIME: CPT | Performed by: INTERNAL MEDICINE

## 2020-05-18 PROCEDURE — 80053 COMPREHEN METABOLIC PANEL: CPT | Performed by: INTERNAL MEDICINE

## 2020-05-18 PROCEDURE — 82140 ASSAY OF AMMONIA: CPT | Performed by: INTERNAL MEDICINE

## 2020-05-18 PROCEDURE — 49083 ABD PARACENTESIS W/IMAGING: CPT

## 2020-05-18 PROCEDURE — 25000125 ZZHC RX 250: Performed by: RADIOLOGY

## 2020-05-18 PROCEDURE — 25000128 H RX IP 250 OP 636

## 2020-05-18 RX ORDER — ALBUMIN (HUMAN) 12.5 G/50ML
50 SOLUTION INTRAVENOUS ONCE
Status: COMPLETED | OUTPATIENT
Start: 2020-05-18 | End: 2020-05-18

## 2020-05-18 RX ORDER — ALBUMIN (HUMAN) 12.5 G/50ML
SOLUTION INTRAVENOUS
Status: COMPLETED
Start: 2020-05-18 | End: 2020-05-18

## 2020-05-18 RX ADMIN — LIDOCAINE HYDROCHLORIDE 10 ML: 10 INJECTION, SOLUTION EPIDURAL; INFILTRATION; INTRACAUDAL; PERINEURAL at 09:02

## 2020-05-18 RX ADMIN — ALBUMIN (HUMAN) 50 G: 12.5 SOLUTION INTRAVENOUS at 09:03

## 2020-05-18 RX ADMIN — ALBUMIN HUMAN 50 G: 0.25 SOLUTION INTRAVENOUS at 09:03

## 2020-05-18 NOTE — PROGRESS NOTES
Paracentesis complete.  Consent obtained with Dr Merchant.  Pt tolerated well, drained 5100 mLs clear austin colored fluid.  Site at LLQ.  Site covered with a suture and dermabond.  Site CDI.  Albumin 50 grams.  Reviewed discharge instructions with pt.  Pt stable on discharge.

## 2020-05-18 NOTE — PROCEDURES
[facility]    Procedure: Paracentesis    Date/Time: 5/18/2020 10:55 AM  Performed by: Jocelynn Merchant DO  Authorized by: Jocelynn Merchant DO     UNIVERSAL PROTOCOL   Site Marked: Yes  Prior Images Obtained and Reviewed:  Yes  Required items: Required blood products, implants, devices and special equipment available    Patient identity confirmed:  Verbally with patient, arm band, provided demographic data and hospital-assigned identification number  Patient was reevaluated immediately before administering moderate or deep sedation or anesthesia  Confirmation Checklist:  Patient's identity using two indicators, relevant allergies, procedure was appropriate and matched the consent or emergent situation and correct equipment/implants were available  Time out: Immediately prior to the procedure a time out was called    Universal Protocol: the Joint Commission Universal Protocol was followed    Preparation: Patient was prepped and draped in usual sterile fashion           ANESTHESIA    Anesthesia: Local infiltration  Local Anesthetic:  Lidocaine 1% without epinephrine      SEDATION    Patient Sedated: No    See dictated procedure note for full details.  Findings: Paracentesis.    Specimens: none    Complications: None    Condition: Stable    Plan: Repeat as needed.     PROCEDURE   Patient Tolerance:  Patient tolerated the procedure well with no immediate complications    Length of time physician/provider present for 1:1 monitoring during sedation: 0

## 2020-05-21 ENCOUNTER — OFFICE VISIT (OUTPATIENT)
Dept: TRANSPLANT | Facility: CLINIC | Age: 45
End: 2020-05-21
Attending: TRANSPLANT SURGERY
Payer: COMMERCIAL

## 2020-05-21 ENCOUNTER — VIRTUAL VISIT (OUTPATIENT)
Dept: BEHAVIORAL HEALTH | Facility: CLINIC | Age: 45
End: 2020-05-21
Payer: COMMERCIAL

## 2020-05-21 VITALS
HEART RATE: 98 BPM | OXYGEN SATURATION: 99 % | TEMPERATURE: 98.3 F | BODY MASS INDEX: 28.51 KG/M2 | DIASTOLIC BLOOD PRESSURE: 67 MMHG | SYSTOLIC BLOOD PRESSURE: 107 MMHG | WEIGHT: 216.1 LBS

## 2020-05-21 DIAGNOSIS — F43.20 ADJUSTMENT DISORDER, UNSPECIFIED TYPE: Primary | ICD-10-CM

## 2020-05-21 DIAGNOSIS — K70.31 ALCOHOLIC CIRRHOSIS OF LIVER WITH ASCITES (H): Primary | ICD-10-CM

## 2020-05-21 PROCEDURE — 90791 PSYCH DIAGNOSTIC EVALUATION: CPT | Mod: TEL | Performed by: MARRIAGE & FAMILY THERAPIST

## 2020-05-21 ASSESSMENT — COLUMBIA-SUICIDE SEVERITY RATING SCALE - C-SSRS
6. HAVE YOU EVER DONE ANYTHING, STARTED TO DO ANYTHING, OR PREPARED TO DO ANYTHING TO END YOUR LIFE?: NO
TOTAL  NUMBER OF ABORTED OR SELF INTERRUPTED ATTEMPTS PAST 3 MONTHS: NO
1. IN THE PAST MONTH, HAVE YOU WISHED YOU WERE DEAD OR WISHED YOU COULD GO TO SLEEP AND NOT WAKE UP?: NO
3. HAVE YOU BEEN THINKING ABOUT HOW YOU MIGHT KILL YOURSELF?: NO
2. HAVE YOU ACTUALLY HAD ANY THOUGHTS OF KILLING YOURSELF?: NO
TOTAL  NUMBER OF INTERRUPTED ATTEMPTS LIFETIME: NO
ATTEMPT LIFETIME: NO
5. HAVE YOU STARTED TO WORK OUT OR WORKED OUT THE DETAILS OF HOW TO KILL YOURSELF? DO YOU INTEND TO CARRY OUT THIS PLAN?: NO
4. HAVE YOU HAD THESE THOUGHTS AND HAD SOME INTENTION OF ACTING ON THEM?: NO
5. HAVE YOU STARTED TO WORK OUT OR WORKED OUT THE DETAILS OF HOW TO KILL YOURSELF? DO YOU INTEND TO CARRY OUT THIS PLAN?: NO
TOTAL  NUMBER OF ABORTED OR SELF INTERRUPTED ATTEMPTS PAST LIFETIME: NO
2. HAVE YOU ACTUALLY HAD ANY THOUGHTS OF KILLING YOURSELF LIFETIME?: NO
4. HAVE YOU HAD THESE THOUGHTS AND HAD SOME INTENTION OF ACTING ON THEM?: NO
ATTEMPT PAST THREE MONTHS: NO
1. IN THE PAST MONTH, HAVE YOU WISHED YOU WERE DEAD OR WISHED YOU COULD GO TO SLEEP AND NOT WAKE UP?: NO
TOTAL  NUMBER OF INTERRUPTED ATTEMPTS PAST 3 MONTHS: NO
6. HAVE YOU EVER DONE ANYTHING, STARTED TO DO ANYTHING, OR PREPARED TO DO ANYTHING TO END YOUR LIFE?: NO

## 2020-05-21 ASSESSMENT — ANXIETY QUESTIONNAIRES
GAD7 TOTAL SCORE: 4
IF YOU CHECKED OFF ANY PROBLEMS ON THIS QUESTIONNAIRE, HOW DIFFICULT HAVE THESE PROBLEMS MADE IT FOR YOU TO DO YOUR WORK, TAKE CARE OF THINGS AT HOME, OR GET ALONG WITH OTHER PEOPLE: NOT DIFFICULT AT ALL
2. NOT BEING ABLE TO STOP OR CONTROL WORRYING: NOT AT ALL
7. FEELING AFRAID AS IF SOMETHING AWFUL MIGHT HAPPEN: NOT AT ALL
3. WORRYING TOO MUCH ABOUT DIFFERENT THINGS: NEARLY EVERY DAY
5. BEING SO RESTLESS THAT IT IS HARD TO SIT STILL: NOT AT ALL
1. FEELING NERVOUS, ANXIOUS, OR ON EDGE: NOT AT ALL
6. BECOMING EASILY ANNOYED OR IRRITABLE: SEVERAL DAYS

## 2020-05-21 ASSESSMENT — PATIENT HEALTH QUESTIONNAIRE - PHQ9
SUM OF ALL RESPONSES TO PHQ QUESTIONS 1-9: 5
5. POOR APPETITE OR OVEREATING: NOT AT ALL

## 2020-05-21 NOTE — LETTER
5/21/2020         RE: Sp Plasencia  [address] [facility] [id number]        Dear Colleague,    Thank you for referring your patient, Sp Plasencia, to the [facility] SOLID ORGAN TRANSPLANT. Please see a copy of my visit note below.    Assessment and Plan:  1. liver transplant evaluation - patient is a fair candidate overall. Benefits and surgical risks of a liver transplantation were discussed.  2.  End stage liver disease due to Laennec's    Surgical evaluation:  1. Portal Vein:Patent - needs doppler US liver  2. Hepatic Artery: Open  3. TIPS: absent  4. Previous Abdominal Surgery: No  5. Hepatocellular Carcinoma: None  6. Ascites: Present - large amount  7. Costal Angle: wide  8. Portopulmonary Hypertension: absent  9. Hepatopulmonary Syndrome: absent  10. Cardiac Evaluation: needs stress echocardiogram  11. Nutritional Status: Moderate  12. Diabetes: no  13.Hypertension yes  14. Smoker:yes  15. Meets guidelines to receive Living Donor  Yes  16. Potential Living donors ?    Recommendations:   - would get liver doppler US to assess actual flow in PV and HA although, patent on CT  - Chem Dep eval and clearance  - facioscapulohumeral muscular dystrophy - would recommend evaluation by our transplant anesthesia  - given pt's slow progression of FSH on one hand and his clinical improvement from liver stand point plus concerns regarding chem dep, it would be appropriate to discuss if patient would benefit from alternative management of his liver dz rather than liver transplantation      Patients overall evaluation will be discussed at the Liver Transplant selection committee meeting with a final recommendation on the patients suitability for transplant to be made at that time.    Consult Full  Details:  Sp Plasencia was seen in consultation at the request of Dr. Winters for evaluation as a potential liver transplant recipient.    Reason for Visit:  Sp Plasencia is a 45 year old year old male with  Jason's, who presents for liver transplant evaluation.    HPI:  Initially presented with hepatic decompensation secondary to Jason's with MELD 29.  Had ascites, jaundice, HE, ASHWINI, elevated ammonia ~180    Stopped ETOH on Jan 1st, 2020    Now MELD improved to 18, still has ascites but no HE  Gets paracentesis every 10 days  Small varieces - egd, no banding        FH: dad - liver dz (hereditary/etoh), mom lymphoma    Tobacco: 10 yrs, 1 cig/week  Etoh: quit Jan 1st, 2020  Before quitting he would drink ~ 1L per day vodka    He also had diagnosis of facioscapulohumeral muscular dystrophy.  According to his neurologist, he had slow progression to the point that occasionally uses wheel chair.  Pt has been reluctant in the past to have PT for the condition and did not like to wear LE braces.        Past Medical History:   Diagnosis Date     Acid reflux      Ascites      Esophageal varices (H)      FSHD (facioscapulohumeral muscular dystrophy) (H)      Gout      HTN (hypertension)      Jaundice      Liver cirrhosis (H)      Smoker      Past Surgical History:   Procedure Laterality Date     NO HISTORY OF SURGERY       Past Surgical History:   Procedure Laterality Date     NO HISTORY OF SURGERY       Family History   Problem Relation Age of Onset     Hypertension Father      Allergies   Allergen Reactions     Adhesive Tape Blisters     Skin breakdown, open sores     Prior to Admission medications    Medication Sig Start Date End Date Taking? Authorizing Provider   acetaminophen (TYLENOL) 325 MG tablet Take 325-650 mg by mouth every 6 hours as needed for mild pain   Yes Reported, Patient   allopurinol (ZYLOPRIM) 300 MG tablet Take 1 tablet (300 mg) by mouth daily 4/10/20  Yes Anam Castillo MD   escitalopram (LEXAPRO) 5 MG tablet Take 1 tablet (5 mg) by mouth daily 4/22/20  Yes Deanna Barahona MD   esomeprazole (NEXIUM) 40 MG DR capsule Take 1 capsule (40 mg) by mouth daily Take 30-60 minutes before eating.  4/10/20  Yes Anam Castillo MD   hydrOXYzine (ATARAX) 25 MG tablet Take 1-2 tablets (25-50 mg) by mouth every 6 hours as needed for itching or other (adjuvant pain) 4/10/20  Yes Anam Castillo MD   ibuprofen (ADVIL/MOTRIN) 200 MG capsule Take 200 mg by mouth every 4 hours as needed for fever   Yes Reported, Patient   lactulose (CEPHULAC) 20 GM packet Take 1 packet (20 g) by mouth 2 times daily 4/10/20  Yes Anam Castillo MD   Multiple Vitamin (DAILY MULTIVITAMIN PO) Take 1 tablet by mouth daily    Yes Reported, Patient   Omega-3 Fatty Acids (FISH OIL PO) Take 1 capsule by mouth Once weekly   Yes Unknown, Entered By History   ondansetron (ZOFRAN) 4 MG tablet  4/16/20  Yes Reported, Patient   traMADol (ULTRAM) 50 MG tablet Take 50 mg by mouth daily   Yes Unknown, Entered By History   zolpidem (AMBIEN) 5 MG tablet Take 1 tablet (5 mg) by mouth nightly as needed for sleep 4/22/20  Yes Denana Barahona MD       Previous Transplant Hx: No    Cardiovascular Hx:        h/o Cardiac Issues: No       Exercise Tolerance: no chest pain or shortness of breath with exertion.    Potential Donor(s):?    ROS:    REVIEW OF SYSTEMS (check box if normal)  [x]                GENERAL  [x]                  PULMONARY [x]                 GENITOURINARY  [x]                 CNS                 [x]                  CARDIAC  [x]                  ENDOCRINE  [x]                 EARS,NOSE,THROAT [x]                  GASTROINTESTINAL [x]                  NEUROLOGIC    [x]                 MUSCLOSKELTAL  [x]                   HEMATOLOGY    Examination:     Vitals:  /67   Pulse 98   Temp 98.3  F (36.8  C) (Oral)   Wt 98 kg (216 lb 1.6 oz)   SpO2 99%   BMI 28.51 kg/m      GENERAL APPEARANCE: alert and no distress  EYES: PERRL  HENT: mouth without ulcers or lesions  NECK: supple, no adenopathy  RESP: lungs clear to auscultation - no rales, rhonchi or wheezes  CV: regular rhythm, normal rate, no rub   ABDOMEN:  soft,  nontender, no HSM or masses and bowel sounds normal  MS: extremities normal- no gross deformities noted, no evidence of inflammation in joints, no muscle tenderness  SKIN: no rash  NEURO: Normal strength and tone, sensory exam grossly normal, mentation intact and speech normal  PSYCH: mentation appears normal. and affect normal/bright      Results:   Recent Results (from the past 168 hour(s))   PFT General Lab Testing    Collection Time: 06/15/20 11:40 AM   Result Value Ref Range    FVC-Pred 5.25 L    FVC-Pre 4.00 L    FVC-%Pred-Pre 76 %    FEV1-Pre 3.41 L    FEV1-%Pred-Pre 81 %    FEV1FVC-Pred 80 %    FEV1FVC-Pre 85 %    FEFMax-Pred 10.76 L/sec    FEFMax-Pre 8.59 L/sec    FEFMax-%Pred-Pre 79 %    FEF2575-Pred 3.98 L/sec    FEF2575-Pre 4.32 L/sec    PXL8825-%Pred-Pre 108 %    ExpTime-Pre 7.25 sec    FIFMax-Pre 4.42 L/sec    MEP-Pre 85 cmH2O    MIP-Pre -92 cmH2O    VC-Pred 5.88 L    VC-Pre 4.27 L    VC-%Pred-Pre 72 %    IC-Pred 4.33 L    IC-Pre 3.44 L    IC-%Pred-Pre 79 %    ERV-Pred 1.56 L    ERV-Pre 0.84 L    ERV-%Pred-Pre 53 %    FEV1FEV6-Pred 81 %    FEV1FEV6-Pre 86 %    FRCPleth-Pred 3.64 L    FRCPleth-Pre 2.43 L    FRCPleth-%Pred-Pre 66 %    RVPleth-Pred 2.17 L    RVPleth-Pre 1.60 L    RVPleth-%Pred-Pre 73 %    TLCPleth-Pred 7.74 L    TLCPleth-Pre 5.87 L    TLCPleth-%Pred-Pre 75 %    DLCOunc-Pred 33.26 ml/min/mmHg    DLCOunc-Pre 29.26 ml/min/mmHg    DLCOunc-%Pred-Pre 87 %    VA-Pre 5.48 L    VA-%Pred-Pre 74 %    FEV1SVC-Pred 71 %    FEV1SVC-Pre 80 %     I had a long discussion with the patient regarding liver transplantation which included but was not limited to  the following points:    1. Liver transplant selection committee process.  2. The federal rules for cadaveric waiting list, the size and blood type matching of the organ. The availability of living-related donor transplantation.  3. The types of donors: brain death donors, non-heart beating donors, partial liver grafts: splits and living donor  grafts  4. Extended criteria  Donors (older age, steasosis) and the increased  risk of primary non-function using the extended criteria donors  5. The CDC high risk donors,  Risk of donor transmitted infections and donor transmitted malignancy  6. The liver transplant operation and the associated risks and technical complications which can include intraoperative death, post operative death,  Primary non-function, bleeding requiring re-operations, arterial and biliary complications, bowel perforations, and intra abdominal abscess. Some of these complicaitons may require a second operation.  7. The postoperative course, the ICU stay and risk of postoperative complications which can include sepsis, MI, stroke, brain injury, pneumonia, pleural effusions, and renal dysfunction.  8. The current 1 year and 5 year graft and patient survivals.  9. The need for life long immunosuppressive therapy and the side effects of these medications, including the possibility of toxicity, opportunistic infections, risk of cancer including lymphoma, and the possibility of rejection even if the patient is taking the medication exactly as prescribed.  10. The need for compliance with medications and follow-up visits in the clinic and thereafter.  11. The patient and family understand these risks and wish to proceed to transplantation       I spent .......minutes with the patient and more than 50% of the time was spend in direct face to face counseling.            Again, thank you for allowing me to participate in the care of your patient.        Sincerely,        Sabina Montalvo MD

## 2020-05-21 NOTE — NURSING NOTE
Chief Complaint   Patient presents with     Consult     Pre op liver tx     Blood pressure 107/67, pulse 98, temperature 98.3  F (36.8  C), temperature source Oral, weight 98 kg (216 lb 1.6 oz), SpO2 99 %.    Sandy Guidry on 5/21/2020 at 2:30 PM

## 2020-05-21 NOTE — PROGRESS NOTES
"Sp Plasencia is a 45 year old male who is being evaluated via a telephone visit.      The patient has been notified of the following:     \"We have found that certain health care needs can be provided without the need for a face to face visit.  This service lets us provide the care you need with a short phone conversation.      I will have full access to your [facility] medical record during this entire phone call.   I will be taking notes for your medical record.     Since this is like an office visit, we will bill your insurance company for this service.  Please check with your medical insurance if this type of telephone visit/virtual care is covered.  You may be responsible for the cost of this service if insurance coverage is denied.      There are potential benefits and risks of telephone visits (e.g. limits to patient confidentiality) that differ from in-person visits.?  Confidentiality still applies for telephone services, and nobody will record the visit.  It is important to be in a quiet, private space that is free of distractions (including cell phone or other devices) during the visit.??     If during the course of the call I believe a telephone visit is not appropriate, you will not be charged for this service\"    Consent has been obtained for this service by care team member: yes.    Start time: 10:59 am    End time: 11:35 am    [facility] Primary Care: Integrated Behavioral Health  Integrated Behavioral Health Services   Diagnostic Assessment      PATIENT'S NAME: Sp Plasencia  MRN:   [id number]  :   1975  DATE OF SERVICE: May 21, 2020  SERVICE LOCATION: Phone call (patient / identified key support person reached)  Visit Activities: NEW and [facility] Only      Identifying Information:  Patient is a 45 year old year old, ,  male.  Patient attended the session alone.        Referral:  Patient was referred for an assessment by self.   Reason for referral: clarify " behavioral health diagnosis, determine behavioral health treatment options, assess client readiness and motivation to change, assess client social situation, address the interaction of behavioral and medical issues and consultation on complex comorbid conditions.       Patient's Statement of Presenting Concern:  Patient reports the following reason(s) for seeking an assessment at this time: transplant sooner was suggested by his donor doctors. Pt states that he is a stated that his symptoms have resulted in the following functional impairments: chronic disease management, management of the household and or completion of tasks, relationship(s), self-care and work / vocational responsibilities      History of Presenting Concern:  Patient reports that these problem(s) began adjusting due to transplant. Drinking was a way of life. That was the biggest change because he has stopped drinking. 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 He denies having depression and anxiety but adjusting since January to the changes Patient has not attempted to resolve these concerns in the past. Patient reports that other professional(s) are involved in providing support / services.       Social History:  Patient reported he grew up in [place], [place]. They were the first born of4 children.  Patient reported that his childhood was normal.  Patient reported a history of 2 committed relationships or marriages. Patient has been  for 15 years. Patient reported having 3 children. Patient identified some stable and meaningful social connections.     Patient lives with wife and children.  Patient is currently employed full time and reports they are able to function appropriately at work..  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    Patient reported that he has been involved with the legal system. DUI suspension.  Patient's highest education level was college graduate. Patient did not identify any learning problems. There  are no ethnic, cultural or Scientology factors that may be relevant for therapy.  Patient did not serve in the .       Mental Health History:  Patient reported no family history of mental health issues. Patient has not received mental health services in the past. Patient is not currently receiving any mental health services.      Chemical Health History:  Patient reported no family history of chemical health issues. Patient has not received chemical dependency treatment in the past. Patient is not currently receiving any chemical dependency treatment. Patient reports no problems as a result of their drinking / drug use.      Cage-AID score is: 1 Based on Cage-Aid score and clinical interview there  are not indications of drug or alcohol abuse.  Pt states that he has not a drink in six months    Discussed the general effects of drugs and alcohol on health and well-being.      Significant Losses / Trauma / Abuse / Neglect Issues:  There are no indications or report of: significant losses, trauma, abuse or neglect.    Issues of possible neglect are not present.      Medical History:   Patient Active Problem List   Diagnosis     FSHD (facioscapulohumeral muscular dystrophy) (H)     ASHWINI (acute kidney injury) (H)     Ascites     Hepatic encephalopathy (H)     Chest pain, unspecified     Gout, unspecified     HTN (hypertension)     Smoker     Alcoholic cirrhosis (H)     No-show for appointment       Medication Review:  Current Outpatient Medications   Medication     acetaminophen (TYLENOL) 325 MG tablet     allopurinol (ZYLOPRIM) 300 MG tablet     escitalopram (LEXAPRO) 5 MG tablet     esomeprazole (NEXIUM) 40 MG DR capsule     hydrOXYzine (ATARAX) 25 MG tablet     ibuprofen (ADVIL/MOTRIN) 200 MG capsule     lactulose (CEPHULAC) 20 GM packet     Multiple Vitamin (DAILY MULTIVITAMIN PO)     Omega-3 Fatty Acids (FISH OIL PO)     ondansetron (ZOFRAN) 4 MG tablet     traMADol (ULTRAM) 50 MG tablet     zolpidem (AMBIEN) 5  MG tablet     No current facility-administered medications for this visit.        Patient was provided recommendation to follow-up with physician.    Mental Status Assessment:  Appearance:   n/a   Eye Contact:   n/a   Psychomotor Behavior: n/a   Attitude:   Cooperative  Danis  Orientation:   All  Speech   Rate / Production: Normal/ Responsive Normal    Volume:  Normal   Mood:    Normal  Affect:    n/a   Thought Content:  Clear   Thought Form:  Coherent  Logical   Insight:    Good       Safety Assessment:    Patient denies a history of suicidal ideation, suicide attempts, self-injurious behavior, homicidal ideation, homicidal behavior and and other safety concerns  Patient denies current or recent suicidal ideation or behaviors.  Patient denies current or recent homicidal ideation or behaviors.  Patient denies current or recent self injurious behavior or ideation.  Patient denies other safety concerns.  Patient reports there are firearms in the house. The firearms are not secured in a locked space. Client was advised to secure all firearms  Protective Factors Children in the home , Sense of responsibility to family and Life Satisfaction   Risk Factors Abrupt changes in clinical condition      Plan for Safety and Risk Management:  A safety and risk management plan has not been developed at this time, however patient was encouraged to call [place] [phone] should there be a change in any of these risk factors.      Review of Symptoms:  Depression: Sleep Guilt Energy Appetite Ruminations Irritability  Tosin:  No symptoms  Psychosis: No symptoms  Anxiety: Worries  Panic:  No symptoms  Post Traumatic Stress Disorder: No symptoms  Obsessive Compulsive Disorder: No symptoms  Eating Disorder: No symptoms  Oppositional Defiant Disorder: No symptoms  ADD / ADHD: No symptoms  Conduct Disorder: No symptoms    Patient's Strengths and Limitations:  Patient identified the following strengths or resources that will help him  succeed in counseling: commitment to health and well being, community involvement, insight, positive work environment, motivation and work ethic. Patient identified the following supports: family and friends. Things that may interfere with the patien'ts success in behavioral health services include:physical health concerns.    Diagnostic Criteria:  A. The development of emotional or behavioral symptoms in response to an identifiable stressor(s) occurring within 3 months of the onset of the stressor(s)  B. These symptoms or behaviors are clinically significant, as evidenced by one or both of the following:       - Marked distress that is out of proportion to the severity/intensity of the stressor (with consideration for external context & culture)       - Significant impairment in social, occupational, or other important areas of functioning  C. The stress-related disturbance does not meet criteria for another disorder & is not not an exacerbation of another mental disorder  D. The symptoms do not represent normal bereavement  E. Once the stressor or its consequences have terminated, the symptoms do not persist for more than an additional 6 months       * Adjustment Disorder with Depressed Mood: The predominant manifestations are symptoms such as low mood, tearfulness, or feelings of hopelessness      Functional Status:  Patient's symptoms are causing reduced functional status in the following areas: denies      DSM5 Diagnoses: (Sustained by DSM5 Criteria Listed Above)  Diagnoses: Adjustment Disorders  309.9 (F43.20) Unspecified  Psychosocial & Contextual Factors: health problems, work  WHODAS Score:   WHODAS 2.0 Total Score 4/30/2020   Total Score 25       See Media section of EPIC medical record for completed WHODAS    Preliminary Treatment Plan:    The client reports no currently identified Episcopalian, ethnic or cultural issues relevant to therapy.    Initial Treatment will focus on: Adjustment Difficulties related  to: health problems.    Chemical dependency recommendations: Maintain Sobriety    As a preliminary treatment goal, patient will develop coping/problem-solving skills to facilitate more adaptive adjustment.    The focus of initial interventions will be to facilitate appropriate expression of feelings.    The patient is receiving treatment / structured support from the following professional(s) / service and treatment. Collaboration will be initiated with: primary care physician.    Referral to another professional/service is not indicated at this time..    A Release of Information is not needed at this time.    Report to child or adult protection services was NA.    MICHELLE Cazares, Behavioral Health Clinician

## 2020-05-21 NOTE — PROGRESS NOTES
Assessment and Plan:  1. liver transplant evaluation - patient is a fair candidate overall. Benefits and surgical risks of a liver transplantation were discussed.  2.  End stage liver disease due to Laennec's    Surgical evaluation:  1. Portal Vein:Patent - needs doppler US liver  2. Hepatic Artery: Open  3. TIPS: absent  4. Previous Abdominal Surgery: No  5. Hepatocellular Carcinoma: None  6. Ascites: Present - large amount  7. Costal Angle: wide  8. Portopulmonary Hypertension: absent  9. Hepatopulmonary Syndrome: absent  10. Cardiac Evaluation: needs stress echocardiogram  11. Nutritional Status: Moderate  12. Diabetes: no  13.Hypertension yes  14. Smoker:yes  15. Meets guidelines to receive Living Donor  Yes  16. Potential Living donors ?    Recommendations:   - would get liver doppler US to assess actual flow in PV and HA although, patent on CT  - Chem Dep eval and clearance  - facioscapulohumeral muscular dystrophy - would recommend evaluation by our transplant anesthesia  - given pt's slow progression of FSH on one hand and his clinical improvement from liver stand point plus concerns regarding chem dep, it would be appropriate to discuss if patient would benefit from alternative management of his liver dz rather than liver transplantation      Patients overall evaluation will be discussed at the Liver Transplant selection committee meeting with a final recommendation on the patients suitability for transplant to be made at that time.    Consult Full  Details:  Sp Plasencia was seen in consultation at the request of Dr. Winters for evaluation as a potential liver transplant recipient.    Reason for Visit:  Sp Plasencia is a 45 year old year old male with Laennec's, who presents for liver transplant evaluation.    HPI:  Initially presented with hepatic decompensation secondary to Laennec's with MELD 29.  Had ascites, jaundice, HE, ASHWINI, elevated ammonia ~180    Stopped ETOH on Jan 1st, 2020    Now MELD  improved to 18, still has ascites but no HE  Gets paracentesis every 10 days  Small varieces - egd, no banding        FH: dad - liver dz (hereditary/etoh), mom lymphoma    Tobacco: 10 yrs, 1 cig/week  Etoh: quit Jan 1st, 2020  Before quitting he would drink ~ 1L per day vodka    He also had diagnosis of facioscapulohumeral muscular dystrophy.  According to his neurologist, he had slow progression to the point that occasionally uses wheel chair.  Pt has been reluctant in the past to have PT for the condition and did not like to wear LE braces.        Past Medical History:   Diagnosis Date     Acid reflux      Ascites      Esophageal varices (H)      FSHD (facioscapulohumeral muscular dystrophy) (H)      Gout      HTN (hypertension)      Jaundice      Liver cirrhosis (H)      Smoker      Past Surgical History:   Procedure Laterality Date     NO HISTORY OF SURGERY       Past Surgical History:   Procedure Laterality Date     NO HISTORY OF SURGERY       Family History   Problem Relation Age of Onset     Hypertension Father      Allergies   Allergen Reactions     Adhesive Tape Blisters     Skin breakdown, open sores     Prior to Admission medications    Medication Sig Start Date End Date Taking? Authorizing Provider   acetaminophen (TYLENOL) 325 MG tablet Take 325-650 mg by mouth every 6 hours as needed for mild pain   Yes Reported, Patient   allopurinol (ZYLOPRIM) 300 MG tablet Take 1 tablet (300 mg) by mouth daily 4/10/20  Yes Anam Castillo MD   escitalopram (LEXAPRO) 5 MG tablet Take 1 tablet (5 mg) by mouth daily 4/22/20  Yes Deanna Barahona MD   esomeprazole (NEXIUM) 40 MG DR capsule Take 1 capsule (40 mg) by mouth daily Take 30-60 minutes before eating. 4/10/20  Yes Anam Castillo MD   hydrOXYzine (ATARAX) 25 MG tablet Take 1-2 tablets (25-50 mg) by mouth every 6 hours as needed for itching or other (adjuvant pain) 4/10/20  Yes Anam Castillo MD   ibuprofen (ADVIL/MOTRIN) 200 MG capsule  Take 200 mg by mouth every 4 hours as needed for fever   Yes Reported, Patient   lactulose (CEPHULAC) 20 GM packet Take 1 packet (20 g) by mouth 2 times daily 4/10/20  Yes Anam Castillo MD   Multiple Vitamin (DAILY MULTIVITAMIN PO) Take 1 tablet by mouth daily    Yes Reported, Patient   Omega-3 Fatty Acids (FISH OIL PO) Take 1 capsule by mouth Once weekly   Yes Unknown, Entered By History   ondansetron (ZOFRAN) 4 MG tablet  4/16/20  Yes Reported, Patient   traMADol (ULTRAM) 50 MG tablet Take 50 mg by mouth daily   Yes Unknown, Entered By History   zolpidem (AMBIEN) 5 MG tablet Take 1 tablet (5 mg) by mouth nightly as needed for sleep 4/22/20  Yes Deanna Barahona MD       Previous Transplant Hx: No    Cardiovascular Hx:        h/o Cardiac Issues: No       Exercise Tolerance: no chest pain or shortness of breath with exertion.    Potential Donor(s):?    ROS:    REVIEW OF SYSTEMS (check box if normal)  [x]                GENERAL  [x]                  PULMONARY [x]                 GENITOURINARY  [x]                 CNS                 [x]                  CARDIAC  [x]                  ENDOCRINE  [x]                 EARS,NOSE,THROAT [x]                  GASTROINTESTINAL [x]                  NEUROLOGIC    [x]                 MUSCLOSKELTAL  [x]                   HEMATOLOGY    Examination:     Vitals:  /67   Pulse 98   Temp 98.3  F (36.8  C) (Oral)   Wt 98 kg (216 lb 1.6 oz)   SpO2 99%   BMI 28.51 kg/m      GENERAL APPEARANCE: alert and no distress  EYES: PERRL  HENT: mouth without ulcers or lesions  NECK: supple, no adenopathy  RESP: lungs clear to auscultation - no rales, rhonchi or wheezes  CV: regular rhythm, normal rate, no rub   ABDOMEN:  soft, nontender, no HSM or masses and bowel sounds normal  MS: extremities normal- no gross deformities noted, no evidence of inflammation in joints, no muscle tenderness  SKIN: no rash  NEURO: Normal strength and tone, sensory exam grossly normal, mentation  intact and speech normal  PSYCH: mentation appears normal. and affect normal/bright      Results:   Recent Results (from the past 168 hour(s))   PFT General Lab Testing    Collection Time: 06/15/20 11:40 AM   Result Value Ref Range    FVC-Pred 5.25 L    FVC-Pre 4.00 L    FVC-%Pred-Pre 76 %    FEV1-Pre 3.41 L    FEV1-%Pred-Pre 81 %    FEV1FVC-Pred 80 %    FEV1FVC-Pre 85 %    FEFMax-Pred 10.76 L/sec    FEFMax-Pre 8.59 L/sec    FEFMax-%Pred-Pre 79 %    FEF2575-Pred 3.98 L/sec    FEF2575-Pre 4.32 L/sec    GKC7714-%Pred-Pre 108 %    ExpTime-Pre 7.25 sec    FIFMax-Pre 4.42 L/sec    MEP-Pre 85 cmH2O    MIP-Pre -92 cmH2O    VC-Pred 5.88 L    VC-Pre 4.27 L    VC-%Pred-Pre 72 %    IC-Pred 4.33 L    IC-Pre 3.44 L    IC-%Pred-Pre 79 %    ERV-Pred 1.56 L    ERV-Pre 0.84 L    ERV-%Pred-Pre 53 %    FEV1FEV6-Pred 81 %    FEV1FEV6-Pre 86 %    FRCPleth-Pred 3.64 L    FRCPleth-Pre 2.43 L    FRCPleth-%Pred-Pre 66 %    RVPleth-Pred 2.17 L    RVPleth-Pre 1.60 L    RVPleth-%Pred-Pre 73 %    TLCPleth-Pred 7.74 L    TLCPleth-Pre 5.87 L    TLCPleth-%Pred-Pre 75 %    DLCOunc-Pred 33.26 ml/min/mmHg    DLCOunc-Pre 29.26 ml/min/mmHg    DLCOunc-%Pred-Pre 87 %    VA-Pre 5.48 L    VA-%Pred-Pre 74 %    FEV1SVC-Pred 71 %    FEV1SVC-Pre 80 %     I had a long discussion with the patient regarding liver transplantation which included but was not limited to  the following points:    1. Liver transplant selection committee process.  2. The federal rules for cadaveric waiting list, the size and blood type matching of the organ. The availability of living-related donor transplantation.  3. The types of donors: brain death donors, non-heart beating donors, partial liver grafts: splits and living donor grafts  4. Extended criteria  Donors (older age, steasosis) and the increased  risk of primary non-function using the extended criteria donors  5. The CDC high risk donors,  Risk of donor transmitted infections and donor transmitted malignancy  6. The liver  transplant operation and the associated risks and technical complications which can include intraoperative death, post operative death,  Primary non-function, bleeding requiring re-operations, arterial and biliary complications, bowel perforations, and intra abdominal abscess. Some of these complicaitons may require a second operation.  7. The postoperative course, the ICU stay and risk of postoperative complications which can include sepsis, MI, stroke, brain injury, pneumonia, pleural effusions, and renal dysfunction.  8. The current 1 year and 5 year graft and patient survivals.  9. The need for life long immunosuppressive therapy and the side effects of these medications, including the possibility of toxicity, opportunistic infections, risk of cancer including lymphoma, and the possibility of rejection even if the patient is taking the medication exactly as prescribed.  10. The need for compliance with medications and follow-up visits in the clinic and thereafter.  11. The patient and family understand these risks and wish to proceed to transplantation       I spent .......minutes with the patient and more than 50% of the time was spend in direct face to face counseling.

## 2020-05-22 ENCOUNTER — TELEPHONE (OUTPATIENT)
Dept: TRANSPLANT | Facility: CLINIC | Age: 45
End: 2020-05-22

## 2020-05-22 ASSESSMENT — ANXIETY QUESTIONNAIRES: GAD7 TOTAL SCORE: 4

## 2020-05-22 NOTE — TELEPHONE ENCOUNTER
[facility] Social Work Services Phone Call      Data: I received a call this afternoon from Mr. Plasencia's [employer] RN case manager. She wanted us to know that Mr. Plasencia's wife contacted her about potential safety concerns at home, as her  was behaving in a paranoid fashion. We attempted to call her back for a three-way call, but were unsuccessful.   Intervention: I provided Vanessa with the contact information for the [facility] Crisis Response Team,([phone]), should  call her back, and also suggested that 911 could also be called.   Assessment:  is concerned about her 's mental status. She was supposedly going to contact this writer after finishing her errands. I have not yet received a return call.   Education provided by ARACELI: Provided  with potential resources for this family.   Plan:I will briefly update the ED  should a call come after hours.     KASANDRA Rodriguez

## 2020-05-22 NOTE — PROGRESS NOTES
Psychosocial Assessment  Sp Plasencia participated in a telephone interview as part of his evaluation as a potential liver transplant recipient.  His wife also participated in the call.   Living Situation: Sp Plasencia is a [age] year old man who lives in [place], [place] with his wife, Dorene, and their three children, who are 13,11 & 6 years old. Their house has two stories. There is a bedroom on the main level. He is currently using the living room, as he cannot navigate the stairs.   Education/Employment:  Mr. Plasencia has a degree in business. 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   Financial /Income: Mr. Plasencia is currently furloughed, as his restaurant is closed due to the coronavirus. He has applied for financial assistance from the government for his business, as well as unemployment benefits. He is also working on an application for Social Security Disability. His wife provides additional income from her employment as a .   Health Insurance:  Mr. Plasencia has [email or web address] insurance. The family out-of-pocket maximum is $4,500, which includes medications. He also has an HSA. This writer talked with Sp and Dorene about the financial risks of transplant, particularly about the high cost of transplant related medications and the importance of maintaining adequate health insurance coverage.  Family/Social Support: Mr. Plasencia's wife of fifteen years, Dorene, is his main caregiver. He also reported that his three siblings and his children are helpful. This writer stressed the importance of having a stable and involved support network before and after transplant.  Provided Sp  with education about the relationship between a stable support system and better surgical and post-transplant outcomes compared to patients with a limited support system.  I will e-mail him a copy of our caregiver agreement to review.   Functional Status: Mr. Plasencia has muscular  "dystrophy, and reports that his mobility has been progressively declining since the age of [age]. He has a walker that he rarely uses. He has a lift chair at home to help him stand from sitting. He has an ATV/golf cart for getting around his yard. He was driving himself to work, but could only stand for a few hours. As previously mentioned, he is unable to navigate the stairs to go to the second level of his house.   Chemical Dependency:  Mr. Plasencia informed me that he stopped drinking alcohol in January 2020 when diagnosed with liver disease. He plans to continue being abstinent, as he can practice \"mind over matter\". His prior use pattern was consuming about five mixed drinks per day, often with customers at his bar.He has already completed a substance use assessment that can be reviewed in his medical record. It recommended that he complete an outpatient treatment program, which he has refused to do. This is in part because it would involve a group five days a week, and he prefers to be treated as an individual.   Mental Health: Mr. Plasencia informed me that he has been prescribed Lexapro and has filled the prescription, but is not taking it. He is taking Ambien to help him sleep. He was seen in an emergency room on 3/16 for a panic attack. Mr. Plasencia has been seen by the psychiatrist at [facility], Dr. Machado once, as well as Gerry Quintana, a health psychologist. Please see their notes for additional information.   Adjustment to Illness:  Mr. Plasencia has been coping with muscular dystrophy since he was a teenager. He has managed to have a normal live and a family. Recently he has had more difficulty coping as his health has changed, including a new diagnosis of liver disease. He is an independent person, and wishes to maintain as much independence as possible. He has difficulty sleeping, and has complaints related to his mobility and muscle tone. His current goal is to focus on his physical health and strengthening, not " mental and/or chemical health concerns. This writer provided Sp  with supportive counseling throughout this interview.  This writer also encouraged him to attend the liver transplant support group for additional support and encouragement.   Impression/Recommendations:   Sp and Dorene verbalized understanding the psychosocial risks of transplant and teaching provided during this evaluation.  Finances are adequate, but tight. Once he begins to receive unemployment or disability benefits, this will improve his financial situation.   His wife is his main support person. It will be important for them to mobilize others, such as his siblings, for caregiver support and respite.   Mr. Plasencia has been abstinent from alcohol for four months, so does not yet meet transplant listing criteria from this perspective. He has completed a substance use evaluation, which recommended that he participate in an outpatient treatment program. Mr. Plasencia is not in agreement with doing so. This is partly due to the time involved with group sessions. He may be willing to participate in a more individualized program.    would benefit from ongoing psychotherapy, either with  or a clinician closer to his home. This would be useful for his ongoing adjustment to illness needs, as well anxiety, for which he has been seen in the ED.   Mr. Plasencia does not currently meet transplant criteria from a psychosocial perspective. Once he has maintained abstinence for six months, begun a substance use treatment program and continued psychotherapy, an updated psychosocial assessment will be important. He would likely meet criteria at such a time.    Teaching completed during assessment:  1.     Housing and relocation needs post transplant.  2.     Caregiver needs post transplant.  3.     Financial issues related to transplant.  4.     Risks of alcohol use post transplant.  5.     Common psychosocial stressors pre/post transplant.         6.     Liver Transplant support group availability.        7.     [employer] Directive             Psychosocial Risks of Transplant Reviewed:  1.     Increased stress related to your emotional, family, social, employment, or   financial situation.  2.     Affect on work and/or disability benefits.  3.     Affect on future health and life insurance.  4.     Transplant outcome expectations may not be met.  5.     Mental Health risks: anxiety, depression, PTSD, guilt, grief and chronic fatigue.     Ophelia Almodovar,MSW, LICW

## 2020-05-26 DIAGNOSIS — Z11.59 ENCOUNTER FOR SCREENING FOR OTHER VIRAL DISEASES: ICD-10-CM

## 2020-05-26 LAB
SARS-COV-2 RNA SPEC QL NAA+PROBE: NOT DETECTED
SPECIMEN SOURCE: NORMAL

## 2020-05-26 PROCEDURE — 99207 ZZC NO BILLABLE SERVICE THIS VISIT: CPT

## 2020-05-26 PROCEDURE — U0003 INFECTIOUS AGENT DETECTION BY NUCLEIC ACID (DNA OR RNA); SEVERE ACUTE RESPIRATORY SYNDROME CORONAVIRUS 2 (SARS-COV-2) (CORONAVIRUS DISEASE [COVID-19]), AMPLIFIED PROBE TECHNIQUE, MAKING USE OF HIGH THROUGHPUT TECHNOLOGIES AS DESCRIBED BY CMS-2020-01-R: HCPCS | Mod: 90 | Performed by: FAMILY MEDICINE

## 2020-05-26 PROCEDURE — 99000 SPECIMEN HANDLING OFFICE-LAB: CPT | Performed by: FAMILY MEDICINE

## 2020-05-28 ENCOUNTER — VIRTUAL VISIT (OUTPATIENT)
Dept: NEUROLOGY | Facility: CLINIC | Age: 45
End: 2020-05-28
Attending: INTERNAL MEDICINE
Payer: COMMERCIAL

## 2020-05-28 ENCOUNTER — HOSPITAL ENCOUNTER (OUTPATIENT)
Dept: ULTRASOUND IMAGING | Facility: CLINIC | Age: 45
Discharge: HOME | End: 2020-05-28
Attending: INTERNAL MEDICINE | Admitting: INTERNAL MEDICINE
Payer: COMMERCIAL

## 2020-05-28 VITALS — SYSTOLIC BLOOD PRESSURE: 120 MMHG | DIASTOLIC BLOOD PRESSURE: 61 MMHG | HEART RATE: 97 BPM

## 2020-05-28 DIAGNOSIS — R29.6 FALLS FREQUENTLY: ICD-10-CM

## 2020-05-28 DIAGNOSIS — K70.9 ALCOHOL LIVER DAMAGE (H): ICD-10-CM

## 2020-05-28 DIAGNOSIS — G71.02 FSH (FACIOSCAPULOHUMERAL MUSCULAR DYSTROPHY) (H): Primary | ICD-10-CM

## 2020-05-28 DIAGNOSIS — K70.9 ALCOHOLIC LIVER DISEASE (H): ICD-10-CM

## 2020-05-28 LAB — AMMONIA PLAS-SCNC: 38 UMOL/L (ref 10–50)

## 2020-05-28 PROCEDURE — 25000125 ZZHC RX 250: Performed by: RADIOLOGY

## 2020-05-28 PROCEDURE — 25000128 H RX IP 250 OP 636

## 2020-05-28 PROCEDURE — 82140 ASSAY OF AMMONIA: CPT | Performed by: INTERNAL MEDICINE

## 2020-05-28 PROCEDURE — 27210190 US PARACENTESIS

## 2020-05-28 PROCEDURE — P9047 ALBUMIN (HUMAN), 25%, 50ML: HCPCS

## 2020-05-28 RX ORDER — ALBUMIN (HUMAN) 12.5 G/50ML
SOLUTION INTRAVENOUS
Status: COMPLETED
Start: 2020-05-28 | End: 2020-05-28

## 2020-05-28 RX ORDER — ALBUMIN (HUMAN) 12.5 G/50ML
50 SOLUTION INTRAVENOUS ONCE
Status: COMPLETED | OUTPATIENT
Start: 2020-05-28 | End: 2020-05-28

## 2020-05-28 RX ADMIN — ALBUMIN HUMAN 50 G: 0.25 SOLUTION INTRAVENOUS at 13:22

## 2020-05-28 RX ADMIN — LIDOCAINE HYDROCHLORIDE 10 ML: 10 INJECTION, SOLUTION EPIDURAL; INFILTRATION; INTRACAUDAL; PERINEURAL at 13:18

## 2020-05-28 RX ADMIN — ALBUMIN (HUMAN) 50 G: 12.5 SOLUTION INTRAVENOUS at 13:22

## 2020-05-28 NOTE — PROGRESS NOTES
"Sp Plasencia is a 45 year old male who is being evaluated via a billable telephone visit.      The patient has been notified of following:     \"This telephone visit will be conducted via a call between you and your physician/provider. We have found that certain health care needs can be provided without the need for a physical exam.  This service lets us provide the care you need with a short phone conversation.  If a prescription is necessary we can send it directly to your pharmacy.  If lab work is needed we can place an order for that and you can then stop by our lab to have the test done at a later time.    Telephone visits are billed at different rates depending on your insurance coverage. During this emergency period, for some insurers they may be billed the same as an in-person visit.  Please reach out to your insurance provider with any questions.    If during the course of the call the physician/provider feels a telephone visit is not appropriate, you will not be charged for this service.\"    Patient has given verbal consent for Telephone visit?  Yes    What phone number would you like to be contacted at? [phone]    [facility]    Phone call duration: 16 minutes    Norman Mccarthy EMT      "

## 2020-05-28 NOTE — PATIENT INSTRUCTIONS
Physical therapy evaluation (they will call you)  Please schedule a breathing test (spirometry) before the liver transplant  We will mail you the genetic test result  Follow up in 1 year

## 2020-05-28 NOTE — PROGRESS NOTES
Paracentesis complete.  Consent obtained with Dr Herrera.  Pt tolerated well, drained 4900 mLs clear austin colored fluid.  Site at RLQ.  Site covered with a dermabond and 3.0 vicryl suture.  Site CDI.  Albumin 50.  Reviewed discharge instructions with pt.  Pt stable on discharge.

## 2020-05-28 NOTE — LETTER
"2020     RE: Sp Plasencia  [address] [facility] [address]     Dear Colleague,    Thank you for referring your patient, Sp Plasencia, to the [facility] at [facility]. Please see a copy of my visit note below.    Sp Plasencia is a 45 year old male who is being evaluated via a billable telephone visit.      The patient has been notified of following:     \"This telephone visit will be conducted via a call between you and your physician/provider. We have found that certain health care needs can be provided without the need for a physical exam.  This service lets us provide the care you need with a short phone conversation.  If a prescription is necessary we can send it directly to your pharmacy.  If lab work is needed we can place an order for that and you can then stop by our lab to have the test done at a later time.    Telephone visits are billed at different rates depending on your insurance coverage. During this emergency period, for some insurers they may be billed the same as an in-person visit.  Please reach out to your insurance provider with any questions.    If during the course of the call the physician/provider feels a telephone visit is not appropriate, you will not be charged for this service.\"    Patient has given verbal consent for Telephone visit?  Yes    What phone number would you like to be contacted at? [phone]    How would you like to obtain your AVS? [facility]    Phone call duration: 16 minutes    Norman Mccarthy, DEVONTE        Service Date: 2020      Perla Winters MD   [address], [facility] [address]   [place]  [address]      RE: Sp Plasencia   MRN: [id number]   : 1975      Dear Dr. Winters:      I had the pleasure to evaluate Sp Plasencia via billable telephone visit today.  In-person visit was not recommended due to COVID-19 pandemic and guidelines about social distancing.       Mr. Plasencia " has genetically confirmed FSH muscular dystrophy.  I have seen him one time in the past in 12/2013.  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 He had presented with a typical phenotype.  It is apparent that he has developed alcoholic cirrhosis and he is a candidate for liver transplant and I guess clearance was requested by Hepatology before proceeding with transplant.  It is an elective transplant procedure, not an emergent one.  He was recently hospitalized and had severe hyperammonemia with ammonia of 182.  He is on lactulose.    Since I last saw him 6-1/2 years ago, he has noted a slow progression of his weakness, particularly in his legs.  He now uses his grandmother's old power wheelchair at times to get outside the house because he needs it.  He avoids stairs altogether. He falls frequently, at least a couple times a week.  When I saw him in 2013, I had recommended an AFO, and Physical Therapy also recommend a knee-high orthosis, but he did not like using any of the two, because he cannot flex his knee when wearing the knee-high orthosis, and therefore, he cannot walk on any surface that has an inclination. He still can take his shower upright, but he has installed grab bars.  He feels unsteady with his gait and he has difficulty getting up from low seated chairs- he sometimes will have to put pillows under. He does not feel comfortable using a cane or walker because he does not have the arm strength to put pressure on the assistive device.  He acknowledges some limitation lifting his arms overhead. He does not have any problems handwriting or brushing his teeth.  He can cut his food but it is a little bit slower than 6 years ago, although he can still do it independently. He attributes some of his hand symptoms to shaking, which I presume is due to tremor or asterixis due to the hepatic encephalopathy. He denies any dysphagia.  He does  not have any hearing concerns or other cranial nerve symptoms.  He has mild dyspnea on exertion but no clearcut orthopnea.  He sleeps in reclined chair, but that is because of musculoskeletal discomfort when sleeping flat in bed ,as opposed to respiratory.      In summary, Mr. Mcmillan has FSH muscular dystrophy, and as anticipated, he has experienced some progression of his muscle weakness over the last 6 or 7 years.  I explained to him that we still remain without any disease-modifying treatments for FSH dystrophy.  Therefore, management remains purely symptomatic.  I will mail him a copy of his genetic test results.  I will ask our physical therapist to reevaluate him, specifically his home safety, and make suggestions to mitigate the risk of falls.  He may need to retry orthotic equipment, but I will leave it to the judgment of the therapist.   I would recommend obtaining spirometry at some point before liver transplant.  FSH dystrophy generally does not cause severe restrictive respiratory dysfunction in the majority of cases, but I have seen a couple of exceptions and this is one important thing to assess before he proceeds with a 7 to 8-hour long major surgery that will require prolonged postoperative intubation.      Thank you for allowing me to participate in the care of Mr. Mcmillan. I spent a total of 16 minutes on phone with him; start of phone call 9:24 a.m., end 9:40 a.m. More than half was counseling.  I will see him in followup in our clinic in 1 year.        Sincerely,      Maurilio Woodson MD      cc:   Deanna Barahona MD   [facility]   [address], [facility] [address]   [place]  [address]     D: 2020   T: 2020   MT: luh    Name:     ADELIA MCMILLAN   MRN:      [id number]        Account:      [id number]   :      1975           Service Date: 2020    Document: [id number]

## 2020-05-28 NOTE — PROGRESS NOTES
Service Date: 2020      Perla Winters MD   [facility]   [address] [facility], [facility] [address]   [place]  [address]      RE: Sp Plasencia   MRN: [id number]   : 1975      Dear Dr. Winters:      I had the pleasure to evaluate Sp Plasencia via billable telephone visit today.  In-person visit was not recommended due to COVID-19 pandemic and guidelines about social distancing.       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  I have seen him one time in the past in 2013.  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 He had presented with a typical phenotype.  It is apparent that he has developed alcoholic cirrhosis and he is a candidate for liver transplant and I guess clearance was requested by Hepatology before proceeding with transplant.  It is an elective transplant procedure, not an emergent one.  He was recently hospitalized and had severe hyperammonemia with ammonia of 182.  He is on lactulose.    Since I last saw him 6-1/2 years ago, he has noted a slow progression of his weakness, particularly in his legs.  He now uses his grandmother's old power wheelchair at times to get outside the house because he needs it.  He avoids stairs altogether. He falls frequently, at least a couple times a week.  When I saw him in , I had recommended an AFO, and Physical Therapy also recommend a knee-high orthosis, but he did not like using any of the two, because he cannot flex his knee when wearing the knee-high orthosis, and therefore, he cannot walk on any surface that has an inclination. He still can take his shower upright, but he has installed grab bars.  He feels unsteady with his gait and he has difficulty getting up from low seated chairs- he sometimes will have to put pillows under. He does not feel comfortable using a cane or walker because he does not have the arm strength to put pressure on the assistive  device.  He acknowledges some limitation lifting his arms overhead. He does not have any problems handwriting or brushing his teeth.  He can cut his food but it is a little bit slower than 6 years ago, although he can still do it independently. He attributes some of his hand symptoms to shaking, which I presume is due to tremor or asterixis due to the hepatic encephalopathy. He denies any dysphagia.  He does not have any hearing concerns or other cranial nerve symptoms.  He has mild dyspnea on exertion but no clearcut orthopnea.  He sleeps in reclined chair, but that is because of musculoskeletal discomfort when sleeping flat in bed ,as opposed to respiratory.      In summary, Mr. Plasencia has FSH muscular dystrophy, and as anticipated, he has experienced some progression of his muscle weakness over the last 6 or 7 years.  I explained to him that we still remain without any disease-modifying treatments for FSH dystrophy.  Therefore, management remains purely symptomatic.  I will mail him a copy of his genetic test results.  I will ask our physical therapist to reevaluate him, specifically his home safety, and make suggestions to mitigate the risk of falls.  He may need to retry orthotic equipment, but I will leave it to the judgment of the therapist.   I would recommend obtaining spirometry at some point before liver transplant.  FSH dystrophy generally does not cause severe restrictive respiratory dysfunction in the majority of cases, but I have seen a couple of exceptions and this is one important thing to assess before he proceeds with a 7 to 8-hour long major surgery that will require prolonged postoperative intubation.      Thank you for allowing me to participate in the care of Mr. Plasencia. I spent a total of 16 minutes on phone with him; start of phone call 9:24 a.m., end 9:40 a.m. More than half was counseling.  I will see him in followup in our clinic in 1 year.        Sincerely,      Maurilio Woodson,  MD      cc:   Deanna Barahona MD   [facility]   [address]   [place]  [address]         OSITO MOSER MD             D: 2020   T: 2020   MT: luh      Name:     ADELIA MCMILLAN   MRN:      [id number]        Account:      [id number]   :      1975           Service Date: 2020      Document: [id number]

## 2020-06-03 ENCOUNTER — MEDICAL CORRESPONDENCE (OUTPATIENT)
Dept: HEALTH INFORMATION MANAGEMENT | Facility: CLINIC | Age: 45
End: 2020-06-03

## 2020-06-03 DIAGNOSIS — K70.9 ALCOHOL LIVER DAMAGE (H): Primary | ICD-10-CM

## 2020-06-05 ENCOUNTER — HOSPITAL ENCOUNTER (OUTPATIENT)
Dept: PHYSICAL THERAPY | Facility: CLINIC | Age: 45
Setting detail: THERAPIES SERIES
End: 2020-06-05
Attending: PSYCHIATRY & NEUROLOGY
Payer: COMMERCIAL

## 2020-06-05 DIAGNOSIS — G71.02 FSH (FACIOSCAPULOHUMERAL MUSCULAR DYSTROPHY) (H): ICD-10-CM

## 2020-06-05 DIAGNOSIS — R29.6 FALLS FREQUENTLY: ICD-10-CM

## 2020-06-05 PROCEDURE — 97162 PT EVAL MOD COMPLEX 30 MIN: CPT | Mod: GP | Performed by: PHYSICAL THERAPIST

## 2020-06-05 NOTE — PROGRESS NOTES
06/05/20 1400   Quick Adds   Type of Visit Initial OP PT Evaluation   General Information   Start of Care Date 06/05/20   Referring Physician Maurilio Woodson MD    Orders Evaluate and Treat as Indicated   Order Date 05/28/20   Medical Diagnosis FSH (facioscapulohumeral muscular dystrophy); falls frequently   Onset of illness/injury or Date of Surgery 05/28/20  (date of order)   Surgical/Medical history reviewed Yes   Pertinent history of current problem (include personal factors and/or comorbidities that impact the POC) Sp presents with progressive weakness due to FSH muscular dystrophy. He has noted a slow progression of his weakness, particularly in his legs.  He now uses his grandmother's old power wheelchair at times to get outside the house because he needs it.  He avoids stairs altogether. He falls frequently, at least a couple times a week. Also has liver failure, planning liver transplant   Prior level of function comment Has a hobby farm. Stays active with working on projects   Patient role/Employment history Employed   Living environment House/townhome   Home/Community Accessibility Comments fitting with grab bars. Looking into hospital bed   General Information Comments Not clear why he is here for this assessment. Hoping was for pulmonary function. Agreeable to have balance/gait assessment   Fall Risk Screen   Fall screen completed by PT   Have you fallen 2 or more times in the past year? Yes   Timed Up and Go score (seconds) 18.2   Pain   Pain comments reports pain everywhere   Cognitive Status Examination   Orientation orientation to person, place and time   Posture   Posture Protracted shoulders;Forward head position   Strength   Strength Comments see detailed report from Dr. Monroy   Transfer Skills   Transfer Comments Requires significant use of UEs   Gait   Gait Comments Demonstrates B foot drop, excessive forceful knee extension during swing, kicking LEs forward, hip drop    Gait Special Tests   Gait Special Tests 25 FOOT TIMED WALK   Gait Special Tests 25 Foot Timed Walk   Seconds 10   Steps 14 Steps   Balance Special Tests   Balance Special Tests Sit to stand reps;Modified CTSIB Conditions   Balance Special Tests Modified CTSIB Conditions   Condition 1, seconds 30 Seconds   Condition 2, seconds 30 Seconds   Condition 4, seconds 30 Seconds   Condition 5, seconds 8 Seconds   Balance Special Tests Sit to Stand Reps in 30 Seconds   Reps in 30 seconds 0   Clinical Impression   Criteria for Skilled Therapeutic Interventions Met evaluation only   Clinical Decision Making (Complexity) Moderate complexity   Clinical Impression Comments Discussed PT services. Sp is comfortable with current level of mobility. Is not yet ready to start using walker even with frequent falls. Is not interested in other AD at this time. Education on PT in future if status changes/needing more assistance.    Total Evaluation Time   PT yRlee Moderate Complexity Minutes (46811) 30

## 2020-06-08 ENCOUNTER — HOSPITAL ENCOUNTER (OUTPATIENT)
Dept: ULTRASOUND IMAGING | Facility: CLINIC | Age: 45
Discharge: HOME | End: 2020-06-08
Attending: INTERNAL MEDICINE | Admitting: INTERNAL MEDICINE
Payer: COMMERCIAL

## 2020-06-08 VITALS — HEART RATE: 92 BPM | SYSTOLIC BLOOD PRESSURE: 123 MMHG | DIASTOLIC BLOOD PRESSURE: 75 MMHG

## 2020-06-08 DIAGNOSIS — R18.8 OTHER ASCITES: ICD-10-CM

## 2020-06-08 DIAGNOSIS — K70.9 ALCOHOLIC LIVER DISEASE, UNSPECIFIED (H): ICD-10-CM

## 2020-06-08 PROCEDURE — 25000125 ZZHC RX 250: Performed by: RADIOLOGY

## 2020-06-08 PROCEDURE — 27210190 US PARACENTESIS

## 2020-06-08 PROCEDURE — 25000128 H RX IP 250 OP 636

## 2020-06-08 PROCEDURE — P9047 ALBUMIN (HUMAN), 25%, 50ML: HCPCS

## 2020-06-08 RX ORDER — NICOTINE POLACRILEX 4 MG
15-30 LOZENGE BUCCAL
Status: CANCELLED | OUTPATIENT
Start: 2020-06-08

## 2020-06-08 RX ORDER — ALBUMIN (HUMAN) 12.5 G/50ML
SOLUTION INTRAVENOUS
Status: COMPLETED
Start: 2020-06-08 | End: 2020-06-08

## 2020-06-08 RX ORDER — DEXTROSE MONOHYDRATE 25 G/50ML
25-50 INJECTION, SOLUTION INTRAVENOUS
Status: CANCELLED | OUTPATIENT
Start: 2020-06-08

## 2020-06-08 RX ORDER — ALBUMIN (HUMAN) 12.5 G/50ML
50 SOLUTION INTRAVENOUS ONCE
Status: COMPLETED | OUTPATIENT
Start: 2020-06-08 | End: 2020-06-08

## 2020-06-08 RX ADMIN — ALBUMIN HUMAN 50 G: 0.25 SOLUTION INTRAVENOUS at 11:11

## 2020-06-08 RX ADMIN — ALBUMIN (HUMAN) 50 G: 12.5 SOLUTION INTRAVENOUS at 11:11

## 2020-06-08 RX ADMIN — LIDOCAINE HYDROCHLORIDE 10 ML: 10 INJECTION, SOLUTION EPIDURAL; INFILTRATION; INTRACAUDAL; PERINEURAL at 11:35

## 2020-06-08 NOTE — PROCEDURES
[facility]    Procedure: Paracentesis, US    Date/Time: 6/8/2020 12:34 PM  Performed by: Gerry Berg MD  Authorized by: Gerry Berg MD     UNIVERSAL PROTOCOL   Site Marked: NA  Prior Images Obtained and Reviewed:  Yes  Required items: Required blood products, implants, devices and special equipment available    Patient identity confirmed:  Verbally with patient, arm band, provided demographic data and hospital-assigned identification number  Patient was reevaluated immediately before administering moderate or deep sedation or anesthesia  Confirmation Checklist:  Patient's identity using two indicators, relevant allergies, procedure was appropriate and matched the consent or emergent situation and correct equipment/implants were available  Time out: Immediately prior to the procedure a time out was called    Universal Protocol: the Joint Commission Universal Protocol was followed    Preparation: Patient was prepped and draped in usual sterile fashion    ESBL (mL):  2         ANESTHESIA    Anesthesia: Local infiltration  Local Anesthetic:  Lidocaine 1% without epinephrine      SEDATION    Patient Sedated: No    See dictated procedure note for full details.  Findings: US guided paracentesis with removal of yellow fluid, about 6 L.  Per patient request and instruction, pursestring vicryl suture applied at access site.    Specimens: none    Complications: None    Condition: Stable    PROCEDURE   Patient Tolerance:  Patient tolerated the procedure well with no immediate complications    Length of time physician/provider present for 1:1 monitoring during sedation: 0

## 2020-06-08 NOTE — PROGRESS NOTES
Paracentesis completed by Dr. Love for 5800 ml's yellow fluid.  3- vicryl suture placed by Dr. Love and then sterile glue to repeated complaints of leaking post drain.  Pt states understanding of site care instructions.  Pt discharged ambulatory per self with all questions answered.

## 2020-06-10 ENCOUNTER — TELEPHONE (OUTPATIENT)
Dept: TRANSPLANT | Facility: CLINIC | Age: 45
End: 2020-06-10

## 2020-06-10 NOTE — TELEPHONE ENCOUNTER
Left message with patient's wife, Dorene, that we are in the process of reviewing evaluation thus far assessing if proceeding with evaluation. This RN will communicate back next week with plan.

## 2020-06-15 DIAGNOSIS — G71.02 FSH (FACIOSCAPULOHUMERAL MUSCULAR DYSTROPHY) (H): ICD-10-CM

## 2020-06-15 LAB
DLCOUNC-%PRED-PRE: 87 %
DLCOUNC-PRE: 29.26 ML/MIN/MMHG
DLCOUNC-PRED: 33.26 ML/MIN/MMHG
ERV-%PRED-PRE: 53 %
ERV-PRE: 0.84 L
ERV-PRED: 1.56 L
EXPTIME-PRE: 7.25 SEC
FEF2575-%PRED-PRE: 108 %
FEF2575-PRE: 4.32 L/SEC
FEF2575-PRED: 3.98 L/SEC
FEFMAX-%PRED-PRE: 79 %
FEFMAX-PRE: 8.59 L/SEC
FEFMAX-PRED: 10.76 L/SEC
FEV1-%PRED-PRE: 81 %
FEV1-PRE: 3.41 L
FEV1FEV6-PRE: 86 %
FEV1FEV6-PRED: 81 %
FEV1FVC-PRE: 85 %
FEV1FVC-PRED: 80 %
FEV1SVC-PRE: 80 %
FEV1SVC-PRED: 71 %
FIFMAX-PRE: 4.42 L/SEC
FRCPLETH-%PRED-PRE: 66 %
FRCPLETH-PRE: 2.43 L
FRCPLETH-PRED: 3.64 L
FVC-%PRED-PRE: 76 %
FVC-PRE: 4 L
FVC-PRED: 5.25 L
IC-%PRED-PRE: 79 %
IC-PRE: 3.44 L
IC-PRED: 4.33 L
MEP-PRE: 85 CMH2O
MIP-PRE: -92 CMH2O
RVPLETH-%PRED-PRE: 73 %
RVPLETH-PRE: 1.6 L
RVPLETH-PRED: 2.17 L
TLCPLETH-%PRED-PRE: 75 %
TLCPLETH-PRE: 5.87 L
TLCPLETH-PRED: 7.74 L
VA-%PRED-PRE: 74 %
VA-PRE: 5.48 L
VC-%PRED-PRE: 72 %
VC-PRE: 4.27 L
VC-PRED: 5.88 L

## 2020-06-16 ENCOUNTER — TELEPHONE (OUTPATIENT)
Dept: TRANSPLANT | Facility: CLINIC | Age: 45
End: 2020-06-16

## 2020-06-16 ENCOUNTER — COMMITTEE REVIEW (OUTPATIENT)
Dept: TRANSPLANT | Facility: CLINIC | Age: 45
End: 2020-06-16

## 2020-06-16 DIAGNOSIS — K74.60 CIRRHOSIS OF LIVER (H): Primary | ICD-10-CM

## 2020-06-16 DIAGNOSIS — Z11.59 ENCOUNTER FOR SCREENING FOR OTHER VIRAL DISEASES: ICD-10-CM

## 2020-06-16 LAB
SARS-COV-2 RNA SPEC QL NAA+PROBE: NOT DETECTED
SPECIMEN SOURCE: NORMAL

## 2020-06-16 PROCEDURE — 99000 SPECIMEN HANDLING OFFICE-LAB: CPT | Performed by: INTERNAL MEDICINE

## 2020-06-16 PROCEDURE — 99207 ZZC NO BILLABLE SERVICE THIS VISIT: CPT

## 2020-06-16 PROCEDURE — U0003 INFECTIOUS AGENT DETECTION BY NUCLEIC ACID (DNA OR RNA); SEVERE ACUTE RESPIRATORY SYNDROME CORONAVIRUS 2 (SARS-COV-2) (CORONAVIRUS DISEASE [COVID-19]), AMPLIFIED PROBE TECHNIQUE, MAKING USE OF HIGH THROUGHPUT TECHNOLOGIES AS DESCRIBED BY CMS-2020-01-R: HCPCS | Mod: 90 | Performed by: INTERNAL MEDICINE

## 2020-06-16 NOTE — COMMITTEE REVIEW
Abdominal Patient Discussion Note Transplant Coordinator: Marlen Quesada  Transplant Surgeon:   Sabina Montalvo    Referring Physician: Carin Mcnally    Committee Review Members:  Nurse Practitioner Aissatou Roland, LADI   Nutrition Sandrine Haddad, RD   Pharmacy Yimi Najera, [facility]    - Clinical Rosamaria Robertson, KASANDRA, Ophelia Almodovar, [facility]   Transplant Carin Lozada MD, Arnie Patel MD, Jr Stephane Saha, TERESSA, Perla Winters MD, Tahira Norman, APRN CNP, Marlen Quesada, RN, Clinton Welch MD, Mando Raymond MD, Thomas M. Leventhal, MD, Giuseppe Santizo MD, Sabina Montalvo MD       Additional Discussion Notes and Findings:   -Follow up with Dr. Winters and labs within the next 2-4 weeks  -Patient may be too well to proceed with evaluation  -If proceeding would need CD treatment and individual therapy, as well as completing the rest of his evaluation  -txp surgeon to discuss with anesthesia if proceeding

## 2020-06-16 NOTE — TELEPHONE ENCOUNTER
Left message to expect a call from scheduling to set up virtual appointment with Dr. Winters in the next few weeks. To get labs same day as his paracentesis on 6/29/20, prior to his Dr. Winters follow up. At that time will review plan of care.

## 2020-06-18 ENCOUNTER — HOSPITAL ENCOUNTER (OUTPATIENT)
Dept: ULTRASOUND IMAGING | Facility: CLINIC | Age: 45
Discharge: HOME | End: 2020-06-18
Attending: INTERNAL MEDICINE | Admitting: INTERNAL MEDICINE
Payer: COMMERCIAL

## 2020-06-18 VITALS
HEART RATE: 87 BPM | DIASTOLIC BLOOD PRESSURE: 64 MMHG | RESPIRATION RATE: 16 BRPM | OXYGEN SATURATION: 98 % | SYSTOLIC BLOOD PRESSURE: 123 MMHG

## 2020-06-18 DIAGNOSIS — K70.9 ALCOHOLIC LIVER DISEASE, UNSPECIFIED (H): ICD-10-CM

## 2020-06-18 DIAGNOSIS — R18.8 OTHER ASCITES: ICD-10-CM

## 2020-06-18 PROCEDURE — 27210190 US PARACENTESIS

## 2020-06-18 PROCEDURE — 25000125 ZZHC RX 250: Performed by: RADIOLOGY

## 2020-06-18 RX ORDER — ALBUMIN (HUMAN) 12.5 G/50ML
SOLUTION INTRAVENOUS
Status: DISPENSED
Start: 2020-06-18 | End: 2020-06-18

## 2020-06-18 RX ORDER — ALBUMIN (HUMAN) 12.5 G/50ML
50 SOLUTION INTRAVENOUS ONCE
Status: DISCONTINUED | OUTPATIENT
Start: 2020-06-18 | End: 2020-06-19 | Stop reason: HOSPADM

## 2020-06-18 RX ADMIN — LIDOCAINE HYDROCHLORIDE 10 ML: 10 INJECTION, SOLUTION EPIDURAL; INFILTRATION; INTRACAUDAL; PERINEURAL at 11:18

## 2020-06-18 NOTE — PROGRESS NOTES
Patient tolerated paracentesis well.  5500 mL of straw colored fluid drained done by Dr Parr  Dressing self with no drainage.  No albumin given, patient declined at this time. Patient states understanding discharge instructions, taking P.O and home per self.  Cisco Sargent, RN, BSN

## 2020-06-18 NOTE — IR NOTE
ULTRASOUND GUIDED PARACENTESIS   6/18/2020 12:11 PM     HISTORY:  If more than 5L removed, please replace IV albumin. For every 3L removed, 25g albumin not to exceed 75g total.; Alcoholic liver disease, unspecified (H); Other ascites    PROCEDURE:   Informed consent was obtained from the patient prior to the procedure with discussion including the possible risks of bleeding, infection and organ injury . Using 5 mL of 1% lidocaine for local anesthesia, sterile technique, and sonographic guidance with permanent image documentation, I placed an 8F paracentesis catheter into the peritoneal fluid collection. This was used to aspirate 5500 mL of yellow, serous fluid in vacuum bottles, and some of this was sent for any laboratory studies that had been ordered. There were no immediate complications.  Intravenous albumen replacement was performed according to protocol.    IMPRESSION:  Ultrasound guided paracentesis.

## 2020-06-18 NOTE — IR NOTE
RADIOLOGY POST PROCEDURE NOTE w/ SEDATION  Patient name: Sp Plasencia  MRN: [id number]  : 1975    Pre-procedure diagnosis: ascites  Post-procedure diagnosis: Same    Procedure Date/Time: 2020  11:26 AM  Procedure: paracentesis  Estimated blood loss: None  Specimen(s) collected with description: none    I determined this patient to be an appropriate candidate for the planned sedation and procedure and reassessed the patient IMMEDIATELY PRIOR to sedation and procedure.     The patient tolerated the procedure well with no immediate complications.  Significant findings:none    See imaging dictation for procedural details.    Provider name: Musa Parr MD  Assistant(s):None

## 2020-06-26 DIAGNOSIS — Z11.59 ENCOUNTER FOR SCREENING FOR OTHER VIRAL DISEASES: Primary | ICD-10-CM

## 2020-06-29 ENCOUNTER — HOSPITAL ENCOUNTER (OUTPATIENT)
Dept: ULTRASOUND IMAGING | Facility: CLINIC | Age: 45
Discharge: HOME | End: 2020-06-29
Attending: INTERNAL MEDICINE | Admitting: INTERNAL MEDICINE
Payer: COMMERCIAL

## 2020-06-29 ENCOUNTER — TELEPHONE (OUTPATIENT)
Dept: TRANSPLANT | Facility: CLINIC | Age: 45
End: 2020-06-29

## 2020-06-29 VITALS — HEART RATE: 87 BPM | RESPIRATION RATE: 16 BRPM | DIASTOLIC BLOOD PRESSURE: 70 MMHG | SYSTOLIC BLOOD PRESSURE: 121 MMHG

## 2020-06-29 DIAGNOSIS — R18.8 OTHER ASCITES: ICD-10-CM

## 2020-06-29 DIAGNOSIS — K70.30 ALCOHOLIC CIRRHOSIS (H): Primary | ICD-10-CM

## 2020-06-29 DIAGNOSIS — K70.9 ALCOHOLIC LIVER DISEASE, UNSPECIFIED (H): ICD-10-CM

## 2020-06-29 DIAGNOSIS — K74.60 CIRRHOSIS OF LIVER (H): ICD-10-CM

## 2020-06-29 LAB
ALBUMIN SERPL-MCNC: 2.9 G/DL (ref 3.4–5)
ALP SERPL-CCNC: 104 U/L (ref 40–150)
ALT SERPL W P-5'-P-CCNC: 31 U/L (ref 0–70)
ANION GAP SERPL CALCULATED.3IONS-SCNC: 8 MMOL/L (ref 3–14)
AST SERPL W P-5'-P-CCNC: 63 U/L (ref 0–45)
BILIRUB DIRECT SERPL-MCNC: 1.2 MG/DL (ref 0–0.2)
BILIRUB SERPL-MCNC: 2.8 MG/DL (ref 0.2–1.3)
BUN SERPL-MCNC: 14 MG/DL (ref 7–30)
CALCIUM SERPL-MCNC: 8.5 MG/DL (ref 8.5–10.1)
CHLORIDE SERPL-SCNC: 108 MMOL/L (ref 94–109)
CO2 SERPL-SCNC: 25 MMOL/L (ref 20–32)
CREAT SERPL-MCNC: 0.54 MG/DL (ref 0.66–1.25)
ERYTHROCYTE [DISTWIDTH] IN BLOOD BY AUTOMATED COUNT: 14.5 % (ref 10–15)
GFR SERPL CREATININE-BSD FRML MDRD: >90 ML/MIN/{1.73_M2}
GLUCOSE SERPL-MCNC: 102 MG/DL (ref 70–99)
HCT VFR BLD AUTO: 38.8 % (ref 40–53)
HGB BLD-MCNC: 12.6 G/DL (ref 13.3–17.7)
INR PPP: 1.51 (ref 0.86–1.14)
MCH RBC QN AUTO: 31 PG (ref 26.5–33)
MCHC RBC AUTO-ENTMCNC: 32.5 G/DL (ref 31.5–36.5)
MCV RBC AUTO: 96 FL (ref 78–100)
PLATELET # BLD AUTO: 125 10E9/L (ref 150–450)
POTASSIUM SERPL-SCNC: 3.1 MMOL/L (ref 3.4–5.3)
PROT SERPL-MCNC: 6.2 G/DL (ref 6.8–8.8)
RBC # BLD AUTO: 4.06 10E12/L (ref 4.4–5.9)
SODIUM SERPL-SCNC: 141 MMOL/L (ref 133–144)
WBC # BLD AUTO: 5.1 10E9/L (ref 4–11)

## 2020-06-29 PROCEDURE — 80048 BASIC METABOLIC PNL TOTAL CA: CPT | Performed by: INTERNAL MEDICINE

## 2020-06-29 PROCEDURE — 85610 PROTHROMBIN TIME: CPT | Performed by: INTERNAL MEDICINE

## 2020-06-29 PROCEDURE — 25000125 ZZHC RX 250: Performed by: RADIOLOGY

## 2020-06-29 PROCEDURE — 80076 HEPATIC FUNCTION PANEL: CPT | Performed by: INTERNAL MEDICINE

## 2020-06-29 PROCEDURE — 27210238 US PARACENTESIS

## 2020-06-29 PROCEDURE — 85027 COMPLETE CBC AUTOMATED: CPT | Performed by: INTERNAL MEDICINE

## 2020-06-29 RX ADMIN — LIDOCAINE HYDROCHLORIDE 10 ML: 10 INJECTION, SOLUTION EPIDURAL; INFILTRATION; INTRACAUDAL; PERINEURAL at 11:22

## 2020-06-29 NOTE — PROGRESS NOTES
Paracentesis complete.  Consent obtained with Dr Parr.  Pt tolerated well, drained 4600 mLs clear austin colored fluid.  Site at LLQ.  Site covered with a dermabond and vicryl suture.  Site CDI.Reviewed discharge instructions with pt.  Pt stable on discharge.

## 2020-07-01 NOTE — TELEPHONE ENCOUNTER
Left message asking Sp to call and speak with this RN if he wants labs done on 7/9 at another facility besides [place]. The results should be done so Dr. Winters can review with virtual video appointment on 7/10/20.

## 2020-07-09 ENCOUNTER — HOSPITAL ENCOUNTER (OUTPATIENT)
Dept: ULTRASOUND IMAGING | Facility: CLINIC | Age: 45
Discharge: HOME | End: 2020-07-09
Attending: INTERNAL MEDICINE | Admitting: INTERNAL MEDICINE
Payer: COMMERCIAL

## 2020-07-09 VITALS
RESPIRATION RATE: 16 BRPM | SYSTOLIC BLOOD PRESSURE: 123 MMHG | HEART RATE: 79 BPM | OXYGEN SATURATION: 95 % | DIASTOLIC BLOOD PRESSURE: 59 MMHG

## 2020-07-09 DIAGNOSIS — K70.30 ALCOHOLIC CIRRHOSIS (H): ICD-10-CM

## 2020-07-09 DIAGNOSIS — K70.9 ALCOHOLIC LIVER DISEASE, UNSPECIFIED (H): ICD-10-CM

## 2020-07-09 DIAGNOSIS — R18.8 OTHER ASCITES: ICD-10-CM

## 2020-07-09 LAB
ALBUMIN SERPL-MCNC: 2.9 G/DL (ref 3.4–5)
ALP SERPL-CCNC: 118 U/L (ref 40–150)
ALT SERPL W P-5'-P-CCNC: 35 U/L (ref 0–70)
ANION GAP SERPL CALCULATED.3IONS-SCNC: 7 MMOL/L (ref 3–14)
AST SERPL W P-5'-P-CCNC: 59 U/L (ref 0–45)
BILIRUB DIRECT SERPL-MCNC: 1 MG/DL (ref 0–0.2)
BILIRUB SERPL-MCNC: 2.3 MG/DL (ref 0.2–1.3)
BUN SERPL-MCNC: 14 MG/DL (ref 7–30)
CALCIUM SERPL-MCNC: 8.6 MG/DL (ref 8.5–10.1)
CHLORIDE SERPL-SCNC: 109 MMOL/L (ref 94–109)
CO2 SERPL-SCNC: 25 MMOL/L (ref 20–32)
CREAT SERPL-MCNC: 0.5 MG/DL (ref 0.66–1.25)
GFR SERPL CREATININE-BSD FRML MDRD: >90 ML/MIN/{1.73_M2}
GLUCOSE SERPL-MCNC: 97 MG/DL (ref 70–99)
INR PPP: 1.42 (ref 0.86–1.14)
POTASSIUM SERPL-SCNC: 3.3 MMOL/L (ref 3.4–5.3)
PROT SERPL-MCNC: 6.7 G/DL (ref 6.8–8.8)
SODIUM SERPL-SCNC: 141 MMOL/L (ref 133–144)

## 2020-07-09 PROCEDURE — 80048 BASIC METABOLIC PNL TOTAL CA: CPT | Performed by: INTERNAL MEDICINE

## 2020-07-09 PROCEDURE — 27210190 US PARACENTESIS

## 2020-07-09 PROCEDURE — 25000128 H RX IP 250 OP 636

## 2020-07-09 PROCEDURE — 85610 PROTHROMBIN TIME: CPT | Performed by: INTERNAL MEDICINE

## 2020-07-09 PROCEDURE — P9047 ALBUMIN (HUMAN), 25%, 50ML: HCPCS

## 2020-07-09 PROCEDURE — 25000125 ZZHC RX 250: Performed by: RADIOLOGY

## 2020-07-09 PROCEDURE — 80076 HEPATIC FUNCTION PANEL: CPT | Performed by: INTERNAL MEDICINE

## 2020-07-09 RX ORDER — DEXTROSE MONOHYDRATE 25 G/50ML
25-50 INJECTION, SOLUTION INTRAVENOUS
Status: CANCELLED | OUTPATIENT
Start: 2020-07-09

## 2020-07-09 RX ORDER — ALBUMIN (HUMAN) 12.5 G/50ML
12.5 SOLUTION INTRAVENOUS ONCE
Status: CANCELLED | OUTPATIENT
Start: 2020-07-09 | End: 2020-07-09

## 2020-07-09 RX ORDER — ALBUMIN (HUMAN) 12.5 G/50ML
25 SOLUTION INTRAVENOUS ONCE
Status: COMPLETED | OUTPATIENT
Start: 2020-07-09 | End: 2020-07-09

## 2020-07-09 RX ORDER — NICOTINE POLACRILEX 4 MG
15-30 LOZENGE BUCCAL
Status: CANCELLED | OUTPATIENT
Start: 2020-07-09

## 2020-07-09 RX ORDER — ALBUMIN (HUMAN) 12.5 G/50ML
SOLUTION INTRAVENOUS
Status: COMPLETED
Start: 2020-07-09 | End: 2020-07-09

## 2020-07-09 RX ADMIN — ALBUMIN (HUMAN) 25 G: 12.5 SOLUTION INTRAVENOUS at 12:12

## 2020-07-09 RX ADMIN — ALBUMIN HUMAN 25 G: 0.25 SOLUTION INTRAVENOUS at 12:12

## 2020-07-09 RX ADMIN — LIDOCAINE HYDROCHLORIDE 10 ML: 10 INJECTION, SOLUTION EPIDURAL; INFILTRATION; INTRACAUDAL; PERINEURAL at 11:43

## 2020-07-09 NOTE — PROCEDURES
[facility]    Procedure: US paracentesis    Date/Time: 7/9/2020 1:56 PM  Performed by: Gerry Berg MD  Authorized by: Gerry Berg MD     UNIVERSAL PROTOCOL   Site Marked: NA  Prior Images Obtained and Reviewed:  Yes  Required items: Required blood products, implants, devices and special equipment available    Patient identity confirmed:  Verbally with patient, arm band, provided demographic data and hospital-assigned identification number  Patient was reevaluated immediately before administering moderate or deep sedation or anesthesia  Confirmation Checklist:  Patient's identity using two indicators, relevant allergies, procedure was appropriate and matched the consent or emergent situation and correct equipment/implants were available  Time out: Immediately prior to the procedure a time out was called    Universal Protocol: the Joint Commission Universal Protocol was followed    Preparation: Patient was prepped and draped in usual sterile fashion    ESBL (mL):  4         ANESTHESIA    Anesthesia: Local infiltration  Local Anesthetic:  Lidocaine 1% without epinephrine      SEDATION    Patient Sedated: No    See dictated procedure note for full details.  Findings: US guided paracentesis with removal of ascites.  No complications.    Specimens: none    Complications: None    Condition: Stable    PROCEDURE   Patient Tolerance:  Patient tolerated the procedure well with no immediate complications    Length of time physician/provider present for 1:1 monitoring during sedation: 0

## 2020-07-09 NOTE — PROGRESS NOTES
Patient tolerated paracentesis well.  5500 mL of straw colred fluid drained done by Dr Berg  Dressing  And stitch clean dry and intact with no drainage.  25 albumin given. Patient states understanding discharge instructions, taking P.O and home per self.  Cisco Sargent, RN, BSN

## 2020-07-10 ENCOUNTER — VIRTUAL VISIT (OUTPATIENT)
Dept: GASTROENTEROLOGY | Facility: CLINIC | Age: 45
End: 2020-07-10
Attending: INTERNAL MEDICINE
Payer: COMMERCIAL

## 2020-07-10 DIAGNOSIS — K70.31 ALCOHOLIC CIRRHOSIS OF LIVER WITH ASCITES (H): Primary | ICD-10-CM

## 2020-07-10 RX ORDER — FUROSEMIDE 20 MG
20 TABLET ORAL DAILY
Qty: 30 TABLET | Refills: 3 | Status: SHIPPED | OUTPATIENT
Start: 2020-07-10 | End: 2020-10-13

## 2020-07-10 RX ORDER — SPIRONOLACTONE 25 MG/1
25 TABLET ORAL DAILY
Qty: 30 TABLET | Refills: 3 | Status: SHIPPED | OUTPATIENT
Start: 2020-07-10 | End: 2020-10-13

## 2020-07-10 RX ORDER — TRAMADOL HYDROCHLORIDE 50 MG/1
50 TABLET ORAL DAILY
Qty: 30 TABLET | Refills: 5 | Status: SHIPPED | OUTPATIENT
Start: 2020-07-10 | End: 2020-12-09

## 2020-07-10 ASSESSMENT — PAIN SCALES - GENERAL: PAINLEVEL: MODERATE PAIN (4)

## 2020-07-10 NOTE — PROGRESS NOTES
"Sp Plasencia is a 45 year old male who is being evaluated via a billable telephone visit during the COVID-19 pandemic and clinic response to limit in-person visits.  The patient has been notified of following:   \"This telephone visit will be conducted via a call between you and your physician/provider. We have found that certain health care needs can be provided without the need for a physical exam.  This service lets us provide the care you need with a short phone conversation.  If a prescription is necessary we can send it directly to your pharmacy.  If lab work is needed we can place an order for that and you can then stop by our lab to have the test done at a later time.  If during the course of the call the physician/provider feels a telephone visit is not appropriate, you will not be charged for this service.\" Telephone visits are billed at different rates depending on your insurance coverage. During this emergency period, for some insurers they may be billed the same as an in-person visit.  Please reach out to your insurance provider with any questions.  Consent has been obtained for this service by 1 care team member: yes  I have reviewed and updated the patient's Past Medical History, Social History, Family History and Medication List.    What phone number would you like to be contacted at?     Phone call contact time  Call Started at 10:03  Call Ended at 10:43  On phone patient  [facility] Liver Transplant Evaluation    Requesting Provider and Health System: Carin Mcnally MD , [facility]. Vinod Stewart MD, PCP [place]   Liver Disease:  ETOH cirrhosis    A/P  45 year old male with ETOH cirrhosis. MELD 29-> 13 ABO unknown    CIRRHOSIS. 2/2 ETOH. Sober for 6 months. Synthetic function continues to improve  ASCITES. Refractory. Unable to be on diuretics 2/2 kidney function in the past. Gets sylvia [facility]. Asks us to take over orders. Ok for PRN para, up to 8L, with albumin replacement. " IR consult for TIPS. Will restart tiff 25, lasix 20. BMP 1 week  HE Questionable. One episode when he was very ill. On treatment. I would proceed with TIPS.  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 UTD US 2/11/20  VARICEAL SCREENING. UTD EGD 1/2020 small varices    FASCIOSCAPULOHUMERAL MUSCULAR DYSTROPHY See notes below re neuro opinion. No treatments available forthis condition    INGUINAL HERNIA Unable to examine but severe per patient. Discussed with him that would not be operable under current medical condition. Has hammad. Renewed tramadol for 6 mo, after that will need to be through pain clinic or PCP    ROUTINE CARE Does not have a PCP he feels established with. Had placed referral here.    AUD Has refused CD treatment    SOCIAL SUPPORT. Good per his report  FUNCTIONAL STATUS. Impairment with weakness. Independent in ADLs  LIVER TRANSPLANT CANDIDACY.   Has improved significantly with sobriety. Hold on further evalation    Perla Winters MD  Hepatology/Liver Transplantation  Medical Director, Liver Transplantation  [facility]  ========================================================================    Subjective:  45 y M with ETOH cirrhosis.Last ETOH 1/1/20. First diagnosed with liver disease in January 2020 when he presented with ASHWINI and elevated LFTs. His wife noted that he was jaundiced.     Since I last spoke to him, he has had no hospitalizations. He has had care with [facility] (Dr. Mcnally) since I last saw him.    WeaknessFascioscapulohumeral Muscular Dystrophy   LV with neuro 2013. We asked him to see neuro again, which he did    In summary, Mr. Plaesncia has FSH muscular dystrophy, and as anticipated, he has experienced some progression of his muscle weakness over the last 6 or 7 years.  I explained to him that we still remain without any disease-modifying treatments for FSH dystrophy.  Therefore, management remains purely symptomatic.  I will mail him a copy of his genetic test  "results.  I will ask our physical therapist to reevaluate him, specifically his home safety, and make suggestions to mitigate the risk of falls.  He may need to retry orthotic equipment, but I will leave it to the judgment of the therapist.   I would recommend obtaining spirometry at some point before liver transplant.  FSH dystrophy generally does not cause severe restrictive respiratory dysfunction in the majority of cases, but I have seen a couple of exceptions and this is one important thing to assess before he proceeds with a 7 to 8-hour long major surgery that will require prolonged postoperative intubation.   He complains of muscle mass loss, feels weaker. Sometimes needs help getting up from a chair. He has a lift chair. He is getting help from his kids and wife. He has lost about 25-30 pounds currently 225. He is driving. He drives a pickup and can get in and out without assistance but has a tough time. He cannot crouch down to pick something up due to weakness, transition from crouching to standing. No ongoing PT. He did have a visit with PT per Dr. Woodson.   Hospitalized  1/4-1/6/19 ASHWINI thought 2/2 ACE + hydrochlorothiazide/triamterene + NSAIDs  2/11-2/12/20 new ascites, AH treated with prednisone for a couple weeks. BIli peaked at 19 at that time.   4/8-4/10/20 ASHWINI creat 2.4 -> 1.2 HE.  Bili peak 25 on 4/8/20.  AUD  Last ETOH 1/1/20  DUI 2001  Was about 6 drinks/d  Saw CD evaluator here 4/30/20. Her note to our transplant team: he denied having a problem with alcohol, depression, and anxiety (\"those are made up diagnoses\")  Ascites  Para q 10 d getting about 5-6 L  No diuretics: stopped 2/2 concern for kidneys  Last time he was on diuretics was beginning of April  Creat at that time was 2.38, now 0.89  Follows low Na diet.  HE  One episode in hospital. None before or since. On lactulose and rifaximin. Takes lactulose BID, has about 2 BM/d.      HCC screening US 4/29/20  EGD 1/2020 small " varices  COLONOSCOPY no record  KIDNEY FUNCTION 2 episodes of ASHWINI  BONE DENSITY no record    Portal vein assessment: decreased flow US 4/29/20  Diabetes: no  Abdominal surgery: no  HCV neg  HBV neg  HIV neg    Lab Test 07/09/20  1120   PROTTOTAL 6.7*   ALBUMIN 2.9*   BILITOTAL 2.3*   ALKPHOS 118   AST 59*   ALT 35     Lab Test 06/29/20  1115   WBC 5.1   RBC 4.06*   HGB 12.6*   HCT 38.8*   MCV 96   MCH 31.0   MCHC 32.5   RDW 14.5   *     MELD-Na score: 13 at 7/9/2020 11:20 AM  MELD score: 13 at 7/9/2020 11:20 AM  Calculated from:  Serum Creatinine: 0.50 mg/dL (Rounded to 1 mg/dL) at 7/9/2020 11:20 AM  Serum Sodium: 141 mmol/L (Rounded to 137 mmol/L) at 7/9/2020 11:20 AM  Total Bilirubin: 2.3 mg/dL at 7/9/2020 11:20 AM  INR(ratio): 1.42 at 7/9/2020 11:20 AM  Age: 45 years 2 months       PAST MEDICAL HISTORY  Fascioscapulohumeral Muscular Dystrophy LV with neuro 2013. We asked him to see neuro again. See above.  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 Owns a bar/restaurant On disability   Smoking: Has smoked for about 8 years  Alcohol: see above  FAMILY HISTORY  M alive and well, lymphoma 25 years  F d liver failure last year age 67  3 sibs A&W  3 kids A&W  No known family members with fascioscapulohumeral muscular dystrophy  ROS trouble sleeping, change in appetite. 10 point ROS neg other than the symptoms noted above in the HPI.

## 2020-07-10 NOTE — LETTER
"7/10/2020     RE: Sp Plasencia  [address] [facility] [address]    Dear Colleague,    Thank you for referring your patient, Sp Plasencia, to the [facility] HEPATOLOGY. Please see a copy of my visit note below.    Sp Plasencia is a 45 year old male who is being evaluated via a billable telephone visit during the COVID-19 pandemic and clinic response to limit in-person visits.  The patient has been notified of following:   \"This telephone visit will be conducted via a call between you and your physician/provider. We have found that certain health care needs can be provided without the need for a physical exam.  This service lets us provide the care you need with a short phone conversation.  If a prescription is necessary we can send it directly to your pharmacy.  If lab work is needed we can place an order for that and you can then stop by our lab to have the test done at a later time.  If during the course of the call the physician/provider feels a telephone visit is not appropriate, you will not be charged for this service.\" Telephone visits are billed at different rates depending on your insurance coverage. During this emergency period, for some insurers they may be billed the same as an in-person visit.  Please reach out to your insurance provider with any questions.  Consent has been obtained for this service by 1 care team member: yes  I have reviewed and updated the patient's Past Medical History, Social History, Family History and Medication List.    What phone number would you like to be contacted at?     Phone call contact time  Call Started at 10:03  Call Ended at 10:43  On phone patient  [facility] Liver Transplant Evaluation    Requesting Provider and Health System: Carin Mcnally MD , [place]. Vinod Stewart MD, PCP [place]   Liver Disease:  ETOH cirrhosis    A/P  45 year old male with ETOH cirrhosis. MELD 29-> 13 ABO unknown    CIRRHOSIS. 2/2 ETOH. Sober for 6 months. " Synthetic function continues to improve  ASCITES. Refractory. Unable to be on diuretics 2/2 kidney function in the past. Gets sylvia [place] [facility]. Asks us to take over orders. Ok for PRN para, up to 8L, with albumin replacement. IR consult for TIPS. Will restart tiff 25, lasix 20. BMP 1 week  HE Questionable. One episode when he was very ill. On treatment. I would proceed with TIPS.  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 UTD US 2/11/20  VARICEAL SCREENING. UTD EGD 1/2020 small varices    FASCIOSCAPULOHUMERAL MUSCULAR DYSTROPHY See notes below re neuro opinion. No treatments available forthis condition    INGUINAL HERNIA Unable to examine but severe per patient. Discussed with him that would not be operable under current medical condition. Has hammad. Renewed tramadol for 6 mo, after that will need to be through pain clinic or PCP    ROUTINE CARE Does not have a PCP he feels established with. Had placed referral here.    AUD Has refused CD treatment    SOCIAL SUPPORT. Good per his report  FUNCTIONAL STATUS. Impairment with weakness. Independent in ADLs  LIVER TRANSPLANT CANDIDACY.   Has improved significantly with sobriety. Hold on further evalation    Perla Winters MD  Hepatology/Liver Transplantation  Medical Director, Liver Transplantation  [facility]  ========================================================================    Subjective:  45 y M with ETOH cirrhosis.Last ETOH 1/1/20. First diagnosed with liver disease in January 2020 when he presented with ASHWINI and elevated LFTs. His wife noted that he was jaundiced.     Since I last spoke to him, he has had no hospitalizations. He has had care with [facility] (Dr. Mcnally) since I last saw him.    WeaknessFascioscapulohumeral Muscular Dystrophy   LV with neuro 2013. 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  I explained to him that we still remain without any disease-modifying treatments for FSH dystrophy.  Therefore, management remains purely symptomatic.  I will mail him a copy of his genetic test results.  I will ask our physical therapist to reevaluate him, specifically his home safety, and make suggestions to mitigate the risk of falls.  He may need to retry orthotic equipment, but I will leave it to the judgment of the therapist.   I would recommend obtaining spirometry at some point before liver transplant.  FSH dystrophy generally does not cause severe restrictive respiratory dysfunction in the majority of cases, but I have seen a couple of exceptions and this is one important thing to assess before he proceeds with a 7 to 8-hour long major surgery that will require prolonged postoperative intubation.   He complains of muscle mass loss, feels weaker. Sometimes needs help getting up from a chair. He has a lift chair. He is getting help from his kids and wife. He has lost about 25-30 pounds currently 225. He is driving. He drives a pickup and can get in and out without assistance but has a tough time. He cannot crouch down to pick something up due to weakness, transition from crouching to standing. No ongoing PT. He did have a visit with PT per Dr. Woodson.   Hospitalized  1/4-1/6/19 ASHWINI thought 2/2 ACE + hydrochlorothiazide/triamterene + NSAIDs  2/11-2/12/20 new ascites, AH treated with prednisone for a couple weeks. BIli peaked at 19 at that time.   4/8-4/10/20 ASHWINI creat 2.4 -> 1.2 HE.  Bili peak 25 on 4/8/20.  AUD  Last ETOH 1/1/20  DUI 2001  Was about 6 drinks/d  Saw CD evaluator here 4/30/20. Her note to our transplant team: he denied having a problem with alcohol, depression, and anxiety (\"those are made up diagnoses\")  Ascites  Para q 10 d getting about 5-6 L  No diuretics: stopped 2/2 concern for kidneys  Last time he was on diuretics was beginning of April  Creat " at that time was 2.38, now 0.89  Follows low Na diet.  HE  One episode in hospital. None before or since. On lactulose and rifaximin. Takes lactulose BID, has about 2 BM/d.      HCC screening US 4/29/20  EGD 1/2020 small varices  COLONOSCOPY no record  KIDNEY FUNCTION 2 episodes of ASHWINI  BONE DENSITY no record    Portal vein assessment: decreased flow US 4/29/20  Diabetes: no  Abdominal surgery: no  HCV neg  HBV neg  HIV neg    Lab Test 07/09/20  1120   PROTTOTAL 6.7*   ALBUMIN 2.9*   BILITOTAL 2.3*   ALKPHOS 118   AST 59*   ALT 35     Lab Test 06/29/20  1115   WBC 5.1   RBC 4.06*   HGB 12.6*   HCT 38.8*   MCV 96   MCH 31.0   MCHC 32.5   RDW 14.5   *     MELD-Na score: 13 at 7/9/2020 11:20 AM  MELD score: 13 at 7/9/2020 11:20 AM  Calculated from:  Serum Creatinine: 0.50 mg/dL (Rounded to 1 mg/dL) at 7/9/2020 11:20 AM  Serum Sodium: 141 mmol/L (Rounded to 137 mmol/L) at 7/9/2020 11:20 AM  Total Bilirubin: 2.3 mg/dL at 7/9/2020 11:20 AM  INR(ratio): 1.42 at 7/9/2020 11:20 AM  Age: 45 years 2 months       PAST MEDICAL HISTORY  Fascioscapulohumeral Muscular Dystrophy LV with neuro 2013. We asked him to see neuro again. See above.  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 Owns a bar/restaurant On disability   Smoking: Has smoked for about 8 years  Alcohol: see above  FAMILY HISTORY  M alive and well, lymphoma 25 years  F d liver failure last year age 67  3 sibs A&W  3 kids A&W  No known family members with fascioscapulohumeral muscular dystrophy  ROS trouble sleeping, change in appetite. 10 point ROS neg other than the symptoms noted above in the HPI.    Again, thank you for allowing me to participate in the care of your patient.      Sincerely,    Perla Winters MD

## 2020-07-10 NOTE — PROGRESS NOTES
"Sp Plasencia is a 45 year old male who is being evaluated via a billable telephone visit.      The patient has been notified of following:     \"This telephone visit will be conducted via a call between you and your physician/provider. We have found that certain health care needs can be provided without the need for a physical exam.  This service lets us provide the care you need with a short phone conversation.  If a prescription is necessary we can send it directly to your pharmacy.  If lab work is needed we can place an order for that and you can then stop by our lab to have the test done at a later time.    Telephone visits are billed at different rates depending on your insurance coverage. During this emergency period, for some insurers they may be billed the same as an in-person visit.  Please reach out to your insurance provider with any questions.    If during the course of the call the physician/provider feels a telephone visit is not appropriate, you will not be charged for this service.\"    Patient has given verbal consent for Telephone visit?  Yes    What phone number would you like to be contacted at? [phone]    How would you like to obtain your AVS? Mail a copy    Phone call duration: *** minutes    {signature options:[id number]}      "

## 2020-07-14 ENCOUNTER — COMMITTEE REVIEW (OUTPATIENT)
Dept: TRANSPLANT | Facility: CLINIC | Age: 45
End: 2020-07-14

## 2020-07-14 ENCOUNTER — MYC MEDICAL ADVICE (OUTPATIENT)
Dept: GASTROENTEROLOGY | Facility: CLINIC | Age: 45
End: 2020-07-14

## 2020-07-14 NOTE — COMMITTEE REVIEW
Abdominal Committee Review Note     Evaluation Date: 5/1/2020  Committee Review Date: 7/14/2020    Organ being evaluated for: Liver    Transplant Phase: Evaluation  Transplant Status: Active    Transplant Coordinator: Marlen Quesada  Transplant Surgeon:   Sabina Montalvo    Referring Physician: Carin Mcnally    Primary Diagnosis: Alcoholic Cirrhosis  Secondary Diagnosis:     Committee Review Members:  Nurse Practitioner Aissatou Roland, NP   Nutrition Sandrine Haddad, RD   Pharmacy Yimi Najera, [facility]    - Clinical Rosamaria Robertson, KASANDRA, Ophelia Almodovar, [facility]   Transplant Marietta Magallanes, RN, Eagle Guerra MD, Lacey Pulido, TERESSA, Tray Aguilar MD, Arnie Patel MD, Jr Stephane Saha, TERESSA, Perla Winters MD, Tahira Norman, APRN CNP, Marlen Quesada, RN, Darren Adkins MD, Clinton Welch MD, Sasha Guzman, TERESSA, Mando Raymond MD, Thomas M. Leventhal, MD, Giuseppe Santizo MD, Sabina Montalvo MD       Transplant Eligibility: ETOH    Committee Review Decision: Declined    Relative Contraindications: Other, improving, refused CD eval    Absolute Contraindications: None    Committee Chair Clinton Rosales MD verbally attested to the committee's decision.    Committee Discussion Details:   MELD 13, improved and too well for transplant  Patient refused Chemical dependency evaluation  Defer rest of evaluation

## 2020-07-14 NOTE — LETTER
July 15, 2020    Sp Plasencia  [address] [place] [id number]      Dear Mr. Plasencia,   The purpose of this letter is to let you know that on 7/14/2020 the [facility] Multi-Disciplinary Selection Team reviewed the results of your transplant evaluation thus far.  Based on the results of your evaluation and the selection criteria used by our program , the decision was made to defer completing your liver transplant evaluation. Your liver function has improved and it is essential to remain alcohol free.  Important things you should know:    If you would like to discuss the decision, or if your medical status changes you may schedule a return visits with your doctor by calling [phone] and asking to speak to your transplant coordinator.    We recommend that you continue to follow up with your primary care doctor in order to manage your health concerns.  Enclosed is a letter from [facility] which describes the services offered to patients by [facility] and the Organ Procurement and Transplantation Network.  Thank you for allowing us to participate in your care.  We wish you well.  Sincerely,    XIOMARA OlmsteadN,RN  Adult Liver Coordinator  [phone]    Solid Organ Transplant  [facility], [facility]    Enclosures: OS Letter  cc: Care Team            The Organ Procurement and Transplantation Network  Toll-free patient services line:     Your resource for organ transplant information    If you have a question regarding your own medical care, you always should call your transplant hospital first. However, for general organ transplant-related information, you can call the Organ Procurement and Transplantation Network (OPTN) toll-free patient services line at [phone]- [phone]. Anyone, including potential transplant candidates, candidates, recipients, family members, friends, living donors, and donor family members, can call this number  to:          Talk about organ donation, living donation, the transplant process, the donation process, and transplant policies.    Get a free patient information kit with helpful booklets, waiting list and transplant information, and a list of all transplant hospitals.    Ask questions about the OPTN website (https://optn.transplant.hrsa.gov/), the United Network for Organ Sharing s (UNOS) website (https://unos.org/), or the UNOS website for living donors and transplant recipients. (https://www.transplantliving.org/).    Learn how the OPTN can help you.    Talk about any concerns that you may have with a transplant hospital.    The [facility] s transplant system, the OPTN, is managed under federal contract by the United Network for Organ Sharing (UNOS), which is a non-profit charitable organization. The OPTN helps create and define organ sharing policies that make the best use of donated organs. This process continuously evaluating new advances and discoveries so policies can be adapted to best serve patients waiting for transplants. To do so, the OPTN works closely with transplant professionals, transplant patients, transplant candidates, donor families, living donors, and the public. All transplant programs and organ procurement organizations throughout the country are OPTN members and are obligated to follow the policies the OPTN creates for allocating organs.    The OPTN also is responsible for:      Providing educational material for patients, the public, and professionals.    Raising awareness of the need for donated organs and tissue.    Coordinating organ procurement, matching, and placement.    Collecting information about every organ transplant and donation that occurs in the [place].    Remember, you should contact your transplant hospital directly if you have questions or concerns about your own medical care including medical records, work-up progress, and test results.    We are not your transplant  hospital, and our staff will not be able to answer questions about your case, so please keep your transplant hospital s phone number handy.    However, while you research your transplant needs and learn as much as you can about transplantation and donation, we welcome your call to our toll-free patient services line at [phone].          Updated 4/1/2019

## 2020-07-15 DIAGNOSIS — K70.31 ALCOHOLIC CIRRHOSIS OF LIVER WITH ASCITES (H): Primary | ICD-10-CM

## 2020-07-17 DIAGNOSIS — Z11.59 ENCOUNTER FOR SCREENING FOR OTHER VIRAL DISEASES: Primary | ICD-10-CM

## 2020-07-21 ENCOUNTER — HOSPITAL ENCOUNTER (OUTPATIENT)
Dept: ULTRASOUND IMAGING | Facility: CLINIC | Age: 45
Discharge: HOME | End: 2020-07-21
Attending: INTERNAL MEDICINE | Admitting: INTERNAL MEDICINE
Payer: COMMERCIAL

## 2020-07-21 VITALS
DIASTOLIC BLOOD PRESSURE: 72 MMHG | RESPIRATION RATE: 16 BRPM | OXYGEN SATURATION: 99 % | SYSTOLIC BLOOD PRESSURE: 120 MMHG | HEART RATE: 77 BPM

## 2020-07-21 DIAGNOSIS — K70.9 ALCOHOLIC LIVER DISEASE, UNSPECIFIED (H): ICD-10-CM

## 2020-07-21 DIAGNOSIS — R18.8 OTHER ASCITES: ICD-10-CM

## 2020-07-21 PROCEDURE — 25000125 ZZHC RX 250: Performed by: RADIOLOGY

## 2020-07-21 PROCEDURE — 49083 ABD PARACENTESIS W/IMAGING: CPT

## 2020-07-21 RX ORDER — ALBUMIN (HUMAN) 12.5 G/50ML
25 SOLUTION INTRAVENOUS ONCE
Status: DISCONTINUED | OUTPATIENT
Start: 2020-07-21 | End: 2020-07-22 | Stop reason: HOSPADM

## 2020-07-21 RX ORDER — ALBUMIN (HUMAN) 12.5 G/50ML
SOLUTION INTRAVENOUS
Status: DISPENSED
Start: 2020-07-21 | End: 2020-07-21

## 2020-07-21 RX ADMIN — LIDOCAINE HYDROCHLORIDE 10 ML: 10 INJECTION, SOLUTION EPIDURAL; INFILTRATION; INTRACAUDAL; PERINEURAL at 11:11

## 2020-07-21 NOTE — IR NOTE
ULTRASOUND GUIDED PARACENTESIS   7/21/2020 11:46 AM     HISTORY: Ascites    PROCEDURE:   Informed consent was obtained from the patient prior to the procedure with discussion including the possible risks of bleeding, infection and organ injury . Using 5 mL of 1% lidocaine for local anesthesia, sterile technique, and sonographic guidance with permanent image documentation, I placed an 8F paracentesis catheter into the peritoneal fluid collection. This was used to aspirate 6200 mL of yellow, serous fluid in vacuum bottles, and some of this was sent for any laboratory studies that had been ordered. There were no immediate complications.  Intravenous albumen replacement was performed according to protocol.    IMPRESSION:  Ultrasound guided paracentesis.    REJI NASCIMENTO MD

## 2020-07-21 NOTE — PROGRESS NOTES
Patient tolerated paracentesis well.  6200 mL of straw colred fluid drained done by Dr Parr  Dressing , stitch and glue clean dry and intact with no drainage.  25 albumin given. Patient states understanding discharge instructions, taking P.O and home per self.  Cisco Sargent, RN, BSN

## 2020-07-27 NOTE — TELEPHONE ENCOUNTER
DIAGNOSIS: Telephone - no Video//New tips consult   DATE RECEIVED: 7.31.20   NOTES STATUS DETAILS   OFFICE NOTE from referring provider Internal 7.10.20, 5.5.20  Dr. Perla Winters  [facility] Hepatology   OFFICE NOTE from other specialist N/A    DISCHARGE SUMMARY from hospital Internal 4.8-4.10.20  Dr. Anam Castillo  [facility]   DISCHARGE REPORT from the ER N/A    OPERATIVE REPORT N/A    MEDICATION LIST Internal    XRAYS (IMAGES & REPORTS) Internal 4.29.20, 2.11.20  US Abdomen    2.11.20  CT Abd/Pelvis   PATHOLOGY  Slides & report N/A

## 2020-07-30 ENCOUNTER — HOSPITAL ENCOUNTER (OUTPATIENT)
Dept: ULTRASOUND IMAGING | Facility: CLINIC | Age: 45
Discharge: HOME | End: 2020-07-30
Attending: INTERNAL MEDICINE | Admitting: INTERNAL MEDICINE
Payer: COMMERCIAL

## 2020-07-30 VITALS
OXYGEN SATURATION: 99 % | SYSTOLIC BLOOD PRESSURE: 120 MMHG | RESPIRATION RATE: 16 BRPM | HEART RATE: 82 BPM | DIASTOLIC BLOOD PRESSURE: 66 MMHG

## 2020-07-30 DIAGNOSIS — R18.8 OTHER ASCITES: ICD-10-CM

## 2020-07-30 DIAGNOSIS — K70.9 ALCOHOLIC LIVER DISEASE, UNSPECIFIED (H): ICD-10-CM

## 2020-07-30 PROCEDURE — 49083 ABD PARACENTESIS W/IMAGING: CPT

## 2020-07-30 PROCEDURE — 25000128 H RX IP 250 OP 636

## 2020-07-30 PROCEDURE — P9047 ALBUMIN (HUMAN), 25%, 50ML: HCPCS

## 2020-07-30 PROCEDURE — 25000125 ZZHC RX 250: Performed by: RADIOLOGY

## 2020-07-30 RX ORDER — ALBUMIN (HUMAN) 12.5 G/50ML
25 SOLUTION INTRAVENOUS ONCE
Status: COMPLETED | OUTPATIENT
Start: 2020-07-30 | End: 2020-07-30

## 2020-07-30 RX ORDER — ALBUMIN (HUMAN) 12.5 G/50ML
SOLUTION INTRAVENOUS
Status: COMPLETED
Start: 2020-07-30 | End: 2020-07-30

## 2020-07-30 RX ADMIN — ALBUMIN HUMAN 25 G: 0.25 SOLUTION INTRAVENOUS at 11:49

## 2020-07-30 RX ADMIN — LIDOCAINE HYDROCHLORIDE 10 ML: 10 INJECTION, SOLUTION EPIDURAL; INFILTRATION; INTRACAUDAL; PERINEURAL at 11:09

## 2020-07-30 RX ADMIN — ALBUMIN (HUMAN) 25 G: 12.5 SOLUTION INTRAVENOUS at 11:49

## 2020-07-30 NOTE — PROGRESS NOTES
Patient tolerated paracentesis well.  5200 mL of straw colored fluid drained done by Dr Griffin  Dressing glue and stich with no drainage.  25 albumin given. Patient states understanding discharge instructions, taking P.O and home per self.  Cisco Sargent, RN, BSN

## 2020-07-31 ENCOUNTER — VIRTUAL VISIT (OUTPATIENT)
Dept: RADIOLOGY | Facility: CLINIC | Age: 45
End: 2020-07-31
Attending: INTERNAL MEDICINE
Payer: COMMERCIAL

## 2020-07-31 ENCOUNTER — PRE VISIT (OUTPATIENT)
Dept: RADIOLOGY | Facility: CLINIC | Age: 45
End: 2020-07-31

## 2020-07-31 DIAGNOSIS — K70.31 ASCITES DUE TO ALCOHOLIC CIRRHOSIS (H): Primary | ICD-10-CM

## 2020-07-31 RX ORDER — RIFAXIMIN 550 MG/1
550 TABLET ORAL 2 TIMES DAILY
COMMUNITY
Start: 2020-07-15 | End: 2020-11-24

## 2020-07-31 NOTE — LETTER
"    7/31/2020         RE: Sp Plasencia  [id number] [facility] [id number]        Dear Colleague,    Thank you for referring your patient, Sp Plasencia, to the [facility]. Please see a copy of my visit note below.    Sp Plasencia is a 45 year old male who is being evaluated via a billable telephone visit.      The patient has been notified of following:     \"This telephone visit will be conducted via a call between you and your physician/provider. We have found that certain health care needs can be provided without the need for a physical exam.  This service lets us provide the care you need with a short phone conversation.  If a prescription is necessary we can send it directly to your pharmacy.  If lab work is needed we can place an order for that and you can then stop by our lab to have the test done at a later time.    Telephone visits are billed at different rates depending on your insurance coverage. During this emergency period, for some insurers they may be billed the same as an in-person visit.  Please reach out to your insurance provider with any questions.    If during the course of the call the physician/provider feels a telephone visit is not appropriate, you will not be charged for this service.\"    Patient has given verbal consent for Telephone visit?  Yes    What phone number would you like to be contacted at? [phone].    How would you like to obtain your AVS? Shanika    I have reviewed and updated the patient's allergies and medication list.    Concerns: No new concerns.  Refills: None needed.     Vitals - Patient Reported  Systolic (Patient Reported): 120  Diastolic (Patient Reported): 66  SpO2 (Patient Reported): 99  Pain Score: Mild Pain (2)  Pain Loc: Other - see comment(whole body)        Sandrine Watkins CMA    Phone call duration: 34 minutes              Interventional Radiology Clinic Visit    Date of this visit: 7/31/2020    Sp Plasencia  is referred " by Dr. Perla Winters for treatment recommendations. The patient requires evaluation for diagnosis of refractory ascites.    Primary Physician: Deanna Barahona        History Of Present Illness:    Sp Plasencia is a 45 year old male who presents with diagnosis of alcoholic cirrhosis and refractory ascites.  He states he first became aware of his liver disease around January 2020 after he presented with jaundice and abdominal distention.  He requires large-volume paracentesis about every 10 days, draining 5.5 to 6 L, sometimes up to 8 L.  He has not required a thoracentesis.  He denies history of hematemesis or melena.  He had an episode of encephalopathy in April 2020 for which he was hospitalized, but none since and he is on lactulose and rifaximin.    From a performance status standpoint, he is significantly limited by his muscular dystrophy causing generalized muscle weakness.  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    He denies any history of cardiac disease such as heart attack or heart failure.      Review of Systems:    General: Negative for recent fever.  Skin: Negative for recent jaundice.  Eyes: Negative for recent jaundice.  Respiratory: Positive for shortness of breath on exertion, which he states is due to the degree of effort he has to put in due to his muscular dystrophy.  No shortness of breath at rest.  No cough.  Cardiovascular: Negative for chest pain.  Gastrointestinal: Positive for abdominal pain or swelling associated with ascites.  Musculoskeletal: Positive for occasional ankle swelling; right now the ankles mildly swollen.    Past Medical/Surgical History:    Past Medical History:   Diagnosis Date     Acid reflux      Ascites      Esophageal varices (H)      FSHD (facioscapulohumeral muscular dystrophy) (H)      Gout      HTN (hypertension)      Jaundice      Liver  cirrhosis (H)      Smoker      Past Surgical History:   Procedure Laterality Date     NO HISTORY OF SURGERY         Current Medications:    Current Outpatient Medications   Medication Sig Dispense Refill     allopurinol (ZYLOPRIM) 300 MG tablet Take 1 tablet (300 mg) by mouth daily 30 tablet 0     esomeprazole (NEXIUM) 40 MG DR capsule Take 1 capsule (40 mg) by mouth daily Take 30-60 minutes before eating. 30 capsule 0     ibuprofen (ADVIL/MOTRIN) 200 MG capsule Take 200 mg by mouth every 4 hours as needed for fever       lactulose (CEPHULAC) 20 GM packet Take 1 packet (20 g) by mouth 2 times daily 60 packet 1     Multiple Vitamin (DAILY MULTIVITAMIN PO) Take 1 tablet by mouth daily        Omega-3 Fatty Acids (FISH OIL PO) Take 1 capsule by mouth Once weekly       ondansetron (ZOFRAN) 4 MG tablet        spironolactone (ALDACTONE) 25 MG tablet Take 1 tablet (25 mg) by mouth daily 30 tablet 3     traMADol (ULTRAM) 50 MG tablet Take 1 tablet (50 mg) by mouth daily 30 tablet 5     zolpidem (AMBIEN) 5 MG tablet Take 1 tablet (5 mg) by mouth nightly as needed for sleep 30 tablet 0     acetaminophen (TYLENOL) 325 MG tablet Take 325-650 mg by mouth every 6 hours as needed for mild pain       escitalopram (LEXAPRO) 5 MG tablet Take 1 tablet (5 mg) by mouth daily (Patient not taking: Reported on 7/10/2020) 30 tablet 0     furosemide (LASIX) 20 MG tablet Take 1 tablet (20 mg) by mouth daily (Patient not taking: Reported on 7/31/2020) 30 tablet 3     hydrOXYzine (ATARAX) 25 MG tablet Take 1-2 tablets (25-50 mg) by mouth every 6 hours as needed for itching or other (adjuvant pain) (Patient not taking: Reported on 7/10/2020) 90 tablet 0     XIFAXAN 550 MG TABS tablet          Allergies:    Adhesive tape    Family History:    Family History   Problem Relation Age of Onset     Hypertension Father        Social History:    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  He lives with his wife and 3 children.  He stopped  drinking alcohol in January 2020.  He smokes cigarettes socially but not daily.    Physical Exam:     CONSTITUTIONAL: healthy, alert and no distress.  PSYCHIATRIC: mentation appears normal and affect normal.  NEURO: Normal speech.  RESP: No audible cough or wheeze.    Laboratory Studies:    Lab Results   Component Value Date    HGB 12.6 06/29/2020     Lab Results   Component Value Date     06/29/2020     Lab Results   Component Value Date    WBC 5.1 06/29/2020       Lab Results   Component Value Date    INR 1.42 07/09/2020       Lab Results   Component Value Date    PROTTOTAL 6.7 07/09/2020      Lab Results   Component Value Date    ALBUMIN 2.9 07/09/2020     Lab Results   Component Value Date    BILITOTAL 2.3 07/09/2020     Lab Results   Component Value Date    ALKPHOS 118 07/09/2020     Lab Results   Component Value Date    AST 59 07/09/2020     Lab Results   Component Value Date    ALT 35 07/09/2020       Lab Results   Component Value Date    CR 0.50 07/09/2020     Lab Results   Component Value Date    BUN 14 07/09/2020       Alpha Fetoprotein   Date Value Ref Range Status   04/01/2020 3.5 0 - 8 ug/L Final     Comment:     Assay Method:  Chemiluminescence using Siemens Centaur XP       Imaging:     I reviewed his CT abdomen from 2/11/2020.  It shows a cirrhotic appearing liver, with geographic areas of hypodensity anterolaterally that may represent focal fatty infiltration versus heterogeneous perfusion.  No liver mass is present.  The portal veins are patent.  The hepatic veins are not well seen.  The spleen is enlarged and multiple portosystemic collaterals are seen.  Moderate ascites is present.    I reviewed his outside abdominal ultrasound from 4/29/2020.  It shows no color flow in the portal vein, though power Doppler was not used.    I reviewed his outside echocardiogram report from 2018 which shows normal right and left heart structure and function and normal ejection fraction.    ASSESSMENT:       Sp Plasencia is a 45 year old male with alcoholic cirrhosis and refractory ascites requiring large-volume paracentesis every 10 days.  Imaging shows no liver mass but questionable portal vein thrombus on prior ultrasound, though no thrombus was seen on a CT 2 months before that ultrasound.  The patient's MELD score is 13.  He is likely therefore a suitable candidate for a TIPS procedure, but will need a repeat CT to rule out development of portal vein thrombus.  If there is, portal vein recanalization may need to be performed at the same time.  I discussed with the patient what a TIPS procedure and I explained why the procedure is performed and how it decompresses the portal venous system. We discussed the risks of the procedure, which include but are not limited to infection such as liver abscess, biloma formation, damage to nearby vessels or bile ducts, bleeding, failure of the procedure, stent maldeployment, liver failure, right heart failure, and encephalopathy. We discussed the alternative of not doing the procedure, which would be repeated paracentesis which carries an increasing risk of infection/bacterial peritonitis.   I discussed that the procedure will be performed under general anaesthesia and that he will likely be discharged the same day. I explained she will be followed with regular TIPS ultrasounds due to the risk of narrowing or thrombosis within the stent over time. We discussed that while some patients do not respond to the procedure, most patients experience relief or decrease in their ascites over 3 to 6 months after the procedure.  I informed the patient that I will be assisted during the procedure and that this likely will include a resident and/or fellow.  We discussed that he will need a Covid test prior to the procedure.  All of the patient's questions were answered and s/he agreed to proceed.     PLAN:    1.  Sp will need a repeat CT with contrast to evaluate the portal veins, and  a hepatic Doppler ultrasound.  2.  We will schedule him for a TIPS procedure.  3.  At the time of TIPS procedure, we will measure his right heart pressures.      A total of 45 minutes was spent in care for the patient, of which >50% was spent in imaging review, counseling and coordination of care.     It was a pleasure seeing the patient.     SignedMagnolia M.D.    Interventional Radiology  [facility]  Patient Care Team:  Deanna Barahona MD as PCP - General (Internal Medicine)  Maurilio Woodson MD as MD (Neurology)  Danielle Eller, TERESSA as Nurse Coordinator (Neurology)  IVONNE PELAEZ

## 2020-07-31 NOTE — PROGRESS NOTES
"Sp Plasencia is a 45 year old male who is being evaluated via a billable telephone visit.      The patient has been notified of following:     \"This telephone visit will be conducted via a call between you and your physician/provider. We have found that certain health care needs can be provided without the need for a physical exam.  This service lets us provide the care you need with a short phone conversation.  If a prescription is necessary we can send it directly to your pharmacy.  If lab work is needed we can place an order for that and you can then stop by our lab to have the test done at a later time.    Telephone visits are billed at different rates depending on your insurance coverage. During this emergency period, for some insurers they may be billed the same as an in-person visit.  Please reach out to your insurance provider with any questions.    If during the course of the call the physician/provider feels a telephone visit is not appropriate, you will not be charged for this service.\"    Patient has given verbal consent for Telephone visit?  Yes    What phone number would you like to be contacted at? [phone].    How would you like to obtain your AVS? MyChart    I have reviewed and updated the patient's allergies and medication list.    Concerns: No new concerns.  Refills: None needed.     Vitals - Patient Reported  Systolic (Patient Reported): 120  Diastolic (Patient Reported): 66  SpO2 (Patient Reported): 99  Pain Score: Mild Pain (2)  Pain Loc: Other - see comment(whole body)        Sandrine Watkins CMA    Phone call duration: 34 minutes              Interventional Radiology Clinic Visit    Date of this visit: 7/31/2020    Sp Plasencia  is referred by Dr. Perla Winters for treatment recommendations. The patient requires evaluation for diagnosis of refractory ascites.    Primary Physician: Deanna Barahona        History Of Present Illness:    Sp Plasencia is a 45 year old male who " presents with diagnosis of alcoholic cirrhosis and refractory ascites.  He states he first became aware of his liver disease around January 2020 after he presented with jaundice and abdominal distention.  He requires large-volume paracentesis about every 10 days, draining 5.5 to 6 L, sometimes up to 8 L.  He has not required a thoracentesis.  He denies history of hematemesis or melena.  He had an episode of encephalopathy in April 2020 for which he was hospitalized, but none since and he is on lactulose and rifaximin.    From a performance status standpoint, he is significantly limited by his muscular dystrophy causing generalized muscle weakness.  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    He denies any history of cardiac disease such as heart attack or heart failure.      Review of Systems:    General: Negative for recent fever.  Skin: Negative for recent jaundice.  Eyes: Negative for recent jaundice.  Respiratory: Positive for shortness of breath on exertion, which he states is due to the degree of effort he has to put in due to his muscular dystrophy.  No shortness of breath at rest.  No cough.  Cardiovascular: Negative for chest pain.  Gastrointestinal: Positive for abdominal pain or swelling associated with ascites.  Musculoskeletal: Positive for occasional ankle swelling; right now the ankles mildly swollen.    Past Medical/Surgical History:    Past Medical History:   Diagnosis Date     Acid reflux      Ascites      Esophageal varices (H)      FSHD (facioscapulohumeral muscular dystrophy) (H)      Gout      HTN (hypertension)      Jaundice      Liver cirrhosis (H)      Smoker      Past Surgical History:   Procedure Laterality Date     NO HISTORY OF SURGERY         Current Medications:    Current Outpatient Medications   Medication Sig Dispense Refill     allopurinol (ZYLOPRIM) 300 MG tablet Take 1  tablet (300 mg) by mouth daily 30 tablet 0     esomeprazole (NEXIUM) 40 MG DR capsule Take 1 capsule (40 mg) by mouth daily Take 30-60 minutes before eating. 30 capsule 0     ibuprofen (ADVIL/MOTRIN) 200 MG capsule Take 200 mg by mouth every 4 hours as needed for fever       lactulose (CEPHULAC) 20 GM packet Take 1 packet (20 g) by mouth 2 times daily 60 packet 1     Multiple Vitamin (DAILY MULTIVITAMIN PO) Take 1 tablet by mouth daily        Omega-3 Fatty Acids (FISH OIL PO) Take 1 capsule by mouth Once weekly       ondansetron (ZOFRAN) 4 MG tablet        spironolactone (ALDACTONE) 25 MG tablet Take 1 tablet (25 mg) by mouth daily 30 tablet 3     traMADol (ULTRAM) 50 MG tablet Take 1 tablet (50 mg) by mouth daily 30 tablet 5     zolpidem (AMBIEN) 5 MG tablet Take 1 tablet (5 mg) by mouth nightly as needed for sleep 30 tablet 0     acetaminophen (TYLENOL) 325 MG tablet Take 325-650 mg by mouth every 6 hours as needed for mild pain       escitalopram (LEXAPRO) 5 MG tablet Take 1 tablet (5 mg) by mouth daily (Patient not taking: Reported on 7/10/2020) 30 tablet 0     furosemide (LASIX) 20 MG tablet Take 1 tablet (20 mg) by mouth daily (Patient not taking: Reported on 7/31/2020) 30 tablet 3     hydrOXYzine (ATARAX) 25 MG tablet Take 1-2 tablets (25-50 mg) by mouth every 6 hours as needed for itching or other (adjuvant pain) (Patient not taking: Reported on 7/10/2020) 90 tablet 0     XIFAXAN 550 MG TABS tablet          Allergies:    Adhesive tape    Family History:    Family History   Problem Relation Age of Onset     Hypertension Father        Social History:    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  He lives with his wife and 3 children.  He stopped drinking alcohol in January 2020.  He smokes cigarettes socially but not daily.    Physical Exam:     CONSTITUTIONAL: healthy, alert and no distress.  PSYCHIATRIC: mentation appears normal and affect normal.  NEURO: Normal speech.  RESP: No audible  cough or wheeze.    Laboratory Studies:    Lab Results   Component Value Date    HGB 12.6 06/29/2020     Lab Results   Component Value Date     06/29/2020     Lab Results   Component Value Date    WBC 5.1 06/29/2020       Lab Results   Component Value Date    INR 1.42 07/09/2020       Lab Results   Component Value Date    PROTTOTAL 6.7 07/09/2020      Lab Results   Component Value Date    ALBUMIN 2.9 07/09/2020     Lab Results   Component Value Date    BILITOTAL 2.3 07/09/2020     Lab Results   Component Value Date    ALKPHOS 118 07/09/2020     Lab Results   Component Value Date    AST 59 07/09/2020     Lab Results   Component Value Date    ALT 35 07/09/2020       Lab Results   Component Value Date    CR 0.50 07/09/2020     Lab Results   Component Value Date    BUN 14 07/09/2020       Alpha Fetoprotein   Date Value Ref Range Status   04/01/2020 3.5 0 - 8 ug/L Final     Comment:     Assay Method:  Chemiluminescence using Siemens Centaur XP       Imaging:     I reviewed his CT abdomen from 2/11/2020.  It shows a cirrhotic appearing liver, with geographic areas of hypodensity anterolaterally that may represent focal fatty infiltration versus heterogeneous perfusion.  No liver mass is present.  The portal veins are patent.  The hepatic veins are not well seen.  The spleen is enlarged and multiple portosystemic collaterals are seen.  Moderate ascites is present.    I reviewed his outside abdominal ultrasound from 4/29/2020.  It shows no color flow in the portal vein, though power Doppler was not used.    I reviewed his outside echocardiogram report from 2018 which shows normal right and left heart structure and function and normal ejection fraction.    ASSESSMENT:      Sp Plasencia is a 45 year old male with alcoholic cirrhosis and refractory ascites requiring large-volume paracentesis every 10 days.  Imaging shows no liver mass but questionable portal vein thrombus on prior ultrasound, though no thrombus was  seen on a CT 2 months before that ultrasound.  The patient's MELD score is 13.  He is likely therefore a suitable candidate for a TIPS procedure, but will need a repeat CT to rule out development of portal vein thrombus.  If there is, portal vein recanalization may need to be performed at the same time.  I discussed with the patient what a TIPS procedure and I explained why the procedure is performed and how it decompresses the portal venous system. We discussed the risks of the procedure, which include but are not limited to infection such as liver abscess, biloma formation, damage to nearby vessels or bile ducts, bleeding, failure of the procedure, stent maldeployment, liver failure, right heart failure, and encephalopathy. We discussed the alternative of not doing the procedure, which would be repeated paracentesis which carries an increasing risk of infection/bacterial peritonitis.   I discussed that the procedure will be performed under general anaesthesia and that he will likely be discharged the same day. I explained she will be followed with regular TIPS ultrasounds due to the risk of narrowing or thrombosis within the stent over time. We discussed that while some patients do not respond to the procedure, most patients experience relief or decrease in their ascites over 3 to 6 months after the procedure.  I informed the patient that I will be assisted during the procedure and that this likely will include a resident and/or fellow.  We discussed that he will need a Covid test prior to the procedure.  All of the patient's questions were answered and s/he agreed to proceed.     PLAN:    1.  Sp will need a repeat CT with contrast to evaluate the portal veins, and a hepatic Doppler ultrasound.  2.  We will schedule him for a TIPS procedure.  3.  At the time of TIPS procedure, we will measure his right heart pressures.      A total of 45 minutes was spent in care for the patient, of which >50% was spent in  imaging review, counseling and coordination of care.     It was a pleasure seeing the patient.     Signed,    Magnolia Kingston M.D.    Interventional Radiology  [facility]      CC  Patient Care Team:  Deanna Barahona MD as PCP - General (Internal Medicine)  Maurilio Woodson MD as MD (Neurology)  Danielle Eller RN as Nurse Coordinator (Neurology)  Deanna Barahona MD as Assigned PCP  IVONNE PELAEZ

## 2020-08-04 ENCOUNTER — TELEPHONE (OUTPATIENT)
Dept: VASCULAR SURGERY | Facility: CLINIC | Age: 45
End: 2020-08-04

## 2020-08-04 DIAGNOSIS — K70.31 ALCOHOLIC CIRRHOSIS OF LIVER WITH ASCITES (H): Primary | ICD-10-CM

## 2020-08-04 NOTE — TELEPHONE ENCOUNTER
I called pt to f/up on visit with Dr Villanueva regarding his tips.     He is very anxious to get the TIPS so that he can get the hernia repair taken care of also    Informed him that we will try to see what we can do    Pt is wondering If I can relay the msg that  if the risk of his hernia becoming strangulated outweighs his wait for getting TIPS.    Also I informed him about the Preop in which he states that the less MD visits the better as we are making money and that he's gone through lots of eval already    If it is needed he will go see his PCP however if it's not needed then he'd like to forego this.     I informed him that I am just informing him the necessary information that is needed to not risk cancellation of procedure. That is up to him     Also that he will need a negative covid 19 test as well     Will schedule and then call pt back with apt details.     Christie ALONSO RN, BSN  Interventional Radiology Nurse Coordinator   Phone: [phone]

## 2020-08-05 DIAGNOSIS — Z11.59 ENCOUNTER FOR SCREENING FOR OTHER VIRAL DISEASES: Primary | ICD-10-CM

## 2020-08-07 ENCOUNTER — TELEPHONE (OUTPATIENT)
Dept: VASCULAR SURGERY | Facility: CLINIC | Age: 45
End: 2020-08-07

## 2020-08-07 DIAGNOSIS — K70.31 ALCOHOLIC CIRRHOSIS OF LIVER WITH ASCITES (H): Primary | ICD-10-CM

## 2020-08-07 NOTE — TELEPHONE ENCOUNTER
Called pt and informed him that I have him scheduled for his TIPS with Vashti on Weds 2/26.     Informed him that he is to check into 3rd floor Surgery area at 6 am for an 8 am start time. Went over prep    Informed him that Dr Vashti's note will serve as his H&P as we are within 30 days prior to his TIPS procedure.     Also that he will need a negative covid 19 test 4 days prior to the procedure also.    I informed him that in regards to his questions of tips versus hernia repair, it was highly recommended that his tips procedure be completed first.     There is fluid in front the hernia.     Pt verbalized understanding of this information.    Will send letter with appt details.     He agrees to plan.     Christie ALONSO RN, BSN  Interventional Radiology Nurse Coordinator   Phone: [phone]

## 2020-08-11 ENCOUNTER — HOSPITAL ENCOUNTER (OUTPATIENT)
Dept: ULTRASOUND IMAGING | Facility: CLINIC | Age: 45
Discharge: HOME | End: 2020-08-11
Attending: INTERNAL MEDICINE | Admitting: INTERNAL MEDICINE
Payer: COMMERCIAL

## 2020-08-11 VITALS
OXYGEN SATURATION: 95 % | DIASTOLIC BLOOD PRESSURE: 75 MMHG | RESPIRATION RATE: 16 BRPM | SYSTOLIC BLOOD PRESSURE: 126 MMHG | HEART RATE: 91 BPM

## 2020-08-11 DIAGNOSIS — K70.31 ALCOHOLIC CIRRHOSIS OF LIVER WITH ASCITES (H): ICD-10-CM

## 2020-08-11 PROCEDURE — 25000128 H RX IP 250 OP 636

## 2020-08-11 PROCEDURE — 27210190 US PARACENTESIS

## 2020-08-11 PROCEDURE — P9047 ALBUMIN (HUMAN), 25%, 50ML: HCPCS

## 2020-08-11 PROCEDURE — 25000125 ZZHC RX 250: Performed by: RADIOLOGY

## 2020-08-11 RX ORDER — ALBUMIN (HUMAN) 12.5 G/50ML
SOLUTION INTRAVENOUS
Status: COMPLETED
Start: 2020-08-11 | End: 2020-08-11

## 2020-08-11 RX ORDER — ALBUMIN (HUMAN) 12.5 G/50ML
25 SOLUTION INTRAVENOUS ONCE
Status: COMPLETED | OUTPATIENT
Start: 2020-08-11 | End: 2020-08-11

## 2020-08-11 RX ORDER — LIDOCAINE HYDROCHLORIDE 10 MG/ML
10 INJECTION, SOLUTION EPIDURAL; INFILTRATION; INTRACAUDAL; PERINEURAL ONCE
Status: COMPLETED | OUTPATIENT
Start: 2020-08-11 | End: 2020-08-11

## 2020-08-11 RX ADMIN — ALBUMIN (HUMAN) 25 G: 12.5 SOLUTION INTRAVENOUS at 11:45

## 2020-08-11 RX ADMIN — ALBUMIN HUMAN 25 G: 0.25 SOLUTION INTRAVENOUS at 11:45

## 2020-08-11 RX ADMIN — LIDOCAINE HYDROCHLORIDE 10 ML: 10 INJECTION, SOLUTION EPIDURAL; INFILTRATION; INTRACAUDAL; PERINEURAL at 11:24

## 2020-08-11 NOTE — PROGRESS NOTES
Paracentesis complete.  Consent obtained with Dr. Parr.  Pt tolerated well, drained 3400mLs yellow colored fluid.  Site at RLQ.  Site covered with a vicryl suture and dermabond.  Site CDI.  Albumin 25 Grams given.  Reviewed discharge instructions with pt.  Pt ambulated on discharge.

## 2020-08-11 NOTE — IR NOTE
ULTRASOUND GUIDED PARACENTESIS   8/11/2020 12:08 PM     HISTORY:  Alcoholic cirrhosis of liver with ascites (H)    PROCEDURE:   Informed consent was obtained from the patient prior to the procedure with discussion including the possible risks of bleeding, infection and organ injury . Using 5 mL of 1% lidocaine for local anesthesia, sterile technique, and sonographic guidance with permanent image documentation, I placed an 8F paracentesis catheter into the peritoneal fluid collection. This was used to aspirate 3400 mL of yellow, serous fluid in vacuum bottles, and some of this was sent for any laboratory studies that had been ordered. There were no immediate complications.  Intravenous albumen replacement was performed according to protocol.    IMPRESSION:  Ultrasound guided paracentesis.

## 2020-08-15 DIAGNOSIS — Z11.59 ENCOUNTER FOR SCREENING FOR OTHER VIRAL DISEASES: Primary | ICD-10-CM

## 2020-08-20 ENCOUNTER — HOSPITAL ENCOUNTER (OUTPATIENT)
Dept: ULTRASOUND IMAGING | Facility: CLINIC | Age: 45
Discharge: HOME | End: 2020-08-20
Attending: RADIOLOGY | Admitting: RADIOLOGY
Payer: COMMERCIAL

## 2020-08-20 ENCOUNTER — HOSPITAL ENCOUNTER (OUTPATIENT)
Dept: CT IMAGING | Facility: CLINIC | Age: 45
Discharge: HOME | End: 2020-08-20
Attending: RADIOLOGY | Admitting: RADIOLOGY
Payer: COMMERCIAL

## 2020-08-20 ENCOUNTER — HOSPITAL ENCOUNTER (OUTPATIENT)
Dept: ULTRASOUND IMAGING | Facility: CLINIC | Age: 45
Discharge: HOME | End: 2020-08-20
Attending: INTERNAL MEDICINE | Admitting: INTERNAL MEDICINE
Payer: COMMERCIAL

## 2020-08-20 DIAGNOSIS — K70.31 ALCOHOLIC CIRRHOSIS OF LIVER WITH ASCITES (H): ICD-10-CM

## 2020-08-20 PROCEDURE — 25000128 H RX IP 250 OP 636: Performed by: RADIOLOGY

## 2020-08-20 PROCEDURE — 74170 CT ABD WO CNTRST FLWD CNTRST: CPT

## 2020-08-20 PROCEDURE — P9047 ALBUMIN (HUMAN), 25%, 50ML: HCPCS | Performed by: RADIOLOGY

## 2020-08-20 PROCEDURE — 25000125 ZZHC RX 250: Performed by: RADIOLOGY

## 2020-08-20 PROCEDURE — 49083 ABD PARACENTESIS W/IMAGING: CPT

## 2020-08-20 PROCEDURE — 76700 US EXAM ABDOM COMPLETE: CPT

## 2020-08-20 RX ORDER — ALBUMIN (HUMAN) 12.5 G/50ML
25 SOLUTION INTRAVENOUS ONCE
Status: COMPLETED | OUTPATIENT
Start: 2020-08-20 | End: 2020-08-20

## 2020-08-20 RX ORDER — IOPAMIDOL 755 MG/ML
500 INJECTION, SOLUTION INTRAVASCULAR ONCE
Status: COMPLETED | OUTPATIENT
Start: 2020-08-20 | End: 2020-08-20

## 2020-08-20 RX ORDER — ALBUMIN (HUMAN) 12.5 G/50ML
SOLUTION INTRAVENOUS
Status: DISCONTINUED
Start: 2020-08-20 | End: 2020-08-20 | Stop reason: HOSPADM

## 2020-08-20 RX ADMIN — SODIUM CHLORIDE 73 ML: 9 INJECTION, SOLUTION INTRAVENOUS at 08:45

## 2020-08-20 RX ADMIN — LIDOCAINE HYDROCHLORIDE 10 ML: 10 INJECTION, SOLUTION EPIDURAL; INFILTRATION; INTRACAUDAL; PERINEURAL at 09:05

## 2020-08-20 RX ADMIN — IOPAMIDOL 100 ML: 755 INJECTION, SOLUTION INTRAVENOUS at 08:45

## 2020-08-20 RX ADMIN — ALBUMIN HUMAN 25 G: 0.25 SOLUTION INTRAVENOUS at 09:05

## 2020-08-20 NOTE — PROGRESS NOTES
Paracentesis complete.  Consent obtained with Dr Merchant.  Pt tolerated well, drained 5600 mLs clear austin colored fluid.  Site at RLQ.  Site covered with a dermabond and suture.  Site CDI.  Albumin 25 grams.  Reviewed discharge instructions with pt.  Pt stable on discharge.

## 2020-08-20 NOTE — PROCEDURES
[facility]    Procedure: Paracentesis    Date/Time: 8/20/2020 9:54 AM  Performed by: Jocelynn Merchant DO  Authorized by: Jocelynn Merchant DO     UNIVERSAL PROTOCOL   Site Marked: NA  Prior Images Obtained and Reviewed:  Yes  Required items: Required blood products, implants, devices and special equipment available    Patient identity confirmed:  Verbally with patient, arm band, provided demographic data and hospital-assigned identification number  Patient was reevaluated immediately before administering moderate or deep sedation or anesthesia  Confirmation Checklist:  Patient's identity using two indicators, relevant allergies, procedure was appropriate and matched the consent or emergent situation and correct equipment/implants were available  Time out: Immediately prior to the procedure a time out was called    Universal Protocol: the Joint Commission Universal Protocol was followed    Preparation: Patient was prepped and draped in usual sterile fashion           ANESTHESIA    Anesthesia: Local infiltration  Local Anesthetic:  Lidocaine 1% without epinephrine      SEDATION    Patient Sedated: No    See dictated procedure note for full details.  Findings: paracentesis    Specimens: none    Complications: None    Condition: Stable    PROCEDURE   Patient Tolerance:  Patient tolerated the procedure well with no immediate complications    Length of time physician/provider present for 1:1 monitoring during sedation: 0

## 2020-08-24 DIAGNOSIS — Z11.59 ENCOUNTER FOR SCREENING FOR OTHER VIRAL DISEASES: ICD-10-CM

## 2020-08-24 PROCEDURE — U0003 INFECTIOUS AGENT DETECTION BY NUCLEIC ACID (DNA OR RNA); SEVERE ACUTE RESPIRATORY SYNDROME CORONAVIRUS 2 (SARS-COV-2) (CORONAVIRUS DISEASE [COVID-19]), AMPLIFIED PROBE TECHNIQUE, MAKING USE OF HIGH THROUGHPUT TECHNOLOGIES AS DESCRIBED BY CMS-2020-01-R: HCPCS | Performed by: RADIOLOGY

## 2020-08-25 LAB
SARS-COV-2 RNA SPEC QL NAA+PROBE: NOT DETECTED
SPECIMEN SOURCE: NORMAL

## 2020-08-26 ENCOUNTER — APPOINTMENT (OUTPATIENT)
Dept: INTERVENTIONAL RADIOLOGY/VASCULAR | Facility: CLINIC | Age: 45
End: 2020-08-26
Attending: RADIOLOGY
Payer: COMMERCIAL

## 2020-08-26 ENCOUNTER — HOSPITAL ENCOUNTER (OUTPATIENT)
Facility: CLINIC | Age: 45
Discharge: HOME | End: 2020-08-26
Attending: RADIOLOGY | Admitting: RADIOLOGY
Payer: COMMERCIAL

## 2020-08-26 ENCOUNTER — ANESTHESIA (OUTPATIENT)
Dept: SURGERY | Facility: CLINIC | Age: 45
End: 2020-08-26
Payer: COMMERCIAL

## 2020-08-26 ENCOUNTER — ANESTHESIA EVENT (OUTPATIENT)
Dept: SURGERY | Facility: CLINIC | Age: 45
End: 2020-08-26
Payer: COMMERCIAL

## 2020-08-26 VITALS
WEIGHT: 216.49 LBS | BODY MASS INDEX: 28.69 KG/M2 | TEMPERATURE: 97.6 F | SYSTOLIC BLOOD PRESSURE: 143 MMHG | DIASTOLIC BLOOD PRESSURE: 84 MMHG | RESPIRATION RATE: 16 BRPM | HEART RATE: 86 BPM | OXYGEN SATURATION: 96 % | HEIGHT: 73 IN

## 2020-08-26 DIAGNOSIS — K70.31 ALCOHOLIC CIRRHOSIS OF LIVER WITH ASCITES (H): ICD-10-CM

## 2020-08-26 LAB
ABO + RH BLD: NORMAL
ABO + RH BLD: NORMAL
ALBUMIN SERPL-MCNC: 3 G/DL (ref 3.4–5)
ALP SERPL-CCNC: 116 U/L (ref 40–150)
ALT SERPL W P-5'-P-CCNC: 46 U/L (ref 0–70)
ANION GAP SERPL CALCULATED.3IONS-SCNC: 5 MMOL/L (ref 3–14)
APTT PPP: 38 SEC (ref 22–37)
AST SERPL W P-5'-P-CCNC: 75 U/L (ref 0–45)
BASOPHILS # BLD AUTO: 0.1 10E9/L (ref 0–0.2)
BASOPHILS NFR BLD AUTO: 1.2 %
BILIRUB SERPL-MCNC: 2.2 MG/DL (ref 0.2–1.3)
BLD GP AB SCN SERPL QL: NORMAL
BLD PROD TYP BPU: NORMAL
BLOOD BANK CMNT PATIENT-IMP: NORMAL
BUN SERPL-MCNC: 15 MG/DL (ref 7–30)
CALCIUM SERPL-MCNC: 9.1 MG/DL (ref 8.5–10.1)
CHLORIDE SERPL-SCNC: 111 MMOL/L (ref 94–109)
CO2 SERPL-SCNC: 27 MMOL/L (ref 20–32)
CREAT SERPL-MCNC: 0.6 MG/DL (ref 0.66–1.25)
DIFFERENTIAL METHOD BLD: ABNORMAL
EOSINOPHIL # BLD AUTO: 0.1 10E9/L (ref 0–0.7)
EOSINOPHIL NFR BLD AUTO: 2.4 %
ERYTHROCYTE [DISTWIDTH] IN BLOOD BY AUTOMATED COUNT: 14.6 % (ref 10–15)
GFR SERPL CREATININE-BSD FRML MDRD: >90 ML/MIN/{1.73_M2}
GLUCOSE BLDC GLUCOMTR-MCNC: 90 MG/DL (ref 70–99)
GLUCOSE SERPL-MCNC: 90 MG/DL (ref 70–99)
HCT VFR BLD AUTO: 38.9 % (ref 40–53)
HGB BLD-MCNC: 12.7 G/DL (ref 13.3–17.7)
IMM GRANULOCYTES # BLD: 0 10E9/L (ref 0–0.4)
IMM GRANULOCYTES NFR BLD: 0.2 %
INR PPP: 1.46 (ref 0.86–1.14)
LYMPHOCYTES # BLD AUTO: 1.2 10E9/L (ref 0.8–5.3)
LYMPHOCYTES NFR BLD AUTO: 24.3 %
MCH RBC QN AUTO: 30 PG (ref 26.5–33)
MCHC RBC AUTO-ENTMCNC: 32.6 G/DL (ref 31.5–36.5)
MCV RBC AUTO: 92 FL (ref 78–100)
MONOCYTES # BLD AUTO: 0.4 10E9/L (ref 0–1.3)
MONOCYTES NFR BLD AUTO: 7.6 %
NEUTROPHILS # BLD AUTO: 3.2 10E9/L (ref 1.6–8.3)
NEUTROPHILS NFR BLD AUTO: 64.3 %
NRBC # BLD AUTO: 0 10*3/UL
NRBC BLD AUTO-RTO: 0 /100
NUM BPU REQUESTED: 2
PLATELET # BLD AUTO: 127 10E9/L (ref 150–450)
POTASSIUM SERPL-SCNC: 3.3 MMOL/L (ref 3.4–5.3)
PROT SERPL-MCNC: 6.8 G/DL (ref 6.8–8.8)
RBC # BLD AUTO: 4.23 10E12/L (ref 4.4–5.9)
SODIUM SERPL-SCNC: 143 MMOL/L (ref 133–144)
SPECIMEN EXP DATE BLD: NORMAL
WBC # BLD AUTO: 4.9 10E9/L (ref 4–11)

## 2020-08-26 PROCEDURE — P9041 ALBUMIN (HUMAN),5%, 50ML: HCPCS | Performed by: NURSE ANESTHETIST, CERTIFIED REGISTERED

## 2020-08-26 PROCEDURE — 25000128 H RX IP 250 OP 636: Performed by: RADIOLOGY

## 2020-08-26 PROCEDURE — 25000125 ZZHC RX 250: Performed by: NURSE ANESTHETIST, CERTIFIED REGISTERED

## 2020-08-26 PROCEDURE — 71000027 ZZH RECOVERY PHASE 2 EACH 15 MINS

## 2020-08-26 PROCEDURE — 40000170 ZZH STATISTIC PRE-PROCEDURE ASSESSMENT II

## 2020-08-26 PROCEDURE — 37000009 ZZH ANESTHESIA TECHNICAL FEE, EACH ADDTL 15 MIN

## 2020-08-26 PROCEDURE — 25800030 ZZH RX IP 258 OP 636: Performed by: ANESTHESIOLOGY

## 2020-08-26 PROCEDURE — 27211039 ZZH NEEDLE CR2

## 2020-08-26 PROCEDURE — 49083 ABD PARACENTESIS W/IMAGING: CPT

## 2020-08-26 PROCEDURE — C1769 GUIDE WIRE: HCPCS

## 2020-08-26 PROCEDURE — 71000015 ZZH RECOVERY PHASE 1 LEVEL 2 EA ADDTL HR

## 2020-08-26 PROCEDURE — C1887 CATHETER, GUIDING: HCPCS

## 2020-08-26 PROCEDURE — 82962 GLUCOSE BLOOD TEST: CPT

## 2020-08-26 PROCEDURE — 27210889 ZZH ACCESSORY CR8

## 2020-08-26 PROCEDURE — 37182 INSERT HEPATIC SHUNT (TIPS): CPT

## 2020-08-26 PROCEDURE — 86850 RBC ANTIBODY SCREEN: CPT | Performed by: RADIOLOGY

## 2020-08-26 PROCEDURE — 25000128 H RX IP 250 OP 636: Performed by: ANESTHESIOLOGY

## 2020-08-26 PROCEDURE — 25000128 H RX IP 250 OP 636: Performed by: NURSE ANESTHETIST, CERTIFIED REGISTERED

## 2020-08-26 PROCEDURE — 85025 COMPLETE CBC W/AUTO DIFF WBC: CPT | Performed by: RADIOLOGY

## 2020-08-26 PROCEDURE — 37000008 ZZH ANESTHESIA TECHNICAL FEE, 1ST 30 MIN

## 2020-08-26 PROCEDURE — 25000125 ZZHC RX 250: Performed by: RADIOLOGY

## 2020-08-26 PROCEDURE — 27210845 ZZH DEVICE INFLATION CR5

## 2020-08-26 PROCEDURE — 25800030 ZZH RX IP 258 OP 636: Performed by: NURSE ANESTHETIST, CERTIFIED REGISTERED

## 2020-08-26 PROCEDURE — 27211288 ZZ H NEEDLE CR16

## 2020-08-26 PROCEDURE — 27210891 ZZH BALLOON (NON-PTA) CR6

## 2020-08-26 PROCEDURE — 27211081 ZZH CATH CR10

## 2020-08-26 PROCEDURE — 85610 PROTHROMBIN TIME: CPT | Performed by: RADIOLOGY

## 2020-08-26 PROCEDURE — 71000014 ZZH RECOVERY PHASE 1 LEVEL 2 FIRST HR

## 2020-08-26 PROCEDURE — 85730 THROMBOPLASTIN TIME PARTIAL: CPT | Performed by: RADIOLOGY

## 2020-08-26 PROCEDURE — C1725 CATH, TRANSLUMIN NON-LASER: HCPCS

## 2020-08-26 PROCEDURE — 80053 COMPREHEN METABOLIC PANEL: CPT | Performed by: RADIOLOGY

## 2020-08-26 PROCEDURE — C1874 STENT, COATED/COV W/DEL SYS: HCPCS

## 2020-08-26 PROCEDURE — 25000566 ZZH SEVOFLURANE, EA 15 MIN

## 2020-08-26 PROCEDURE — 86900 BLOOD TYPING SEROLOGIC ABO: CPT | Performed by: RADIOLOGY

## 2020-08-26 PROCEDURE — 36415 COLL VENOUS BLD VENIPUNCTURE: CPT | Performed by: RADIOLOGY

## 2020-08-26 PROCEDURE — 27210908 ZZH NEEDLE CR4

## 2020-08-26 PROCEDURE — 27210732 ZZH ACCESSORY CR1

## 2020-08-26 PROCEDURE — 86901 BLOOD TYPING SEROLOGIC RH(D): CPT | Performed by: RADIOLOGY

## 2020-08-26 PROCEDURE — 86923 COMPATIBILITY TEST ELECTRIC: CPT | Performed by: RADIOLOGY

## 2020-08-26 PROCEDURE — 25500064 ZZH RX 255 OP 636: Performed by: RADIOLOGY

## 2020-08-26 RX ORDER — LIDOCAINE HYDROCHLORIDE 10 MG/ML
1-30 INJECTION, SOLUTION EPIDURAL; INFILTRATION; INTRACAUDAL; PERINEURAL
Status: COMPLETED | OUTPATIENT
Start: 2020-08-26 | End: 2020-08-26

## 2020-08-26 RX ORDER — MEPERIDINE HYDROCHLORIDE 50 MG/ML
12.5 INJECTION INTRAMUSCULAR; INTRAVENOUS; SUBCUTANEOUS
Status: DISCONTINUED | OUTPATIENT
Start: 2020-08-26 | End: 2020-08-26 | Stop reason: HOSPADM

## 2020-08-26 RX ORDER — LIDOCAINE 40 MG/G
CREAM TOPICAL
Status: DISCONTINUED | OUTPATIENT
Start: 2020-08-26 | End: 2020-08-26 | Stop reason: HOSPADM

## 2020-08-26 RX ORDER — LIDOCAINE HYDROCHLORIDE 20 MG/ML
INJECTION, SOLUTION INFILTRATION; PERINEURAL PRN
Status: DISCONTINUED | OUTPATIENT
Start: 2020-08-26 | End: 2020-08-26

## 2020-08-26 RX ORDER — DEXTROSE MONOHYDRATE 25 G/50ML
25-50 INJECTION, SOLUTION INTRAVENOUS
Status: DISCONTINUED | OUTPATIENT
Start: 2020-08-26 | End: 2020-08-26 | Stop reason: HOSPADM

## 2020-08-26 RX ORDER — SODIUM CHLORIDE, SODIUM LACTATE, POTASSIUM CHLORIDE, CALCIUM CHLORIDE 600; 310; 30; 20 MG/100ML; MG/100ML; MG/100ML; MG/100ML
INJECTION, SOLUTION INTRAVENOUS CONTINUOUS
Status: DISCONTINUED | OUTPATIENT
Start: 2020-08-26 | End: 2020-08-26 | Stop reason: HOSPADM

## 2020-08-26 RX ORDER — HYDROMORPHONE HYDROCHLORIDE 1 MG/ML
.3-.5 INJECTION, SOLUTION INTRAMUSCULAR; INTRAVENOUS; SUBCUTANEOUS EVERY 10 MIN PRN
Status: DISCONTINUED | OUTPATIENT
Start: 2020-08-26 | End: 2020-08-26 | Stop reason: HOSPADM

## 2020-08-26 RX ORDER — ALBUTEROL SULFATE 0.83 MG/ML
2.5 SOLUTION RESPIRATORY (INHALATION) EVERY 4 HOURS PRN
Status: DISCONTINUED | OUTPATIENT
Start: 2020-08-26 | End: 2020-08-26 | Stop reason: HOSPADM

## 2020-08-26 RX ORDER — NICOTINE POLACRILEX 4 MG
15-30 LOZENGE BUCCAL
Status: DISCONTINUED | OUTPATIENT
Start: 2020-08-26 | End: 2020-08-26 | Stop reason: HOSPADM

## 2020-08-26 RX ORDER — ONDANSETRON 2 MG/ML
INJECTION INTRAMUSCULAR; INTRAVENOUS PRN
Status: DISCONTINUED | OUTPATIENT
Start: 2020-08-26 | End: 2020-08-26

## 2020-08-26 RX ORDER — FENTANYL CITRATE 50 UG/ML
25-50 INJECTION, SOLUTION INTRAMUSCULAR; INTRAVENOUS EVERY 5 MIN PRN
Status: DISCONTINUED | OUTPATIENT
Start: 2020-08-26 | End: 2020-08-26 | Stop reason: HOSPADM

## 2020-08-26 RX ORDER — FENTANYL CITRATE 50 UG/ML
INJECTION, SOLUTION INTRAMUSCULAR; INTRAVENOUS PRN
Status: DISCONTINUED | OUTPATIENT
Start: 2020-08-26 | End: 2020-08-26

## 2020-08-26 RX ORDER — IODIXANOL 320 MG/ML
150 INJECTION, SOLUTION INTRAVASCULAR ONCE
Status: COMPLETED | OUTPATIENT
Start: 2020-08-26 | End: 2020-08-26

## 2020-08-26 RX ORDER — AMPICILLIN AND SULBACTAM 2; 1 G/1; G/1
3 INJECTION, POWDER, FOR SOLUTION INTRAMUSCULAR; INTRAVENOUS
Status: COMPLETED | OUTPATIENT
Start: 2020-08-26 | End: 2020-08-26

## 2020-08-26 RX ORDER — ALBUMIN, HUMAN INJ 5% 5 %
SOLUTION INTRAVENOUS CONTINUOUS PRN
Status: DISCONTINUED | OUTPATIENT
Start: 2020-08-26 | End: 2020-08-26

## 2020-08-26 RX ORDER — ONDANSETRON 2 MG/ML
4 INJECTION INTRAMUSCULAR; INTRAVENOUS EVERY 30 MIN PRN
Status: DISCONTINUED | OUTPATIENT
Start: 2020-08-26 | End: 2020-08-26 | Stop reason: HOSPADM

## 2020-08-26 RX ORDER — ONDANSETRON 4 MG/1
4 TABLET, ORALLY DISINTEGRATING ORAL EVERY 30 MIN PRN
Status: DISCONTINUED | OUTPATIENT
Start: 2020-08-26 | End: 2020-08-26 | Stop reason: HOSPADM

## 2020-08-26 RX ORDER — PROPOFOL 10 MG/ML
INJECTION, EMULSION INTRAVENOUS PRN
Status: DISCONTINUED | OUTPATIENT
Start: 2020-08-26 | End: 2020-08-26

## 2020-08-26 RX ORDER — HEPARIN SODIUM 200 [USP'U]/100ML
1 INJECTION, SOLUTION INTRAVENOUS CONTINUOUS PRN
Status: DISCONTINUED | OUTPATIENT
Start: 2020-08-26 | End: 2020-08-26 | Stop reason: HOSPADM

## 2020-08-26 RX ORDER — NALOXONE HYDROCHLORIDE 0.4 MG/ML
.1-.4 INJECTION, SOLUTION INTRAMUSCULAR; INTRAVENOUS; SUBCUTANEOUS
Status: DISCONTINUED | OUTPATIENT
Start: 2020-08-26 | End: 2020-08-26 | Stop reason: HOSPADM

## 2020-08-26 RX ORDER — SODIUM CHLORIDE 9 MG/ML
INJECTION, SOLUTION INTRAVENOUS CONTINUOUS
Status: DISCONTINUED | OUTPATIENT
Start: 2020-08-26 | End: 2020-08-26 | Stop reason: HOSPADM

## 2020-08-26 RX ORDER — KETAMINE HYDROCHLORIDE 10 MG/ML
INJECTION INTRAMUSCULAR; INTRAVENOUS PRN
Status: DISCONTINUED | OUTPATIENT
Start: 2020-08-26 | End: 2020-08-26

## 2020-08-26 RX ADMIN — HYDROMORPHONE HYDROCHLORIDE 0.5 MG: 1 INJECTION, SOLUTION INTRAMUSCULAR; INTRAVENOUS; SUBCUTANEOUS at 14:40

## 2020-08-26 RX ADMIN — ONDANSETRON 4 MG: 2 INJECTION INTRAMUSCULAR; INTRAVENOUS at 13:46

## 2020-08-26 RX ADMIN — PHENYLEPHRINE HYDROCHLORIDE 100 MCG: 10 INJECTION INTRAVENOUS at 08:41

## 2020-08-26 RX ADMIN — PROPOFOL 150 MG: 10 INJECTION, EMULSION INTRAVENOUS at 08:13

## 2020-08-26 RX ADMIN — Medication 10 MG: at 11:20

## 2020-08-26 RX ADMIN — AMPICILLIN SODIUM AND SULBACTAM SODIUM 3 G: 2; 1 INJECTION, POWDER, FOR SOLUTION INTRAMUSCULAR; INTRAVENOUS at 07:57

## 2020-08-26 RX ADMIN — ALBUMIN HUMAN: 0.05 INJECTION, SOLUTION INTRAVENOUS at 09:05

## 2020-08-26 RX ADMIN — ALBUMIN HUMAN: 0.05 INJECTION, SOLUTION INTRAVENOUS at 09:15

## 2020-08-26 RX ADMIN — ROCURONIUM BROMIDE 50 MG: 10 INJECTION INTRAVENOUS at 08:13

## 2020-08-26 RX ADMIN — FENTANYL CITRATE 50 MCG: 50 INJECTION, SOLUTION INTRAMUSCULAR; INTRAVENOUS at 11:34

## 2020-08-26 RX ADMIN — ROCURONIUM BROMIDE 20 MG: 10 INJECTION INTRAVENOUS at 09:22

## 2020-08-26 RX ADMIN — ALBUMIN HUMAN: 0.05 INJECTION, SOLUTION INTRAVENOUS at 09:25

## 2020-08-26 RX ADMIN — HEPARIN SODIUM 2 BAG: 200 INJECTION, SOLUTION INTRAVENOUS at 08:49

## 2020-08-26 RX ADMIN — ONDANSETRON 4 MG: 2 INJECTION INTRAMUSCULAR; INTRAVENOUS at 12:03

## 2020-08-26 RX ADMIN — MIDAZOLAM 2 MG: 1 INJECTION INTRAMUSCULAR; INTRAVENOUS at 07:58

## 2020-08-26 RX ADMIN — FENTANYL CITRATE 25 MCG: 50 INJECTION, SOLUTION INTRAMUSCULAR; INTRAVENOUS at 13:48

## 2020-08-26 RX ADMIN — AMPICILLIN SODIUM AND SULBACTAM SODIUM 1.5 G: 2; 1 INJECTION, POWDER, FOR SOLUTION INTRAMUSCULAR; INTRAVENOUS at 09:57

## 2020-08-26 RX ADMIN — IODIXANOL 110 ML: 320 INJECTION, SOLUTION INTRAVASCULAR at 09:00

## 2020-08-26 RX ADMIN — LIDOCAINE HYDROCHLORIDE 100 MG: 20 INJECTION, SOLUTION INFILTRATION; PERINEURAL at 08:13

## 2020-08-26 RX ADMIN — AMPICILLIN SODIUM AND SULBACTAM SODIUM 1.5 G: 2; 1 INJECTION, POWDER, FOR SOLUTION INTRAMUSCULAR; INTRAVENOUS at 11:58

## 2020-08-26 RX ADMIN — LIDOCAINE HYDROCHLORIDE 5 ML: 10 INJECTION, SOLUTION EPIDURAL; INFILTRATION; INTRACAUDAL; PERINEURAL at 08:48

## 2020-08-26 RX ADMIN — Medication 10 MG: at 10:15

## 2020-08-26 RX ADMIN — Medication 30 MG: at 08:13

## 2020-08-26 RX ADMIN — PHENYLEPHRINE HYDROCHLORIDE 100 MCG: 10 INJECTION INTRAVENOUS at 09:43

## 2020-08-26 RX ADMIN — ROCURONIUM BROMIDE 30 MG: 10 INJECTION INTRAVENOUS at 10:15

## 2020-08-26 RX ADMIN — ROCURONIUM BROMIDE 30 MG: 10 INJECTION INTRAVENOUS at 08:42

## 2020-08-26 RX ADMIN — SODIUM CHLORIDE, POTASSIUM CHLORIDE, SODIUM LACTATE AND CALCIUM CHLORIDE: 600; 310; 30; 20 INJECTION, SOLUTION INTRAVENOUS at 10:28

## 2020-08-26 RX ADMIN — FENTANYL CITRATE 50 MCG: 50 INJECTION, SOLUTION INTRAMUSCULAR; INTRAVENOUS at 08:13

## 2020-08-26 RX ADMIN — PHENYLEPHRINE HYDROCHLORIDE 200 MCG: 10 INJECTION INTRAVENOUS at 09:14

## 2020-08-26 RX ADMIN — SODIUM CHLORIDE, POTASSIUM CHLORIDE, SODIUM LACTATE AND CALCIUM CHLORIDE: 600; 310; 30; 20 INJECTION, SOLUTION INTRAVENOUS at 07:57

## 2020-08-26 RX ADMIN — ROCURONIUM BROMIDE 20 MG: 10 INJECTION INTRAVENOUS at 11:34

## 2020-08-26 RX ADMIN — PHENYLEPHRINE HYDROCHLORIDE 100 MCG: 10 INJECTION INTRAVENOUS at 09:13

## 2020-08-26 RX ADMIN — SUGAMMADEX 200 MG: 100 INJECTION, SOLUTION INTRAVENOUS at 12:17

## 2020-08-26 RX ADMIN — PROCHLORPERAZINE EDISYLATE 5 MG: 5 INJECTION INTRAMUSCULAR; INTRAVENOUS at 14:35

## 2020-08-26 ASSESSMENT — MIFFLIN-ST. JEOR: SCORE: 1925.88

## 2020-08-26 ASSESSMENT — LIFESTYLE VARIABLES: TOBACCO_USE: 1

## 2020-08-26 NOTE — PROCEDURES
[facility], [place]    Procedure: IR Procedure Note    Date/Time: 8/26/2020 3:53 PM  Performed by: Magnolia Villanueva MD  Authorized by: Magnolia Villanueva MD   IR Fellow Physician:  Radiology Resident Physician: Kerry Dunne      UNIVERSAL PROTOCOL   Site Marked: NA  Prior Images Obtained and Reviewed:  Yes  Required items: Required blood products, implants, devices and special equipment available    Patient identity confirmed:  Verbally with patient, arm band, provided demographic data and hospital-assigned identification number  Patient was reevaluated immediately before administering moderate or deep sedation or anesthesia  Confirmation Checklist:  Patient's identity using two indicators, relevant allergies, procedure was appropriate and matched the consent or emergent situation and correct equipment/implants were available  Time out: Immediately prior to the procedure a time out was called    Universal Protocol: the Joint Commission Universal Protocol was followed    Preparation: Patient was prepped and draped in usual sterile fashion           ANESTHESIA    Anesthesia: Local infiltration  Local Anesthetic:  Lidocaine 1% without epinephrine    See dictated procedure note for full details.  Findings: Successful Tips Shunt Placed.    Specimens: none    Complications: None    Condition: Stable    Plan: Transfer to [facility] post procedure.     PROCEDURE   Patient Tolerance:  Patient tolerated the procedure well with no immediate complications  Describe Procedure: Successful Tips Shunt Placed.  Length of time physician/provider present for 1:1 monitoring during sedation: 0

## 2020-08-26 NOTE — OR NURSING
Spoke to IR for discharge order, operative note, and discharge instructions. Awaiting those orders and instructions.

## 2020-08-26 NOTE — OR NURSING
Dr. Villanueva and Dr. Dunne at bedside; patient okay to be discharged. While MD's present, discussed old para site on right abdomen; team believes it is more of a contact dermatitis from adhesive tape and/or dressings. Will continue to monitor and patient instructed on further signs and symptoms to watch for.

## 2020-08-26 NOTE — PROGRESS NOTES
Interventional Radiology Intra-procedural Nursing Note    Patient Name: Sp Plasencia  Medical Record Number: [id number]  Today's Date: August 26, 2020    Start Time: 0840  End of procedure time: 1220  Procedure: Transvenous intrahepatic portosystemic shunt placement with paracentesis  Fire Safety Score: 1    Consent Review/Timeout Performed by: Dr. Magnolia Kingston  Procedure Performed By: Dr. Kerry Dunne, Dr. Magnolia Kingston    Procedure start time: 0840  Procedure end time: 1220    Report given to: Anesthesia Staff to provide report to PACU staff upon transfer of care.    Other Notes:  Alert male transported via cart with anesthesia escort, from Jennifer-Op to IR Procedure Room 4 for planned intervention.  ID band confirmed and patient acknowledges understanding of planned procedure. Patient repositioned to procedure table via hover-mat and positioned supine.  Patient prepped and draped per policy see VS flowsheet, MAR for further information.     Anesthesia present during case for management of airway and hemodynamic monitoring/management.    Bo placed per MD order without difficulty.    6000 mls drained from paracentesis.    Patient condition post procedure is stable.  Patient returned to PACU with anesthesia escort for post-procedure monitoring and continuation of care.    Sandrine Parish RN

## 2020-08-26 NOTE — PROGRESS NOTES
Pressures:  Pre: RA=14    POST:  RA=19  PROXIMAL STENT=23  MID STENT=25  DISTAL STENT=28    6L PARA FLUID TAKEN OFF

## 2020-08-26 NOTE — DISCHARGE INSTRUCTIONS
[facility], [place]  Same-Day Surgery   Adult Discharge Orders & Instructions     For 24 hours after surgery    1. Get plenty of rest.  A responsible adult must stay with you for at least 24 hours after you leave the hospital.   2. Do not drive or use heavy equipment.  If you have weakness or tingling, don't drive or use heavy equipment until this feeling goes away.  3. Do not drink alcohol.  4. Avoid strenuous or risky activities.  Ask for help when climbing stairs.   5. You may feel lightheaded.  IF so, sit for a few minutes before standing.  Have someone help you get up.   6. If you have nausea (feel sick to your stomach): Drink only clear liquids such as apple juice, ginger ale, broth or 7-Up.  Rest may also help.  Be sure to drink enough fluids.  Move to a regular diet as you feel able.  7. You may have a slight fever. Call the doctor if your fever is over 100 F (37.7 C) (taken under the tongue) or lasts longer than 24 hours.  8. You may have a dry mouth, a sore throat, muscle aches or trouble sleeping.  These should go away after 24 hours.  9. Do not make important or legal decisions.   Call your doctor for any of the followin.  Signs of infection (fever, growing tenderness at the surgery site, a large amount of drainage or bleeding, severe pain, foul-smelling drainage, redness, swelling).    2. It has been over 8 to 10 hours since surgery and you are still not able to urinate (pass water).    3.  Headache for over 24 hours.    To contact a doctor, call:  [ ] [phone] and ask for the INTERVENTIONAL RADIOLOGY FELLOW on call (answered 24 hours a day)  [ ] Emergency Department: [facility]: [phone]    Take it easy when you get home.  Remember, same day surgery DOES NOT MEAN SAME DAY RECOVERY!  Healing is a gradual process.  You will need some time to recover - you may be more tired than you realize at first.  Rest and relax for at least the first 24 hours at  home.  You'll feel better and heal faster if you take good care of yourself.

## 2020-08-26 NOTE — OR NURSING
Writer paged IR fellow for discharge patient order, brief op note, and discharge instructions in AVS.

## 2020-08-26 NOTE — ANESTHESIA PREPROCEDURE EVALUATION
"Anesthesia Pre-Procedure Evaluation    Patient: Sp Plasencia   MRN:     [id number] Gender:   male   Age:    45 year old :      1975        Preoperative Diagnosis: Alcoholic cirrhosis of liver with ascites (H) [K70.31]   Procedure(s):  ANESTHESIA OUT OF OR Paracentesis prior to Transjugular Intrahepatic Porosystemic Shunt @ 0800     LABS:  CBC:   Lab Results   Component Value Date    WBC 5.1 2020    WBC 6.7 2020    HGB 12.6 (L) 2020    HGB 10.2 (L) 2020    HCT 38.8 (L) 2020    HCT 30.3 (L) 2020     (L) 2020     2020     BMP:   Lab Results   Component Value Date     2020     2020    POTASSIUM 3.3 (L) 2020    POTASSIUM 3.1 (L) 2020    CHLORIDE 109 2020    CHLORIDE 108 2020    CO2 25 2020    CO2 25 2020    BUN 14 2020    BUN 14 2020    CR 0.50 (L) 2020    CR 0.54 (L) 2020    GLC 97 2020     (H) 2020     COAGS:   Lab Results   Component Value Date    INR 1.42 (H) 2020     POC: No results found for: BGM, HCG, HCGS  OTHER:   Lab Results   Component Value Date    JOSE CARLOS 8.6 2020    ALBUMIN 2.9 (L) 2020    PROTTOTAL 6.7 (L) 2020    ALT 35 2020    AST 59 (H) 2020    ALKPHOS 118 2020    BILITOTAL 2.3 (H) 2020    LIPASE 205 2020    NANDO 38 2020    TSH 2.100 12/10/2018        Preop Vitals    BP Readings from Last 3 Encounters:   20 126/75   20 120/66   20 120/72    Pulse Readings from Last 3 Encounters:   20 91   20 82   20 77      Resp Readings from Last 3 Encounters:   20 16   20 16   20 16    SpO2 Readings from Last 3 Encounters:   20 95%   20 99%   20 99%      Temp Readings from Last 1 Encounters:   20 36.8  C (98.3  F) (Oral)    Ht Readings from Last 1 Encounters:   20 1.854 m (6' 1\")      Wt Readings from Last " "1 Encounters:   05/21/20 98 kg (216 lb 1.6 oz)    Estimated body mass index is 28.51 kg/m  as calculated from the following:    Height as of 4/20/20: 1.854 m (6' 1\").    Weight as of 5/21/20: 98 kg (216 lb 1.6 oz).     LDA:  Peripheral IV Right (Active)   Number of days:        Peripheral IV Right (Active)   Number of days:        Peripheral IV 05/28/20 Right (Active)   Number of days: 90       Peripheral IV 07/09/20 Right Hand (Active)   Number of days: 48       Peripheral IV 08/20/20 Right (Active)   Number of days: 6       ETT (Active)   Number of days: 0        Past Medical History:   Diagnosis Date     Acid reflux      Ascites      Esophageal varices (H)      FSHD (facioscapulohumeral muscular dystrophy) (H)      Gout      HTN (hypertension)      Jaundice      Liver cirrhosis (H)      Smoker       Past Surgical History:   Procedure Laterality Date     NO HISTORY OF SURGERY        Allergies   Allergen Reactions     Adhesive Tape Blisters     Skin breakdown, open sores        Anesthesia Evaluation     .             ROS/MED HX    ENT/Pulmonary:     (+)tobacco use, , . .    Neurologic:       Cardiovascular:     (+) hypertension----. : . . . :. .       METS/Exercise Tolerance:     Hematologic:         Musculoskeletal:   (+)  other musculoskeletal- FSHD (facioscapulohumeral muscular dystrophy)      GI/Hepatic:     (+) GERD liver disease (Liver cirrhosis ), Other GI/Hepatic (Hepatic encephalopathy ) Esophageal varices       Renal/Genitourinary:     (+) chronic renal disease, type: ARF,       Endo:         Psychiatric:         Infectious Disease:         Malignancy:         Other:                         PHYSICAL EXAM:   Mental Status/Neuro: A/A/O   Airway: Facies: Feasible  Mallampati: I  Mouth/Opening: Full  TM distance: > 6 cm  Neck ROM: Full   Respiratory: Auscultation: CTAB     Resp. Rate: Normal     Resp. Effort: Normal      CV: Rhythm: Regular  Rate: Age appropriate  Heart: Normal Sounds  Edema: None   Comments: "      Dental: Normal Dentition                Assessment:   ASA SCORE: 3    H&P: History and physical reviewed and following examination; no interval change.   Smoking Status:  Non-Smoker/Unknown   NPO Status: NPO Appropriate     Plan:   Anes. Type:  General   Pre-Medication: None   Induction:  IV (Standard)   Airway: ETT; Oral   Access/Monitoring: PIV; 2nd PIV; A-Line   Maintenance: Balanced     Postop Plan:   Postop Pain: Opioids  Postop Sedation/Airway: Not planned  Disposition: Outpatient     PONV Management:   Adult Risk Factors:, Non-Smoker, Postop Opioids   Prevention: Ondansetron, Dexamethasone     CONSENT: Direct conversation   Plan and risks discussed with: Patient   Blood Products: Consented (ALL Blood Products)                   Gwen Ortiz MD

## 2020-08-26 NOTE — ANESTHESIA POSTPROCEDURE EVALUATION
Anesthesia POST Procedure Evaluation    Patient: Sp Plasencia   MRN:     [id number] Gender:   male   Age:    45 year old :      1975        Preoperative Diagnosis: Alcoholic cirrhosis of liver with ascites (H) [K70.31]   Procedure(s):  ANESTHESIA OUT OF OR Paracentesis prior to Transjugular Intrahepatic Porosystemic Shunt @ 0800   Postop Comments: No value filed.     Anesthesia Type: General       Disposition: Outpatient   Postop Pain Control: Uneventful            Sign Out: Well controlled pain   PONV: No   Neuro/Psych: Uneventful            Sign Out: Acceptable/Baseline neuro status   Airway/Respiratory: Uneventful            Sign Out: Acceptable/Baseline resp. status   CV/Hemodynamics: Uneventful            Sign Out: Acceptable CV status   Other NRE: NONE   DID A NON-ROUTINE EVENT OCCUR? No         Last Anesthesia Record Vitals:  CRNA VITALS  2020 1158 - 2020 1258      2020             Pulse:  97    Ht Rate:  96    SpO2:  93 %          Last PACU Vitals:  Vitals Value Taken Time   /73 2020 12:50 PM   Temp 36.3  C (97.3  F) 2020 12:33 PM   Pulse 96 2020 12:59 PM   Resp 18 2020 12:33 PM   SpO2 95 % 2020 12:59 PM   Temp src     NIBP     Pulse     SpO2     Resp     Temp     Ht Rate     Temp 2     Vitals shown include unvalidated device data.      Electronically Signed By: Gwen Ortiz MD, 2020, 1:00 PM

## 2020-08-26 NOTE — ANESTHESIA CARE TRANSFER NOTE
Patient: Sp Plasencia    Procedure(s):  ANESTHESIA OUT OF OR Paracentesis prior to Transjugular Intrahepatic Porosystemic Shunt @ 0800    Diagnosis: Alcoholic cirrhosis of liver with ascites (H) [K70.31]  Diagnosis Additional Information: No value filed.    Anesthesia Type:   General     Note:  Airway :Nasal Cannula  Patient transferred to:PACU  Comments: Awake in PAR. Tolerated anesthesia well  Handoff Report: Identifed the Patient, Identified the Reponsible Provider, Reviewed the pertinent medical history, Discussed the surgical course, Reviewed Intra-OP anesthesia mangement and issues during anesthesia, Set expectations for post-procedure period and Allowed opportunity for questions and acknowledgement of understanding      Vitals: (Last set prior to Anesthesia Care Transfer)    CRNA VITALS  8/26/2020 1158 - 8/26/2020 1238      8/26/2020             Pulse:  97    Ht Rate:  96    SpO2:  93 %                Electronically Signed By: YVONNE Ellis CRNA  August 26, 2020  12:38 PM

## 2020-08-28 ENCOUNTER — TELEPHONE (OUTPATIENT)
Dept: VASCULAR SURGERY | Facility: CLINIC | Age: 45
End: 2020-08-28

## 2020-08-28 DIAGNOSIS — K70.31 ALCOHOLIC CIRRHOSIS OF LIVER WITH ASCITES (H): Primary | ICD-10-CM

## 2020-08-28 NOTE — TELEPHONE ENCOUNTER
Called pt to fup on him s/p tips with Dr Kingston.     Pt states that he's doing well post treatment.     No issues except  Minor pain from the catheter but overall doing well    His next para is for next Thursday in which I did remind him to continue to attend these appts  In which it can take 3-6 mos for the stent to start working.     Will also get his 1 mos f/up scheduled as well    Pt states that he likes to have his US prior to his sylvia.     Will see what we can do to coincide the appts with his upcoming sylvia.    He agrees to plan.     Christie ALONSO RN, BSN  Interventional Radiology/Vascular  Nurse Coordinator   Phone: [phone]  Fax: [phone]

## 2020-08-29 LAB
BLD PROD TYP BPU: NORMAL
BLD PROD TYP BPU: NORMAL
BLD UNIT ID BPU: 0
BLD UNIT ID BPU: 0
BLOOD PRODUCT CODE: NORMAL
BLOOD PRODUCT CODE: NORMAL
BPU ID: NORMAL
BPU ID: NORMAL
TRANSFUSION STATUS PATIENT QL: NORMAL

## 2020-09-02 ENCOUNTER — HOSPITAL ENCOUNTER (OUTPATIENT)
Dept: ULTRASOUND IMAGING | Facility: CLINIC | Age: 45
Discharge: HOME | End: 2020-09-02
Attending: INTERNAL MEDICINE | Admitting: INTERNAL MEDICINE
Payer: COMMERCIAL

## 2020-09-02 ENCOUNTER — PATIENT OUTREACH (OUTPATIENT)
Dept: SURGERY | Facility: CLINIC | Age: 45
End: 2020-09-02

## 2020-09-02 VITALS
HEART RATE: 90 BPM | RESPIRATION RATE: 18 BRPM | DIASTOLIC BLOOD PRESSURE: 85 MMHG | SYSTOLIC BLOOD PRESSURE: 153 MMHG | OXYGEN SATURATION: 99 %

## 2020-09-02 DIAGNOSIS — K70.31 ALCOHOLIC CIRRHOSIS OF LIVER WITH ASCITES (H): ICD-10-CM

## 2020-09-02 DIAGNOSIS — K42.9 UMBILICAL HERNIA WITHOUT OBSTRUCTION AND WITHOUT GANGRENE: Primary | ICD-10-CM

## 2020-09-02 PROCEDURE — 25000125 ZZHC RX 250: Performed by: RADIOLOGY

## 2020-09-02 PROCEDURE — 49083 ABD PARACENTESIS W/IMAGING: CPT

## 2020-09-02 RX ORDER — DEXTROSE MONOHYDRATE 25 G/50ML
25-50 INJECTION, SOLUTION INTRAVENOUS
Status: CANCELLED | OUTPATIENT
Start: 2020-09-02

## 2020-09-02 RX ORDER — NICOTINE POLACRILEX 4 MG
15-30 LOZENGE BUCCAL
Status: CANCELLED | OUTPATIENT
Start: 2020-09-02

## 2020-09-02 RX ADMIN — LIDOCAINE HYDROCHLORIDE 10 ML: 10 INJECTION, SOLUTION EPIDURAL; INFILTRATION; INTRACAUDAL; PERINEURAL at 10:06

## 2020-09-02 NOTE — PROGRESS NOTES
Patient tolerated paracentesis well.  2200 mL of light straw fluid drained done by Dr Berg  Dressing / tegaderm clean dry and intact with no drainage.  NO albumin given. Patient states understanding discharge instructions, taking P.O and home per self.  Cisco Sargent, RN, BSN

## 2020-09-02 NOTE — PROGRESS NOTES
A referral was placed for this patient to consult with the General Surgery Team regarding an umbilical hernia.  Spoke with the patient and he is currently recovering from TIPS procedure.      The patient would like to see a surgeon closer to home and not travel to [place].  Patient will contact this office if he would like to schedule a consultation at [facility].

## 2020-09-02 NOTE — PROCEDURES
[facility]    Procedure: US para    Date/Time: 9/2/2020 10:17 AM  Performed by: Gerry Berg MD  Authorized by: Gerry Berg MD     UNIVERSAL PROTOCOL   Site Marked: NA  Prior Images Obtained and Reviewed:  Yes  Required items: Required blood products, implants, devices and special equipment available    Patient identity confirmed:  Verbally with patient, arm band, provided demographic data and hospital-assigned identification number  Patient was reevaluated immediately before administering moderate or deep sedation or anesthesia  Confirmation Checklist:  Patient's identity using two indicators, relevant allergies, procedure was appropriate and matched the consent or emergent situation and correct equipment/implants were available  Time out: Immediately prior to the procedure a time out was called    Universal Protocol: the Joint Commission Universal Protocol was followed    Preparation: Patient was prepped and draped in usual sterile fashion           ANESTHESIA    Anesthesia: Local infiltration  Local Anesthetic:  Lidocaine 1% without epinephrine      SEDATION    Patient Sedated: No    See dictated procedure note for full details.  Findings: US LLQ Para    Specimens: none    Complications: None    Condition: Stable    PROCEDURE   Patient Tolerance:  Patient tolerated the procedure well with no immediate complications    Length of time physician/provider present for 1:1 monitoring during sedation: 0

## 2020-09-10 DIAGNOSIS — Z11.59 ENCOUNTER FOR SCREENING FOR OTHER VIRAL DISEASES: Primary | ICD-10-CM

## 2020-09-15 ENCOUNTER — HOSPITAL ENCOUNTER (OUTPATIENT)
Dept: LAB | Facility: CLINIC | Age: 45
End: 2020-09-15
Attending: INTERNAL MEDICINE
Payer: COMMERCIAL

## 2020-09-15 ENCOUNTER — HOSPITAL ENCOUNTER (OUTPATIENT)
Dept: ULTRASOUND IMAGING | Facility: CLINIC | Age: 45
End: 2020-09-15
Attending: INTERNAL MEDICINE
Payer: COMMERCIAL

## 2020-09-15 ENCOUNTER — HOSPITAL ENCOUNTER (OUTPATIENT)
Dept: ULTRASOUND IMAGING | Facility: CLINIC | Age: 45
End: 2020-09-15
Attending: RADIOLOGY
Payer: COMMERCIAL

## 2020-09-15 ENCOUNTER — MYC MEDICAL ADVICE (OUTPATIENT)
Dept: GASTROENTEROLOGY | Facility: CLINIC | Age: 45
End: 2020-09-15

## 2020-09-15 DIAGNOSIS — K70.31 ALCOHOLIC CIRRHOSIS OF LIVER WITH ASCITES (H): ICD-10-CM

## 2020-09-15 DIAGNOSIS — K74.60 CIRRHOSIS OF LIVER (H): ICD-10-CM

## 2020-09-15 DIAGNOSIS — K70.9: ICD-10-CM

## 2020-09-15 DIAGNOSIS — K70.9 ALCOHOL LIVER DAMAGE (H): ICD-10-CM

## 2020-09-15 LAB
AFP SERPL-MCNC: 1.8 UG/L (ref 0–8)
ALBUMIN SERPL-MCNC: 3.1 G/DL (ref 3.4–5)
ALP SERPL-CCNC: 134 U/L (ref 40–150)
ALT SERPL W P-5'-P-CCNC: 44 U/L (ref 0–70)
ANION GAP SERPL CALCULATED.3IONS-SCNC: 4 MMOL/L (ref 3–14)
AST SERPL W P-5'-P-CCNC: 63 U/L (ref 0–45)
BILIRUB SERPL-MCNC: 1.8 MG/DL (ref 0.2–1.3)
BUN SERPL-MCNC: 16 MG/DL (ref 7–30)
CALCIUM SERPL-MCNC: 9.3 MG/DL (ref 8.5–10.1)
CHLORIDE SERPL-SCNC: 113 MMOL/L (ref 94–109)
CO2 SERPL-SCNC: 25 MMOL/L (ref 20–32)
CREAT SERPL-MCNC: 0.64 MG/DL (ref 0.66–1.25)
ERYTHROCYTE [DISTWIDTH] IN BLOOD BY AUTOMATED COUNT: 16 % (ref 10–15)
GFR SERPL CREATININE-BSD FRML MDRD: >90 ML/MIN/{1.73_M2}
GLUCOSE SERPL-MCNC: 96 MG/DL (ref 70–99)
HCT VFR BLD AUTO: 38 % (ref 40–53)
HGB BLD-MCNC: 12.3 G/DL (ref 13.3–17.7)
INR PPP: 1.53 (ref 0.86–1.14)
MCH RBC QN AUTO: 30 PG (ref 26.5–33)
MCHC RBC AUTO-ENTMCNC: 32.4 G/DL (ref 31.5–36.5)
MCV RBC AUTO: 93 FL (ref 78–100)
PLATELET # BLD AUTO: 122 10E9/L (ref 150–450)
POTASSIUM SERPL-SCNC: 4.3 MMOL/L (ref 3.4–5.3)
PROT SERPL-MCNC: 6.6 G/DL (ref 6.8–8.8)
RBC # BLD AUTO: 4.1 10E12/L (ref 4.4–5.9)
SODIUM SERPL-SCNC: 142 MMOL/L (ref 133–144)
WBC # BLD AUTO: 4.3 10E9/L (ref 4–11)

## 2020-09-15 PROCEDURE — 93975 VASCULAR STUDY: CPT | Mod: TC

## 2020-09-15 PROCEDURE — 80321 ALCOHOLS BIOMARKERS 1OR 2: CPT | Performed by: INTERNAL MEDICINE

## 2020-09-15 PROCEDURE — 36415 COLL VENOUS BLD VENIPUNCTURE: CPT | Performed by: INTERNAL MEDICINE

## 2020-09-15 PROCEDURE — 76705 ECHO EXAM OF ABDOMEN: CPT | Mod: XS

## 2020-09-15 PROCEDURE — 82105 ALPHA-FETOPROTEIN SERUM: CPT | Performed by: INTERNAL MEDICINE

## 2020-09-15 PROCEDURE — 85027 COMPLETE CBC AUTOMATED: CPT | Performed by: INTERNAL MEDICINE

## 2020-09-15 PROCEDURE — 85610 PROTHROMBIN TIME: CPT | Performed by: INTERNAL MEDICINE

## 2020-09-15 PROCEDURE — 80053 COMPREHEN METABOLIC PANEL: CPT | Performed by: INTERNAL MEDICINE

## 2020-09-16 ENCOUNTER — OFFICE VISIT (OUTPATIENT)
Dept: SURGERY | Facility: CLINIC | Age: 45
End: 2020-09-16
Payer: COMMERCIAL

## 2020-09-16 VITALS
BODY MASS INDEX: 29.2 KG/M2 | HEIGHT: 73 IN | SYSTOLIC BLOOD PRESSURE: 122 MMHG | OXYGEN SATURATION: 97 % | HEART RATE: 90 BPM | DIASTOLIC BLOOD PRESSURE: 73 MMHG | WEIGHT: 220.3 LBS

## 2020-09-16 DIAGNOSIS — K70.31 ALCOHOLIC CIRRHOSIS OF LIVER WITH ASCITES (H): ICD-10-CM

## 2020-09-16 DIAGNOSIS — K42.9 UMBILICAL HERNIA WITHOUT OBSTRUCTION AND WITHOUT GANGRENE: ICD-10-CM

## 2020-09-16 DIAGNOSIS — R79.1 ELEVATED INR: Primary | ICD-10-CM

## 2020-09-16 RX ORDER — PHYTONADIONE 5 MG/1
5 TABLET ORAL DAILY
Qty: 5 TABLET | Refills: 0 | Status: SHIPPED | OUTPATIENT
Start: 2020-09-16 | End: 2020-09-21

## 2020-09-16 ASSESSMENT — PAIN SCALES - GENERAL: PAINLEVEL: SEVERE PAIN (6)

## 2020-09-16 ASSESSMENT — MIFFLIN-ST. JEOR: SCORE: 1943.15

## 2020-09-16 NOTE — PROGRESS NOTES
This is a 45-year-old gentleman who has a significant history of alcohol use and Cirrhosis. His last drink was January 2020.  He recently has undergone a successful TIPS procedure.  This has greatly reduced his ascites.  He has a longstanding right inguinal hernia.  This is symptomatic for him.  It is unclear if he has had any obstructive symptoms.  The hernia has been reducible.  I did review previous CAT scans.  One from February of this past year shows a clear right inguinal filled with ascites fluid.  The patient does describe being able to reduce contents.  He has a slightly elevated INR at 1.53.  His platelet count is slightly low at 122,000.  He denies any GI bleeds.  The patient has been evaluated by Dr. Briggs for liver transplantation in May.  And is seeing us today for evaluation of his right inguinal hernia.    Past Surgical History:   Procedure Laterality Date     NO HISTORY OF SURGERY       Past Medical History:   Diagnosis Date     Acid reflux      Ascites      Esophageal varices (H)      FSHD (facioscapulohumeral muscular dystrophy) (H)      Gout      HTN (hypertension)      Jaundice      Liver cirrhosis (H)      Smoker         Allergies   Allergen Reactions     Adhesive Tape Blisters     Skin breakdown, open sores     Current Outpatient Medications   Medication     acetaminophen (TYLENOL) 325 MG tablet     allopurinol (ZYLOPRIM) 300 MG tablet     escitalopram (LEXAPRO) 5 MG tablet     esomeprazole (NEXIUM) 40 MG DR capsule     furosemide (LASIX) 20 MG tablet     hydrOXYzine (ATARAX) 25 MG tablet     ibuprofen (ADVIL/MOTRIN) 200 MG capsule     lactulose (CEPHULAC) 20 GM packet     Multiple Vitamin (DAILY MULTIVITAMIN PO)     Omega-3 Fatty Acids (FISH OIL PO)     ondansetron (ZOFRAN) 4 MG tablet     spironolactone (ALDACTONE) 25 MG tablet     traMADol (ULTRAM) 50 MG tablet     XIFAXAN 550 MG TABS tablet     zolpidem (AMBIEN) 5 MG tablet     No current facility-administered medications for this  "visit.      /73 (BP Location: Left arm, Patient Position: Sitting, Cuff Size: Adult Regular)   Pulse 90   Ht 1.854 m (6' 1\")   Wt 99.9 kg (220 lb 4.8 oz)   SpO2 97%   BMI 29.07 kg/m      On physical examination the patient has a reducible small right inguinal hernia testes are descended bilaterally without masses.  Patient has a small umbilical hernia.    Assessment/Plan: The patient has a symptomatic right inguinal hernia.  He appears to be medically optimized.  I have contacted his transplant surgeon Dr. Sabina Montalvo given her extensive expertise in managing patients with liver failure as well as his hepatologist Dr. Mellisa Winters to clarify next steps.  I explained to the patient by Dr. Briggs may be the operative surgeon that he was fine with this.  I discussed about the laparoscopic and open approaches and that likely he would need a mesh repair.  He is eager to better understand the timeline.    I have been able to connect with Selina Montalvo and Singh. I called the patient today. Plan a laparoscopic right possible bilateral inguinal hernia repair with Mesh.  He understands the procedure and its risks and would like us to proceed.  "

## 2020-09-16 NOTE — LETTER
9/16/2020       RE: Sp Plasencia  [address] [facility] [address]     Dear Colleague,    Thank you for referring your patient, Sp Plasencia, to the [facility] GENERAL SURGERY at [facility]. Please see a copy of my visit note below.    This is a 45-year-old gentleman who has a significant history of alcohol use and Cirrhosis. His last drink was January 2020.  He recently has undergone a successful TIPS procedure.  This has greatly reduced his ascites.  He has a longstanding right inguinal hernia.  This is symptomatic for him.  It is unclear if he has had any obstructive symptoms.  The hernia has been reducible.  I did review previous CAT scans.  One from February of this past year shows a clear right inguinal filled with ascites fluid.  The patient does describe being able to reduce contents.  He has a slightly elevated INR at 1.53.  His platelet count is slightly low at 122,000.  He denies any GI bleeds.  The patient has been evaluated by Dr. Briggs for liver transplantation in May.  And is seeing us today for evaluation of his right inguinal hernia.    Past Surgical History:   Procedure Laterality Date     NO HISTORY OF SURGERY       Past Medical History:   Diagnosis Date     Acid reflux      Ascites      Esophageal varices (H)      FSHD (facioscapulohumeral muscular dystrophy) (H)      Gout      HTN (hypertension)      Jaundice      Liver cirrhosis (H)      Smoker      Allergies   Allergen Reactions     Adhesive Tape Blisters     Skin breakdown, open sores     Current Outpatient Medications   Medication     acetaminophen (TYLENOL) 325 MG tablet     allopurinol (ZYLOPRIM) 300 MG tablet     escitalopram (LEXAPRO) 5 MG tablet     esomeprazole (NEXIUM) 40 MG DR capsule     furosemide (LASIX) 20 MG tablet     hydrOXYzine (ATARAX) 25 MG tablet     ibuprofen (ADVIL/MOTRIN) 200 MG capsule     lactulose (CEPHULAC) 20 GM packet     Multiple Vitamin (DAILY MULTIVITAMIN PO)  "    Omega-3 Fatty Acids (FISH OIL PO)     ondansetron (ZOFRAN) 4 MG tablet     spironolactone (ALDACTONE) 25 MG tablet     traMADol (ULTRAM) 50 MG tablet     XIFAXAN 550 MG TABS tablet     zolpidem (AMBIEN) 5 MG tablet     No current facility-administered medications for this visit.      /73 (BP Location: Left arm, Patient Position: Sitting, Cuff Size: Adult Regular)   Pulse 90   Ht 1.854 m (6' 1\")   Wt 99.9 kg (220 lb 4.8 oz)   SpO2 97%   BMI 29.07 kg/m      On physical examination the patient has a reducible small right inguinal hernia testes are descended bilaterally without masses.  Patient has a small umbilical hernia.    Assessment/Plan: The patient has a symptomatic right inguinal hernia.  He appears to be medically optimized.  I have contacted his transplant surgeon Dr. Sabina Montalvo given her extensive expertise in managing patients with liver failure as well as his hepatologist Dr. Mellisa Winters to clarify next steps.  I explained to the patient by Dr. Briggs may be the operative surgeon that he was fine with this.  I discussed about the laparoscopic and open approaches and that likely he would need a mesh repair.  He is eager to better understand the timeline.    I have been able to connect with Selina Montalvo and Singh. I called the patient today. Plan a laparoscopic right possible bilateral inguinal hernia repair with Mesh.  He understands the procedure and its risks and would like us to proceed.    Again, thank you for allowing me to participate in the care of your patient.  Sincerely,    Chris Parker MD  "

## 2020-09-16 NOTE — LETTER
9/16/2020       RE: Sp Plasencia  [address] [facility] [address]     Dear Colleague,    Thank you for referring your patient, Sp Plasencia, to the [facility] GENERAL SURGERY at [facility]. Please see a copy of my visit note below.    This is a 45-year-old gentleman who has a significant history of alcohol use and Cirrhosis. His last drink was January 2020.  He recently has undergone a successful TIPS procedure.  This has greatly reduced his ascites.  He has a longstanding right inguinal hernia.  This is symptomatic for him.  It is unclear if he has had any obstructive symptoms.  The hernia has been reducible.  I did review previous CAT scans.  One from February of this past year shows a clear right inguinal filled with ascites fluid.  The patient does describe being able to reduce contents.  He has a slightly elevated INR at 1.53.  His platelet count is slightly low at 122,000.  He denies any GI bleeds.  The patient has been evaluated by Dr. Briggs for liver transplantation in May.  And is seeing us today for evaluation of his right inguinal hernia.    Past Surgical History:   Procedure Laterality Date     NO HISTORY OF SURGERY       Past Medical History:   Diagnosis Date     Acid reflux      Ascites      Esophageal varices (H)      FSHD (facioscapulohumeral muscular dystrophy) (H)      Gout      HTN (hypertension)      Jaundice      Liver cirrhosis (H)      Smoker         Allergies   Allergen Reactions     Adhesive Tape Blisters     Skin breakdown, open sores     Current Outpatient Medications   Medication     acetaminophen (TYLENOL) 325 MG tablet     allopurinol (ZYLOPRIM) 300 MG tablet     escitalopram (LEXAPRO) 5 MG tablet     esomeprazole (NEXIUM) 40 MG DR capsule     furosemide (LASIX) 20 MG tablet     hydrOXYzine (ATARAX) 25 MG tablet     ibuprofen (ADVIL/MOTRIN) 200 MG capsule     lactulose (CEPHULAC) 20 GM packet     Multiple Vitamin (DAILY MULTIVITAMIN  "PO)     Omega-3 Fatty Acids (FISH OIL PO)     ondansetron (ZOFRAN) 4 MG tablet     spironolactone (ALDACTONE) 25 MG tablet     traMADol (ULTRAM) 50 MG tablet     XIFAXAN 550 MG TABS tablet     zolpidem (AMBIEN) 5 MG tablet     No current facility-administered medications for this visit.      /73 (BP Location: Left arm, Patient Position: Sitting, Cuff Size: Adult Regular)   Pulse 90   Ht 1.854 m (6' 1\")   Wt 99.9 kg (220 lb 4.8 oz)   SpO2 97%   BMI 29.07 kg/m      On physical examination the patient has a reducible small right inguinal hernia testes are descended bilaterally without masses.  Patient has a small umbilical hernia.    Assessment/Plan: The patient has a symptomatic right inguinal hernia.  He appears to be medically optimized.  I have contacted his transplant surgeon Dr. Sabina Montalvo given her extensive expertise in managing patients with liver failure as well as his hepatologist Dr. Mellisa Winters to clarify next steps.  I explained to the patient by Dr. Briggs may be the operative surgeon that he was fine with this.  I discussed about the laparoscopic and open approaches and that likely he would need a mesh repair.  He is eager to better understand the timeline.    I have been able to connect with Selina Montalvo and Singh. I called the patient today. Plan a laparoscopic right possible bilateral inguinal hernia repair with Mesh.  He understands the procedure and its risks and would like us to proceed.    Again, thank you for allowing me to participate in the care of your patient.      Sincerely,    Chris Parker MD      "

## 2020-09-16 NOTE — TELEPHONE ENCOUNTER
1. I did not place that referral. It was placed by Dr. Kingston. I had recommended he return to transplant surgeon (Dr. Montalvo) to discuss the hernia.    2. The other questions are not related to liver disease per se (alcohol and other issues are related to these, but not directly to liver). 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 I had placed a referral for Sp to establish with a PCP in July. There is no appointment pending that I can see.

## 2020-09-16 NOTE — NURSING NOTE
"Chief Complaint   Patient presents with     Consult     Appointment for hernia.       Vitals:    09/16/20 1042   BP: 122/73   BP Location: Left arm   Patient Position: Sitting   Cuff Size: Adult Regular   Pulse: 90   SpO2: 97%   Weight: 99.9 kg (220 lb 4.8 oz)   Height: 1.854 m (6' 1\")       Body mass index is 29.07 kg/m .                            JEANE FRIAS, EMT    "

## 2020-09-17 ENCOUNTER — TRANSFERRED RECORDS (OUTPATIENT)
Dept: HEALTH INFORMATION MANAGEMENT | Facility: CLINIC | Age: 45
End: 2020-09-17

## 2020-09-17 ENCOUNTER — TELEPHONE (OUTPATIENT)
Dept: GASTROENTEROLOGY | Facility: CLINIC | Age: 45
End: 2020-09-17

## 2020-09-17 LAB — PETH BLD-MCNC: NEGATIVE NG/ML

## 2020-09-17 NOTE — TELEPHONE ENCOUNTER
Called patient and let him know to take vitamin K 5 mg for 5 days starting 1 week prior to his operation date (taking on days minus 7, 6, 5, 4, 3, and none on the 2 days prior to his operation).  Pt verbalized understanding.     Patient stated he would like to address his [employer] message sent to Dr. Winters about symptoms of restless leg and of being able to sleep until after TIPS procedure.  States he feels it is in fact related to his liver and does not feel a PCP would know how to handle this.

## 2020-09-18 DIAGNOSIS — K40.90 HERNIA, INGUINAL: Primary | ICD-10-CM

## 2020-09-18 RX ORDER — CEFAZOLIN SODIUM 1 G/50ML
1 INJECTION, SOLUTION INTRAVENOUS SEE ADMIN INSTRUCTIONS
Status: CANCELLED | OUTPATIENT
Start: 2020-09-18

## 2020-09-18 RX ORDER — CEFAZOLIN SODIUM 2 G/50ML
2 SOLUTION INTRAVENOUS
Status: CANCELLED | OUTPATIENT
Start: 2020-09-18

## 2020-09-21 ENCOUNTER — VIRTUAL VISIT (OUTPATIENT)
Dept: INTERNAL MEDICINE | Facility: CLINIC | Age: 45
End: 2020-09-21
Payer: COMMERCIAL

## 2020-09-21 DIAGNOSIS — K76.82 HEPATIC ENCEPHALOPATHY (H): ICD-10-CM

## 2020-09-21 DIAGNOSIS — G71.02 FSHD (FACIOSCAPULOHUMERAL MUSCULAR DYSTROPHY) (H): ICD-10-CM

## 2020-09-21 DIAGNOSIS — K70.30 ALCOHOLIC CIRRHOSIS, UNSPECIFIED WHETHER ASCITES PRESENT (H): Primary | ICD-10-CM

## 2020-09-21 DIAGNOSIS — G25.81 RESTLESS LEGS SYNDROME (RLS): ICD-10-CM

## 2020-09-21 DIAGNOSIS — R18.8 OTHER ASCITES: ICD-10-CM

## 2020-09-21 DIAGNOSIS — M25.50 MULTIPLE JOINT PAIN: ICD-10-CM

## 2020-09-21 PROCEDURE — 99214 OFFICE O/P EST MOD 30 MIN: CPT | Mod: GT | Performed by: INTERNAL MEDICINE

## 2020-09-21 RX ORDER — HYDROXYZINE HYDROCHLORIDE 25 MG/1
25-50 TABLET, FILM COATED ORAL EVERY 6 HOURS PRN
Qty: 90 TABLET | Refills: 0 | Status: SHIPPED | OUTPATIENT
Start: 2020-09-21 | End: 2020-10-22

## 2020-09-21 RX ORDER — ROPINIROLE 0.25 MG/1
0.25 TABLET, FILM COATED ORAL AT BEDTIME
Qty: 30 TABLET | Refills: 0 | Status: SHIPPED | OUTPATIENT
Start: 2020-09-21 | End: 2020-10-22

## 2020-09-21 NOTE — PROGRESS NOTES
"Sp Plasencia is a 45 year old male who is being evaluated via a billable video visit.      The patient has been notified of following:     \"This video visit will be conducted via a call between you and your physician/provider. We have found that certain health care needs can be provided without the need for an in-person physical exam.  This service lets us provide the care you need with a video conversation.  If a prescription is necessary we can send it directly to your pharmacy.  If lab work is needed we can place an order for that and you can then stop by our lab to have the test done at a later time.    Video visits are billed at different rates depending on your insurance coverage.  Please reach out to your insurance provider with any questions.    If during the course of the call the physician/provider feels a video visit is not appropriate, you will not be charged for this service.\"    Patient has given verbal consent for Video visit? Yes - telephone- unable to do video  How would you like to obtain your AVS? MyChart  If you are dropped from the video visit, the video invite should be resent to: Text to cell phone: [phone] (H)  Will anyone else be joining your video visit? No      Subjective     Sp Plasencia is a 45 year old male who presents today via video visit for the following health issues:    HPI    Pain in joints and muscles,legs particularly-feels like RLS at nighttime  Sleep concerns, more trouble falling asleep.   The patient is taking ambien and melatonin which did not help.    The patient reports that if he takes 10mg he can sleep.  The patient also has had pain with this.   Been taking lasix and spironolactone daily for last 6 weeks since procedure which have helped with his leg swelling.    Alcoholic cirrhosis.  He has had the TIPS procedure done recently.    Hypertension Follow-up      Do you check your blood pressure regularly outside of the clinic? No     Are you following a low " salt diet? Yes    Are your blood pressures ever more than 140 on the top number (systolic) OR more   than 90 on the bottom number (diastolic), for example 140/90? No   Been taking lasix and spironolactone daily for last 6 weeks since procedure which have helped with his leg swelling.    He is taking lactulose for his encephalopathy.  He denies altered mental status.     Muscular dystrophy. Does not follow with neurology.     Gout.  He does not think his joint hammad is gout.  He has pain of knees, wrists, elbows, etc.        How many servings of fruits and vegetables do you eat daily?  2-3    On average, how many sweetened beverages do you drink each day (Examples: soda, juice, sweet tea, etc.  Do NOT count diet or artificially sweetened beverages)?   0    How many days per week do you exercise enough to make your heart beat faster? 5    How many minutes a day do you exercise enough to make your heart beat faster? 20 - 29    How many days per week do you miss taking your medication? 0        Review of Systems   CONSTITUTIONAL: NEGATIVE for fever, chills, change in weight  RESP: NEGATIVE for significant cough or SOB  CV: NEGATIVE for chest pain, palpitations or peripheral edema      Objective           Vitals:  No vitals were obtained today due to virtual visit.    Physical Exam     GENERAL: Healthy, alert and no distress  EYES: Eyes grossly normal to inspection.  No discharge or erythema, or obvious scleral/conjunctival abnormalities.  RESP: No audible wheeze, cough, or visible cyanosis.  No visible retractions or increased work of breathing.    SKIN: Visible skin clear. No significant rash, abnormal pigmentation or lesions.  NEURO: Cranial nerves grossly intact.  Mentation and speech appropriate for age.  PSYCH: Mentation appears normal, affect normal/bright, judgement and insight intact, normal speech and appearance well-groomed.              Assessment & Plan       (K70.30) Alcoholic cirrhosis, unspecified whether  "ascites present (H)  (primary encounter diagnosis)  Comment: s/p TIPS procedure  Plan: GI    (K72.90) Hepatic encephalopathy (H)  Comment: stable  Plan: GI    (G71.02) FSHD (facioscapulohumeral muscular dystrophy) (H)  Comment:   Plan: consider PT    (G25.81) Restless legs syndrome (RLS)  Comment:   Plan: rOPINIRole (REQUIP) 0.25 MG tablet        -trial   -pt to call in one week with update    (M25.50) Multiple joint pain  Comment: assess  Plan: Vitamin D Deficiency, TSH with free T4 reflex,         Uric acid          -will add hydroxyzine     BMI:   Estimated body mass index is 29.07 kg/m  as calculated from the following:    Height as of 9/16/20: 1.854 m (6' 1\").    Weight as of 9/16/20: 99.9 kg (220 lb 4.8 oz).           See Patient Instructions    No follow-ups on file.    Deanna Barahona MD  [facility]      Video-Visit Details-changed to telephone    Telephone visit:    15 minutes      Deanna Barahona MD          "

## 2020-09-22 ENCOUNTER — MYC MEDICAL ADVICE (OUTPATIENT)
Dept: INTERNAL MEDICINE | Facility: CLINIC | Age: 45
End: 2020-09-22

## 2020-09-23 RX ORDER — LIDOCAINE HYDROCHLORIDE 10 MG/ML
20 INJECTION, SOLUTION EPIDURAL; INFILTRATION; INTRACAUDAL; PERINEURAL ONCE
Status: CANCELLED | OUTPATIENT
Start: 2020-09-23

## 2020-09-23 RX ORDER — HEPARIN SODIUM (PORCINE) LOCK FLUSH IV SOLN 100 UNIT/ML 100 UNIT/ML
5 SOLUTION INTRAVENOUS
Status: CANCELLED | OUTPATIENT
Start: 2020-09-23

## 2020-09-23 RX ORDER — HEPARIN SODIUM,PORCINE 10 UNIT/ML
5 VIAL (ML) INTRAVENOUS
Status: CANCELLED | OUTPATIENT
Start: 2020-09-23

## 2020-09-23 RX ORDER — ALBUMIN (HUMAN) 12.5 G/50ML
12.5 SOLUTION INTRAVENOUS
Status: CANCELLED | OUTPATIENT
Start: 2020-09-23

## 2020-09-23 NOTE — TELEPHONE ENCOUNTER
FUTURE VISIT INFORMATION      SURGERY INFORMATION:    Date: TBD- Hernia- Surgery clinic    Consult: ov     RECORDS REQUESTED FROM:       Primary Care Provider: Deanna Barahona MD- [place]    Pertinent Medical History: hypertension    Most recent EKG+ Tracin20    Most recent ECHO: 18    Most recent PFT's: 6/15/20

## 2020-09-24 ENCOUNTER — PRE VISIT (OUTPATIENT)
Dept: SURGERY | Facility: CLINIC | Age: 45
End: 2020-09-24

## 2020-09-25 ENCOUNTER — VIRTUAL VISIT (OUTPATIENT)
Dept: RADIOLOGY | Facility: CLINIC | Age: 45
End: 2020-09-25
Attending: RADIOLOGY
Payer: COMMERCIAL

## 2020-09-25 DIAGNOSIS — K70.31 ASCITES DUE TO ALCOHOLIC CIRRHOSIS (H): Primary | ICD-10-CM

## 2020-09-25 PROCEDURE — 40001009 ZZH VIDEO/TELEPHONE VISIT; NO CHARGE

## 2020-09-25 NOTE — PROGRESS NOTES
"Sp Plasencia is a 45 year old male who is being evaluated via a billable telephone visit.      The patient has been notified of following:     \"This telephone visit will be conducted via a call between you and your physician/provider. We have found that certain health care needs can be provided without the need for a physical exam.  This service lets us provide the care you need with a short phone conversation.  If a prescription is necessary we can send it directly to your pharmacy.  If lab work is needed we can place an order for that and you can then stop by our lab to have the test done at a later time.    Telephone visits are billed at different rates depending on your insurance coverage. During this emergency period, for some insurers they may be billed the same as an in-person visit.  Please reach out to your insurance provider with any questions.    If during the course of the call the physician/provider feels a telephone visit is not appropriate, you will not be charged for this service.\"    Patient has given verbal consent for Telephone visit?  Yes    What phone number would you like to be contacted at? [phone]    How would you like to obtain your AVS? MyChart    I have reviewed and updated the patient's allergies and medication list.    Concerns: May be having a procedure for hernia.   Refills: None needed.     Vitals - Patient Reported  Weight (Patient Reported): 99.9 kg (220 lb 3.8 oz)  Height (Patient Reported): 185.4 cm (6' 1\")  BMI (Based on Pt Reported Ht/Wt): 29.06  Pain Score: Mild Pain (3)  Pain Loc: Other - see comment(joints)      Sandrine Watkins CMA    Phone call duration: *** minutes    {signature options:713130}              INTERVENTIONAL RADIOLOGY ESTABLISHED PATIENT FOLLOW UP    Name: Sp Plasencia  Age: 45 year old   Referring Physician: Dr. Romero     HPI: 45 year old male with history of EtOH cirrhosis (sober since 1/2020) c/b refractory ascites s/p TIPS on 8/26.     PAST MEDICAL " HISTORY:   Past Medical History:   Diagnosis Date     Acid reflux      Ascites      Esophageal varices (H)      FSHD (facioscapulohumeral muscular dystrophy) (H)      Gout      HTN (hypertension)      Jaundice      Liver cirrhosis (H)      Smoker        PAST SURGICAL HISTORY:   Past Surgical History:   Procedure Laterality Date     IR PARACENTESIS  8/26/2020     IR TRANSVEN INTRAHEPATIC PORTOSYST SHUNT  8/26/2020     NO HISTORY OF SURGERY         FAMILY HISTORY:   Family History   Problem Relation Age of Onset     Hypertension Father        SOCIAL HISTORY:   Social History     Tobacco Use     Smoking status: Current Some Day Smoker     Packs/day: 1.00     Types: Cigarettes     Smokeless tobacco: Never Used     Tobacco comment: very little cigarette use   Substance Use Topics     Alcohol use: Not Currently     Comment: 1/1/2020       PROBLEM LIST:   Patient Active Problem List    Diagnosis Date Noted     No-show for appointment 05/05/2020     Priority: Medium     Alcoholic cirrhosis (H) 04/22/2020     Priority: Medium     Hepatic encephalopathy (H) 04/08/2020     Priority: Medium     Ascites 02/11/2020     Priority: Medium     ASHWINI (acute kidney injury) (H) 01/04/2019     Priority: Medium     Smoker 04/05/2016     Priority: Medium     FSHD (facioscapulohumeral muscular dystrophy) (H) 12/11/2014     Priority: Medium     Chest pain, unspecified 03/08/2013     Priority: Medium     Gout, unspecified 07/08/2010     Priority: Medium     HTN (hypertension) 07/08/2010     Priority: Medium       MEDICATIONS:   Prescription Medications as of 9/25/2020       Rx Number Disp Refills Start End Last Dispensed Date Next Fill Date Owning Pharmacy    acetaminophen (TYLENOL) 325 MG tablet        [id number] [place], [place] - [address] [address]     Sig: Take 325-650 mg by mouth every 6 hours as needed for mild pain    Class: Historical    Route: Oral    allopurinol (ZYLOPRIM) 300 MG tablet  30 tablet 0 4/10/2020    [facility]  [address]    Sig: Take 1 tablet (300 mg) by mouth daily    Class: E-Prescribe    Route: Oral    escitalopram (LEXAPRO) 5 MG tablet  30 tablet 0 4/22/2020    [facility] [address] [place] [id number] LILO NAGY    Sig: Take 1 tablet (5 mg) by mouth daily    Class: E-Prescribe    Route: Oral    esomeprazole (NEXIUM) 40 MG DR capsule  30 capsule 0 4/10/2020    [address]    Sig: Take 1 capsule (40 mg) by mouth daily Take 30-60 minutes before eating.    Class: E-Prescribe    Route: Oral    furosemide (LASIX) 20 MG tablet  30 tablet 3 7/10/2020    [facility] [address] [place] [id number] LILO NAGY    Sig: Take 1 tablet (20 mg) by mouth daily    Class: E-Prescribe    Route: Oral    hydrOXYzine (ATARAX) 25 MG tablet  90 tablet 0 9/21/2020    [facility] [address] [place] [id number] LILO NAGY    Sig: Take 1-2 tablets (25-50 mg) by mouth every 6 hours as needed for itching or other (adjuvant pain)    Class: E-Prescribe    Route: Oral    ibuprofen (ADVIL/MOTRIN) 200 MG capsule            Sig: Take 200 mg by mouth every 4 hours as needed for fever    Class: Historical    Route: Oral    lactulose (CEPHULAC) 20 GM packet  60 packet 1 4/10/2020    [address]    Sig: Take 1 packet (20 g) by mouth 2 times daily    Class: E-Prescribe    Route: Oral    Multiple Vitamin (DAILY MULTIVITAMIN PO)            Sig: Take 1 tablet by mouth daily     Class: Historical    Route: Oral    Omega-3 Fatty Acids (FISH OIL PO)            Sig: Take 1 capsule by mouth Once weekly    Class: Historical    Route: Oral    ondansetron (ZOFRAN) 4 MG tablet    4/16/2020    [facility] [address] [place] [id number] LILO NAGY    Class: Historical    rOPINIRole (REQUIP) 0.25 MG tablet  30 tablet 0 9/21/2020    [facility] [address] [place] [id number] LILO NAGY    Sig: Take 1  "tablet (0.25 mg) by mouth At Bedtime    Class: E-Prescribe    Route: Oral    spironolactone (ALDACTONE) 25 MG tablet  30 tablet 3 7/10/2020    [facility] [address] [place] [address]     Sig: Take 1 tablet (25 mg) by mouth daily    Class: E-Prescribe    Route: Oral    traMADol (ULTRAM) 50 MG tablet  30 tablet 5 7/10/2020    [facility] [address] [place] [address]     Sig: Take 1 tablet (50 mg) by mouth daily    Class: E-Prescribe    Route: Oral    XIFAXAN 550 MG TABS tablet    7/15/2020    [facility] [address] [place] [address]     Class: Historical    zolpidem (AMBIEN) 5 MG tablet  30 tablet 0 4/22/2020    [facility] [address] [place] [address]     Sig: Take 1 tablet (5 mg) by mouth nightly as needed for sleep    Class: E-Prescribe    Notes to Pharmacy: Profile Rx: patient will contact pharmacy when needed    Route: Oral          ALLERGIES:   Adhesive tape    ROS:  Skin: {Skin:100::\"negative\"}  Eyes: {Eyes:200::\"negative\"}  Ears/Nose/Throat: {ENT:300::\"negative\"}  Respiratory: {Resp:400::\"No shortness of breath, dyspnea on exertion, cough, or hemoptysis\"}  Cardiovascular: {CV:500::\"negative\"}  Gastrointestinal: {GI:600::\"negative\"}  Genitourinary: {:700::\"negative\"}  Musculoskeletal: {Musc:800::\"negative\"}  Neurologic: {Neur:900::\"negative\"}  Psychiatric: {Psych:1000::\"negative\"}  Hematologic/Lymphatic/Immunologic: {Hem:1100::\"negative\"}  Endocrine: {Endo:1200::\"negative\"}      Physical Examination:   VITALS:   There were no vitals taken for this visit.  Constitutional: {GENERAL APPEARANCE:172710::\"healthy\",\"alert\",\"no distress\"}  Head: {HEAD EXAM:301::\"Normocephalic. No masses, lesions, tenderness or abnormalities\"}  Neck: {NECK EXAM:304::\"Neck supple. No adenopathy. Thyroid symmetric, normal size,\",\"Carotids without bruits.\"}  ENT: {ENT EXAM:5032::\"ENT exam normal, no neck nodes or sinus tenderness\"}  Cardiovascular: {HEART EXAM:501::\"negative\",\"PMI " "normal. No lifts, heaves, or thrills. RRR. No murmurs, clicks gallops or rub\"}  Respiratory: {LUNG EXAM:401::\"negative\",\"Percussion normal. Good diaphragmatic excursion. Lungs clear\"}  Gastrointestinal: {ABDOMEN EXAM:601::\"Abdomen soft, non-tender. BS normal. No masses, organomegaly\"}  : { Normal Exam Male or Female:200127::\"Deferred\"}  Musculoskeletal: {RADHA EXAM MS/JOINT:392237::\"extremities normal- no gross deformities noted\",\"gait normal\",\"normal muscle tone\"}  Skin: {RADHA SKIN EXAM:838245::\"no suspicious lesions or rashes\"}  Neurologic: {NEURO EXAM:901::\"Gait normal. Reflexes normal and symmetric. Sensation grossly WNL.\"}  Psychiatric: {RADHA PATIENT PSYCH APPEARANCE:375285::\"mentation appears normal.\",\"affect normal/bright\"}  Hematologic/Lymphatic/Immunologic: {RADHA EXAM LYMPH:164309::\"normal ant/post cervical, axillary, supraclavicular and inguinal nodes\"}  Vascular: No bruits are noted.    Brachial  Radial  Ulnar  Femoral  Popliteal  DP  PT    Left  ***/2 ***/2  ***/2  ***/2  ***/2  ***/2  ***/2    Right  ***/2  ***/2  ***/2  ***/2  ***/2  ***/2  ***/2        Labs:    BMP RESULTS:  Lab Results   Component Value Date     09/15/2020    POTASSIUM 4.3 09/15/2020    CHLORIDE 113 (H) 09/15/2020    CO2 25 09/15/2020    ANIONGAP 4 09/15/2020    GLC 96 09/15/2020    BUN 16 09/15/2020    CR 0.64 (L) 09/15/2020    GFRESTIMATED >90 09/15/2020    GFRESTBLACK >90 09/15/2020    JOSE CARLOS 9.3 09/15/2020        CBC RESULTS:  Lab Results   Component Value Date    WBC 4.3 09/15/2020    RBC 4.10 (L) 09/15/2020    HGB 12.3 (L) 09/15/2020    HCT 38.0 (L) 09/15/2020    MCV 93 09/15/2020    MCH 30.0 09/15/2020    MCHC 32.4 09/15/2020    RDW 16.0 (H) 09/15/2020     (L) 09/15/2020       INR/PTT:  Lab Results   Component Value Date    INR 1.53 (H) 09/15/2020    PTT 38 (H) 08/26/2020       Diagnostic studies: Personally reviewed in the EMR.    Abdominal US 9/15/20: Patent TIPS with elevated PSV in the mid shunt of 291 cm/s. " Cirrhotic appreaing liver and splenomegaly. Ascites.    Assessment: 45 year old male with history of EtOH cirrhosis (sober since 1/2020) c/b refractory ascites s/p TIPS on 8/26.       Plan:          Discussed with Dr. Vashti Bullard MD  Interventional Radiology, PGY-4.        CC  Patient Care Team:  Deanna Barahona MD as PCP - General (Internal Medicine)  Maurilio Woodson MD as MD (Neurology)  Danielle Eller RN as Nurse Coordinator (Neurology)  Deanna Barahona MD as Assigned PCP  SELF, REFERRED

## 2020-09-25 NOTE — PROGRESS NOTES
"    Sp Plasencia is a 45 year old male who is being evaluated via a billable video visit.      The patient has been notified of following:     \"This video visit will be conducted via a call between you and your physician/provider. We have found that certain health care needs can be provided without the need for an in-person physical exam.  This service lets us provide the care you need with a video conversation.  If a prescription is necessary we can send it directly to your pharmacy.  If lab work is needed we can place an order for that and you can then stop by our lab to have the test done at a later time.    Video visits are billed at different rates depending on your insurance coverage.  Please reach out to your insurance provider with any questions.    If during the course of the call the physician/provider feels a video visit is not appropriate, you will not be charged for this service.\"    Patient has given verbal consent for Video visit? Yes  How would you like to obtain your AVS? MyChart  If you are dropped from the video visit, the video invite should be resent to: Text to cell phone: [phone]  Will anyone else be joining your video visit? No      Concerns: May be having a procedure for hernia.   Refills: None needed.     Vitals - Patient Reported  Weight (Patient Reported): 99.9 kg (220 lb 3.8 oz)  Height (Patient Reported): 185.4 cm (6' 1\")  BMI (Based on Pt Reported Ht/Wt): 29.06  Pain Score: Mild Pain (3)  Pain Loc: Other - see comment(joints)      Sandrine Watkins CMA      Video-Visit Details    Type of service:  Video Visit    Video Start Time: 9:10  Video End Time: 9:31    Originating Location (pt. Location): Home    Distant Location (provider location):  [facility]     Platform used for Video Visit: Doximity                INTERVENTIONAL RADIOLOGY ESTABLISHED PATIENT FOLLOW UP    Name: pS Plasencia  Age: 45 year old   Referring Physician: Dr. Romero     HPI: 45 year old male " with history of muscular dystrophy and EtOH cirrhosis (sober since 1/2020) c/b refractory ascites s/p TIPS on 8/26/2020.    Patient reports that he is doing well.  He had 1 paracentesis on 9/2/2020 with a drained 2.2 L.  He is happy that he did not require a paracentesis when he went in on 9/15/2020 because there was virtually no ascites. He reports a small amount of post-procedural pain in the abdomen which he attributes to a groin hernia, which has a potential surgical repair date of 9/28. Patient continues to have ankle swelling which he says he can prevent with daily usage of spironolactone and lasix. Endorses mild chest pain and SOB which he attributes to his baseline from muscular dystrophy. No overt confusion, brain fog or new memory issues. No fever.  No chest pain or cough.  Has some chronic shortness of breath that he attributes to his muscular dystrophy.      PAST MEDICAL HISTORY:   Past Medical History:   Diagnosis Date     Acid reflux      Ascites      Esophageal varices (H)      FSHD (facioscapulohumeral muscular dystrophy) (H)      Gout      HTN (hypertension)      Jaundice      Liver cirrhosis (H)      Smoker        PAST SURGICAL HISTORY:   Past Surgical History:   Procedure Laterality Date     IR PARACENTESIS  8/26/2020     IR TRANSVEN INTRAHEPATIC PORTOSYST SHUNT  8/26/2020     NO HISTORY OF SURGERY         FAMILY HISTORY:   Family History   Problem Relation Age of Onset     Hypertension Father        SOCIAL HISTORY:   Social History     Tobacco Use     Smoking status: Current Some Day Smoker     Packs/day: 1.00     Types: Cigarettes     Smokeless tobacco: Never Used     Tobacco comment: very little cigarette use   Substance Use Topics     Alcohol use: Not Currently     Comment: 1/1/2020       PROBLEM LIST:   Patient Active Problem List    Diagnosis Date Noted     No-show for appointment 05/05/2020     Priority: Medium     Alcoholic cirrhosis (H) 04/22/2020     Priority: Medium     Hepatic  encephalopathy (H) 04/08/2020     Priority: Medium     Ascites 02/11/2020     Priority: Medium     ASHWINI (acute kidney injury) (H) 01/04/2019     Priority: Medium     Smoker 04/05/2016     Priority: Medium     FSHD (facioscapulohumeral muscular dystrophy) (H) 12/11/2014     Priority: Medium     Chest pain, unspecified 03/08/2013     Priority: Medium     Gout, unspecified 07/08/2010     Priority: Medium     HTN (hypertension) 07/08/2010     Priority: Medium       MEDICATIONS:   Prescription Medications as of 9/25/2020       Rx Number Disp Refills Start End Last Dispensed Date Next Fill Date Owning Pharmacy    acetaminophen (TYLENOL) 325 MG tablet        [facility] [address] [place] [id number] [address]     Sig: Take 325-650 mg by mouth every 6 hours as needed for mild pain    Class: Historical    Route: Oral    allopurinol (ZYLOPRIM) 300 MG tablet  30 tablet 0 4/10/2020    [place] [id number]    Sig: Take 1 tablet (300 mg) by mouth daily    Class: E-Prescribe    Route: Oral    escitalopram (LEXAPRO) 5 MG tablet  30 tablet 0 4/22/2020    [facility] [address] [place] [id number] [address]AG[address]    Sig: Take 1 tablet (5 mg) by mouth daily    Class: E-Prescribe    Route: Oral    esomeprazole (NEXIUM) 40 MG DR capsule  30 capsule 0 4/10/2020    [address]    Sig: Take 1 capsule (40 mg) by mouth daily Take 30-60 minutes before eating.    Class: E-Prescribe    Route: Oral    furosemide (LASIX) 20 MG tablet  30 tablet 3 7/10/2020    [facility] [address] [place] [id number] [address] NAGY    Sig: Take 1 tablet (20 mg) by mouth daily    Class: E-Prescribe    Route: Oral    hydrOXYzine (ATARAX) 25 MG tablet  90 tablet 0 9/21/2020    [facility] [address] [place] [id number] [address]AG[address]    Sig: Take 1-2 tablets (25-50 mg) by mouth every 6 hours as needed for itching or other (adjuvant pain)    Class: E-Prescribe     Route: Oral    ibuprofen (ADVIL/MOTRIN) 200 MG capsule            Sig: Take 200 mg by mouth every 4 hours as needed for fever    Class: Historical    Route: Oral    lactulose (CEPHULAC) 20 GM packet  60 packet 1 4/10/2020    [place], [place] - [address] [facility]    Sig: Take 1 packet (20 g) by mouth 2 times daily    Class: E-Prescribe    Route: Oral    Multiple Vitamin (DAILY MULTIVITAMIN PO)            Sig: Take 1 tablet by mouth daily     Class: Historical    Route: Oral    Omega-3 Fatty Acids (FISH OIL PO)            Sig: Take 1 capsule by mouth Once weekly    Class: Historical    Route: Oral    ondansetron (ZOFRAN) 4 MG tablet    4/16/2020    [facility] [address] [place] [id number] TOMMY     Class: Historical    rOPINIRole (REQUIP) 0.25 MG tablet  30 tablet 0 9/21/2020    [facility] [address] [place] [id number] LILO NAGY    Sig: Take 1 tablet (0.25 mg) by mouth At Bedtime    Class: E-Prescribe    Route: Oral    spironolactone (ALDACTONE) 25 MG tablet  30 tablet 3 7/10/2020    [facility] [address] [place] [id number] LILO NAGY    Sig: Take 1 tablet (25 mg) by mouth daily    Class: E-Prescribe    Route: Oral    traMADol (ULTRAM) 50 MG tablet  30 tablet 5 7/10/2020    [facility] [address] [place] [id number] LILO NAGY    Sig: Take 1 tablet (50 mg) by mouth daily    Class: E-Prescribe    Route: Oral    XIFAXAN 550 MG TABS tablet    7/15/2020    [facility] [address] [place] [id number] LILO NAGY    Class: Historical    zolpidem (AMBIEN) 5 MG tablet  30 tablet 0 4/22/2020    [facility] [address] [place] [id number] LILO NAGY    Sig: Take 1 tablet (5 mg) by mouth nightly as needed for sleep    Class: E-Prescribe    Notes to Pharmacy: Profile Rx: patient will contact pharmacy when needed    Route: Oral          ALLERGIES:   Adhesive tape    ROS:     Negative unless otherwise stated in the HPI.      PHYSICAL EXAM:    CONSTITUTIONAL: healthy, alert and no  distress.  PSYCHIATRIC: mentation appears normal and affect normal.  NEURO: Normal movements and speech.  EYES: No jaundice or pallor.  SKIN: No jaundice.   RESP: No audible cough or wheeze.      LABS:  Lab Results   Component Value Date    HGB 12.3 09/15/2020     Lab Results   Component Value Date     09/15/2020     Lab Results   Component Value Date    WBC 4.3 09/15/2020       Lab Results   Component Value Date    INR 1.53 09/15/2020       Lab Results   Component Value Date    PROTTOTAL 6.6 09/15/2020      Lab Results   Component Value Date    ALBUMIN 3.1 09/15/2020     Lab Results   Component Value Date    BILITOTAL 1.8 09/15/2020     Lab Results   Component Value Date    ALKPHOS 134 09/15/2020     Lab Results   Component Value Date    AST 63 09/15/2020     Lab Results   Component Value Date    ALT 44 09/15/2020       Lab Results   Component Value Date    CR 0.64 09/15/2020       Alpha Fetoprotein   Date Value Ref Range Status   09/15/2020 1.8 0 - 8 ug/L Final     Comment:     Assay Method:  Chemiluminescence using Siemens Centaur XP         IMAGING:    I reviewed his abdominal ultrasound with TIPS Doppler from 9/15/2020.  It shows elevated velocities in the mid TIPS but otherwise the study is unremarkable, in particular there is good antegrade flow in the main portal vein and appropriate retrograde flow in the intrahepatic left and right portal veins.    ASSESSMENT:    45 year old male with history of EtOH cirrhosis and refractory ascites that required frequent large-volume paracentesis.  He is now 1 month post TIPS placement. Patient appears to be responding well to the TIPS, requiring only 1 paracentesis since then with 2.2 L drained, and subsequently when he went in for the next paracentesis there was not enough fluid to drain. He continues to have ankle swelling which responds to diuretics well. No symptoms to suggest encephalopathy.     His Doppler ultrasound shows focal elevated velocity in the mid  TIPS but this is something that we will keep an eye on with follow-up ultrasound, since clinically he is doing so well with early resolution of his ascites indicating the TIPS is functioning well.    From our perspective, now that his ascites is under better control, he is ok to proceed with his hernia repair.     PLAN:    -Follow up in clinic 2 months from now with repeat TIPS doppler US.        Simone Bullard MD  Interventional Radiology, PGY-4.    Co-signed,  Magnolia Kingston M.D.    Interventional Radiology  [facility]      A total of 25 minutes was spent in care for the patient, of which >50% was spent in imaging review, counseling and co-ordination of care.       I, Dr Magnolia Kingston, was present with the fellow during the entire clinic visit. I discussed the case with the fellow and agree with the findings as documented in the assessment and plan.      CC  Patient Care Team:  Deanna Barahona MD as PCP - General (Internal Medicine)  Maurilio Woodson MD as MD (Neurology)  Danielle Eller RN as Nurse Coordinator (Neurology)  Deanna Barahona MD as Assigned PCP  SELF, REFERRED

## 2020-09-25 NOTE — LETTER
"    9/25/2020         RE: Sp Plasencia  [address] [facility] [id number]        Dear Colleague,    Thank you for referring your patient, Sp Plasencia, to the [facility]. Please see a copy of my visit note below.        Sp Plasencia is a 45 year old male who is being evaluated via a billable video visit.      The patient has been notified of following:     \"This video visit will be conducted via a call between you and your physician/provider. We have found that certain health care needs can be provided without the need for an in-person physical exam.  This service lets us provide the care you need with a video conversation.  If a prescription is necessary we can send it directly to your pharmacy.  If lab work is needed we can place an order for that and you can then stop by our lab to have the test done at a later time.    Video visits are billed at different rates depending on your insurance coverage.  Please reach out to your insurance provider with any questions.    If during the course of the call the physician/provider feels a video visit is not appropriate, you will not be charged for this service.\"    Patient has given verbal consent for Video visit? Yes  How would you like to obtain your AVS? MyChart  If you are dropped from the video visit, the video invite should be resent to: Text to cell phone: [phone]  Will anyone else be joining your video visit? No      Concerns: May be having a procedure for hernia.   Refills: None needed.     Vitals - Patient Reported  Weight (Patient Reported): 99.9 kg (220 lb 3.8 oz)  Height (Patient Reported): 185.4 cm (6' 1\")  BMI (Based on Pt Reported Ht/Wt): 29.06  Pain Score: Mild Pain (3)  Pain Loc: Other - see comment(joints)      Sandrine Watkins CMA      Video-Visit Details    Type of service:  Video Visit    Video Start Time: 9:10  Video End Time: 9:31    Originating Location (pt. Location): Home    Distant Location (provider " location):  [facility]     Platform used for Video Visit: [facility]                INTERVENTIONAL RADIOLOGY ESTABLISHED PATIENT FOLLOW UP    Name: Sp Plasencia  Age: 45 year old   Referring Physician: Dr. Romero     HPI: 45 year old male with history of muscular dystrophy and EtOH cirrhosis (sober since 1/2020) c/b refractory ascites s/p TIPS on 8/26/2020.    Patient reports that he is doing well.  He had 1 paracentesis on 9/2/2020 with a drained 2.2 L.  He is happy that he did not require a paracentesis when he went in on 9/15/2020 because there was virtually no ascites. He reports a small amount of post-procedural pain in the abdomen which he attributes to a groin hernia, which has a potential surgical repair date of 9/28. Patient continues to have ankle swelling which he says he can prevent with daily usage of spironolactone and lasix. Endorses mild chest pain and SOB which he attributes to his baseline from muscular dystrophy. No overt confusion, brain fog or new memory issues. No fever.  No chest pain or cough.  Has some chronic shortness of breath that he attributes to his muscular dystrophy.      PAST MEDICAL HISTORY:   Past Medical History:   Diagnosis Date     Acid reflux      Ascites      Esophageal varices (H)      FSHD (facioscapulohumeral muscular dystrophy) (H)      Gout      HTN (hypertension)      Jaundice      Liver cirrhosis (H)      Smoker        PAST SURGICAL HISTORY:   Past Surgical History:   Procedure Laterality Date     IR PARACENTESIS  8/26/2020     IR TRANSVEN INTRAHEPATIC PORTOSYST SHUNT  8/26/2020     NO HISTORY OF SURGERY         FAMILY HISTORY:   Family History   Problem Relation Age of Onset     Hypertension Father        SOCIAL HISTORY:   Social History     Tobacco Use     Smoking status: Current Some Day Smoker     Packs/day: 1.00     Types: Cigarettes     Smokeless tobacco: Never Used     Tobacco comment: very little cigarette use   Substance Use Topics     Alcohol  use: Not Currently     Comment: 1/1/2020       PROBLEM LIST:   Patient Active Problem List    Diagnosis Date Noted     No-show for appointment 05/05/2020     Priority: Medium     Alcoholic cirrhosis (H) 04/22/2020     Priority: Medium     Hepatic encephalopathy (H) 04/08/2020     Priority: Medium     Ascites 02/11/2020     Priority: Medium     ASHWINI (acute kidney injury) (H) 01/04/2019     Priority: Medium     Smoker 04/05/2016     Priority: Medium     FSHD (facioscapulohumeral muscular dystrophy) (H) 12/11/2014     Priority: Medium     Chest pain, unspecified 03/08/2013     Priority: Medium     Gout, unspecified 07/08/2010     Priority: Medium     HTN (hypertension) 07/08/2010     Priority: Medium       MEDICATIONS:   Prescription Medications as of 9/25/2020       Rx Number Disp Refills Start End Last Dispensed Date Next Fill Date Owning Pharmacy    acetaminophen (TYLENOL) 325 MG tablet        [facility] [address] [place] [id number] [facility]     Sig: Take 325-650 mg by mouth every 6 hours as needed for mild pain    Class: Historical    Route: Oral    allopurinol (ZYLOPRIM) 300 MG tablet  30 tablet 0 4/10/2020    [address]    Sig: Take 1 tablet (300 mg) by mouth daily    Class: E-Prescribe    Route: Oral    escitalopram (LEXAPRO) 5 MG tablet  30 tablet 0 4/22/2020    [facility] [address] [place] [id number] HERITA     Sig: Take 1 tablet (5 mg) by mouth daily    Class: E-Prescribe    Route: Oral    esomeprazole (NEXIUM) 40 MG DR capsule  30 capsule 0 4/10/2020    [address]    Sig: Take 1 capsule (40 mg) by mouth daily Take 30-60 minutes before eating.    Class: E-Prescribe    Route: Oral    furosemide (LASIX) 20 MG tablet  30 tablet 3 7/10/2020    [facility] [address] [place] [id number] HERMARLENE     Sig: Take 1 tablet (20 mg) by mouth daily    Class: E-Prescribe    Route: Oral     hydrOXYzine (ATARAX) 25 MG tablet  90 tablet 0 9/21/2020    [facility] [address] [place] [id number] LILO NAGY    Sig: Take 1-2 tablets (25-50 mg) by mouth every 6 hours as needed for itching or other (adjuvant pain)    Class: E-Prescribe    Route: Oral    ibuprofen (ADVIL/MOTRIN) 200 MG capsule            Sig: Take 200 mg by mouth every 4 hours as needed for fever    Class: Historical    Route: Oral    lactulose (CEPHULAC) 20 GM packet  60 packet 1 4/10/2020    [place], [place] - [address] [facility]    Sig: Take 1 packet (20 g) by mouth 2 times daily    Class: E-Prescribe    Route: Oral    Multiple Vitamin (DAILY MULTIVITAMIN PO)            Sig: Take 1 tablet by mouth daily     Class: Historical    Route: Oral    Omega-3 Fatty Acids (FISH OIL PO)            Sig: Take 1 capsule by mouth Once weekly    Class: Historical    Route: Oral    ondansetron (ZOFRAN) 4 MG tablet    4/16/2020    [facility] [address] [place] [id number] LILO NAGY    Class: Historical    rOPINIRole (REQUIP) 0.25 MG tablet  30 tablet 0 9/21/2020    [facility] [address] [place] [id number] HERELIZABETH NAGY    Sig: Take 1 tablet (0.25 mg) by mouth At Bedtime    Class: E-Prescribe    Route: Oral    spironolactone (ALDACTONE) 25 MG tablet  30 tablet 3 7/10/2020    [facility] [address] [place] [id number] LILO NAGY    Sig: Take 1 tablet (25 mg) by mouth daily    Class: E-Prescribe    Route: Oral    traMADol (ULTRAM) 50 MG tablet  30 tablet 5 7/10/2020    [facility] [address] [place] [id number] HERELIZABETH NAGY    Sig: Take 1 tablet (50 mg) by mouth daily    Class: E-Prescribe    Route: Oral    XIFAXAN 550 MG TABS tablet    7/15/2020    [facility] [address] [place] [id number] LILO NAGY    Class: Historical    zolpidem (AMBIEN) 5 MG tablet  30 tablet 0 4/22/2020    [facility] [address] [place] [id number] HERELIZABETH NAGY    Sig: Take 1 tablet (5 mg) by mouth nightly as needed for sleep    Class: E-Prescribe     Notes to Pharmacy: Profile Rx: patient will contact pharmacy when needed    Route: Oral          ALLERGIES:   Adhesive tape    ROS:     Negative unless otherwise stated in the HPI.      PHYSICAL EXAM:    CONSTITUTIONAL: healthy, alert and no distress.  PSYCHIATRIC: mentation appears normal and affect normal.  NEURO: Normal movements and speech.  EYES: No jaundice or pallor.  SKIN: No jaundice.   RESP: No audible cough or wheeze.      LABS:  Lab Results   Component Value Date    HGB 12.3 09/15/2020     Lab Results   Component Value Date     09/15/2020     Lab Results   Component Value Date    WBC 4.3 09/15/2020       Lab Results   Component Value Date    INR 1.53 09/15/2020       Lab Results   Component Value Date    PROTTOTAL 6.6 09/15/2020      Lab Results   Component Value Date    ALBUMIN 3.1 09/15/2020     Lab Results   Component Value Date    BILITOTAL 1.8 09/15/2020     Lab Results   Component Value Date    ALKPHOS 134 09/15/2020     Lab Results   Component Value Date    AST 63 09/15/2020     Lab Results   Component Value Date    ALT 44 09/15/2020       Lab Results   Component Value Date    CR 0.64 09/15/2020       Alpha Fetoprotein   Date Value Ref Range Status   09/15/2020 1.8 0 - 8 ug/L Final     Comment:     Assay Method:  Chemiluminescence using Siemens Centaur XP         IMAGING:    I reviewed his abdominal ultrasound with TIPS Doppler from 9/15/2020.  It shows elevated velocities in the mid TIPS but otherwise the study is unremarkable, in particular there is good antegrade flow in the main portal vein and appropriate retrograde flow in the intrahepatic left and right portal veins.    ASSESSMENT:    45 year old male with history of EtOH cirrhosis and refractory ascites that required frequent large-volume paracentesis.  He is now 1 month post TIPS placement. Patient appears to be responding well to the TIPS, requiring only 1 paracentesis since then with 2.2 L drained, and subsequently when he went  in for the next paracentesis there was not enough fluid to drain. He continues to have ankle swelling which responds to diuretics well. No symptoms to suggest encephalopathy.     His Doppler ultrasound shows focal elevated velocity in the mid TIPS but this is something that we will keep an eye on with follow-up ultrasound, since clinically he is doing so well with early resolution of his ascites indicating the TIPS is functioning well.    From our perspective, now that his ascites is under better control, he is ok to proceed with his hernia repair.     PLAN:    -Follow up in clinic 2 months from now with repeat TIPS doppler US.        Simone Bullard MD  Interventional Radiology, PGY-4.    Co-signed,  Magnolia Kingston M.D.    Interventional Radiology  [facility]      A total of 25 minutes was spent in care for the patient, of which >50% was spent in imaging review, counseling and co-ordination of care.       I, Dr Magnolia Kingston, was present with the fellow during the entire clinic visit. I discussed the case with the fellow and agree with the findings as documented in the assessment and plan.      CC  Patient Care Team:  Deanna Barahona MD as PCP - General (Internal Medicine)  Maurilio Woodson MD as MD (Neurology)  Danielle Eller RN as Nurse Coordinator (Neurology)

## 2020-09-28 DIAGNOSIS — K70.31 ALCOHOLIC CIRRHOSIS OF LIVER WITH ASCITES (H): ICD-10-CM

## 2020-09-28 DIAGNOSIS — K42.9 UMBILICAL HERNIA WITHOUT OBSTRUCTION AND WITHOUT GANGRENE: Primary | ICD-10-CM

## 2020-09-29 ENCOUNTER — TELEPHONE (OUTPATIENT)
Dept: SURGERY | Facility: CLINIC | Age: 45
End: 2020-09-29

## 2020-09-29 NOTE — TELEPHONE ENCOUNTER
Patient's wife, Dorene, called to discuss surgery date. I told her I was still working on a date but thought the option might be 10/9 or 10/30. I told her once I got the OR time, I would call her.    (2) good, crying

## 2020-09-30 ENCOUNTER — TELEPHONE (OUTPATIENT)
Dept: INTERNAL MEDICINE | Facility: CLINIC | Age: 45
End: 2020-09-30

## 2020-09-30 ENCOUNTER — TELEPHONE (OUTPATIENT)
Dept: SURGERY | Facility: CLINIC | Age: 45
End: 2020-09-30

## 2020-09-30 ENCOUNTER — HOSPITAL ENCOUNTER (OUTPATIENT)
Facility: AMBULATORY SURGERY CENTER | Age: 45
End: 2020-09-30
Attending: SURGERY
Payer: COMMERCIAL

## 2020-09-30 PROBLEM — K40.90 HERNIA, INGUINAL: Status: ACTIVE | Noted: 2020-09-30

## 2020-09-30 NOTE — TELEPHONE ENCOUNTER
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 Form in your mailbox to be signed.

## 2020-09-30 NOTE — TELEPHONE ENCOUNTER
I spoke with the patient's wife, Dorene. Dr. Parker asked to move the case to the [facility] as I could not find time at the hospital. Discussed that he already is scheduled for paracentesis on 10/13 and OR date is now 10/14.   Discussed COVID-19 testing should be done on Samy 10/11. Scheduling number given to wife.   Discussed taking vitamin K for 5 days per note in Epic by Magy Garza.     Dr. Parker would like patient to present to the [facility] ED should he have persistent pain rather than intermittent discomfort. Wife is comfortable with that plan.

## 2020-10-01 NOTE — TELEPHONE ENCOUNTER
FUTURE VISIT INFORMATION      SURGERY INFORMATION:    Date: 10/14/20    [facility]    Surgeon:  Chris Parker MD    Anesthesia Type:  general    Procedure: HERNIORRHAPHY, RIGHT INGUINAL, LAPAROSCOPIC HERNIORRHAPHY, Right INGUINAL, Possible BILATERAL, LAPAROSCOPIC    Consult: ov      RECORDS REQUESTED FROM:         [facility]     Pertinent Medical History: hypertension     Most recent EKG+ Tracin20     Most recent ECHO: 18     Most recent PFT's: 6/15/20

## 2020-10-06 DIAGNOSIS — Z11.59 ENCOUNTER FOR SCREENING FOR OTHER VIRAL DISEASES: Primary | ICD-10-CM

## 2020-10-07 NOTE — TELEPHONE ENCOUNTER
Left message for patient to call back  Does patient want to pick this up or should we mail it to him?  Form is at my desk.

## 2020-10-10 DIAGNOSIS — Z11.59 ENCOUNTER FOR SCREENING FOR OTHER VIRAL DISEASES: ICD-10-CM

## 2020-10-10 PROCEDURE — U0003 INFECTIOUS AGENT DETECTION BY NUCLEIC ACID (DNA OR RNA); SEVERE ACUTE RESPIRATORY SYNDROME CORONAVIRUS 2 (SARS-COV-2) (CORONAVIRUS DISEASE [COVID-19]), AMPLIFIED PROBE TECHNIQUE, MAKING USE OF HIGH THROUGHPUT TECHNOLOGIES AS DESCRIBED BY CMS-2020-01-R: HCPCS | Performed by: INTERNAL MEDICINE

## 2020-10-11 LAB
SARS-COV-2 RNA SPEC QL NAA+PROBE: NOT DETECTED
SPECIMEN SOURCE: NORMAL

## 2020-10-12 ENCOUNTER — HOSPITAL ENCOUNTER (OUTPATIENT)
Dept: ULTRASOUND IMAGING | Facility: CLINIC | Age: 45
Discharge: HOME | End: 2020-10-12
Attending: INTERNAL MEDICINE | Admitting: INTERNAL MEDICINE
Payer: COMMERCIAL

## 2020-10-12 ENCOUNTER — TELEPHONE (OUTPATIENT)
Dept: SURGERY | Facility: CLINIC | Age: 45
End: 2020-10-12

## 2020-10-12 DIAGNOSIS — K70.31 ALCOHOLIC CIRRHOSIS OF LIVER WITH ASCITES (H): ICD-10-CM

## 2020-10-12 PROCEDURE — 76705 ECHO EXAM OF ABDOMEN: CPT

## 2020-10-12 NOTE — TELEPHONE ENCOUNTER
Patient's wife, Fariba, called. She had questions about arrival time for her 's surgery on 10/14 with Dr. Parker. Currently case start time is 12 p.m. at the [facility], to arrive 10:30 a.m. She had questions on medications. Patient has a PAC appointment on 10/13 and I told her those questions would be answered at that visit.   She inquired about staying while the patient was in surgery. This is a 3-hour case. She said she might leave after and come back when it is time to pick her  up. I said if that is the case, staff would call her to let her know how the procedure went and when he should be picked up. If she wants to stay, she can.

## 2020-10-13 ENCOUNTER — PRE VISIT (OUTPATIENT)
Dept: SURGERY | Facility: CLINIC | Age: 45
End: 2020-10-13

## 2020-10-13 ENCOUNTER — ANESTHESIA EVENT (OUTPATIENT)
Dept: SURGERY | Facility: CLINIC | Age: 45
DRG: 337 | End: 2020-10-13
Payer: COMMERCIAL

## 2020-10-13 ENCOUNTER — OFFICE VISIT (OUTPATIENT)
Dept: SURGERY | Facility: CLINIC | Age: 45
End: 2020-10-13
Payer: COMMERCIAL

## 2020-10-13 VITALS
TEMPERATURE: 98.3 F | HEIGHT: 73 IN | HEART RATE: 85 BPM | WEIGHT: 211 LBS | RESPIRATION RATE: 16 BRPM | OXYGEN SATURATION: 98 % | BODY MASS INDEX: 27.96 KG/M2 | SYSTOLIC BLOOD PRESSURE: 126 MMHG | DIASTOLIC BLOOD PRESSURE: 75 MMHG

## 2020-10-13 DIAGNOSIS — Z01.818 PRE-OP EXAMINATION: Primary | ICD-10-CM

## 2020-10-13 DIAGNOSIS — K40.90 HERNIA, INGUINAL, RIGHT: ICD-10-CM

## 2020-10-13 LAB
ALBUMIN SERPL-MCNC: 3.2 G/DL (ref 3.4–5)
ALP SERPL-CCNC: 132 U/L (ref 40–150)
ALT SERPL W P-5'-P-CCNC: 47 U/L (ref 0–70)
ANION GAP SERPL CALCULATED.3IONS-SCNC: 4 MMOL/L (ref 3–14)
AST SERPL W P-5'-P-CCNC: 55 U/L (ref 0–45)
BILIRUB SERPL-MCNC: 1.8 MG/DL (ref 0.2–1.3)
BUN SERPL-MCNC: 14 MG/DL (ref 7–30)
CALCIUM SERPL-MCNC: 9.3 MG/DL (ref 8.5–10.1)
CHLORIDE SERPL-SCNC: 116 MMOL/L (ref 94–109)
CO2 SERPL-SCNC: 24 MMOL/L (ref 20–32)
CREAT SERPL-MCNC: 0.65 MG/DL (ref 0.66–1.25)
ERYTHROCYTE [DISTWIDTH] IN BLOOD BY AUTOMATED COUNT: 16 % (ref 10–15)
GFR SERPL CREATININE-BSD FRML MDRD: >90 ML/MIN/{1.73_M2}
GLUCOSE SERPL-MCNC: 87 MG/DL (ref 70–99)
HCT VFR BLD AUTO: 37.2 % (ref 40–53)
HGB BLD-MCNC: 12.5 G/DL (ref 13.3–17.7)
MCH RBC QN AUTO: 30.7 PG (ref 26.5–33)
MCHC RBC AUTO-ENTMCNC: 33.6 G/DL (ref 31.5–36.5)
MCV RBC AUTO: 91 FL (ref 78–100)
PLATELET # BLD AUTO: 123 10E9/L (ref 150–450)
POTASSIUM SERPL-SCNC: 4.3 MMOL/L (ref 3.4–5.3)
PROT SERPL-MCNC: 6.9 G/DL (ref 6.8–8.8)
RBC # BLD AUTO: 4.07 10E12/L (ref 4.4–5.9)
SODIUM SERPL-SCNC: 144 MMOL/L (ref 133–144)
WBC # BLD AUTO: 5.2 10E9/L (ref 4–11)

## 2020-10-13 PROCEDURE — 85027 COMPLETE CBC AUTOMATED: CPT | Performed by: PATHOLOGY

## 2020-10-13 PROCEDURE — 99203 OFFICE O/P NEW LOW 30 MIN: CPT | Performed by: PHYSICIAN ASSISTANT

## 2020-10-13 PROCEDURE — 80053 COMPREHEN METABOLIC PANEL: CPT | Performed by: PATHOLOGY

## 2020-10-13 RX ORDER — SODIUM CHLORIDE, SODIUM LACTATE, POTASSIUM CHLORIDE, CALCIUM CHLORIDE 600; 310; 30; 20 MG/100ML; MG/100ML; MG/100ML; MG/100ML
INJECTION, SOLUTION INTRAVENOUS CONTINUOUS
Status: CANCELLED | OUTPATIENT
Start: 2020-10-13

## 2020-10-13 RX ORDER — NALOXONE HYDROCHLORIDE 0.4 MG/ML
.1-.4 INJECTION, SOLUTION INTRAMUSCULAR; INTRAVENOUS; SUBCUTANEOUS
Status: CANCELLED | OUTPATIENT
Start: 2020-10-13 | End: 2020-10-14

## 2020-10-13 RX ORDER — FENTANYL CITRATE 50 UG/ML
25-50 INJECTION, SOLUTION INTRAMUSCULAR; INTRAVENOUS
Status: CANCELLED | OUTPATIENT
Start: 2020-10-13

## 2020-10-13 RX ORDER — OXYCODONE HYDROCHLORIDE 5 MG/1
5 TABLET ORAL EVERY 4 HOURS PRN
Status: CANCELLED | OUTPATIENT
Start: 2020-10-13

## 2020-10-13 RX ORDER — MEPERIDINE HYDROCHLORIDE 25 MG/ML
12.5 INJECTION INTRAMUSCULAR; INTRAVENOUS; SUBCUTANEOUS
Status: CANCELLED | OUTPATIENT
Start: 2020-10-13

## 2020-10-13 RX ORDER — GABAPENTIN 300 MG/1
300 CAPSULE ORAL ONCE
Status: CANCELLED | OUTPATIENT
Start: 2020-10-13 | End: 2020-10-13

## 2020-10-13 RX ORDER — ONDANSETRON 4 MG/1
4 TABLET, ORALLY DISINTEGRATING ORAL EVERY 30 MIN PRN
Status: CANCELLED | OUTPATIENT
Start: 2020-10-13

## 2020-10-13 RX ORDER — LIDOCAINE 40 MG/G
CREAM TOPICAL
Status: CANCELLED | OUTPATIENT
Start: 2020-10-13

## 2020-10-13 RX ORDER — ACETAMINOPHEN 325 MG/1
975 TABLET ORAL ONCE
Status: CANCELLED | OUTPATIENT
Start: 2020-10-13 | End: 2020-10-13

## 2020-10-13 RX ORDER — ONDANSETRON 2 MG/ML
4 INJECTION INTRAMUSCULAR; INTRAVENOUS EVERY 30 MIN PRN
Status: CANCELLED | OUTPATIENT
Start: 2020-10-13

## 2020-10-13 ASSESSMENT — PAIN SCALES - GENERAL: PAINLEVEL: MODERATE PAIN (5)

## 2020-10-13 ASSESSMENT — LIFESTYLE VARIABLES: TOBACCO_USE: 1

## 2020-10-13 ASSESSMENT — MIFFLIN-ST. JEOR: SCORE: 1900.97

## 2020-10-13 ASSESSMENT — ENCOUNTER SYMPTOMS: SEIZURES: 0

## 2020-10-13 NOTE — OR NURSING
Left a message with patient regarding location change to [address] and NPO times for OR tomorrow.  I can see that the change was actually given to patient in PAC today as well.

## 2020-10-13 NOTE — ANESTHESIA PREPROCEDURE EVALUATION
Anesthesia Pre-Procedure Evaluation    Patient: Sp Mcmillan   MRN:     [id number] Gender:   male   Age:    45 year old :      1975        Preoperative Diagnosis: Hernia, inguinal [K40.90]   Procedure(s):  HERNIORRHAPHY, RIGHT INGUINAL, LAPAROSCOPIC  HERNIORRHAPHY, Right INGUINAL, Possible BILATERAL, LAPAROSCOPIC     Results for SP MCMILLAN (MRN [id number]) as of 10/13/2020 16:45   Ref. Range 10/13/2020 15:31   Sodium Latest Ref Range: 133 - 144 mmol/L 144   Potassium Latest Ref Range: 3.4 - 5.3 mmol/L 4.3   Chloride Latest Ref Range: 94 - 109 mmol/L 116 (H)   Carbon Dioxide Latest Ref Range: 20 - 32 mmol/L 24   Urea Nitrogen Latest Ref Range: 7 - 30 mg/dL 14   Creatinine Latest Ref Range: 0.66 - 1.25 mg/dL 0.65 (L)   GFR Estimate Latest Ref Range: >60 mL/min/1.73_m2 >90   GFR Estimate If Black Latest Ref Range: >60 mL/min/1.73_m2 >90   Calcium Latest Ref Range: 8.5 - 10.1 mg/dL 9.3   Anion Gap Latest Ref Range: 3 - 14 mmol/L 4   Albumin Latest Ref Range: 3.4 - 5.0 g/dL 3.2 (L)   Protein Total Latest Ref Range: 6.8 - 8.8 g/dL 6.9   Bilirubin Total Latest Ref Range: 0.2 - 1.3 mg/dL 1.8 (H)   Alkaline Phosphatase Latest Ref Range: 40 - 150 U/L 132   ALT Latest Ref Range: 0 - 70 U/L 47   AST Latest Ref Range: 0 - 45 U/L 55 (H)   Glucose Latest Ref Range: 70 - 99 mg/dL 87   WBC Latest Ref Range: 4.0 - 11.0 10e9/L 5.2   Hemoglobin Latest Ref Range: 13.3 - 17.7 g/dL 12.5 (L)   Hematocrit Latest Ref Range: 40.0 - 53.0 % 37.2 (L)   Platelet Count Latest Ref Range: 150 - 450 10e9/L 123 (L)   RBC Count Latest Ref Range: 4.4 - 5.9 10e12/L 4.07 (L)   MCV Latest Ref Range: 78 - 100 fl 91   MCH Latest Ref Range: 26.5 - 33.0 pg 30.7   MCHC Latest Ref Range: 31.5 - 36.5 g/dL 33.6   RDW Latest Ref Range: 10.0 - 15.0 % 16.0 (H)   Hgb, platelets and LFTs are unchanged and stable for the patient.     Preop Vitals    BP Readings from Last 3 Encounters:   10/13/20 126/75   20 122/73   20 (!) 153/85    Pulse  "Readings from Last 3 Encounters:   10/13/20 85   09/16/20 90   09/02/20 90      Resp Readings from Last 3 Encounters:   10/13/20 16   09/02/20 18   08/26/20 16    SpO2 Readings from Last 3 Encounters:   10/13/20 98%   09/16/20 97%   09/02/20 99%      Temp Readings from Last 1 Encounters:   10/13/20 98.3  F (36.8  C) (Oral)    Ht Readings from Last 1 Encounters:   10/13/20 1.854 m (6' 1\")      Wt Readings from Last 1 Encounters:   10/13/20 95.7 kg (211 lb)    Estimated body mass index is 27.84 kg/m  as calculated from the following:    Height as of this encounter: 1.854 m (6' 1\").    Weight as of this encounter: 95.7 kg (211 lb).     LDA:  Peripheral IV Right (Active)   Number of days:        Right Jugular Interventional Procedure Access (Active)   Number of days: 48        Past Medical History:   Diagnosis Date     Acid reflux      Anemia      Ascites      Esophageal varices (H)      FSHD (facioscapulohumeral muscular dystrophy) (H)      Gout      HTN (hypertension)      Jaundice      Liver cirrhosis (H)      Smoker      Thrombocytopenia (H)       Past Surgical History:   Procedure Laterality Date     IR PARACENTESIS  08/26/2020     IR TRANSVEN INTRAHEPATIC PORTOSYST SHUNT  08/26/2020      Allergies   Allergen Reactions     Adhesive Tape Blisters     Skin breakdown, open sores        Anesthesia Evaluation     . Pt has had prior anesthetic. Type: General    No history of anesthetic complications          ROS/MED HX    ENT/Pulmonary:     (+)tobacco use (1 pack per month for 15 years), Current use , . .    Neurologic:     (+)other neuro FSHD (facioscapulohumeral muscular dystrophy)   (-) seizures, CVA and migraines   Cardiovascular:     (+) hypertension----. : . . . :. . Previous cardiac testing Echodate:12/7/18results:Final Impressions:   1. Normal LV size, borderline wall thickness, normal global systolic function with an estimated EF of 60 - 65%.   2. Grade 1 pattern of LV diastolic filling.   3. Right ventricular " cavity size is normal, global systolic RV function is normal.  date: results:ECG reviewed date:4/8/20 results:Sinus tachycardia with PAC date: results:          METS/Exercise Tolerance:  4 - Raking leaves, gardening   Hematologic:     (+) Anemia, Other Hematologic Disorder-thrombocytopenia      (-) history of blood clots and History of Transfusion   Musculoskeletal:  - neg musculoskeletal ROS       GI/Hepatic:     (+) GERD Asymptomatic on medication, liver disease (Liver cirrhosis s/p TIPS 8/26/20. History of hepatic encephalopathy and esophageal varices),       Renal/Genitourinary:  - ROS Renal section negative       Endo:     (+) Other Endocrine Disorder gout.      Psychiatric: Comment: History of alcohol abuse - sober 1/2020    Adjustment disorder.     (+) psychiatric history other (comment)      Infectious Disease:  - neg infectious disease ROS       Malignancy:      - no malignancy   Other:    (+) no H/O Chronic Pain,no other significant disability                        PHYSICAL EXAM:   Mental Status/Neuro: A/A/O; Age Appropriate   Airway: Facies: Feasible  Mallampati: II  Mouth/Opening: Full  TM distance: > 6 cm  Neck ROM: Limited   Respiratory: Auscultation: CTAB     Resp. Rate: Normal     Resp. Effort: Normal      CV: Rhythm: Regular  Heart: Normal Sounds  Edema: RLE; LLE  Pulses: Normal  Neck: Normal   Comments:      Dental: Details                  Assessment:   ASA SCORE: 3    H&P: History and physical reviewed and following examination; no interval change.   Smoking Status:  Non-Smoker/Unknown   NPO Status: NPO Appropriate     Plan:   Anes. Type:  General   Pre-Medication: None   Induction:  IV (Standard)   Airway: ETT; Oral   Access/Monitoring: PIV   Maintenance: Balanced     Postop Plan:   Postop Pain: Opioids  Postop Sedation/Airway: Not planned  Disposition: Outpatient     PONV Management:   Adult Risk Factors:, Non-Smoker, Postop Opioids   Prevention: Ondansetron     CONSENT: Direct conversation    Plan and risks discussed with: Patient                   PAC Discussion and Assessment    ASA Classification: 3  Case is suitable for: [place]  Anesthetic techniques and relevant risks discussed: GA  Invasive monitoring and risk discussed:   Types:   Possibility and Risk of blood transfusion discussed:   NPO instructions given:   Additional anesthetic preparation and risks discussed:   Needs early admission to pre-op area:   Other:     PAC Resident/NP Anesthesia Assessment:  Sp is a 45 year old man who is scheduled for HERNIORRHAPHY, RIGHT INGUINAL, LAPAROSCOPIC, HERNIORRHAPHY, Right INGUINAL, Possible BILATERAL, LAPAROSCOPIC on 10/14/20 by Dr. Parker in treatment of hernia, inguinal.  PAC referral for risk assessment and optimization for anesthesia with comorbid conditions of smoker, anemia, thrombocytopenia, FSHD (facioscapulohumeral muscular dystrophy), gout, alcoholic cirrhosis, GERD, adjustment disorder, history of alcohol abuse:    Pre-operative considerations:  1.  Cardiac:  Functional status- METS 4, the patient has weakness secondary to his muscular dystrophy. He is able to walk and [place] and [place].  Intermediate risk surgery with 0.9% (RCRI #) risk of major adverse cardiac event.   ~ History of HTN - this is resolved 2/2 liver disease. He is not on medications. He denies cardiac symptoms and had recent testing with EKG on 4/8/20 and echo on 12/17/18    2.  Pulm:  Airway feasible.  DORA risk: Low  ~ Smoker - the patient reports he smokes 1 pack per month and has so for 15 years. We discussed smoking cessation for DOS which the patient's agrees to do.     3. Heme: Anemia/ thrombocytopenia 2/2 liver disease - hgb stable in 12 and platelets in the 120's.      4. Neuro: FSHD (facioscapulohumeral muscular dystrophy) - the patient is followed by Dr. Conner and last seen on 5/28/20 at this time there is no treatment. The patient continues with overall weakness but  increased in legs. He falls daily. He will need fall precautions. Continue requip.   ~ History of hepatic encephalopathy - continue lactuose    5. Endo:Gout - in left big toe - continue allopurinol. No acute infection but has felt this more after TIPS.     6. GI:  Risk of PONV score = 1.  If > 2, anti-emetic intervention recommended.  ~ Alcoholic cirrhosis with ESLD - the patient has met with transplant and deemed a candidate. He underwent TIPS on 8/26/20 and reports he has not needed any paracentesis since then. The patient does have records stating on 9/2/20 he had 2.2 L removed but the patient reports this is wrong. The patient will continue Xifaxan.   ~ GERD - continue nexium    7. Psych: Adjustment disorder - continue atarax.   ~ History of alcohol abuse - sober since 1/2020    VTE risk: 0.5%    Patient is optimized and is acceptable candidate for the proposed procedure.  No further diagnostic evaluation is needed.     Patient discussed with Dr Borges.     For further details of assessment, testing, and physical exam please see H and P completed on same date.    Destini Gallegos PA-C          Mid-Level Provider/Resident: Destini Gallegos PA-C  Date: 10/13/20  Time:     Attending Anesthesiologist Anesthesia Assessment:  I have reviewed the medical record and discussed the patient with the DESIRE.  Patient is being seen in the preoperative assessment clinic for evaluation optimization prior to bilateral laparoscopic herniorrhaphy.  The patient does have significant comorbidities including muscular dystrophy with global weakness and end-stage liver disease awaiting transplant.  The patient recently had a TIPS procedure but continues to require paracentesis.  Patient also has elevated INR and mild thrombocytopenia.  Patient initially was scheduled in the ambulatory surgery center.  After discussion with the medical  was decided the patient was not a good candidate for amatory surgery center.  Patient's  surgery was rescheduled for the main hospital campus on the same day (tomorrow) at 1640.  No further testing necessary per protocol.        Reviewed and Signed by PAC Anesthesiologist  Anesthesiologist: Demetrio Borges MD  Date: 10/13/2020  Time:   Pass/Fail:   Disposition:     PAC Pharmacist Assessment:        Pharmacist:   Date:   Time:    Destini Gallegos PA-C

## 2020-10-13 NOTE — H&P
Pre-Operative H & P     CC:  Preoperative exam to assess for increased cardiopulmonary risk while undergoing surgery and anesthesia.    Date of Encounter: 10/13/2020  Primary Care Physician:  Deanna Barahona     Reason for visit: pre operative examination, Hernia, inguinal    HPI  Sp Plasencia is a 45 year old male who presents for pre-operative H & P in preparation for HERNIORRHAPHY, RIGHT INGUINAL, LAPAROSCOPIC, HERNIORRHAPHY, Right INGUINAL, Possible BILATERAL, LAPAROSCOPIC with Dr. Parker on 10/14/20 at [facility].     The patient is a 45-year-old man with a significant past medical history for alcoholic cirrhosis.  He had been followed by [place] [facility] but has had multiple admissions in 2020 for his cirrhosis.  He has been sober since January 2020 but underwent a TIPS procedure on 8/26/2020 and seen by both gastroenterology here and transplant surgery here.  Patient has been deemed an acceptable liver transplant candidate at this point.  He otherwise has a history of hypertension, smoking, anemia, thrombocytopenia, muscular dystrophy, gout, GERD, and adjustment disorder.  Patient saw Dr. Parker on 9/16/2020 for an right inguinal hernia.  He reports this has been more symptomatic for him but is reducible.  At that visit Dr. Parker, did discuss his treatment options and per his note, he contacted both his transplant surgeon and his hepatologist and he has been scheduled for the procedure as above    History is obtained from the patient and chart review    Past Medical History  Past Medical History:   Diagnosis Date     Acid reflux      Anemia      Ascites      Esophageal varices (H)      FSHD (facioscapulohumeral muscular dystrophy) (H)      Gout      HTN (hypertension)      Jaundice      Liver cirrhosis (H)      Smoker      Thrombocytopenia (H)        Past Surgical History  Past Surgical History:   Procedure Laterality Date     IR  PARACENTESIS  08/26/2020     IR TRANSVEN INTRAHEPATIC PORTOSYST SHUNT  08/26/2020       Hx of Blood transfusions/reactions: denies     Hx of abnormal bleeding or anti-platelet use: none    Menstrual history: No LMP for male patient.:     Steroid use in the last year: none    Personal or FH with difficulty with Anesthesia:  denies    Prior to Admission Medications  Current Outpatient Medications   Medication Sig Dispense Refill     allopurinol (ZYLOPRIM) 300 MG tablet Take 1 tablet (300 mg) by mouth daily (Patient taking differently: Take 300 mg by mouth every morning ) 30 tablet 0     esomeprazole (NEXIUM) 40 MG DR capsule Take 1 capsule (40 mg) by mouth daily Take 30-60 minutes before eating. (Patient taking differently: Take 40 mg by mouth every morning (before breakfast) Take 30-60 minutes before eating. ) 30 capsule 0     hydrOXYzine (ATARAX) 25 MG tablet Take 1-2 tablets (25-50 mg) by mouth every 6 hours as needed for itching or other (adjuvant pain) (Patient taking differently: Take 25-50 mg by mouth every evening ) 90 tablet 0     ibuprofen (ADVIL/MOTRIN) 200 MG capsule Take 600 mg by mouth 3 times daily        lactulose (CEPHULAC) 20 GM packet Take 1 packet (20 g) by mouth 2 times daily (Patient taking differently: Take 20 g by mouth every evening ) 60 packet 1     Multiple Vitamin (DAILY MULTIVITAMIN PO) Take 1 tablet by mouth every evening        rOPINIRole (REQUIP) 0.25 MG tablet Take 1 tablet (0.25 mg) by mouth At Bedtime 30 tablet 0     traMADol (ULTRAM) 50 MG tablet Take 1 tablet (50 mg) by mouth daily (Patient taking differently: Take 50 mg by mouth every morning ) 30 tablet 5     XIFAXAN 550 MG TABS tablet 2 times daily        zolpidem (AMBIEN) 5 MG tablet Take 1 tablet (5 mg) by mouth nightly as needed for sleep 30 tablet 0       Allergies  Allergies   Allergen Reactions     Adhesive Tape Blisters     Skin breakdown, open sores       Social History  Social History     Socioeconomic History      Marital status:      Spouse name: Not on file     Number of children: Not on file     Years of education: Not on file     Highest education level: Not on file   Occupational History     Not on file   Social Needs     Financial resource strain: Not on file     Food insecurity     Worry: Not on file     Inability: Not on file     Transportation needs     Medical: Not on file     Non-medical: Not on file   Tobacco Use     Smoking status: Current Some Day Smoker     Types: Cigarettes     Smokeless tobacco: Never Used     Tobacco comment: 1 pack per month for 15 years   Substance and Sexual Activity     Alcohol use: Not Currently     Comment: 1/1/2020     Drug use: Not Currently     Sexual activity: Yes     Partners: Female   Lifestyle     Physical activity     Days per week: Not on file     Minutes per session: Not on file     Stress: Not on file   Relationships     Social connections     Talks on phone: Not on file     Gets together: Not on file     Attends Mandaen service: Not on file     Active member of club or organization: Not on file     Attends meetings of clubs or organizations: Not on file     Relationship status: Not on file     Intimate partner violence     Fear of current or ex partner: Not on file     Emotionally abused: Not on file     Physically abused: Not on file     Forced sexual activity: Not on file   Other Topics Concern     Parent/sibling w/ CABG, MI or angioplasty before 65F 55M? Not Asked   Social History Narrative     Not on file       Family History  Family History   Problem Relation Age of Onset     Hypertension Father      Hypertension Paternal Grandfather        Review of Systems  ROS/MED HX    ENT/Pulmonary:     (+)tobacco use (1 pack per month for 15 years), Current use , . .    Neurologic:     (+)other neuro FSHD (facioscapulohumeral muscular dystrophy)   (-) seizures, CVA and migraines   Cardiovascular:     (+) hypertension----. : . . . :. . Previous cardiac testing  "Echodate:12/7/18results:Final Impressions:   1. Normal LV size, borderline wall thickness, normal global systolic function with an estimated EF of 60 - 65%.   2. Grade 1 pattern of LV diastolic filling.   3. Right ventricular cavity size is normal, global systolic RV function is normal.  date: results:ECG reviewed date:4/8/20 results:Sinus tachycardia with PAC date: results:          METS/Exercise Tolerance:  4 - Raking leaves, gardening   Hematologic:     (+) Anemia, Other Hematologic Disorder-thrombocytopenia      (-) history of blood clots and History of Transfusion   Musculoskeletal:  - neg musculoskeletal ROS       GI/Hepatic:     (+) GERD Asymptomatic on medication, liver disease (Liver cirrhosis s/p TIPS 8/26/20. History of hepatic encephalopathy and esophageal varices),       Renal/Genitourinary:  - ROS Renal section negative       Endo:     (+) Other Endocrine Disorder gout.      Psychiatric: Comment: History of alcohol abuse - sober 1/2020    Adjustment disorder.     (+) psychiatric history other (comment)      Infectious Disease:  - neg infectious disease ROS       Malignancy:      - no malignancy   Other:    (+) no H/O Chronic Pain,no other significant disability          The complete review of systems is negative other than noted in the HPI or here.   Temp: 98.3  F (36.8  C) Temp src: Oral BP: 126/75 Pulse: 85   Resp: 16 SpO2: 98 %         211 lbs 0 oz  6' 1\"[pt report[   Body mass index is 27.84 kg/m .       Physical Exam  Constitutional: Awake, alert, cooperative, no apparent distress, and appears stated age.  Eyes: Pupils equal, round and reactive to light, extra ocular muscles intact, sclera clear, conjunctiva normal.  HENT: Normocephalic, oral pharynx with moist mucus membranes, good dentition. No goiter appreciated.   Respiratory: Clear to auscultation bilaterally, no crackles or wheezing.  Cardiovascular: Regular rate and rhythm, normal S1 and S2, and no murmur noted.  Carotids +2, no bruits. No " edema. Palpable pulses to radial  DP and PT arteries.   GI: Normal bowel sounds, soft, non-distended, non-tender, no masses palpated, no hepatosplenomegaly.  No ascites.   Lymph/Hematologic: No cervical lymphadenopathy and no supraclavicular lymphadenopathy.  Genitourinary:  defer  Skin: Warm and dry.  No rashes at anticipated surgical site.   Musculoskeletal: Limited ROM of neck. There is no redness, warmth, or swelling of the joints. Gross motor strength is impaired.     Neurologic: Awake, alert, oriented to name, place and time. Cranial nerves II-XII are grossly intact. Gait is impaired  Neuropsychiatric: Calm, cooperative. Normal affect.     Labs: (personally reviewed)  Results for ADELIA MCMILLAN (MRN [id number]) as of 10/13/2020 16:45   Ref. Range 10/13/2020 15:31   Sodium Latest Ref Range: 133 - 144 mmol/L 144   Potassium Latest Ref Range: 3.4 - 5.3 mmol/L 4.3   Chloride Latest Ref Range: 94 - 109 mmol/L 116 (H)   Carbon Dioxide Latest Ref Range: 20 - 32 mmol/L 24   Urea Nitrogen Latest Ref Range: 7 - 30 mg/dL 14   Creatinine Latest Ref Range: 0.66 - 1.25 mg/dL 0.65 (L)   GFR Estimate Latest Ref Range: >60 mL/min/1.73_m2 >90   GFR Estimate If Black Latest Ref Range: >60 mL/min/1.73_m2 >90   Calcium Latest Ref Range: 8.5 - 10.1 mg/dL 9.3   Anion Gap Latest Ref Range: 3 - 14 mmol/L 4   Albumin Latest Ref Range: 3.4 - 5.0 g/dL 3.2 (L)   Protein Total Latest Ref Range: 6.8 - 8.8 g/dL 6.9   Bilirubin Total Latest Ref Range: 0.2 - 1.3 mg/dL 1.8 (H)   Alkaline Phosphatase Latest Ref Range: 40 - 150 U/L 132   ALT Latest Ref Range: 0 - 70 U/L 47   AST Latest Ref Range: 0 - 45 U/L 55 (H)   Glucose Latest Ref Range: 70 - 99 mg/dL 87   WBC Latest Ref Range: 4.0 - 11.0 10e9/L 5.2   Hemoglobin Latest Ref Range: 13.3 - 17.7 g/dL 12.5 (L)   Hematocrit Latest Ref Range: 40.0 - 53.0 % 37.2 (L)   Platelet Count Latest Ref Range: 150 - 450 10e9/L 123 (L)   RBC Count Latest Ref Range: 4.4 - 5.9 10e12/L 4.07 (L)   MCV Latest Ref  Range: 78 - 100 fl 91   MCH Latest Ref Range: 26.5 - 33.0 pg 30.7   MCHC Latest Ref Range: 31.5 - 36.5 g/dL 33.6   RDW Latest Ref Range: 10.0 - 15.0 % 16.0 (H)   Hgb, platelets and LFTs are unchanged and stable for the patient.     EK20  Sinus tachycardia with PAC    PFTs 6/15/20  The FVC is reduced but the FEV1 and FEV1/FVC ratio are normal.  The inspiratory flow rates are reduced.  The TLC is reduced.  The diffusing capacity is normal.  However, the diffusing capacity was not corrected for the patient's hemoglobin.s.   IMPRESSION:   Mild Restriction.   Normal diffusing capacity.   MIP is normal; MEP is reduced.    Echo 18  Final Impressions:   1. Normal LV size, borderline wall thickness, normal global systolic function with an estimated EF of 60 - 65%.   2. Grade 1 pattern of LV diastolic filling.   3. Right ventricular cavity size is normal, global systolic RV function is normal.    The patient's records and results personally reviewed by this provider.     Outside records reviewed from: [facility] everywhere     ASSESSMENT and PLAN  Sp is a 45 year old man who is scheduled for HERNIORRHAPHY, RIGHT INGUINAL, LAPAROSCOPIC, HERNIORRHAPHY, Right INGUINAL, Possible BILATERAL, LAPAROSCOPIC on 10/14/20 by Dr. Parker in treatment of hernia, inguinal.  PAC referral for risk assessment and optimization for anesthesia with comorbid conditions of smoker, anemia, thrombocytopenia, FSHD (facioscapulohumeral muscular dystrophy), gout, alcoholic cirrhosis, GERD, adjustment disorder, history of alcohol abuse:    Pre-operative considerations:  1.  Cardiac:  Functional status- METS 4, the patient has weakness secondary to his muscular dystrophy. He is able to walk and [place] and [place].  Intermediate risk surgery with 0.9% (RCRI #) risk of major adverse cardiac event.   ~ History of HTN - this is resolved 2/2 liver disease. He is not on medications. He denies cardiac symptoms and  had recent testing with EKG on 4/8/20 and echo on 12/17/18    2.  Pulm:  Airway feasible.  DORA risk: Low  ~ Smoker - the patient reports he smokes 1 pack per month and has so for 15 years. We discussed smoking cessation for DOS which the patient's agrees to do.     3. Heme: Anemia/ thrombocytopenia 2/2 liver disease - hgb stable in 12 and platelets in the 120's.      4. Neuro: FSHD (facioscapulohumeral muscular dystrophy) - the patient is followed by Dr. Conner and last seen on 5/28/20 at this time there is no treatment. The patient continues with overall weakness but increased in legs. He falls daily. He will need fall precautions. Continue requip.   ~ History of hepatic encephalopathy - continue lactuose    5. Endo:Gout - in left big toe - continue allopurinol. No acute infection but has felt this more after TIPS.     6. GI:  Risk of PONV score = 1.  If > 2, anti-emetic intervention recommended.  ~ Alcoholic cirrhosis with ESLD - the patient has met with transplant and deemed a candidate. He underwent TIPS on 8/26/20 and reports he has not needed any paracentesis since then. The patient does have records stating on 9/2/20 he had 2.2 L removed but the patient reports this is wrong. The patient will continue Xifaxan.   ~ GERD - continue nexium    7. Psych: Adjustment disorder - continue atarax.   ~ History of alcohol abuse - sober since 1/2020    VTE risk: 0.5%      Patient was discussed with Dr Borges.    The patient is optimized for their procedure. AVS with information on surgery time/arrival time, meds and NPO status given by nursing staff.        Destini Gallegos PA-C  Preoperative Assessment Center  [facility]  Clinic and Surgery Center  Phone: [phone]  Fax: [phone]

## 2020-10-13 NOTE — PATIENT INSTRUCTIONS
Preparing for Your Surgery      Name:  Sp Plasencia   MRN:  [id number]   :  1975   Today's Date:  10/13/2020         Arriving for surgery:  Surgery date:  10/14/20  Arrival time:  10:20 am    Restrictions due to COVID 19:  One visitor is allowed at the Surgery Center. Visitor must wear a mask, not be ill, and not be under the age of 18 years old.   parking is available for patients with mobility issues and disabilities.    Please come to:    [facility]  [address] [place] [id number]    Please check in on the 5th floor at the Ambulatory Surgery Center       What can I eat or drink?    -  You may eat and drink normally until 8 hours before surgery. (Until 3:30 am)  -  You may have clear liquids up to 4 hours before surgery. (Until 7:50 am)  Examples of clear liquids:  Water  Clear broth  Juices (apple, white grape, white cranberry  and cider) without pulp  Noncarbonated, powder based beverages  (lemonade and Abner-Aid)  Sodas (Sprite, 7-Up, ginger ale and seltzer)  Coffee or tea (without milk or cream)  Gatorade    --No alcohol for at least 24 hours before surgery    Which medicines can I take?    Hold aspirin for 7 days before surgery.   Hold multivitamins for 7 days before surgery. Hold Multivitamin starting now, if you have not already.  Hold supplements for 7 days before surgery.  Hold Ibuprofen (Advil, Motrin) for 1 day before surgery--unless otherwise directed by surgeon.  Hold Naproxen (Aleve) for 4 days before surgery.      -  PLEASE TAKE the following medications the day of surgery   Allopurinol (Zyloprim), Esomeprazole (Nexium), Tramadol, Xifaxan    How do I prepare myself?  - Please take 2 showers before surgery using Scrubcare or Hibiclens soap.    Use this soap only from the neck to your toes.     Leave the soap on your skin for one minute--then rinse thoroughly.      You may use your own shampoo and conditioner; no other hair products.   - Please  remove all jewelry and body piercings.  - No lotions, deodorants or fragrance.  - Bring your ID and insurance card.        - All patients are required to have a Covid-19 test within 4 days of surgery/procedure.      -Patients will be contacted by the [facility] scheduling team within 1 week of surgery to make an appointment.      - Patients may call the Scheduling team at [phone] if they have not been scheduled within 4 days of  surgery.      ALL PATIENTS ARE REQUIRED TO HAVE A RESPONSIBLE ADULT TO DRIVE AND BE IN ATTENDANCE WITH THEM FOR 24 HOURS FOLLOWING SURGERY       Questions or Concerns:    -For questions regarding the day of surgery please contact the Ambulatory Surgery Center at [phone].    -If you have health changes between today and your surgery please contact your surgeon.     For questions after surgery please call your surgeons office.    AFTER YOUR SURGERY  Breathing exercises   Breathing exercises help you recover faster. Take deep breaths and let the air out slowly. This will:     Help you wake up after surgery.    Help prevent complications like pneumonia.  Preventing complications will help you go home sooner.   Nausea and vomiting   You may feel sick to your stomach after surgery; if so, let your nurse know.    Pain control:  After surgery, you may have pain. Our goal is to help you manage your pain. Pain medicine will help you feel comfortable enough to do activities that will help you heal.  These activities may include breathing exercises, walking and physical therapy.   To help your health care team treat your pain we will ask: 1) If you have pain  2) where it is located 3) describe your pain in your words  Methods of pain control include medications given by mouth, vein or by nerve block for some surgeries.  Sequential Compression Device (SCD):  You may need to wear SCD S (also called pneumo boots)on your legs or feet. These are wraps connected to a machine that pumps in air  and releases it. The repeated pumping helps prevent blood clots from forming.

## 2020-10-14 ENCOUNTER — ANESTHESIA (OUTPATIENT)
Dept: SURGERY | Facility: CLINIC | Age: 45
DRG: 337 | End: 2020-10-14
Payer: COMMERCIAL

## 2020-10-14 ENCOUNTER — HOSPITAL ENCOUNTER (INPATIENT)
Facility: CLINIC | Age: 45
LOS: 1 days | Discharge: HOME | DRG: 337 | End: 2020-10-15
Attending: SURGERY | Admitting: SURGERY
Payer: COMMERCIAL

## 2020-10-14 DIAGNOSIS — K40.90 HERNIA, INGUINAL: ICD-10-CM

## 2020-10-14 DIAGNOSIS — K40.91 UNILATERAL RECURRENT INGUINAL HERNIA WITHOUT OBSTRUCTION OR GANGRENE: Primary | ICD-10-CM

## 2020-10-14 LAB
GLUCOSE BLDC GLUCOMTR-MCNC: 113 MG/DL (ref 70–99)
GLUCOSE BLDC GLUCOMTR-MCNC: 89 MG/DL (ref 70–99)

## 2020-10-14 PROCEDURE — 250N000011 HC RX IP 250 OP 636

## 2020-10-14 PROCEDURE — 0DNW4ZZ RELEASE PERITONEUM, PERCUTANEOUS ENDOSCOPIC APPROACH: ICD-10-PCS | Performed by: SURGERY

## 2020-10-14 PROCEDURE — 999N001017 HC STATISTIC GLUCOSE BY METER IP

## 2020-10-14 PROCEDURE — 250N000009 HC RX 250: Performed by: NURSE ANESTHETIST, CERTIFIED REGISTERED

## 2020-10-14 PROCEDURE — 360N000023 HC SURGERY LEVEL 3 EA 15 ADDTL MIN UMMC: Performed by: SURGERY

## 2020-10-14 PROCEDURE — 258N000003 HC RX IP 258 OP 636: Performed by: NURSE ANESTHETIST, CERTIFIED REGISTERED

## 2020-10-14 PROCEDURE — 120N000002 HC R&B MED SURG/OB UMMC

## 2020-10-14 PROCEDURE — 761N000003 HC RECOVERY PHASE 1 LEVEL 2 FIRST HR: Performed by: SURGERY

## 2020-10-14 PROCEDURE — P9041 ALBUMIN (HUMAN),5%, 50ML: HCPCS

## 2020-10-14 PROCEDURE — 360N000022 HC SURGERY LEVEL 3 1ST 30 MIN - UMMC: Performed by: SURGERY

## 2020-10-14 PROCEDURE — 761N000004 HC RECOVERY PHASE 1 LEVEL 2 EA ADDTL HR: Performed by: SURGERY

## 2020-10-14 PROCEDURE — 250N000013 HC RX MED GY IP 250 OP 250 PS 637: Performed by: ANESTHESIOLOGY

## 2020-10-14 PROCEDURE — 49650 LAP ING HERNIA REPAIR INIT: CPT | Mod: GC | Performed by: SURGERY

## 2020-10-14 PROCEDURE — 370N000001 HC ANESTHESIA TECHNICAL FEE, 1ST 30 MIN: Performed by: SURGERY

## 2020-10-14 PROCEDURE — 272N000001 HC OR GENERAL SUPPLY STERILE: Performed by: SURGERY

## 2020-10-14 PROCEDURE — 0YU54JZ SUPPLEMENT RIGHT INGUINAL REGION WITH SYNTHETIC SUBSTITUTE, PERCUTANEOUS ENDOSCOPIC APPROACH: ICD-10-PCS | Performed by: SURGERY

## 2020-10-14 PROCEDURE — 370N000002 HC ANESTHESIA TECHNICAL FEE, EACH ADDTL 15 MIN: Performed by: SURGERY

## 2020-10-14 PROCEDURE — 250N000003 HC SEVOFLURANE, EA 15 MIN: Performed by: SURGERY

## 2020-10-14 PROCEDURE — 250N000009 HC RX 250: Performed by: SURGERY

## 2020-10-14 PROCEDURE — 250N000011 HC RX IP 250 OP 636: Performed by: NURSE ANESTHETIST, CERTIFIED REGISTERED

## 2020-10-14 PROCEDURE — 250N000011 HC RX IP 250 OP 636: Performed by: SURGERY

## 2020-10-14 PROCEDURE — 250N000011 HC RX IP 250 OP 636: Performed by: ANESTHESIOLOGY

## 2020-10-14 PROCEDURE — C1727 CATH, BAL TIS DIS, NON-VAS: HCPCS | Performed by: SURGERY

## 2020-10-14 PROCEDURE — 258N000003 HC RX IP 258 OP 636: Performed by: ANESTHESIOLOGY

## 2020-10-14 PROCEDURE — 999N000139 HC STATISTIC PRE-PROCEDURE ASSESSMENT II: Performed by: SURGERY

## 2020-10-14 PROCEDURE — C1781 MESH (IMPLANTABLE): HCPCS | Performed by: SURGERY

## 2020-10-14 PROCEDURE — 250N000009 HC RX 250

## 2020-10-14 DEVICE — MESH KNITTED POLYPROPYLENE 06X6" MARLEX 0112720: Type: IMPLANTABLE DEVICE | Site: GROIN | Status: FUNCTIONAL

## 2020-10-14 RX ORDER — NALOXONE HYDROCHLORIDE 0.4 MG/ML
.1-.4 INJECTION, SOLUTION INTRAMUSCULAR; INTRAVENOUS; SUBCUTANEOUS
Status: DISCONTINUED | OUTPATIENT
Start: 2020-10-14 | End: 2020-10-15 | Stop reason: HOSPADM

## 2020-10-14 RX ORDER — SODIUM CHLORIDE, SODIUM LACTATE, POTASSIUM CHLORIDE, CALCIUM CHLORIDE 600; 310; 30; 20 MG/100ML; MG/100ML; MG/100ML; MG/100ML
INJECTION, SOLUTION INTRAVENOUS CONTINUOUS
Status: DISCONTINUED | OUTPATIENT
Start: 2020-10-14 | End: 2020-10-14 | Stop reason: HOSPADM

## 2020-10-14 RX ORDER — CEFAZOLIN SODIUM 1 G/3ML
1 INJECTION, POWDER, FOR SOLUTION INTRAMUSCULAR; INTRAVENOUS SEE ADMIN INSTRUCTIONS
Status: DISCONTINUED | OUTPATIENT
Start: 2020-10-14 | End: 2020-10-14 | Stop reason: HOSPADM

## 2020-10-14 RX ORDER — SODIUM CHLORIDE, SODIUM LACTATE, POTASSIUM CHLORIDE, CALCIUM CHLORIDE 600; 310; 30; 20 MG/100ML; MG/100ML; MG/100ML; MG/100ML
INJECTION, SOLUTION INTRAVENOUS CONTINUOUS
Status: DISCONTINUED | OUTPATIENT
Start: 2020-10-14 | End: 2020-10-15 | Stop reason: HOSPADM

## 2020-10-14 RX ORDER — LIDOCAINE HYDROCHLORIDE 20 MG/ML
INJECTION, SOLUTION INFILTRATION; PERINEURAL PRN
Status: DISCONTINUED | OUTPATIENT
Start: 2020-10-14 | End: 2020-10-14

## 2020-10-14 RX ORDER — FENTANYL CITRATE 50 UG/ML
25-50 INJECTION, SOLUTION INTRAMUSCULAR; INTRAVENOUS
Status: DISCONTINUED | OUTPATIENT
Start: 2020-10-14 | End: 2020-10-15 | Stop reason: HOSPADM

## 2020-10-14 RX ORDER — HYDROMORPHONE HYDROCHLORIDE 1 MG/ML
.3-.5 INJECTION, SOLUTION INTRAMUSCULAR; INTRAVENOUS; SUBCUTANEOUS EVERY 5 MIN PRN
Status: DISCONTINUED | OUTPATIENT
Start: 2020-10-14 | End: 2020-10-15 | Stop reason: HOSPADM

## 2020-10-14 RX ORDER — LIDOCAINE 40 MG/G
CREAM TOPICAL
Status: DISCONTINUED | OUTPATIENT
Start: 2020-10-14 | End: 2020-10-14 | Stop reason: HOSPADM

## 2020-10-14 RX ORDER — ALBUMIN, HUMAN INJ 5% 5 %
SOLUTION INTRAVENOUS CONTINUOUS PRN
Status: DISCONTINUED | OUTPATIENT
Start: 2020-10-14 | End: 2020-10-14

## 2020-10-14 RX ORDER — ONDANSETRON 4 MG/1
4 TABLET, ORALLY DISINTEGRATING ORAL EVERY 30 MIN PRN
Status: DISCONTINUED | OUTPATIENT
Start: 2020-10-14 | End: 2020-10-15 | Stop reason: HOSPADM

## 2020-10-14 RX ORDER — DEXAMETHASONE SODIUM PHOSPHATE 4 MG/ML
INJECTION, SOLUTION INTRA-ARTICULAR; INTRALESIONAL; INTRAMUSCULAR; INTRAVENOUS; SOFT TISSUE PRN
Status: DISCONTINUED | OUTPATIENT
Start: 2020-10-14 | End: 2020-10-14

## 2020-10-14 RX ORDER — EPHEDRINE SULFATE 50 MG/ML
INJECTION, SOLUTION INTRAMUSCULAR; INTRAVENOUS; SUBCUTANEOUS PRN
Status: DISCONTINUED | OUTPATIENT
Start: 2020-10-14 | End: 2020-10-14

## 2020-10-14 RX ORDER — FENTANYL CITRATE 50 UG/ML
INJECTION, SOLUTION INTRAMUSCULAR; INTRAVENOUS PRN
Status: DISCONTINUED | OUTPATIENT
Start: 2020-10-14 | End: 2020-10-14

## 2020-10-14 RX ORDER — ONDANSETRON 2 MG/ML
INJECTION INTRAMUSCULAR; INTRAVENOUS PRN
Status: DISCONTINUED | OUTPATIENT
Start: 2020-10-14 | End: 2020-10-14

## 2020-10-14 RX ORDER — PROPOFOL 10 MG/ML
INJECTION, EMULSION INTRAVENOUS PRN
Status: DISCONTINUED | OUTPATIENT
Start: 2020-10-14 | End: 2020-10-14

## 2020-10-14 RX ORDER — GABAPENTIN 300 MG/1
300 CAPSULE ORAL ONCE
Status: COMPLETED | OUTPATIENT
Start: 2020-10-14 | End: 2020-10-14

## 2020-10-14 RX ORDER — MEPERIDINE HYDROCHLORIDE 25 MG/ML
12.5 INJECTION INTRAMUSCULAR; INTRAVENOUS; SUBCUTANEOUS
Status: DISCONTINUED | OUTPATIENT
Start: 2020-10-14 | End: 2020-10-15 | Stop reason: HOSPADM

## 2020-10-14 RX ORDER — ONDANSETRON 2 MG/ML
4 INJECTION INTRAMUSCULAR; INTRAVENOUS EVERY 30 MIN PRN
Status: DISCONTINUED | OUTPATIENT
Start: 2020-10-14 | End: 2020-10-15 | Stop reason: HOSPADM

## 2020-10-14 RX ORDER — ACETAMINOPHEN 325 MG/1
975 TABLET ORAL ONCE
Status: COMPLETED | OUTPATIENT
Start: 2020-10-14 | End: 2020-10-14

## 2020-10-14 RX ORDER — CEFAZOLIN SODIUM 2 G/100ML
2 INJECTION, SOLUTION INTRAVENOUS
Status: COMPLETED | OUTPATIENT
Start: 2020-10-14 | End: 2020-10-14

## 2020-10-14 RX ORDER — OXYCODONE HYDROCHLORIDE 5 MG/1
5 TABLET ORAL EVERY 4 HOURS PRN
Status: DISCONTINUED | OUTPATIENT
Start: 2020-10-14 | End: 2020-10-15

## 2020-10-14 RX ORDER — SODIUM CHLORIDE, SODIUM LACTATE, POTASSIUM CHLORIDE, CALCIUM CHLORIDE 600; 310; 30; 20 MG/100ML; MG/100ML; MG/100ML; MG/100ML
INJECTION, SOLUTION INTRAVENOUS CONTINUOUS
Status: DISCONTINUED | OUTPATIENT
Start: 2020-10-14 | End: 2020-10-14

## 2020-10-14 RX ADMIN — PROPOFOL 180 MG: 10 INJECTION, EMULSION INTRAVENOUS at 16:05

## 2020-10-14 RX ADMIN — PHENYLEPHRINE HYDROCHLORIDE 100 MCG: 10 INJECTION INTRAVENOUS at 16:33

## 2020-10-14 RX ADMIN — SODIUM CHLORIDE, POTASSIUM CHLORIDE, SODIUM LACTATE AND CALCIUM CHLORIDE: 600; 310; 30; 20 INJECTION, SOLUTION INTRAVENOUS at 17:14

## 2020-10-14 RX ADMIN — HYDROMORPHONE HYDROCHLORIDE 0.5 MG: 1 INJECTION, SOLUTION INTRAMUSCULAR; INTRAVENOUS; SUBCUTANEOUS at 19:28

## 2020-10-14 RX ADMIN — PHENYLEPHRINE HYDROCHLORIDE 100 MCG: 10 INJECTION INTRAVENOUS at 17:02

## 2020-10-14 RX ADMIN — CEFAZOLIN 2 G: 10 INJECTION, POWDER, FOR SOLUTION INTRAVENOUS at 16:15

## 2020-10-14 RX ADMIN — FENTANYL CITRATE 50 MCG: 50 INJECTION, SOLUTION INTRAMUSCULAR; INTRAVENOUS at 16:05

## 2020-10-14 RX ADMIN — ALBUMIN (HUMAN): 12.5 SOLUTION INTRAVENOUS at 17:27

## 2020-10-14 RX ADMIN — ROCURONIUM BROMIDE 20 MG: 10 INJECTION INTRAVENOUS at 17:17

## 2020-10-14 RX ADMIN — MIDAZOLAM 1 MG: 1 INJECTION INTRAMUSCULAR; INTRAVENOUS at 15:56

## 2020-10-14 RX ADMIN — ROCURONIUM BROMIDE 50 MG: 10 INJECTION INTRAVENOUS at 16:06

## 2020-10-14 RX ADMIN — Medication 5 MG: at 16:45

## 2020-10-14 RX ADMIN — ROCURONIUM BROMIDE 20 MG: 10 INJECTION INTRAVENOUS at 18:48

## 2020-10-14 RX ADMIN — PHENYLEPHRINE HYDROCHLORIDE 100 MCG: 10 INJECTION INTRAVENOUS at 16:25

## 2020-10-14 RX ADMIN — CEFAZOLIN 1 G: 10 INJECTION, POWDER, FOR SOLUTION INTRAVENOUS at 18:11

## 2020-10-14 RX ADMIN — Medication 5 MG: at 16:41

## 2020-10-14 RX ADMIN — ROCURONIUM BROMIDE 10 MG: 10 INJECTION INTRAVENOUS at 18:14

## 2020-10-14 RX ADMIN — PHENYLEPHRINE HYDROCHLORIDE 100 MCG: 10 INJECTION INTRAVENOUS at 16:41

## 2020-10-14 RX ADMIN — PHENYLEPHRINE HYDROCHLORIDE 100 MCG: 10 INJECTION INTRAVENOUS at 16:31

## 2020-10-14 RX ADMIN — SODIUM CHLORIDE, POTASSIUM CHLORIDE, SODIUM LACTATE AND CALCIUM CHLORIDE: 600; 310; 30; 20 INJECTION, SOLUTION INTRAVENOUS at 15:56

## 2020-10-14 RX ADMIN — ROCURONIUM BROMIDE 10 MG: 10 INJECTION INTRAVENOUS at 19:24

## 2020-10-14 RX ADMIN — FENTANYL CITRATE 50 MCG: 50 INJECTION, SOLUTION INTRAMUSCULAR; INTRAVENOUS at 18:33

## 2020-10-14 RX ADMIN — FENTANYL CITRATE 50 MCG: 50 INJECTION, SOLUTION INTRAMUSCULAR; INTRAVENOUS at 18:16

## 2020-10-14 RX ADMIN — GABAPENTIN 300 MG: 300 CAPSULE ORAL at 15:34

## 2020-10-14 RX ADMIN — LIDOCAINE HYDROCHLORIDE 100 MG: 20 INJECTION, SOLUTION INFILTRATION; PERINEURAL at 16:05

## 2020-10-14 RX ADMIN — ACETAMINOPHEN 975 MG: 325 TABLET, FILM COATED ORAL at 15:34

## 2020-10-14 RX ADMIN — Medication 5 MG: at 16:37

## 2020-10-14 RX ADMIN — PHENYLEPHRINE HYDROCHLORIDE 100 MCG: 10 INJECTION INTRAVENOUS at 20:20

## 2020-10-14 RX ADMIN — SUGAMMADEX 300 MG: 100 INJECTION, SOLUTION INTRAVENOUS at 20:26

## 2020-10-14 RX ADMIN — CEFAZOLIN 1 G: 10 INJECTION, POWDER, FOR SOLUTION INTRAVENOUS at 20:06

## 2020-10-14 RX ADMIN — PHENYLEPHRINE HYDROCHLORIDE 100 MCG: 10 INJECTION INTRAVENOUS at 16:19

## 2020-10-14 RX ADMIN — PHENYLEPHRINE HYDROCHLORIDE 100 MCG: 10 INJECTION INTRAVENOUS at 16:37

## 2020-10-14 RX ADMIN — DEXAMETHASONE SODIUM PHOSPHATE 8 MG: 4 INJECTION, SOLUTION INTRA-ARTICULAR; INTRALESIONAL; INTRAMUSCULAR; INTRAVENOUS; SOFT TISSUE at 16:14

## 2020-10-14 RX ADMIN — ONDANSETRON 4 MG: 2 INJECTION INTRAMUSCULAR; INTRAVENOUS at 20:12

## 2020-10-14 RX ADMIN — ALBUMIN (HUMAN): 12.5 SOLUTION INTRAVENOUS at 17:00

## 2020-10-14 RX ADMIN — ONDANSETRON 4 MG: 2 INJECTION INTRAMUSCULAR; INTRAVENOUS at 21:25

## 2020-10-14 RX ADMIN — Medication 10 MG: at 16:33

## 2020-10-14 RX ADMIN — PHENYLEPHRINE HYDROCHLORIDE 200 MCG: 10 INJECTION INTRAVENOUS at 16:58

## 2020-10-14 RX ADMIN — FENTANYL CITRATE 50 MCG: 50 INJECTION, SOLUTION INTRAMUSCULAR; INTRAVENOUS at 18:36

## 2020-10-14 RX ADMIN — MIDAZOLAM 1 MG: 1 INJECTION INTRAMUSCULAR; INTRAVENOUS at 15:58

## 2020-10-14 RX ADMIN — FENTANYL CITRATE 50 MCG: 50 INJECTION, SOLUTION INTRAMUSCULAR; INTRAVENOUS at 18:19

## 2020-10-14 RX ADMIN — PHENYLEPHRINE HYDROCHLORIDE 100 MCG: 10 INJECTION INTRAVENOUS at 16:45

## 2020-10-14 ASSESSMENT — MIFFLIN-ST. JEOR: SCORE: 1906.88

## 2020-10-15 ENCOUNTER — DOCUMENTATION ONLY (OUTPATIENT)
Dept: PHARMACY | Facility: CLINIC | Age: 45
End: 2020-10-15

## 2020-10-15 VITALS
HEART RATE: 89 BPM | SYSTOLIC BLOOD PRESSURE: 133 MMHG | OXYGEN SATURATION: 97 % | DIASTOLIC BLOOD PRESSURE: 71 MMHG | HEIGHT: 73 IN | TEMPERATURE: 96.4 F | BODY MASS INDEX: 28.14 KG/M2 | WEIGHT: 212.3 LBS | RESPIRATION RATE: 16 BRPM

## 2020-10-15 LAB — INR PPP: 1.67 (ref 0.86–1.14)

## 2020-10-15 PROCEDURE — 36415 COLL VENOUS BLD VENIPUNCTURE: CPT | Performed by: STUDENT IN AN ORGANIZED HEALTH CARE EDUCATION/TRAINING PROGRAM

## 2020-10-15 PROCEDURE — 999N000033 HC STATISTIC CHRONIC PULMONARY DISEASE SPECIALIST

## 2020-10-15 PROCEDURE — 250N000013 HC RX MED GY IP 250 OP 250 PS 637: Performed by: STUDENT IN AN ORGANIZED HEALTH CARE EDUCATION/TRAINING PROGRAM

## 2020-10-15 PROCEDURE — 85610 PROTHROMBIN TIME: CPT | Performed by: STUDENT IN AN ORGANIZED HEALTH CARE EDUCATION/TRAINING PROGRAM

## 2020-10-15 RX ORDER — ACETAMINOPHEN 325 MG/1
650 TABLET ORAL EVERY 6 HOURS
Status: DISCONTINUED | OUTPATIENT
Start: 2020-10-15 | End: 2020-10-15 | Stop reason: HOSPADM

## 2020-10-15 RX ORDER — OXYCODONE HYDROCHLORIDE 5 MG/1
5 TABLET ORAL EVERY 4 HOURS PRN
Qty: 10 TABLET | Refills: 0 | Status: SHIPPED | OUTPATIENT
Start: 2020-10-15 | End: 2020-11-24

## 2020-10-15 RX ORDER — HYDROXYZINE HYDROCHLORIDE 25 MG/1
25-50 TABLET, FILM COATED ORAL EVERY 6 HOURS PRN
Status: DISCONTINUED | OUTPATIENT
Start: 2020-10-15 | End: 2020-10-15 | Stop reason: HOSPADM

## 2020-10-15 RX ORDER — LIDOCAINE 40 MG/G
CREAM TOPICAL
Status: DISCONTINUED | OUTPATIENT
Start: 2020-10-15 | End: 2020-10-15 | Stop reason: HOSPADM

## 2020-10-15 RX ORDER — ROPINIROLE 0.25 MG/1
0.25 TABLET, FILM COATED ORAL AT BEDTIME
Status: DISCONTINUED | OUTPATIENT
Start: 2020-10-15 | End: 2020-10-15 | Stop reason: HOSPADM

## 2020-10-15 RX ORDER — OXYCODONE HYDROCHLORIDE 5 MG/1
5 TABLET ORAL EVERY 4 HOURS PRN
Status: DISCONTINUED | OUTPATIENT
Start: 2020-10-15 | End: 2020-10-15 | Stop reason: HOSPADM

## 2020-10-15 RX ADMIN — OXYCODONE HYDROCHLORIDE 5 MG: 5 TABLET ORAL at 05:11

## 2020-10-15 RX ADMIN — RIFAXIMIN 550 MG: 550 TABLET ORAL at 09:01

## 2020-10-15 RX ADMIN — ACETAMINOPHEN 650 MG: 325 TABLET, FILM COATED ORAL at 00:53

## 2020-10-15 RX ADMIN — OXYCODONE HYDROCHLORIDE 5 MG: 5 TABLET ORAL at 00:53

## 2020-10-15 RX ADMIN — ROPINIROLE HYDROCHLORIDE 0.25 MG: 0.25 TABLET, FILM COATED ORAL at 01:39

## 2020-10-15 RX ADMIN — OXYCODONE HYDROCHLORIDE 5 MG: 5 TABLET ORAL at 09:30

## 2020-10-15 RX ADMIN — ACETAMINOPHEN 650 MG: 325 TABLET, FILM COATED ORAL at 09:01

## 2020-10-15 ASSESSMENT — ACTIVITIES OF DAILY LIVING (ADL)
ADLS_ACUITY_SCORE: 13

## 2020-10-15 NOTE — DISCHARGE SUMMARY
[facility]     General Surgery Discharge Summary    Date of Admission:  10/14/2020  Date of Service: 10/15/2020  Date of Discharge:  10/15/2020  Discharging Attending Provider: Chris Parker  Discharge Team: General Surgery    Discharge Diagnoses    Indirect right inguinal hernia    Procedures:  Right inguinal herniorrhaphy using TEPP (totally extraperitoneal preperitoneal hernia repair) approach.     Follow-ups Needed After Discharge   Someone from Dr. Parker's office will reach out for follow up within 7 days of discharge.    Hospital Course   Sp Plasencia is a 45-year-old man with a significant past medical history for alcoholic cirrhosis, who was admitted on 10/14/2020 for planned laparoscopic right inguinal herniorrhaphy. His pain was well controlled after the procedure and he tolerated a clear liquid diet which was advanced without issue prior to discharge.       # Discharge Pain Plan:   - During his hospitalization, Sp experienced pain due to post operative status after right inguinal herniorrhaphy.  The pain plan for discharge was discussed with Sp and the plan was created in a collaborative fashion.    - Opioids prescribed on discharge: oxycodone 5mg  - Duration of opioids after discharge: < 2 days  - Bowel regimen: not needed    Consultations This Hospital Stay   None    Code Status   Full Code     The patient was discussed with [facility] resident and attending physician on day of discharge.     Elizabeth Fowler MD  PGY-1  ____________________________________________________    Physical Exam   Vital Signs: Temp: 96.4  F (35.8  C) Temp src: Oral BP: 133/71 Pulse: 89   Resp: 16 SpO2: 97 % O2 Device: None (Room air) Oxygen Delivery: 1 LPM  Weight: 212 lbs 4.85 oz    Gen: Appears well and engaged  CV: Regular rate  Resp: non-labored breathing on room air  Abd: Soft, appropriate mild RLQ tenderness, ND, no rebound/guarding  : Scrotum with some  swelling R>L.   Ext: warm and well perfused  Incision: C/D/I incisions with dermabond on them         Primary Care Physician   Deanna Barahona    Discharge Disposition   Discharged to home  Condition at discharge: Stable    Discharge Orders      Reason for your hospital stay    Inguinal hernia repair     Adult [facility] Follow-up and recommended labs and tests    Someone from Dr. Parekr's office will call you regarding follow up. If you have not heard from his office within 7 days of discharge please call [phone]     Activity    Your activity upon discharge: No lifting in excess of 15-20 pounds for 3-4 weeks (depends on occupation/activity level).     Full Code     Diet    Follow this diet upon discharge: Orders Placed This Encounter      Advance Diet as Tolerated: Regular Diet Adult; Regular Diet Adult     Discharge Medications   Current Discharge Medication List      START taking these medications    Details   oxyCODONE (ROXICODONE) 5 MG tablet Take 1 tablet (5 mg) by mouth every 4 hours as needed for moderate to severe pain  Qty: 10 tablet, Refills: 0    Associated Diagnoses: Unilateral recurrent inguinal hernia without obstruction or gangrene         CONTINUE these medications which have NOT CHANGED    Details   allopurinol (ZYLOPRIM) 300 MG tablet Take 1 tablet (300 mg) by mouth daily  Qty: 30 tablet, Refills: 0    Associated Diagnoses: FSHD (facioscapulohumeral muscular dystrophy) (H)      esomeprazole (NEXIUM) 40 MG DR capsule Take 1 capsule (40 mg) by mouth daily Take 30-60 minutes before eating.  Qty: 30 capsule, Refills: 0    Associated Diagnoses: Other ascites      ibuprofen (ADVIL/MOTRIN) 200 MG capsule Take 600 mg by mouth 3 times daily       lactulose (CEPHULAC) 20 GM packet Take 1 packet (20 g) by mouth 2 times daily  Qty: 60 packet, Refills: 1    Associated Diagnoses: Hepatic encephalopathy (H)      Multiple Vitamin (DAILY MULTIVITAMIN PO) Take 1 tablet by mouth every evening      Associated Diagnoses: FSHD (facioscapulohumeral muscular dystrophy) (H)      rOPINIRole (REQUIP) 0.25 MG tablet Take 1 tablet (0.25 mg) by mouth At Bedtime  Qty: 30 tablet, Refills: 0    Associated Diagnoses: Restless legs syndrome (RLS)      traMADol (ULTRAM) 50 MG tablet Take 1 tablet (50 mg) by mouth daily  Qty: 30 tablet, Refills: 5    Associated Diagnoses: Alcoholic cirrhosis of liver with ascites (H)      XIFAXAN 550 MG TABS tablet 2 times daily       zolpidem (AMBIEN) 5 MG tablet Take 1 tablet (5 mg) by mouth nightly as needed for sleep  Qty: 30 tablet, Refills: 0    Comments: Profile Rx: patient will contact pharmacy when needed  Associated Diagnoses: Primary insomnia      hydrOXYzine (ATARAX) 25 MG tablet Take 1-2 tablets (25-50 mg) by mouth every 6 hours as needed for itching or other (adjuvant pain)  Qty: 90 tablet, Refills: 0    Associated Diagnoses: Other ascites           Allergies   Allergies   Allergen Reactions     Adhesive Tape Blisters     Skin breakdown, open sores

## 2020-10-15 NOTE — OP NOTE
Procedure Date: 10/14/2020      HISTORY OF PRESENT ILLNESS:  Mr. Plasencia is a 45-year-old gentleman with alcoholic cirrhosis who is recently status post TIPS procedure.  He has a history of symptomatic right inguinal hernia, and we planned for a possible laparoscopic right inguinal hernia repair, possible bilateral inguinal hernia repair.  He understands the procedures, risks, potential complications, alternatives and would like us to proceed.      FINDINGS AT OPERATION:  The patient had a significant scar and adhesions with peritoneum stuck to the anterior abdominal wall, greatly increasing the complexity of the case.  The epiploic vessels were pulled down onto the peritoneum, noted at the time of insufflation of the preperitoneal cavity, taking an extra hour of dissection to elevate these back up.  There was no significant bleeding noted.      PREOPERATIVE DIAGNOSIS:  Right inguinal hernia.      POSTOPERATIVE DIAGNOSIS:  Indirect right inguinal hernia.      PROCEDURE PERFORMED:  Right inguinal herniorrhaphy using TEPP (totally extraperitoneal preperitoneal hernia repair) approach.      SURGEON:  Chris Parker MD      ASSISTANT:  Doris Dempsey.      ESTIMATED BLOOD LOSS:  25 mL.      FLUIDS:  Crystalloid.      ANESTHESIA:  General endotracheal.        COMPLICATIONS:  No intraoperative complications.        WOUND CLASS:  1.      SPECIMENS:  No specimens.        DRAINS:  No drains.  The patient had intraoperative Bo catheter.      DETAILS OF THE PROCEDURE:  Under the benefits of general endotracheal anesthesia, the patient was prepped and draped about the abdomen in the usual sterile fashion.  All bony prominences had been previously padded, and the arms were placed at the side.  A Bo catheter was inserted as noted.  I made an infraumbilical incision approximately 2 cm using the 11 blade.  We carried the dissection down over the right rectus.  We entered this using the Bovie cautery and removed the rectus  over to the side.  We inserted the CovOppex balloon dissection system and insufflated the preperitoneal space without any difficulty.  We inserted the camera.  We did note that the epigastric vessel was tethered down to the peritoneum, and there were significant scars laterally.  Likely this was secondary to his repeated peritoneal drainage procedures in the past.  We were able to insert 2 ports in the midline, and we observed that we were able to take down the peritoneum laterally up toward the anterior superior iliac spine.  The adhesions between the peritoneum and the abdominal wall musculature were very dense, but this was done satisfactorily, to get close to the anterior superior iliac spine.  We had previously identified pubic tubercle by dissecting bluntly.  A small obturator and crossing vessels were easily moved to the side without any injury.  The pulsations of the iliac artery and vein were clearly seen, and the epigastric vessel was noted to be tethered down to the peritoneum.  We were able to get between the epigastric vessels and the peritoneum and create a plane so that these could be elevated.  This took over an hour to be able to carry out this dissection; however, we did this.  Minimal punctate areas of any oozing were controlled with clips and cautery.  Once this was done, we had identified what appears to be a scrotal hernia with the sac going deep into the canal.  We  off the vas and vessels, which were identified, preserved and uninjured.  We carried these down.  The sac as it entered into the canal was identified and divided with a Harmonic scalpel.  There was an opening in the sac that we were able to approximate at the end of the case using a tack to the mesh, as will be described.  At this point, we inspected for any bleeding.  There was no significant bleeding at all.  We then inserted a 4 x 5.5 piece of Marlex mesh that was cut.  We placed this into the preperitoneal space.  We  noticed that there was a CO2 building up in the intra-abdominal cavity.  Therefore, we inserted a 5 mm trocar in the left upper quadrant, and we were able to visualize the entire intra-abdominal area and decompress at the same time.  There was no evidence of injury, and we were able to inspect the abdomen for any intra-abdominal process, and there was none.  At this point, we then left this port in place, allowing us to decompress any CO2 then entered the abdominal cavity, and we were able to then place the mesh.  We placed using AbsorbaTacks on the pubis and then laterally against the anterior abdominal wall and made sure that we did not enter the peritoneum laterally.  We were able to stay at or above the iliopubic tract, and we tacked anteriorly.  Pushing the epigastric vessels anteriorly, we inspected to make inspect that there was no bleeding or injury, and there was none.  Once we were very satisfied with the mesh placement, we then inspected from the abdominal portion with a 5 mm port and found the mesh to be in good position.At this point, we withdrew the ports.  The skin edge bleeding was cauterized, and then we closed the initial trocar placement infraumbilical of the anterior fascia with 0 Vicryl figure-of-eight, and 4-0 Monocryl were used on the remaining sutures and Dermabond applied.  The patient tolerated the procedure extremely well.  There were no intraoperative complications. Counts were correct.  He was sent to the recovery room.         BENJAMIN MORAN MD             D: 10/14/2020   T: 10/14/2020   MT: MULUGETA      Name:     ADELIA MCMILLAN   MRN:      [id number]        Account:        [id number]   :      1975           Procedure Date: 10/14/2020      Document: [id number]

## 2020-10-15 NOTE — CONSULTS
Smoking Cessation Consult   10/15/2020    Patient: Sp Plasencia      :  1975                    MRN:[id number]      Hernia, inguinal [K40.90] @HX    History of Present Illness: Sp Plasencia is a 45-year-old man with a significant past medical history for alcoholic cirrhosis, who was admitted on 10/14/2020 for planned laparoscopic right inguinal herniorrhaphy.    Reason for Consult: Patient identified as current someday smoker per patient chart.     Asked patient if he would be interested in sharing about his tobacco use and in learning about more options and resources for quitting, staying quit, and in developing a relapse prevention plan.     Patient declines any intervention this time.  Offered workbook; declined this as well.    Symptoms of craving or withdrawal: Patient is currently not experiencing symptoms of withdrawal such as sleeplessness, headache, anxiety, irritability, and intense cravings to smoke.     I spent 5 minutes with patient and 10 minutes in chart review.      XIOMARA Fox, RRT, CTTS  Chronic Pulmonary Disease Specialist  Office: [phone]   Pager: [phone]

## 2020-10-15 NOTE — PLAN OF CARE
"/70 (BP Location: Left arm)   Pulse 80   Temp 96  F (35.6  C) (Oral)   Resp 15   Ht 1.854 m (6' 1\")   Wt 96.3 kg (212 lb 4.9 oz)   SpO2 95%   BMI 28.01 kg/m  \    Time: Pt arrived from unit @ 0030   Status: POD #1 Lap Inguinal Hernia   Neuros: A&O x4, calls appropriately. CMS intact   Cardiac: WDL. Pt denies cardiac chest pain   Respiratory: Pt denies shortness of breath. LS clear, satting 95% on RA   GI/: Pt voiding spontaneously in urinal. Hypoactive bowel sounds   Diet/Nausea: Tolerating regular diet   Skin: Abdominal lap sites   Lines: R PIV infusing NS 50 mL/hr   Labs: Reviewed   Pain: Inicisional abd pain managed w/ oral oxy 5 mg   Activity: Pt not OOB since arrival to unit. Hx of MD, hx of falls   Plan: Continue plan of care     "

## 2020-10-15 NOTE — PROGRESS NOTES
"General Surgery Progress Note    Subjective: Patient is feeling well. Denies nausea or vomiting after clear liquid diet. Pain well controlled while in bed. Overnight exam remained benign.     Objective:   /70 (BP Location: Left arm)   Pulse 80   Temp 96  F (35.6  C) (Oral)   Resp 15   Ht 1.854 m (6' 1\")   Wt 96.3 kg (212 lb 4.9 oz)   SpO2 95%   BMI 28.01 kg/m      PE:  Gen: Appears well and engaged  CV: Regular rate, hemodynamically normal  Resp: non-labored breathing on room air  Abd: Soft, appropriate mild RLQ tenderness, ND, no rebound/guarding  : Scrotum with some swelling R>L.   Ext: warm and well perfused  Incision: C/D/I incisions with dermabond on them      Intake/Output Summary (Last 24 hours) at 10/15/2020 0712  Last data filed at 10/15/2020 0228  Gross per 24 hour   Intake 2270 ml   Output 220 ml   Net 2050 ml       Recent labs reviewed.    Recent imaging reviewed.    A/P: Sp Plasencia is a 44 yo M with PMH of alcoholic cirrhosis s/p TIPS who is now s/p laparoscopic right inguinal hernia repair on 10/14/2020.   - f/u INR value today  - discharge today if doing well on regular diet     Seen and discussed with [facility] resident who will discuss with staff.    [facility]  Surgery Sub-I    I, Koko Billingsley MD, saw the patient with the medical student and have edited the note to reflect our combined findings. I agree with the history, exam, assessment and plan as outlined above.    "

## 2020-10-15 NOTE — ANESTHESIA CARE TRANSFER NOTE
Patient: Sp Plasencia    Procedure(s):  HERNIORRHAPHY, RIGHT INGUINAL, LAPAROSCOPIC.    Diagnosis: Hernia, inguinal [K40.90]  Diagnosis Additional Information: No value filed.    Anesthesia Type:   No value filed.     Note:  Airway :Face Mask  Patient transferred to:PACU  Handoff Report: Identifed the Patient, Identified the Reponsible Provider, Reviewed the pertinent medical history, Discussed the surgical course, Reviewed Intra-OP anesthesia mangement and issues during anesthesia, Set expectations for post-procedure period and Allowed opportunity for questions and acknowledgement of understanding      Vitals: (Last set prior to Anesthesia Care Transfer)    CRNA VITALS  10/14/2020 2023 - 10/14/2020 2059      10/14/2020             NIBP:  123/66    NIBP Mean:  84                Electronically Signed By: Tomas Fuentes MD  October 14, 2020  8:59 PM

## 2020-10-15 NOTE — PROGRESS NOTES
PACU RN NOTE  Assigned as pt nurse while pt recovering in PACU post procedure  Anesthesia orders released prior to pt arrival     Sp02 monitor site changed from left hand to left ear     @ 2151 Dr. Etienne with Anesthesia called and notified of current pt status:   pt is very drowsy will open eyes spontaneously but quickly fall asleep and begin to snore again. Pt has remained non-verbal since arrival to PACU @ 2100. Pt currently 95% on 8L 02 simple facemask. Considering pt HX of MS this is expected. Sign out will be placed.  No new orders given at this time.     IPI 9    Pt starts to follow commands @ 1005

## 2020-10-15 NOTE — PLAN OF CARE
Patient verbalized understanding of After Visit Summary. Peripheral IV removed. Personal belongings packed, transport called for discharge pharmacy.

## 2020-10-15 NOTE — DISCHARGE INSTRUCTIONS
[facility]  Same-Day Surgery   Adult Discharge Orders & Instructions     For 24 hours after surgery    1. Get plenty of rest.  A responsible adult must stay with you for at least 24 hours after you leave the hospital.   2. Do not drive or use heavy equipment.  If you have weakness or tingling, don't drive or use heavy equipment until this feeling goes away.  3. Do not drink alcohol.  4. Avoid strenuous or risky activities.  Ask for help when climbing stairs.   5. You may feel lightheaded.  IF so, sit for a few minutes before standing.  Have someone help you get up.   6. If you have nausea (feel sick to your stomach): Drink only clear liquids such as apple juice, ginger ale, broth or 7-Up.  Rest may also help.  Be sure to drink enough fluids.  Move to a regular diet as you feel able.  7. You may have a slight fever. Call the doctor if your fever is over 100 F (37.7 C) (taken under the tongue) or lasts longer than 24 hours.  8. You may have a dry mouth, a sore throat, muscle aches or trouble sleeping.  These should go away after 24 hours.  9. Do not make important or legal decisions.   Call your doctor for any of the followin.  Signs of infection (fever, growing tenderness at the surgery site, a large amount of drainage or bleeding, severe pain, foul-smelling drainage, redness, swelling).    2. It has been over 8 to 10 hours since surgery and you are still not able to urinate (pass water).    3.  Headache for over 24 hours.    4.  Numbness, tingling or weakness the day after surgery (if you had spinal anesthesia).  To contact a doctor, call Dr Parker's office at [phone] (Bariatric clinic) or:      [phone] and ask for the resident on call for General Surgery (answered 24 hours a day)      Emergency Department:  [facility]: [phone]       (TTY for hearing impaired: [phone])  [facility]: [phone]       (TTY for hearing impaired:  [phone])

## 2020-10-15 NOTE — PROGRESS NOTES
Prior Authorization Approval    oxycodone 5mg tabs  Date Initiated: 10/15/2020  Date Completed: 10/15/2020  Prior Auth Type: Quantity Limit                Status: Approved    Effective Date: 10/15/2020 - 10/15/2021  Copay: 0.00     Filling Pharmacy: [facility] - [place], [place] - [address] [place] [address]    Insurance: [employer] - Phone [phone] Fax [phone]  ID: [id number]  Case Number: [id number]   Submitted Via: Lara Verduzco  [facility] Liaison  Ph: [phone] Page: [phone]

## 2020-10-15 NOTE — PROGRESS NOTES
"POST OP CHECK  October 15, 2020        S: Patient seen at the bedside awake, alert, and interactive. States that his pain is currently well controlled. No nausea or vomiting. No shortness of breath. Has taken in ice chips with no concerns.     O:   /72 (BP Location: Left arm)   Pulse 86   Temp 96.6  F (35.9  C) (Oral)   Resp 19   Ht 1.854 m (6' 1\")   Wt 96.3 kg (212 lb 4.9 oz)   SpO2 94%   BMI 28.01 kg/m      GEN: NAD, AAO   Cv: Non-cyanotic, HR in 80s on monitor  ABD: Soft, appropriately tender surrounding incision sites, without guarding or rebound tenderness. Incision/Dressing c/d/I with no drainage  PSYCH: Cooperative    A/P: 46 yo M with PMH of alcoholic cirrhosis s/p TIPS who is now POD0 s/p laparoscopic right inguinal hernia repair.     Tylenol, PRN oxycodone, PRN Atarax  PTA lactulose  mIVF to be discontinued when tolerating PO  ADAT    Remainder of cares per primary team.    Naima Gillis DO   Surgery PGY-2    "

## 2020-10-15 NOTE — OR NURSING
Pt is stable and vitals are WNL's. Pt sleepy and awakens easily. Oriented x4, follows commands, and answers questions appropriately. Dr. Gillis - Surgical Resident at bedside assessing Pt. Pt transferred to .

## 2020-10-15 NOTE — ANESTHESIA POSTPROCEDURE EVALUATION
Anesthesia POST Procedure Evaluation    Patient: Sp Plasencia   MRN:     [id number] Gender:   male   Age:    45 year old :      1975        Preoperative Diagnosis: Hernia, inguinal [K40.90]   Procedure(s):  HERNIORRHAPHY, RIGHT INGUINAL, LAPAROSCOPIC.   Postop Comments: No value filed.     Anesthesia Type: MAC       Disposition: Admission   Postop Pain Control: Uneventful            Sign Out: Well controlled pain   PONV: No   Neuro/Psych: Uneventful            Sign Out: Acceptable/Baseline neuro status   Airway/Respiratory: Uneventful            Sign Out: Acceptable/Baseline resp. status   CV/Hemodynamics: Uneventful            Sign Out: Acceptable CV status   Other NRE: NONE   DID A NON-ROUTINE EVENT OCCUR? No         Last Anesthesia Record Vitals:  CRNA VITALS  10/14/2020 2023 - 10/14/2020 2123      10/14/2020             NIBP:  123/66    NIBP Mean:  84          Last PACU Vitals:  Vitals Value Taken Time   /78 10/14/20 2240   Temp 36.7  C (98.1  F) 10/14/20 2130   Pulse 93 10/14/20 2247   Resp 12 10/14/20 2230   SpO2 94 % 10/14/20 2247   Temp src     NIBP     Pulse     SpO2     Resp     Temp     Ht Rate     Temp 2     Vitals shown include unvalidated device data.      Electronically Signed By: Kathie Etienne MD, 2020, 10:48 PM

## 2020-10-15 NOTE — BRIEF OP NOTE
"[facility]     Brief Operative Note    Pre-operative diagnosis: Hernia, inguinal [K40.90]  Post-operative diagnosis Indirect inguinal hernia, right    Procedure: Procedure(s):  HERNIORRHAPHY, RIGHT INGUINAL, LAPAROSCOPIC.  Surgeon: Surgeon(s) and Role:     * Chris Parker MD - Primary     * Snow Dempsey MD - Resident - Assisting  Anesthesia: General   Estimated blood loss: 25 mL  Drains: None  Specimens: * No specimens in log *  Findings:   Indirect inguinal hernia on right  Complications: None.  Implants:   Implant Name Type Inv. Item Serial No.  Lot No. LRB No. Used Action   MESH KNITTED POLYPROPYLENE 06X6\" MARLEX [id number] Mesh MESH KNITTED POLYPROPYLENE 06X6\" MARLEX [id number]  CR [facility] [id number] Right 1 Implanted       Snow Dempsey MD  Surgery, PGY2  "

## 2020-10-16 ENCOUNTER — TELEPHONE (OUTPATIENT)
Dept: INTERNAL MEDICINE | Facility: CLINIC | Age: 45
End: 2020-10-16

## 2020-10-16 NOTE — TELEPHONE ENCOUNTER
IP F/U    Date: 10/15/20  Diagnosis: Hernia, Inguinal, Unilateral Recurrent Inguinal Hernia Without Obstruction Or Gangrene  Is patient active in care coordination? No  Was patient in TCU? No

## 2020-10-18 DIAGNOSIS — G25.81 RESTLESS LEGS SYNDROME (RLS): ICD-10-CM

## 2020-10-18 DIAGNOSIS — R18.8 OTHER ASCITES: ICD-10-CM

## 2020-10-20 NOTE — TELEPHONE ENCOUNTER
"Requested Prescriptions   Pending Prescriptions Disp Refills     rOPINIRole (REQUIP) 0.25 MG tablet [Pharmacy Med Name: rOPINIRole HCl Oral Tablet 0.25 MG] 30 tablet 0     Sig: TAKE ONE TABLET BY MOUTH AT BEDTIME       Antiparkinson's Agents Protocol Passed - 10/18/2020 11:26 AM        Passed - Blood pressure under 140/90 in past 12 months     BP Readings from Last 3 Encounters:   10/15/20 133/71   10/13/20 126/75   09/16/20 122/73                 Passed - CBC on record in past 12 months     Recent Labs   Lab Test 10/13/20  1531   WBC 5.2   RBC 4.07*   HGB 12.5*   HCT 37.2*   *                 Passed - ALT on record in past 12 months         Recent Labs   Lab Test 10/13/20  1531   ALT 47             Passed - Serum Creatinine on file in past 12 months     Recent Labs   Lab Test 10/13/20  1531 01/04/19  1614 01/04/19  1614   CR 0.65*   < >  --    CREAT  --   --  1.5*    < > = values in this interval not displayed.       Ok to refill medication if creatinine is low          Passed - Medication is active on med list        Passed - Patient is age 18 or older        Passed - Recent (6 mo) or future (30 days) visit within the authorizing provider's specialty     Patient had office visit in the last 6 months or has a visit in the next 30 days with authorizing provider or within the authorizing provider's specialty.  See \"Patient Info\" tab in inbasket, or \"Choose Columns\" in Meds & Orders section of the refill encounter.               hydrOXYzine (ATARAX) 25 MG tablet [Pharmacy Med Name: hydrOXYzine HCl Oral Tablet 25 MG] 90 tablet 0     Sig: Take 1-2 tablets (25-50 mg) by mouth every 6 hours as needed for itching pain       Antihistamines Protocol Passed - 10/18/2020 11:26 AM        Passed - Recent (12 mo) or future (30 days) visit within the authorizing provider's specialty     Patient has had an office visit with the authorizing provider or a provider within the authorizing providers department within the previous " "12 mos or has a future within next 30 days. See \"Patient Info\" tab in inbasket, or \"Choose Columns\" in Meds & Orders section of the refill encounter.              Passed - Patient is age 3 or older     Apply age and/or weight-based dosing for peds patients age 3 and older.    Forward request to provider for patients under the age of 3.          Passed - Medication is active on med list             Last virtual visit 9/21/20.  Per dictation:  \"(G25.81) Restless legs syndrome (RLS)  Comment:   Plan: rOPINIRole (REQUIP) 0.25 MG tablet        -trial   -pt to call in one week with update\"    [email or web address] message sent for patient to give update.  "

## 2020-10-22 DIAGNOSIS — G25.81 RESTLESS LEGS SYNDROME (RLS): ICD-10-CM

## 2020-10-22 DIAGNOSIS — R18.8 OTHER ASCITES: ICD-10-CM

## 2020-10-22 RX ORDER — ROPINIROLE 0.25 MG/1
TABLET, FILM COATED ORAL
Qty: 30 TABLET | Refills: 0 | OUTPATIENT
Start: 2020-10-22

## 2020-10-22 RX ORDER — HYDROXYZINE HYDROCHLORIDE 25 MG/1
25-50 TABLET, FILM COATED ORAL EVERY 6 HOURS PRN
Qty: 90 TABLET | Refills: 0 | Status: SHIPPED | OUTPATIENT
Start: 2020-10-22 | End: 2021-07-05

## 2020-10-22 RX ORDER — HYDROXYZINE HYDROCHLORIDE 25 MG/1
TABLET, FILM COATED ORAL
Qty: 90 TABLET | Refills: 0 | OUTPATIENT
Start: 2020-10-22

## 2020-10-22 RX ORDER — ROPINIROLE 0.25 MG/1
0.25 TABLET, FILM COATED ORAL AT BEDTIME
Qty: 30 TABLET | Refills: 0 | Status: SHIPPED | OUTPATIENT
Start: 2020-10-22 | End: 2020-11-17

## 2020-10-22 NOTE — TELEPHONE ENCOUNTER
"Hospital/TCU/ED for chronic condition Discharge Protocol    \"Hi, my name is Tamy Calderón RN, a registered nurse, and I am calling from [facility].  I am calling to follow up and see how things are going for you after your recent emergency visit/hospital/TCU stay.\"    Tell me how you are doing now that you are home?\" the swelling still hasnt gone down      Discharge Instructions    \"Let's review your discharge instructions.  What is/are the follow-up recommendations?  Pt. Response: follow up with surgeon in 7 days    \"Has an appointment with your primary care provider been scheduled?\"   not needed    \"When you see the provider, I would recommend that you bring your medications with you.\"    Medications    \"Tell me what changed about your medicines when you discharged?\"    Changes to chronic meds?    0-1    \"What questions do you have about your medications?\"    none - Questions cannot be easily answered - Epic MTM referral needed     New diagnoses of heart failure, COPD, diabetes, or MI?    No              Post Discharge Medication Reconciliation Status: discharge medications reconciled and changed, per note/orders.    Was MTM referral placed (*Make sure to put transitions as reason for referral)?   No    Call Summary    \"What questions or concerns do you have about your recent visit and your follow-up care?\"     pt lifting his body weight to get himself to the wheelchair and is concerned that he may be damaging the sutures.  . Advice given: advised to monitor for increased pain, swelling or bruising and to call the surgeon if having concerns.  also if noting an increase in pain.      \"If you have questions or things don't continue to improve, we encourage you contact us through the main clinic number (give number).  Even if the clinic is not open, triage nurses are available 24/7 to help you.     We would like you to know that our clinic has extended hours (provide information).  We also have urgent care " "(provide details on closest location and hours/contact info)\"      \"Thank you for your time and take care!\"             "

## 2020-10-22 NOTE — TELEPHONE ENCOUNTER
Routing refill request to provider for review/approval because:  Drug not on the [facility] refill protocol     Spoke with patient and he stated that the medications are working really well and he is sleeping much better.  Patient would like refill.

## 2020-10-27 ENCOUNTER — MYC MEDICAL ADVICE (OUTPATIENT)
Dept: SURGERY | Facility: CLINIC | Age: 45
End: 2020-10-27

## 2020-10-27 ENCOUNTER — HOSPITAL ENCOUNTER (OUTPATIENT)
Dept: ULTRASOUND IMAGING | Facility: CLINIC | Age: 45
Discharge: HOME | End: 2020-10-27
Attending: INTERNAL MEDICINE | Admitting: INTERNAL MEDICINE
Payer: COMMERCIAL

## 2020-10-27 DIAGNOSIS — K70.31 ALCOHOLIC CIRRHOSIS OF LIVER WITH ASCITES (H): ICD-10-CM

## 2020-10-27 PROCEDURE — 272N000021 US PARACENTESIS

## 2020-10-27 PROCEDURE — 272N000021 US ABDOMEN LIMITED

## 2020-10-27 NOTE — TELEPHONE ENCOUNTER
Follow-up phone call to patient.  Patient had surgery 10/16/20 for inguinal hernia.  Patient had TIPS procedure done before his hernia surgery.      Rates pain at a 3 on a 10.  Patient took the oxycodone the first few days.  Is taking iburofen for pain now. He has been using a jock-strap for support.  He uses a pillow between his legs at night to decrease pulling in the groin.  He has tried heat with no alleviation of discomfort.  Patient states he has MD, moves around quite a bit and has to use his upper body to lift the rest of his body.Encouraged patient to use ice packs on hernia.      Patient is concerned about his site and would like to have an ultrasound to see that the site is okay.  Patient having a follow-up TIPS procedure 11/9 and would like an order added on for US of the surgical.

## 2020-10-28 ENCOUNTER — TELEPHONE (OUTPATIENT)
Dept: ENDOCRINOLOGY | Facility: CLINIC | Age: 45
End: 2020-10-28

## 2020-10-28 NOTE — TELEPHONE ENCOUNTER
Follow-up phone call to patient.    Notified patient I had spoke with Dr. Parker regarding the request for a postop ultrasound of the inguinal area and Dr. Parker did not recommend and US be done.  Explained to patient that he is in the early postop period and will take about a month to recover and for most of the discomfort and swelling to go away.  Offered in person appointment today or next Wednesday.  Patient prefers to wait until November 11th.  Appt made for patient to see Dr. Parker in clinic November 11 at 10:30 AM.

## 2020-11-03 DIAGNOSIS — K70.31 ALCOHOLIC CIRRHOSIS OF LIVER WITH ASCITES (H): ICD-10-CM

## 2020-11-03 RX ORDER — SPIRONOLACTONE 25 MG/1
TABLET ORAL
Qty: 30 TABLET | Refills: 0 | Status: SHIPPED | OUTPATIENT
Start: 2020-11-03 | End: 2021-07-05

## 2020-11-09 ENCOUNTER — HOSPITAL ENCOUNTER (OUTPATIENT)
Dept: ULTRASOUND IMAGING | Facility: CLINIC | Age: 45
Discharge: HOME | End: 2020-11-09
Attending: RADIOLOGY | Admitting: RADIOLOGY
Payer: COMMERCIAL

## 2020-11-09 DIAGNOSIS — K70.31 ALCOHOLIC CIRRHOSIS OF LIVER WITH ASCITES (H): ICD-10-CM

## 2020-11-09 DIAGNOSIS — M25.50 MULTIPLE JOINT PAIN: ICD-10-CM

## 2020-11-09 DIAGNOSIS — K42.9 UMBILICAL HERNIA WITHOUT OBSTRUCTION AND WITHOUT GANGRENE: ICD-10-CM

## 2020-11-09 LAB
ERYTHROCYTE [DISTWIDTH] IN BLOOD BY AUTOMATED COUNT: 15.2 % (ref 10–15)
HCT VFR BLD AUTO: 37.3 % (ref 40–53)
HGB BLD-MCNC: 12.7 G/DL (ref 13.3–17.7)
INR PPP: 1.55 (ref 0.86–1.14)
MCH RBC QN AUTO: 30.8 PG (ref 26.5–33)
MCHC RBC AUTO-ENTMCNC: 34 G/DL (ref 31.5–36.5)
MCV RBC AUTO: 90 FL (ref 78–100)
PLATELET # BLD AUTO: 144 10E9/L (ref 150–450)
RBC # BLD AUTO: 4.13 10E12/L (ref 4.4–5.9)
WBC # BLD AUTO: 4.7 10E9/L (ref 4–11)

## 2020-11-09 PROCEDURE — 84550 ASSAY OF BLOOD/URIC ACID: CPT | Performed by: RADIOLOGY

## 2020-11-09 PROCEDURE — 82306 VITAMIN D 25 HYDROXY: CPT | Performed by: RADIOLOGY

## 2020-11-09 PROCEDURE — 80053 COMPREHEN METABOLIC PANEL: CPT | Performed by: RADIOLOGY

## 2020-11-09 PROCEDURE — 85027 COMPLETE CBC AUTOMATED: CPT | Performed by: RADIOLOGY

## 2020-11-09 PROCEDURE — 85610 PROTHROMBIN TIME: CPT | Performed by: RADIOLOGY

## 2020-11-09 PROCEDURE — 93975 VASCULAR STUDY: CPT

## 2020-11-09 PROCEDURE — 84443 ASSAY THYROID STIM HORMONE: CPT | Performed by: RADIOLOGY

## 2020-11-10 LAB
ALBUMIN SERPL-MCNC: 3.3 G/DL (ref 3.4–5)
ALP SERPL-CCNC: 119 U/L (ref 40–150)
ALT SERPL W P-5'-P-CCNC: 38 U/L (ref 0–70)
ANION GAP SERPL CALCULATED.3IONS-SCNC: 7 MMOL/L (ref 3–14)
AST SERPL W P-5'-P-CCNC: 48 U/L (ref 0–45)
BILIRUB SERPL-MCNC: 2.4 MG/DL (ref 0.2–1.3)
BUN SERPL-MCNC: 11 MG/DL (ref 7–30)
CALCIUM SERPL-MCNC: 9 MG/DL (ref 8.5–10.1)
CHLORIDE SERPL-SCNC: 112 MMOL/L (ref 94–109)
CO2 SERPL-SCNC: 24 MMOL/L (ref 20–32)
CREAT SERPL-MCNC: 0.53 MG/DL (ref 0.66–1.25)
DEPRECATED CALCIDIOL+CALCIFEROL SERPL-MC: 23 UG/L (ref 20–75)
GFR SERPL CREATININE-BSD FRML MDRD: >90 ML/MIN/{1.73_M2}
GLUCOSE SERPL-MCNC: 90 MG/DL (ref 70–99)
POTASSIUM SERPL-SCNC: 4 MMOL/L (ref 3.4–5.3)
PROT SERPL-MCNC: 6.7 G/DL (ref 6.8–8.8)
SODIUM SERPL-SCNC: 143 MMOL/L (ref 133–144)
TSH SERPL DL<=0.005 MIU/L-ACNC: 0.83 MU/L (ref 0.4–4)
URATE SERPL-MCNC: 3.9 MG/DL (ref 3.5–7.2)

## 2020-11-11 ENCOUNTER — TELEPHONE (OUTPATIENT)
Dept: GASTROENTEROLOGY | Facility: CLINIC | Age: 45
End: 2020-11-11

## 2020-11-11 ENCOUNTER — VIRTUAL VISIT (OUTPATIENT)
Dept: SURGERY | Facility: CLINIC | Age: 45
End: 2020-11-11
Payer: COMMERCIAL

## 2020-11-11 VITALS — WEIGHT: 218 LBS | HEIGHT: 73 IN | BODY MASS INDEX: 28.89 KG/M2

## 2020-11-11 DIAGNOSIS — K40.90 RIGHT INGUINAL HERNIA: Primary | ICD-10-CM

## 2020-11-11 DIAGNOSIS — Z11.59 ENCOUNTER FOR SCREENING FOR OTHER VIRAL DISEASES: Primary | ICD-10-CM

## 2020-11-11 PROCEDURE — 99024 POSTOP FOLLOW-UP VISIT: CPT | Mod: TEL | Performed by: SURGERY

## 2020-11-11 ASSESSMENT — PAIN SCALES - GENERAL: PAINLEVEL: SEVERE PAIN (6)

## 2020-11-11 ASSESSMENT — MIFFLIN-ST. JEOR: SCORE: 1932.72

## 2020-11-11 NOTE — PROGRESS NOTES
"Sp Plasencia is a 45 year old male who is being evaluated via a billable telephone visit.      The patient has been notified of following:     \"This telephone visit will be conducted via a call between you and your physician/provider. We have found that certain health care needs can be provided without the need for a physical exam.  This service lets us provide the care you need with a short phone conversation.  If a prescription is necessary we can send it directly to your pharmacy.  If lab work is needed we can place an order for that and you can then stop by our lab to have the test done at a later time.    Telephone visits are billed at different rates depending on your insurance coverage. During this emergency period, for some insurers they may be billed the same as an in-person visit.  Please reach out to your insurance provider with any questions.    If during the course of the call the physician/provider feels a telephone visit is not appropriate, you will not be charged for this service.\"    Patient has given verbal consent for Telephone visit?  Yes    What phone number would you like to be contacted at? [phone]    How would you like to obtain your AVS? MyChart    This is a 10-minute postoperative follow-up visit call for Mr. Sp Plasencia.  He has a 45-year-old gentleman who is status post repair of a very large scrotal hernia on October 14, 2020.  We used a preperitoneal laparoscopic approach.  Postoperatively patient has done well.  More recently however he has noticed some swelling around his right testicle.  He has some discomfort in his right groin that is approximately a 3 out of 10 pain but if he bumps his groin then the pain is closer to a 7 or 8.  He did have a ultrasound of his abdomen on October 27 and a follow-up on November 9.  It appears to me there may be an increase in ascites between the 2 ultrasounds.  I have asked the patient to elevate his scrotum on a washcloth to see if this " reduces some of the edema and also to follow-up with his primary care and hepatology providers to see if they think that more diuretic or paracentesis would be in order.  He will do this and we will also send in in basket.  My plan will be to follow him up next week.  This can be a phone visit although I prefer in person if possible.

## 2020-11-11 NOTE — LETTER
"11/11/2020       RE: Sp Plasencia  [address] [place] [address]     Dear Colleague,    Thank you for referring your patient, Sp Plasencia, to the [facility] [place] at [facility]. Please see a copy of my visit note below.    Sp Plasencia is a 45 year old male who is being evaluated via a billable telephone visit.      The patient has been notified of following:     \"This telephone visit will be conducted via a call between you and your physician/provider. We have found that certain health care needs can be provided without the need for a physical exam.  This service lets us provide the care you need with a short phone conversation.  If a prescription is necessary we can send it directly to your pharmacy.  If lab work is needed we can place an order for that and you can then stop by our lab to have the test done at a later time.    Telephone visits are billed at different rates depending on your insurance coverage. During this emergency period, for some insurers they may be billed the same as an in-person visit.  Please reach out to your insurance provider with any questions.    If during the course of the call the physician/provider feels a telephone visit is not appropriate, you will not be charged for this service.\"    Patient has given verbal consent for Telephone visit?  Yes    What phone number would you like to be contacted at? [phone]    How would you like to obtain your AVS? Shanika    This is a 10-minute postoperative follow-up visit call for Mr. Sp Plasencia.  He has a 45-year-old gentleman who is status post repair of a very large scrotal hernia on October 14, 2020.  We used a preperitoneal laparoscopic approach.  Postoperatively patient has done well.  More recently however he has noticed some swelling around his right testicle.  He has some discomfort in his right groin that is approximately a 3 out of 10 " pain but if he bumps his groin then the pain is closer to a 7 or 8.  He did have a ultrasound of his abdomen on October 27 and a follow-up on November 9.  It appears to me there may be an increase in ascites between the 2 ultrasounds.  I have asked the patient to elevate his scrotum on a washcloth to see if this reduces some of the edema and also to follow-up with his primary care and hepatology providers to see if they think that more diuretic or paracentesis would be in order.  He will do this and we will also send in in basket.  My plan will be to follow him up next week.  This can be a phone visit although I prefer in person if possible.    Again, thank you for allowing me to participate in the care of your patient.      Sincerely,    Chris Parker MD

## 2020-11-13 ENCOUNTER — VIRTUAL VISIT (OUTPATIENT)
Dept: RADIOLOGY | Facility: CLINIC | Age: 45
End: 2020-11-13
Attending: RADIOLOGY
Payer: COMMERCIAL

## 2020-11-13 DIAGNOSIS — K70.31 ASCITES DUE TO ALCOHOLIC CIRRHOSIS (H): Primary | ICD-10-CM

## 2020-11-13 PROCEDURE — 999N001193 HC VIDEO/TELEPHONE VISIT; NO CHARGE

## 2020-11-13 PROCEDURE — 99213 OFFICE O/P EST LOW 20 MIN: CPT | Mod: TEL | Performed by: RADIOLOGY

## 2020-11-13 NOTE — LETTER
"    11/13/2020         RE: Sp Plasencia  [address] [place] [address]        Dear Colleague,    Thank you for referring your patient, Sp Plasencia, to the [facility]. Please see a copy of my visit note below.    Sp Plasencia is a 45 year old male who is being evaluated via a billable telephone visit.      The patient has been notified of following:     \"This telephone visit will be conducted via a call between you and your physician/provider. We have found that certain health care needs can be provided without the need for a physical exam.  This service lets us provide the care you need with a short phone conversation.  If a prescription is necessary we can send it directly to your pharmacy.  If lab work is needed we can place an order for that and you can then stop by our lab to have the test done at a later time.    Telephone visits are billed at different rates depending on your insurance coverage. During this emergency period, for some insurers they may be billed the same as an in-person visit.  Please reach out to your insurance provider with any questions.    If during the course of the call the physician/provider feels a telephone visit is not appropriate, you will not be charged for this service.\"    Patient has given verbal consent for Telephone visit?  Yes    What phone number would you like to be contacted at? [phone]    How would you like to obtain your AVS? Mail a copy    Kristyn MONCADA    Phone call duration: 23 minutes    Magnolia Kingston MD          Interventional Radiology Clinic Visit    Date of this visit: 11/13/2020    Sp Plasencia returns for follow up post TIPS placement on 8/26/2020.     Primary Physician: Deanna Barahona        History Of Present Illness:    Sp Plasencia is a 45 year old male with muscular dystrophy and alcoholic cirrhosis who is status post transjugular intrahepatic portosystemic shunt (TIPS) placement on " 8/26/2020 for refractory ascites.     Patient reports that he is doing well.  He had 1 paracentesis on 9/2/2020 which drained 2.2 L.  He has not required a paracentesis since then. Because his ascites has resolved, he was able to undergo right inguinal hernia surgery recently (10/14/2020). He states that he has no swelling of his abdomen but has developed swelling in the hernia area and wonders if ascites is trapped there. He states his surgeon has arranged an US of this area for next week.    He also has pain in his RLQ from below is navel to his right groin that started after his surgery. Denies RUQ pain or chest pain.    Denies confusion episodes.    Has occasional ankle swelling which he controls with PRN Aldactone and Lasix. He has only needed to take it twice in the past few months.      Review of Systems:    Negative other than above.      Past Medical/Surgical History:    Past Medical History:   Diagnosis Date     Acid reflux      Anemia      Ascites      Esophageal varices (H)      FSHD (facioscapulohumeral muscular dystrophy) (H)      Gout      HTN (hypertension)      Jaundice      Liver cirrhosis (H)      Smoker      Thrombocytopenia (H)      Past Surgical History:   Procedure Laterality Date     IR PARACENTESIS  08/26/2020     IR TRANSVEN INTRAHEPATIC PORTOSYST SHUNT  08/26/2020     LAPAROSCOPIC HERNIORRHAPHY INGUINAL Right 10/14/2020    Procedure: HERNIORRHAPHY, RIGHT INGUINAL, LAPAROSCOPIC.;  Surgeon: Chris Parker MD;  Location:  OR       Current Medications:    Current Outpatient Medications   Medication Sig Dispense Refill     allopurinol (ZYLOPRIM) 300 MG tablet Take 1 tablet (300 mg) by mouth daily (Patient taking differently: Take 300 mg by mouth every morning ) 30 tablet 0     esomeprazole (NEXIUM) 40 MG DR capsule Take 1 capsule (40 mg) by mouth daily Take 30-60 minutes before eating. (Patient taking differently: Take 40 mg by mouth every morning (before breakfast) Take 30-60 minutes  before eating. ) 30 capsule 0     hydrOXYzine (ATARAX) 25 MG tablet Take 1-2 tablets (25-50 mg) by mouth every 6 hours as needed for itching or other (adjuvant pain) 90 tablet 0     ibuprofen (ADVIL/MOTRIN) 200 MG capsule Take 600 mg by mouth 3 times daily        lactulose (CEPHULAC) 20 GM packet Take 1 packet (20 g) by mouth 2 times daily (Patient taking differently: Take 20 g by mouth every evening ) 60 packet 1     Multiple Vitamin (DAILY MULTIVITAMIN PO) Take 1 tablet by mouth every evening        rOPINIRole (REQUIP) 0.25 MG tablet Take 1 tablet (0.25 mg) by mouth At Bedtime 30 tablet 0     spironolactone (ALDACTONE) 25 MG tablet TAKE ONE TABLET BY MOUTH DAILY 30 tablet 0     traMADol (ULTRAM) 50 MG tablet Take 1 tablet (50 mg) by mouth daily (Patient taking differently: Take 50 mg by mouth every morning ) 30 tablet 5     XIFAXAN 550 MG TABS tablet 2 times daily        zolpidem (AMBIEN) 5 MG tablet Take 1 tablet (5 mg) by mouth nightly as needed for sleep 30 tablet 0     oxyCODONE (ROXICODONE) 5 MG tablet Take 1 tablet (5 mg) by mouth every 4 hours as needed for moderate to severe pain (Patient not taking: Reported on 11/11/2020) 10 tablet 0       Allergies:    Adhesive tape    Family History:    Family History   Problem Relation Age of Onset     Hypertension Father      Hypertension Paternal Grandfather        Social History:    Social History     Socioeconomic History     Marital status:      Spouse name: Not on file     Number of children: Not on file     Years of education: Not on file     Highest education level: Not on file   Occupational History     Not on file   Social Needs     Financial resource strain: Not on file     Food insecurity     Worry: Not on file     Inability: Not on file     Transportation needs     Medical: Not on file     Non-medical: Not on file   Tobacco Use     Smoking status: Current Some Day Smoker     Types: Cigarettes     Smokeless tobacco: Never Used     Tobacco comment: 1  pack per month for 15 years   Substance and Sexual Activity     Alcohol use: Not Currently     Comment: 1/1/2020     Drug use: Not Currently     Sexual activity: Yes     Partners: Female   Lifestyle     Physical activity     Days per week: Not on file     Minutes per session: Not on file     Stress: Not on file   Relationships     Social connections     Talks on phone: Not on file     Gets together: Not on file     Attends Yarsanism service: Not on file     Active member of club or organization: Not on file     Attends meetings of clubs or organizations: Not on file     Relationship status: Not on file     Intimate partner violence     Fear of current or ex partner: Not on file     Emotionally abused: Not on file     Physically abused: Not on file     Forced sexual activity: Not on file   Other Topics Concern     Parent/sibling w/ CABG, MI or angioplasty before 65F 55M? Not Asked   Social History Narrative     Not on file       Physical Exam:    CONSTITUTIONAL: healthy, alert and no distress.  PSYCHIATRIC: mentation appears normal and affect normal.  NEURO: Normal speech.   RESP: No audible cough or wheeze.    Laboratory Studies:    Lab Results   Component Value Date    HGB 12.7 11/09/2020     Lab Results   Component Value Date     11/09/2020     Lab Results   Component Value Date    WBC 4.7 11/09/2020       Lab Results   Component Value Date    INR 1.55 11/09/2020       Lab Results   Component Value Date    PROTTOTAL 6.7 11/09/2020      Lab Results   Component Value Date    ALBUMIN 3.3 11/09/2020     Lab Results   Component Value Date    BILITOTAL 2.4 11/09/2020     Lab Results   Component Value Date    ALKPHOS 119 11/09/2020     Lab Results   Component Value Date    AST 48 11/09/2020     Lab Results   Component Value Date    ALT 38 11/09/2020       Lab Results   Component Value Date    CR 0.53 11/09/2020     Lab Results   Component Value Date    BUN 11 11/09/2020       Alpha Fetoprotein   Date Value Ref  Range Status   09/15/2020 1.8 0 - 8 ug/L Final     Comment:     Assay Method:  Chemiluminescence using Siemens Centaur XP       Imaging:     I reviewed the TIPS Doppler and ultrasound from 11/9/2020. It shows the TIPS is widely patent with mildly elevated velocity at the portal venous end but normal velocities throughout the remainder. There is appropriate rapid antegrade flow in the MPV and appropriate retrograde flow in the intrahepatic left and right PVs. Trace ascites is seen.      ASSESSMENT/PLAN:      Sp Plasencia is a 45 year old male with refractory ascites due to alcoholic cirrhosis, who is 3 months post TIPS placement and has responded well. He has not needed a paracentesis since late September. The recent TIPS ultrasound shows no concerns.    He was able to have his right inguinal hernia repair recently but has pain and swelling in that area and is under the care of his surgeon. It is possible that some ascites is trapped there, but there is not enough in his abdomen to drain.    I will see him again for his 6 month follow up with another TIPS ultrasound.    He agreed with this plan.      A total of 25 minutes was spent in care for the patient.    It was a pleasure seeing the patient.     Magnolia Morel M.D.    Interventional Radiology  Department of Radiology  [facility]      CC  Patient Care Team:  Deanna Barahona MD as PCP - General (Internal Medicine)  Maurilio Woodson MD as MD (Neurology)  Danielle Eller RN as Nurse Coordinator (Neurology)  Deanna Barahona MD as Assigned PCP  Magnolia Villanueva MD as Assigned Heart and Vascular Provider  Maurilio Woodson MD as Assigned Neuroscience Provider  Perla Winters MD as Assigned Gastroenterology Provider  Chris Parker MD as Assigned Surgical Provider  SELF, REFERRED      Again, thank you for allowing me to participate in the care of your patient.         Sincerely,        Magnolia Villanueva MD

## 2020-11-13 NOTE — PROGRESS NOTES
"Sp Plasencia is a 45 year old male who is being evaluated via a billable telephone visit.      The patient has been notified of following:     \"This telephone visit will be conducted via a call between you and your physician/provider. We have found that certain health care needs can be provided without the need for a physical exam.  This service lets us provide the care you need with a short phone conversation.  If a prescription is necessary we can send it directly to your pharmacy.  If lab work is needed we can place an order for that and you can then stop by our lab to have the test done at a later time.    Telephone visits are billed at different rates depending on your insurance coverage. During this emergency period, for some insurers they may be billed the same as an in-person visit.  Please reach out to your insurance provider with any questions.    If during the course of the call the physician/provider feels a telephone visit is not appropriate, you will not be charged for this service.\"    Patient has given verbal consent for Telephone visit?  Yes    What phone number would you like to be contacted at? [phone]    How would you like to obtain your AVS? Mail a copy    Kristyn ANDREWSJARON    Phone call duration: 23 minutes    Magnolia Kingston MD          Interventional Radiology Clinic Visit    Date of this visit: 11/13/2020    Sp Plasencia returns for follow up post TIPS placement on 8/26/2020.     Primary Physician: Deanna Barahona        History Of Present Illness:    Sp Plasencia is a 45 year old male with muscular dystrophy and alcoholic cirrhosis who is status post transjugular intrahepatic portosystemic shunt (TIPS) placement on 8/26/2020 for refractory ascites.     Patient reports that he is doing well.  He had 1 paracentesis on 9/2/2020 which drained 2.2 L.  He has not required a paracentesis since then. Because his ascites has resolved, he was able to undergo right inguinal hernia " surgery recently (10/14/2020). He states that he has no swelling of his abdomen but has developed swelling in the hernia area and wonders if ascites is trapped there. He states his surgeon has arranged an US of this area for next week.    He also has pain in his RLQ from below is navel to his right groin that started after his surgery. Denies RUQ pain or chest pain.    Denies confusion episodes.    Has occasional ankle swelling which he controls with PRN Aldactone and Lasix. He has only needed to take it twice in the past few months.      Review of Systems:    Negative other than above.      Past Medical/Surgical History:    Past Medical History:   Diagnosis Date     Acid reflux      Anemia      Ascites      Esophageal varices (H)      FSHD (facioscapulohumeral muscular dystrophy) (H)      Gout      HTN (hypertension)      Jaundice      Liver cirrhosis (H)      Smoker      Thrombocytopenia (H)      Past Surgical History:   Procedure Laterality Date     IR PARACENTESIS  08/26/2020     IR TRANSVEN INTRAHEPATIC PORTOSYST SHUNT  08/26/2020     LAPAROSCOPIC HERNIORRHAPHY INGUINAL Right 10/14/2020    Procedure: HERNIORRHAPHY, RIGHT INGUINAL, LAPAROSCOPIC.;  Surgeon: Chris Parker MD;  Location:  OR       Current Medications:    Current Outpatient Medications   Medication Sig Dispense Refill     allopurinol (ZYLOPRIM) 300 MG tablet Take 1 tablet (300 mg) by mouth daily (Patient taking differently: Take 300 mg by mouth every morning ) 30 tablet 0     esomeprazole (NEXIUM) 40 MG DR capsule Take 1 capsule (40 mg) by mouth daily Take 30-60 minutes before eating. (Patient taking differently: Take 40 mg by mouth every morning (before breakfast) Take 30-60 minutes before eating. ) 30 capsule 0     hydrOXYzine (ATARAX) 25 MG tablet Take 1-2 tablets (25-50 mg) by mouth every 6 hours as needed for itching or other (adjuvant pain) 90 tablet 0     ibuprofen (ADVIL/MOTRIN) 200 MG capsule Take 600 mg by mouth 3 times daily         lactulose (CEPHULAC) 20 GM packet Take 1 packet (20 g) by mouth 2 times daily (Patient taking differently: Take 20 g by mouth every evening ) 60 packet 1     Multiple Vitamin (DAILY MULTIVITAMIN PO) Take 1 tablet by mouth every evening        rOPINIRole (REQUIP) 0.25 MG tablet Take 1 tablet (0.25 mg) by mouth At Bedtime 30 tablet 0     spironolactone (ALDACTONE) 25 MG tablet TAKE ONE TABLET BY MOUTH DAILY 30 tablet 0     traMADol (ULTRAM) 50 MG tablet Take 1 tablet (50 mg) by mouth daily (Patient taking differently: Take 50 mg by mouth every morning ) 30 tablet 5     XIFAXAN 550 MG TABS tablet 2 times daily        zolpidem (AMBIEN) 5 MG tablet Take 1 tablet (5 mg) by mouth nightly as needed for sleep 30 tablet 0     oxyCODONE (ROXICODONE) 5 MG tablet Take 1 tablet (5 mg) by mouth every 4 hours as needed for moderate to severe pain (Patient not taking: Reported on 11/11/2020) 10 tablet 0       Allergies:    Adhesive tape    Family History:    Family History   Problem Relation Age of Onset     Hypertension Father      Hypertension Paternal Grandfather        Social History:    Social History     Socioeconomic History     Marital status:      Spouse name: Not on file     Number of children: Not on file     Years of education: Not on file     Highest education level: Not on file   Occupational History     Not on file   Social Needs     Financial resource strain: Not on file     Food insecurity     Worry: Not on file     Inability: Not on file     Transportation needs     Medical: Not on file     Non-medical: Not on file   Tobacco Use     Smoking status: Current Some Day Smoker     Types: Cigarettes     Smokeless tobacco: Never Used     Tobacco comment: 1 pack per month for 15 years   Substance and Sexual Activity     Alcohol use: Not Currently     Comment: 1/1/2020     Drug use: Not Currently     Sexual activity: Yes     Partners: Female   Lifestyle     Physical activity     Days per week: Not on file      Minutes per session: Not on file     Stress: Not on file   Relationships     Social connections     Talks on phone: Not on file     Gets together: Not on file     Attends Denominational service: Not on file     Active member of club or organization: Not on file     Attends meetings of clubs or organizations: Not on file     Relationship status: Not on file     Intimate partner violence     Fear of current or ex partner: Not on file     Emotionally abused: Not on file     Physically abused: Not on file     Forced sexual activity: Not on file   Other Topics Concern     Parent/sibling w/ CABG, MI or angioplasty before 65F 55M? Not Asked   Social History Narrative     Not on file       Physical Exam:    CONSTITUTIONAL: healthy, alert and no distress.  PSYCHIATRIC: mentation appears normal and affect normal.  NEURO: Normal speech.   RESP: No audible cough or wheeze.    Laboratory Studies:    Lab Results   Component Value Date    HGB 12.7 11/09/2020     Lab Results   Component Value Date     11/09/2020     Lab Results   Component Value Date    WBC 4.7 11/09/2020       Lab Results   Component Value Date    INR 1.55 11/09/2020       Lab Results   Component Value Date    PROTTOTAL 6.7 11/09/2020      Lab Results   Component Value Date    ALBUMIN 3.3 11/09/2020     Lab Results   Component Value Date    BILITOTAL 2.4 11/09/2020     Lab Results   Component Value Date    ALKPHOS 119 11/09/2020     Lab Results   Component Value Date    AST 48 11/09/2020     Lab Results   Component Value Date    ALT 38 11/09/2020       Lab Results   Component Value Date    CR 0.53 11/09/2020     Lab Results   Component Value Date    BUN 11 11/09/2020       Alpha Fetoprotein   Date Value Ref Range Status   09/15/2020 1.8 0 - 8 ug/L Final     Comment:     Assay Method:  Chemiluminescence using Siemens Centaur XP       Imaging:     I reviewed the TIPS Doppler and ultrasound from 11/9/2020. It shows the TIPS is widely patent with mildly elevated  velocity at the portal venous end but normal velocities throughout the remainder. There is appropriate rapid antegrade flow in the MPV and appropriate retrograde flow in the intrahepatic left and right PVs. Trace ascites is seen.      ASSESSMENT/PLAN:      Sp Plasencia is a 45 year old male with refractory ascites due to alcoholic cirrhosis, who is 3 months post TIPS placement and has responded well. He has not needed a paracentesis since late September. The recent TIPS ultrasound shows no concerns.    He was able to have his right inguinal hernia repair recently but has pain and swelling in that area and is under the care of his surgeon. It is possible that some ascites is trapped there, but there is not enough in his abdomen to drain.    I will see him again for his 6 month follow up with another TIPS ultrasound.    He agreed with this plan.      A total of 25 minutes was spent in care for the patient.    It was a pleasure seeing the patient.     Magnolia Morel M.D.    Interventional Radiology  Department of Radiology  [facility]  Patient Care Team:  Deanna Barahona MD as PCP - General (Internal Medicine)  Maurilio Woodson MD as MD (Neurology)  Danielle Eller RN as Nurse Coordinator (Neurology)  Deanna Barahona MD as Assigned PCP  Magnolia Villanueva MD as Assigned Heart and Vascular Provider  Maurilio Woodson MD as Assigned Neuroscience Provider  Pelra Winters MD as Assigned Gastroenterology Provider  Chris Parker MD as Assigned Surgical Provider  SELF, REFERRED

## 2020-11-16 DIAGNOSIS — K70.31 ALCOHOLIC CIRRHOSIS OF LIVER WITH ASCITES (H): Primary | ICD-10-CM

## 2020-11-17 ENCOUNTER — MYC MEDICAL ADVICE (OUTPATIENT)
Dept: INTERNAL MEDICINE | Facility: CLINIC | Age: 45
End: 2020-11-17

## 2020-11-17 ENCOUNTER — MYC REFILL (OUTPATIENT)
Dept: INTERNAL MEDICINE | Facility: CLINIC | Age: 45
End: 2020-11-17

## 2020-11-17 DIAGNOSIS — G25.81 RESTLESS LEGS SYNDROME (RLS): ICD-10-CM

## 2020-11-18 ENCOUNTER — VIRTUAL VISIT (OUTPATIENT)
Dept: SURGERY | Facility: CLINIC | Age: 45
End: 2020-11-18
Payer: COMMERCIAL

## 2020-11-18 VITALS — BODY MASS INDEX: 28.89 KG/M2 | HEIGHT: 73 IN | WEIGHT: 218 LBS

## 2020-11-18 DIAGNOSIS — G25.81 RESTLESS LEGS SYNDROME (RLS): ICD-10-CM

## 2020-11-18 DIAGNOSIS — K40.90 RIGHT INGUINAL HERNIA: Primary | ICD-10-CM

## 2020-11-18 PROCEDURE — 99024 POSTOP FOLLOW-UP VISIT: CPT | Mod: TEL | Performed by: SURGERY

## 2020-11-18 ASSESSMENT — MIFFLIN-ST. JEOR: SCORE: 1932.72

## 2020-11-18 ASSESSMENT — PAIN SCALES - GENERAL: PAINLEVEL: EXTREME PAIN (8)

## 2020-11-18 NOTE — NURSING NOTE
"Chief Complaint   Patient presents with     RECHECK     Follow up.       Vitals:    11/18/20 0845   Weight: 98.9 kg (218 lb)   Height: 1.854 m (6' 1\")       Body mass index is 28.76 kg/m .                            JEANE FRIAS, EMT    "

## 2020-11-18 NOTE — PROGRESS NOTES
This is a 5-minute telephone visit for Mr. Plasencia who is a 45-year-old gentleman who status post repair of a very large right scrotal hernia on October 14 of 2020.  The patient has had some recurrence of his ascites as well as some swelling in his scrotum.  The patient goes for a another paracentesis on 23 November.  Likely I will need to see him in clinic just to make sure everything is progressing according to plan or if any other imaging like a CAT scan is needed.  He understand this plan and we will follow-up accordingly.

## 2020-11-18 NOTE — LETTER
11/18/2020       RE: Sp Plasencia  [address] [facility] [id number]     Dear Colleague,    Thank you for referring your patient, Sp Plasencia, to the [facility] GENERAL SURGERY [facility] [place] at [facility]. Please see a copy of my visit note below.    This is a 5-minute telephone visit for Mr. Plasencia who is a 45-year-old gentleman who status post repair of a very large right scrotal hernia on October 14 of 2020.  The patient has had some recurrence of his ascites as well as some swelling in his scrotum.  The patient goes for a another paracentesis on 23 November.  Likely I will need to see him in clinic just to make sure everything is progressing according to plan or if any other imaging like a CAT scan is needed.  He understand this plan and we will follow-up accordingly.     Again, thank you for allowing me to participate in the care of your patient.      Sincerely,    Chris Parker MD

## 2020-11-19 DIAGNOSIS — K70.31 ALCOHOLIC CIRRHOSIS OF LIVER WITH ASCITES (H): ICD-10-CM

## 2020-11-19 DIAGNOSIS — Z11.59 ENCOUNTER FOR SCREENING FOR OTHER VIRAL DISEASES: ICD-10-CM

## 2020-11-19 DIAGNOSIS — K42.9 UMBILICAL HERNIA WITHOUT OBSTRUCTION AND WITHOUT GANGRENE: ICD-10-CM

## 2020-11-19 PROCEDURE — U0003 INFECTIOUS AGENT DETECTION BY NUCLEIC ACID (DNA OR RNA); SEVERE ACUTE RESPIRATORY SYNDROME CORONAVIRUS 2 (SARS-COV-2) (CORONAVIRUS DISEASE [COVID-19]), AMPLIFIED PROBE TECHNIQUE, MAKING USE OF HIGH THROUGHPUT TECHNOLOGIES AS DESCRIBED BY CMS-2020-01-R: HCPCS | Performed by: INTERNAL MEDICINE

## 2020-11-19 RX ORDER — ROPINIROLE 0.25 MG/1
TABLET, FILM COATED ORAL
Qty: 30 TABLET | Refills: 0 | OUTPATIENT
Start: 2020-11-19

## 2020-11-19 RX ORDER — ROPINIROLE 0.25 MG/1
0.25 TABLET, FILM COATED ORAL AT BEDTIME
Qty: 30 TABLET | Refills: 2 | Status: SHIPPED | OUTPATIENT
Start: 2020-11-19 | End: 2020-12-08

## 2020-11-19 NOTE — TELEPHONE ENCOUNTER
Ropinerole Prescription approved per [facility] Refill Protocol. LOWELL Monk R.N.

## 2020-11-20 LAB
SARS-COV-2 RNA SPEC QL NAA+PROBE: NOT DETECTED
SPECIMEN SOURCE: NORMAL

## 2020-11-23 ENCOUNTER — HOSPITAL ENCOUNTER (OUTPATIENT)
Dept: ULTRASOUND IMAGING | Facility: CLINIC | Age: 45
Discharge: HOME | End: 2020-11-23
Attending: INTERNAL MEDICINE | Admitting: INTERNAL MEDICINE
Payer: COMMERCIAL

## 2020-11-23 DIAGNOSIS — K70.31 ALCOHOLIC CIRRHOSIS OF LIVER WITH ASCITES (H): ICD-10-CM

## 2020-11-23 PROCEDURE — 76705 ECHO EXAM OF ABDOMEN: CPT

## 2020-11-24 ENCOUNTER — VIRTUAL VISIT (OUTPATIENT)
Dept: PHARMACY | Facility: CLINIC | Age: 45
End: 2020-11-24
Payer: COMMERCIAL

## 2020-11-24 ENCOUNTER — VIRTUAL VISIT (OUTPATIENT)
Dept: GASTROENTEROLOGY | Facility: CLINIC | Age: 45
End: 2020-11-24
Attending: INTERNAL MEDICINE
Payer: COMMERCIAL

## 2020-11-24 DIAGNOSIS — R52 PAIN: ICD-10-CM

## 2020-11-24 DIAGNOSIS — K76.82 HEPATIC ENCEPHALOPATHY (H): Primary | ICD-10-CM

## 2020-11-24 DIAGNOSIS — R79.89 LOW VITAMIN D LEVEL: ICD-10-CM

## 2020-11-24 DIAGNOSIS — K70.30 ALCOHOLIC CIRRHOSIS, UNSPECIFIED WHETHER ASCITES PRESENT (H): Primary | ICD-10-CM

## 2020-11-24 DIAGNOSIS — K70.31 ALCOHOLIC CIRRHOSIS OF LIVER WITH ASCITES (H): ICD-10-CM

## 2020-11-24 DIAGNOSIS — M10.9 GOUT, UNSPECIFIED CAUSE, UNSPECIFIED CHRONICITY, UNSPECIFIED SITE: ICD-10-CM

## 2020-11-24 DIAGNOSIS — G25.81 RESTLESS LEGS SYNDROME: ICD-10-CM

## 2020-11-24 DIAGNOSIS — R12 HEARTBURN: ICD-10-CM

## 2020-11-24 DIAGNOSIS — G71.02 FSHD (FACIOSCAPULOHUMERAL MUSCULAR DYSTROPHY) (H): ICD-10-CM

## 2020-11-24 PROCEDURE — 99214 OFFICE O/P EST MOD 30 MIN: CPT | Mod: TEL | Performed by: INTERNAL MEDICINE

## 2020-11-24 PROCEDURE — 99607 MTMS BY PHARM ADDL 15 MIN: CPT | Mod: TEL | Performed by: PHARMACIST

## 2020-11-24 PROCEDURE — 99605 MTMS BY PHARM NP 15 MIN: CPT | Mod: TEL | Performed by: PHARMACIST

## 2020-11-24 RX ORDER — RIFAXIMIN 550 MG/1
550 TABLET ORAL 2 TIMES DAILY
Qty: 60 TABLET | Refills: 11 | Status: SHIPPED | OUTPATIENT
Start: 2020-11-24 | End: 2021-02-25

## 2020-11-24 RX ORDER — FUROSEMIDE 20 MG
20 TABLET ORAL DAILY
COMMUNITY
End: 2021-07-05

## 2020-11-24 ASSESSMENT — PAIN SCALES - GENERAL: PAINLEVEL: MODERATE PAIN (4)

## 2020-11-24 NOTE — PROGRESS NOTES
"Sp Plasencia is a 45 year old male who is being evaluated via a billable telephone visit during the COVID-19 pandemic and clinic response to limit in-person visits.  The patient has been notified of following:   \"This telephone visit will be conducted via a call between you and your physician/provider. We have found that certain health care needs can be provided without the need for a physical exam.  This service lets us provide the care you need with a short phone conversation.  If a prescription is necessary we can send it directly to your pharmacy.  If lab work is needed we can place an order for that and you can then stop by our lab to have the test done at a later time.  If during the course of the call the physician/provider feels a telephone visit is not appropriate, you will not be charged for this service.\" Telephone visits are billed at different rates depending on your insurance coverage. During this emergency period, for some insurers they may be billed the same as an in-person visit.  Please reach out to your insurance provider with any questions.  Consent has been obtained for this service by 1 care team member: yes  I have reviewed and updated the patient's Past Medical History, Social History, Family History and Medication List.    What phone number would you like to be contacted at?     Phone call contact time  Call Started at 8:30  Call Ended at 9:00  On phone patient  [facility] FollowUp    Requesting Provider and Health System: Carin Mcnally MD , [facility]. Vinod Stewart MD, PCP [place]   Liver Disease:  ETOH cirrhosis    A/P  Mr. Plasencia is a 45 Y M with ETOH cirrhosis. MELD 29 intially, now 15 ABO B    CIRRHOSIS  2/2 ETOH. Sober for 11 months. Synthetic function improved initially, now stable with bili 2-3, INR 1.5 MELD in low-mid teens.  ASCITES  S/p TIPS (Dr. Kingston) 8/26/20. No ascites on US yesterday. Ok to continue low dose prn tiff and " "furosemide.  THROMBOCYTOPENIA Mild. 2/2 ETOH and cirrhosis. Plt 120-140  HCC SCREENING. UTD US 11/9/20. Due May. Ordered.  VARICEAL SCREENING. UTD EGD 1/2020 small varices. S/p TIPS.    PAIN He describes all over, daily pain that is not adequately managed. I cannot ascribe this to liver disease, especially in light of how well he is doing liver-wise. We discussed that he would further pursue with PCP, pharmacist and neurologist.    FASCIOSCAPULOHUMERAL MUSCULAR DYSTROPHY See notes below re neuro opinion. No treatments available for this condition and this is the main health issue he confronts daily.    INGUINAL HERNIA S/p repair with Dr. Parker 10/14/20. Doing well. Will follow up with him 12/9/20    ROUTINE CARE Established with Dr. Franny Barahona    AUD Has declined CD treatment. Sober since January 2020.    SOCIAL SUPPORT. Good per his report  FUNCTIONAL STATUS. Impairment with weakness. Independent in ADLs  LIVER TRANSPLANT CANDIDACY.   Liver function and ascites has improved significantly with sobriety and TIPS. Conversation today about the risks and indications for LT in the face of progressive muscular dystrophy. Not indicated at this time and would not be recommended as a \"stop gap\" in case his liver gets worse before his muscular dystrophy.    RTC 3 mo.  Labs 12/9/20    Perla Winters MD  Hepatology/Liver Transplantation  Medical Director, Liver Transplantation  [facility]  ========================================================================    Subjective:  Mr. Plasencia is a 45 Y M with ETOH cirrhosis. Last ETOH 1/1/20. First diagnosed with liver disease in January 2020 when he presented with ASHWINI and elevated LFTs. His wife noted that he was jaundiced.     Since I last spoke to him, he had scrotal hernia repaired by Dr. Parker 10/14/20 and TIPS by Dr. Kingston 8/26/20. He has done well. US yesterday showed trace ascites.    He states he has pain daily \"everywhere\" and day-to-day " "pain management is not working. He asked if this is liver related.     AUD  Last ETOH 1/1/20  DUI 2001  Was about 6 drinks/d  Saw CD evaluator here 4/30/20. Her note to our transplant team indicated he denied having a problem with alcohol, depression, and anxiety (\"those are made up diagnoses\")  PETH negative 9/15/20  Ascites  TIPS 8/26/20  [place] 25 furosemide 20. Takes these prn now, a couple days per month  Last para 9/2/20 2200 ml. No ascites on US yesterday  Follows low Na diet  HE  One episode in hospital. None before or since. On lactulose and rifaximin. Takes lactulose daily, has about 2 BM/d.      Lab Test 11/09/20  1127   PROTTOTAL 6.7*   ALBUMIN 3.3*   BILITOTAL 2.4*   ALKPHOS 119   AST 48*   ALT 38     Lab Test 11/09/20  1127   WBC 4.7   RBC 4.13*   HGB 12.7*   HCT 37.3*   MCV 90   MCH 30.8   MCHC 34.0   RDW 15.2*   *   MELD-Na score: 15 at 11/9/2020 11:27 AM  MELD score: 15 at 11/9/2020 11:27 AM  Calculated from:  Serum Creatinine: 0.53 mg/dL (Rounded to 1 mg/dL) at 11/9/2020 11:27 AM  Serum Sodium: 143 mmol/L (Rounded to 137 mmol/L) at 11/9/2020 11:27 AM  Total Bilirubin: 2.4 mg/dL at 11/9/2020 11:27 AM  INR(ratio): 1.55 at 11/9/2020 11:27 AM  Age: 45 years 6 months    HCC screening US 11/9/20  EGD 1/2020 small varices  COLONOSCOPY no record  KIDNEY FUNCTION 2 episodes of ASHWINI  BONE DENSITY no record    Weakness/Fascioscapulohumeral Muscular Dystrophy   LV with neuro 2013. 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  I explained to him that we still remain without any disease-modifying treatments for FSH dystrophy.  Therefore, management remains purely symptomatic.  I will mail him a copy of his genetic test results.  I will ask our physical therapist to reevaluate him, specifically his home safety, and make suggestions to mitigate the " risk of falls.  He may need to retry orthotic equipment, but I will leave it to the judgment of the therapist.   I would recommend obtaining spirometry at some point before liver transplant.  FSH dystrophy generally does not cause severe restrictive respiratory dysfunction in the majority of cases, but I have seen a couple of exceptions and this is one important thing to assess before he proceeds with a 7 to 8-hour long major surgery that will require prolonged postoperative intubation.     He complains of muscle mass loss, feels weaker. Sometimes needs help getting up from a chair. He has a lift chair. He is getting help from his kids and wife. He has lost about 25-30 pounds currently 225. He is driving. He drives a pickup and can get in and out without assistance but has a tough time. He cannot crouch down to pick something up due to weakness, transition from crouching to standing. No ongoing PT. He did have a visit with PT per Dr. Woodson.     Hospitalized  1/4-1/6/19  ASHWINI 2/2 ACE+HCTZ/triamterene+NSAIDS   2/11-2/12/20 New ascites, AH. Pred x 2 wk. BIli peak 19   4/8-4/10/20  ASHWINI creat 2.4 -> 1.2. HE.  Bili peak 25    Portal vein assessment: decreased flow US 4/29/20  Diabetes: no  Abdominal surgery: yes. Hernia repair  HCV neg  HBV neg  HIV neg    Lab Test 11/09/20  1127   PROTTOTAL 6.7*   ALBUMIN 3.3*   BILITOTAL 2.4*   ALKPHOS 119   AST 48*   ALT 38     Lab Test 11/09/20  1127   WBC 4.7   RBC 4.13*   HGB 12.7*   HCT 37.3*   MCV 90   MCH 30.8   MCHC 34.0   RDW 15.2*   *     MELD-Na score: 15 at 11/9/2020 11:27 AM  MELD score: 15 at 11/9/2020 11:27 AM  Calculated from:  Serum Creatinine: 0.53 mg/dL (Rounded to 1 mg/dL) at 11/9/2020 11:27 AM  Serum Sodium: 143 mmol/L (Rounded to 137 mmol/L) at 11/9/2020 11:27 AM  Total Bilirubin: 2.4 mg/dL at 11/9/2020 11:27 AM  INR(ratio): 1.55 at 11/9/2020 11:27 AM  Age: 45 years 6 months     PAST MEDICAL HISTORY  Fascioscapulohumeral Muscular Dystrophy See  above.  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 Owns a bar/restaurant. On disability   Smoking: Has smoked for about 8 years  Alcohol: see above  FAMILY HISTORY  M alive and well, lymphoma 25 years  F d liver failure last year age 67  3 sibs A&W  3 kids A&W  No known family members with fascioscapulohumeral muscular dystrophy  ROS trouble sleeping, change in appetite. 10 point ROS neg other than the symptoms noted above in the HPI.

## 2020-11-24 NOTE — PROGRESS NOTES
"Sp Plasencia is a 45 year old male who is being evaluated via a billable telephone visit.      The patient has been notified of following:     \"This telephone visit will be conducted via a call between you and your physician/provider. We have found that certain health care needs can be provided without the need for a physical exam.  This service lets us provide the care you need with a short phone conversation.  If a prescription is necessary we can send it directly to your pharmacy.  If lab work is needed we can place an order for that and you can then stop by our lab to have the test done at a later time.    Telephone visits are billed at different rates depending on your insurance coverage. During this emergency period, for some insurers they may be billed the same as an in-person visit.  Please reach out to your insurance provider with any questions.    If during the course of the call the physician/provider feels a telephone visit is not appropriate, you will not be charged for this service.\"    Patient has given verbal consent for Telephone visit?  Yes    What phone number would you like to be contacted at? [phone]    How would you like to obtain your AVS? Shanika    Phone call duration: *** minutes    {signature options:[id number]}      "

## 2020-11-24 NOTE — LETTER
"    11/24/2020         RE: Sp Plasencia  [address] [facility] [address]        Dear Colleague,    Thank you for referring your patient, Sp Plasencia, to the [facility]PAT[facility] [place]. Please see a copy of my visit note below.    Sp Plasencia is a 45 year old male who is being evaluated via a billable telephone visit during the COVID-19 pandemic and clinic response to limit in-person visits.  The patient has been notified of following:   \"This telephone visit will be conducted via a call between you and your physician/provider. We have found that certain health care needs can be provided without the need for a physical exam.  This service lets us provide the care you need with a short phone conversation.  If a prescription is necessary we can send it directly to your pharmacy.  If lab work is needed we can place an order for that and you can then stop by our lab to have the test done at a later time.  If during the course of the call the physician/provider feels a telephone visit is not appropriate, you will not be charged for this service.\" Telephone visits are billed at different rates depending on your insurance coverage. During this emergency period, for some insurers they may be billed the same as an in-person visit.  Please reach out to your insurance provider with any questions.  Consent has been obtained for this service by 1 care team member: yes  I have reviewed and updated the patient's Past Medical History, Social History, Family History and Medication List.    What phone number would you like to be contacted at?     Phone call contact time  Call Started at 8:30  Call Ended at 9:00  On phone patient  [facility] FollowUp    Requesting Provider and Health System: Carin Mcnally MD , [place]. Vinod Stewart MD, PCP [place]   Liver Disease:  ETOH cirrhosis    A/P  Mr. Plasencia is a 45 Y M with ETOH cirrhosis. MELD 29 intially, now 15 ABO " "B    CIRRHOSIS  2/2 ETOH. Sober for 11 months. Synthetic function improved initially, now stable with bili 2-3, INR 1.5 MELD in low-mid teens.  ASCITES  S/p TIPS (Dr. Kingston) 8/26/20. No ascites on US yesterday. Ok to continue low dose prn tiff and furosemide.  THROMBOCYTOPENIA Mild. 2/2 ETOH and cirrhosis. Plt 120-140  HCC SCREENING. UTD US 11/9/20. Due May. Ordered.  VARICEAL SCREENING. UTD EGD 1/2020 small varices. S/p TIPS.    PAIN He describes all over, daily pain that is not adequately managed. I cannot ascribe this to liver disease, especially in light of how well he is doing liver-wise. We discussed that he would further pursue with PCP, pharmacist and neurologist.    FASCIOSCAPULOHUMERAL MUSCULAR DYSTROPHY See notes below re neuro opinion. No treatments available for this condition and this is the main health issue he confronts daily.    INGUINAL HERNIA S/p repair with Dr. Parker 10/14/20. Doing well. Will follow up with him 12/9/20    ROUTINE CARE Established with Dr. Franny Barahona    AUD Has declined CD treatment. Sober since January 2020.    SOCIAL SUPPORT. Good per his report  FUNCTIONAL STATUS. Impairment with weakness. Independent in ADLs  LIVER TRANSPLANT CANDIDACY.   Liver function and ascites has improved significantly with sobriety and TIPS. Conversation today about the risks and indications for LT in the face of progressive muscular dystrophy. Not indicated at this time and would not be recommended as a \"stop gap\" in case his liver gets worse before his muscular dystrophy.    RTC 3 mo.  Labs 12/9/20    Perla Winters MD  Hepatology/Liver Transplantation  Medical Director, Liver Transplantation  [facility]  ========================================================================    Subjective:  Mr. Plasencia is a 45 Y M with ETOH cirrhosis. Last ETOH 1/1/20. First diagnosed with liver disease in January 2020 when he presented with ASHWINI and elevated LFTs. His wife noted that he " "was jaundiced.     Since I last spoke to him, he had scrotal hernia repaired by Dr. Parker 10/14/20 and TIPS by Dr. Kingston 8/26/20. He has done well. US yesterday showed trace ascites.    He states he has pain daily \"everywhere\" and day-to-day pain management is not working. He asked if this is liver related.     AUD  Last ETOH 1/1/20  DUI 2001  Was about 6 drinks/d  Saw CD evaluator here 4/30/20. Her note to our transplant team indicated he denied having a problem with alcohol, depression, and anxiety (\"those are made up diagnoses\")  PETH negative 9/15/20  Ascites  TIPS 8/26/20  Rosas 25 furosemide 20. Takes these prn now, a couple days per month  Last para 9/2/20 2200 ml. No ascites on US yesterday  Follows low Na diet  HE  One episode in hospital. None before or since. On lactulose and rifaximin. Takes lactulose daily, has about 2 BM/d.      Lab Test 11/09/20  1127   PROTTOTAL 6.7*   ALBUMIN 3.3*   BILITOTAL 2.4*   ALKPHOS 119   AST 48*   ALT 38     Lab Test 11/09/20  1127   WBC 4.7   RBC 4.13*   HGB 12.7*   HCT 37.3*   MCV 90   MCH 30.8   MCHC 34.0   RDW 15.2*   *   MELD-Na score: 15 at 11/9/2020 11:27 AM  MELD score: 15 at 11/9/2020 11:27 AM  Calculated from:  Serum Creatinine: 0.53 mg/dL (Rounded to 1 mg/dL) at 11/9/2020 11:27 AM  Serum Sodium: 143 mmol/L (Rounded to 137 mmol/L) at 11/9/2020 11:27 AM  Total Bilirubin: 2.4 mg/dL at 11/9/2020 11:27 AM  INR(ratio): 1.55 at 11/9/2020 11:27 AM  Age: 45 years 6 months    HCC screening US 11/9/20  EGD 1/2020 small varices  COLONOSCOPY no record  KIDNEY FUNCTION 2 episodes of ASHWINI  BONE DENSITY no record    Weakness/Fascioscapulohumeral Muscular Dystrophy   LV with neuro 2013. 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  I explained to him that we still remain without any disease-modifying " treatments for FSH dystrophy.  Therefore, management remains purely symptomatic.  I will mail him a copy of his genetic test results.  I will ask our physical therapist to reevaluate him, specifically his home safety, and make suggestions to mitigate the risk of falls.  He may need to retry orthotic equipment, but I will leave it to the judgment of the therapist.   I would recommend obtaining spirometry at some point before liver transplant.  FSH dystrophy generally does not cause severe restrictive respiratory dysfunction in the majority of cases, but I have seen a couple of exceptions and this is one important thing to assess before he proceeds with a 7 to 8-hour long major surgery that will require prolonged postoperative intubation.     He complains of muscle mass loss, feels weaker. Sometimes needs help getting up from a chair. He has a lift chair. He is getting help from his kids and wife. He has lost about 25-30 pounds currently 225. He is driving. He drives a pickup and can get in and out without assistance but has a tough time. He cannot crouch down to pick something up due to weakness, transition from crouching to standing. No ongoing PT. He did have a visit with PT per Dr. Woodson.     Hospitalized  1/4-1/6/19  ASHWINI 2/2 ACE+HCTZ/triamterene+NSAIDS   2/11-2/12/20 New ascites, AH. Pred x 2 wk. BIli peak 19   4/8-4/10/20  ASHWINI creat 2.4 -> 1.2. HE.  Bili peak 25    Portal vein assessment: decreased flow US 4/29/20  Diabetes: no  Abdominal surgery: yes. Hernia repair  HCV neg  HBV neg  HIV neg    Lab Test 11/09/20  1127   PROTTOTAL 6.7*   ALBUMIN 3.3*   BILITOTAL 2.4*   ALKPHOS 119   AST 48*   ALT 38     Lab Test 11/09/20  1127   WBC 4.7   RBC 4.13*   HGB 12.7*   HCT 37.3*   MCV 90   MCH 30.8   MCHC 34.0   RDW 15.2*   *     MELD-Na score: 15 at 11/9/2020 11:27 AM  MELD score: 15 at 11/9/2020 11:27 AM  Calculated from:  Serum Creatinine: 0.53 mg/dL (Rounded to 1 mg/dL) at 11/9/2020 11:27 AM  Serum  Sodium: 143 mmol/L (Rounded to 137 mmol/L) at 11/9/2020 11:27 AM  Total Bilirubin: 2.4 mg/dL at 11/9/2020 11:27 AM  INR(ratio): 1.55 at 11/9/2020 11:27 AM  Age: 45 years 6 months     PAST MEDICAL HISTORY  Fascioscapulohumeral Muscular Dystrophy See above.  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 Owns a bar/restaurant. On disability   Smoking: Has smoked for about 8 years  Alcohol: see above  FAMILY HISTORY  M alive and well, lymphoma 25 years  F d liver failure last year age 67  3 sibs A&W  3 kids A&W  No known family members with fascioscapulohumeral muscular dystrophy  ROS trouble sleeping, change in appetite. 10 point ROS neg other than the symptoms noted above in the HPI.        Again, thank you for allowing me to participate in the care of your patient.        Sincerely,        Perla Winters MD

## 2020-11-24 NOTE — PROGRESS NOTES
MTM ENCOUNTER  SUBJECTIVE/OBJECTIVE:                           Sp Plasencia is a 45 year old male called for an initial visit. He was referred to me from [facility].      Reason for visit: Referred by [facility] - would like to discuss his pain concerns.    Allergies/ADRs: Reviewed in chart  Tobacco: He reports that he has been smoking cigarettes. He has never used smokeless tobacco.Tobacco Cessation Action Plan:   Information offered: Patient not interested at this time  Alcohol: not currently using    Medication Adherence/Access: no issues reported    Hepatic Encephalopathy:   Furosemide 20 mg daily (as needed)  Spironolactone 25 mg daily (as needed)  Lactulose 20 g daily  Xifaxan 550 mg twice daily  Multivitamin daily     Just saw Dr. Winters immediately prior to this visit. Notes they were able to rule out hepatic concerns as a source of the pain he is experiencing. Reports 1-2 bowel movements daily.      Restless Legs:  Ropinirole 0.25 mg at night  Hydroxyzine 25-50 mg every 6 hours as needed     Joints are bad - takes about 15 minutes to get off the couch. Reports pain is getting worse over the last five years. Reports over the last six months. Unsure if it is related to medication. Reported considered taking hydroxyzine and got a prescription for this. Takes hydroxyzine once daily during the day. Has tried taking it more than once during the day but not enough to make a pattern. 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 When asked to clarify pain, the said it is mostly from restless legs. Denies crawling feeling, more jumpy like he needs to run around but this causes pain. Chart notes indicate he had a low normal ferritin previously.    Pain/Fascioscapulohumeral Muscular Dystrophy:   Tramadol 50 mg daily   Ibuprofen ten 200 mg tablets daily (throughout the day)  Oxycodone 5 mg  (for hernia surgery - no longer taking)    We discussed that he is taking a  large amount of NSAIDs which increases his risk of side effects including ulceration. Reports previous endoscopy was okay. Declined offer to go to pain management previously as he does not believe they will address his pain due to concerns in our society with opioid use. See above re: restless legs. He isn't sure if this is attributed to worsening muscular dystrophy (chart notes indicate reported diagnosis of facioscapulohumeral musclar dystrophy). Chart notes indicate he did have a visit with pain management outside our system last year and that this is a chronic concern.    Gout:  Allopurinol 300 mg daily    Uric Acid   Date Value Ref Range Status   11/09/2020 3.9 3.5 - 7.2 mg/dL Final     Reports taking for 6-7 years or more. Denies even a tingle in his big toe until about four weeks ago. Considers himself flare free for some time. He believes there was some talk about optimizing his allopurinol to see if this helped his pain.     Heartburn:   Esomeprazole 40 mg daily    No heartburn or indigestion. No reported concerns.     Low Vitamin D:   Multivitamin daily     He mentions his vitamin D was something they were going to look into. Upon review, appears Dr. Barahona had recommended he start vitamin D 2000 units daily, though he was not aware of this and hasn't started yet. Open to trying this.    Vitamin D Deficiency Screening Results:  Lab Results   Component Value Date    VITDT 23 11/09/2020     ASSESSMENT:                            Medication Adherence: No issues identified    Hepatic Encephalopathy: Stable based on report.    Restless Legs: Given prior report that he had low-normal ferritin, it may be benefit to re-check this to ensure this is not contributing to his symptoms. We discussed there are still several options for management of restless legs and optimization of his medications, but we agree with not changing too many things at one time.    Pain/Fascioscapulohumeral Muscular Dystrophy: This  appears to be a main concern of Sp. As included above, he and Dr. Winters discussed this is not likely due to a hepatic cause. We discussed making few intentional changes at one time as to not end up adding multiple agents that are not helping, and he agrees with this approach. It would be ideal if we can get him off chronic NSAIDs, or at least minimize the risks associated with this.     Gout: We discussed his most recent uric acid level, which is at goal. Given he does not have tophi, and reports being flare free we discussed that it would be unlikely increasing his dose of allopurinol would help his pain.     Heartburn: Stable based on report. Encouraged continued use given chronic NSAID use.    Low Vitamin D: We discussed that supplementing vitamin D may help a number of things, including potentially his pain. There have been observational studies indicating low vitamin D can be associated with a worse pain score. Given this is a relatively side-effect free option, we agree this would be a good next step.     PLAN:                            1. Laura to discuss ferritin with Dr. Barahona (staff message sent)  2. Sp to start vitamin D 2,000 units daily     I spent 54 minutes with this patient today. I offer these suggestions for consideration by his care team. A copy of the visit note was provided to the patient's primary care provider.    Will follow up in two weeks for phone call.    The patient was sent via [employer] a summary of these recommendations.     Laura Smith PharmD, BCACP  MTM Pharmacist   [facility] Gastroenterology and Rheumatology  Phone: [phone]     Patient consented to a telehealth visit: yes  Telemedicine Visit Details  Type of service:  Telephone visit  Start Time: 9:01 AM  End Time: 9:56 AM  Originating Location (patient location): Home  Distant Location (provider location):  [facility] [place]  Mode of Communication:  Telephone

## 2020-12-08 ENCOUNTER — VIRTUAL VISIT (OUTPATIENT)
Dept: PHARMACY | Facility: CLINIC | Age: 45
End: 2020-12-08
Payer: COMMERCIAL

## 2020-12-08 DIAGNOSIS — R52 PAIN: ICD-10-CM

## 2020-12-08 DIAGNOSIS — G25.81 RESTLESS LEGS SYNDROME: Primary | ICD-10-CM

## 2020-12-08 DIAGNOSIS — G25.81 RESTLESS LEGS SYNDROME (RLS): ICD-10-CM

## 2020-12-08 DIAGNOSIS — R79.89 LOW VITAMIN D LEVEL: ICD-10-CM

## 2020-12-08 PROCEDURE — 99607 MTMS BY PHARM ADDL 15 MIN: CPT | Mod: TEL | Performed by: PHARMACIST

## 2020-12-08 PROCEDURE — 99606 MTMS BY PHARM EST 15 MIN: CPT | Mod: TEL | Performed by: PHARMACIST

## 2020-12-08 RX ORDER — ROPINIROLE 0.5 MG/1
0.5 TABLET, FILM COATED ORAL AT BEDTIME
Qty: 30 TABLET | Refills: 2 | Status: SHIPPED | OUTPATIENT
Start: 2020-12-08 | End: 2020-12-28

## 2020-12-08 RX ORDER — MULTIVIT-MIN/IRON/FOLIC ACID/K 18-600-40
2000 CAPSULE ORAL DAILY
COMMUNITY
End: 2021-07-05

## 2020-12-08 NOTE — PATIENT INSTRUCTIONS
Recommendations from today's MTM visit:                                                      1. Please schedule follow-up with Dr. Barahona to discuss your joint pain and neck/back pain. As we reviewed, you may need more workup or other interventions outside of medication.    2. Laura to discuss the following with Dr. Barahona:   - Consideration for increasing tramadol to 50 mg by mouth twice daily.   - Consideration for increasing ropinirole to 0.5 mg by mouth daily.    -- preference to fill at [facility] in [place]    It was great to speak with you today.  I value your experience and would be very thankful for your time with providing feedback on our clinic survey. You may receive a survey via email or text message in the next few days.     Next MTM visit: 1/12/21 at 9:30 AM for a telephone call    To schedule another MTM appointment, please call the clinic directly or you may call the MTM scheduling line at [phone] or toll-free at [phone].     My Clinical Pharmacist's contact information:                                                      It was a pleasure talking with you today!  Please feel free to contact me with any questions or concerns you have.      Laura GarciaD, BCACP  MTM Pharmacist   [facility] Gastroenterology and Rheumatology  Phone: [phone]

## 2020-12-08 NOTE — PROGRESS NOTES
[place], MD Sarah Molina, Laura Maria, [facility]             Laura,   I am okay with requip increase.     Okay to increase tramadol, but would not want to continued to do this.   Since tramadol can be addicting, I do not want to trade one addiction for another, so need to be careful with this one.     Thanks,   Dr. Barahona      Update: Will order ropinirole, however, I cannot order controlled substances. Communicated with Dr. Barahona. Update provided directly to Sp 12/8 at 2:40 PM.

## 2020-12-08 NOTE — PROGRESS NOTES
"MTM ENCOUNTER  SUBJECTIVE/OBJECTIVE:                           Sp Plasencia is a 45 year old male called for a follow-up visit. He was referred to me from [facility].  Today's visit is a follow-up MTM visit from 11/24/20.     Reason for visit: review restless legs and discuss plan for pain    Allergies/ADRs: Reviewed in chart  Tobacco: He reports that he has been smoking cigarettes. He has never used smokeless tobacco.Tobacco Cessation Action Plan:   not interested at this time  Alcohol: not currently using    Medication Adherence/Access: no issues reported  -- plan going forward for pain    Ongoing Pain:   Tramadol 50 mg daily  Hydroxyzine 25-50 mg every 6 hours as needed    Notes since last visits, joints have been horribly bad. Reports knees are at the point that he can't walk, back/neck are very painful as well. Believes his joint is bone on bone in his knee and may have a pinched nerve in his neck. He is not sure they would be able to do a joint replacement because \"there is nothing to attach it to.\" He has follow-up scheduled with Dr. Parker tomorrow but this is for follow-up after hernia repair. Reports continuing with hydroxyzine once daily as we discussed previously. Not noticing a huge impact from hydroxyzine. Does find tramadol helpful, but doesn't last all day. Previously noted that he is not interested in seeing pain management for chronic pain.    Low Vitamin D:   Vitamin D 2,000 units daily    Taking but has not noticed a difference since starting in relation to pain. No reported side effects.    Restless Legs Syndrome:   Ropinirole 0.25 mg daily    Reports this did help his symptoms when he started. Does take about 1 hour before bedtime. He notes that I keep saying restless leg syndrome but he states this sensation occurs in other part of his body including his arms. Started ropinirole back in September and diagnosis attached is RLS. As above, at this point he cannot really walk due to his " knee - which limits ability to exercise. He feels symptoms may be from buildup of lactic acid. Reviewed non-pharmacologic recommendations for RLS today.    We discussed that I was able to find his ferritin result from outside our system. He does not recall our discussion on ferritin from last visit. Result returned above normal limits (431) -- see media tab 2/6/20 for results.     ASSESSMENT:                            Medication Adherence: No issues identified    Ongoing Pain: Sp would likely benefit from provider follow-up given on going pain concerns and broader pain discussion. Reviewed that to treat pain it is important to understand what type of pain we are trying to target. If in fact he has bone/bone pain and a pinched nerve, the plan would likely include more than just medication. As a bridge to this work-up/follow-up, it may be reasonable to trial an increase in tramadol to help with potential structural pain. We discussed that although opioids do have a place in therapy for RLS, there are other options to maximize and try before this step.    Low Vitamin D: Unchanged. Continue vitamin D 2,000 units daily.    Restless Leg Syndrome: Given his reported benefit to low-dose ropinirole, it seems reasonable to trial an increased dose of this to see if it provides better coverage of his symptoms. Discussed risks for both augmentation and rebound and encouraged him to monitor for this effect if Dr. Barahona is agreeable to the increased dosage.    PLAN:                            1. Please schedule with Dr. Barahona for follow-up on chronic pain workup (joint/neck).    2. Considerations for Dr. Barahona (staff message sent):    - increase ropinirole to 0.5 mg daily    - increase tramadol to 50 mg twice daily    -- Prefers [facility] in [place]    I spent 41 minutes with this patient today. I offer these suggestions for consideration by Dr. Barahona. A copy of the visit note was provided to the  patient's primary care provider.    Will follow up in five weeks for telephone call.  -- Discussed transitioning to Sallie GarciaD, BCACP given she works in clinic with Dr. Barahona. Fields preference would be to continuing working with me unless communication becomes a barrier.    The patient was sent via [employer] a summary of these recommendations.     Laura GarciaD, BCACP  MTM Pharmacist   [facility] Gastroenterology and Rheumatology  Phone: [phone]    Patient consented to a telehealth visit: yes  Telemedicine Visit Details  Type of service:  Telephone visit  Start Time: 9:30 AM  End Time: 10:11 AM  Originating Location (patient location): [place]  Distant Location (provider location):  [facility] [place]  Mode of Communication:  Telephone      Medication Therapy Recommendations Needing Review  Pain    Rationale: Dose too low - Dosage too low - Effectiveness   Recommendation: Increase Dose - traMADol 50 MG tablet   Status: Contact Provider - Awaiting Response   Note: Consider increasing to 50 mg twice daily pending further workup         Restless legs syndrome    Rationale: Dose too low - Dosage too low - Effectiveness   Recommendation: Increase Dose - rOPINIRole 0.5 MG tablet   Status: Contact Provider - Awaiting Response   Note: Consider increase to 0.5 mg ropinirole daily

## 2020-12-09 ENCOUNTER — OFFICE VISIT (OUTPATIENT)
Dept: SURGERY | Facility: CLINIC | Age: 45
End: 2020-12-09
Payer: COMMERCIAL

## 2020-12-09 VITALS
OXYGEN SATURATION: 99 % | HEIGHT: 73 IN | BODY MASS INDEX: 27.16 KG/M2 | HEART RATE: 89 BPM | DIASTOLIC BLOOD PRESSURE: 79 MMHG | SYSTOLIC BLOOD PRESSURE: 149 MMHG | WEIGHT: 204.9 LBS

## 2020-12-09 DIAGNOSIS — K40.90 RIGHT INGUINAL HERNIA: Primary | ICD-10-CM

## 2020-12-09 DIAGNOSIS — K70.31 ALCOHOLIC CIRRHOSIS OF LIVER WITH ASCITES (H): ICD-10-CM

## 2020-12-09 PROCEDURE — 99024 POSTOP FOLLOW-UP VISIT: CPT | Performed by: SURGERY

## 2020-12-09 ASSESSMENT — PAIN SCALES - GENERAL: PAINLEVEL: SEVERE PAIN (6)

## 2020-12-09 ASSESSMENT — MIFFLIN-ST. JEOR: SCORE: 1873.3

## 2020-12-09 NOTE — LETTER
"12/9/2020       RE: Sp Plasencia  [address] [facility] [address]     Dear Colleague,    Thank you for referring your patient, Sp Plasencia, to the [facility]GERY [facility] [place] [facility]. Please see a copy of my visit note below.    Patient seen and examined.  He is status post repair of a large right scrotal hernia.  Postoperatively he notes some fluid that enters his scrotum.  On examination there appears to be no evidence of bowel or recurrence of hernia however he does have some fluid that appears to communicate with his scrotum-ascites fluid.  On examination he is mildly tender over the pubis but no evidence of hernia. BP (!) 149/79 (BP Location: Left arm, Patient Position: Sitting, Cuff Size: Adult Regular)   Pulse 89   Ht 1.854 m (6' 1\")   Wt 92.9 kg (204 lb 14.4 oz)   SpO2 99%   BMI 27.03 kg/m     The amount of fluid will fluctuate throughout the day.  With prolonged standing or sitting it appears to be more.  At this point I am quite pleased with the repair as his ascites remains under control he should not have much in the way of issues.  Certainly if he determines there are any changes in terms of more bulging with what appeared to be any contents beside simple fluid he will let me know.  He will follow up as needed.    Again, thank you for allowing me to participate in the care of your patient.      Sincerely,    Chrisshadi Parker MD      "

## 2020-12-09 NOTE — NURSING NOTE
"Chief Complaint   Patient presents with     RECHECK     Follow up appt.       Vitals:    12/09/20 1041   BP: (!) 149/79   BP Location: Left arm   Patient Position: Sitting   Cuff Size: Adult Regular   Pulse: 89   SpO2: 99%   Weight: 92.9 kg (204 lb 14.4 oz)   Height: 1.854 m (6' 1\")       Body mass index is 27.03 kg/m .                            JEANE FRIAS, EMT    "

## 2020-12-09 NOTE — TELEPHONE ENCOUNTER
Please let patient know that pharmacist had contacted me.    I am okay if he needs to take tramadol 50mg twice a day.    Please find out when he needs a refill.    thanks

## 2020-12-09 NOTE — PROGRESS NOTES
"Patient seen and examined.  He is status post repair of a large right scrotal hernia.  Postoperatively he notes some fluid that enters his scrotum.  On examination there appears to be no evidence of bowel or recurrence of hernia however he does have some fluid that appears to communicate with his scrotum-ascites fluid.  On examination he is mildly tender over the pubis but no evidence of hernia. BP (!) 149/79 (BP Location: Left arm, Patient Position: Sitting, Cuff Size: Adult Regular)   Pulse 89   Ht 1.854 m (6' 1\")   Wt 92.9 kg (204 lb 14.4 oz)   SpO2 99%   BMI 27.03 kg/m     The amount of fluid will fluctuate throughout the day.  With prolonged standing or sitting it appears to be more.  At this point I am quite pleased with the repair as his ascites remains under control he should not have much in the way of issues.  Certainly if he determines there are any changes in terms of more bulging with what appeared to be any contents beside simple fluid he will let me know.  He will follow up as needed.  "

## 2020-12-09 NOTE — TELEPHONE ENCOUNTER
Patient has 4 days remaining of his Tramadol supply so he would appreciate a refill going into the weekend.  LOWELL Monk R.N.

## 2020-12-13 RX ORDER — TRAMADOL HYDROCHLORIDE 50 MG/1
50 TABLET ORAL 2 TIMES DAILY PRN
Qty: 60 TABLET | Refills: 1 | Status: SHIPPED | OUTPATIENT
Start: 2020-12-13 | End: 2021-02-18

## 2020-12-15 ENCOUNTER — TELEPHONE (OUTPATIENT)
Dept: GASTROENTEROLOGY | Facility: CLINIC | Age: 45
End: 2020-12-15

## 2020-12-28 ENCOUNTER — VIRTUAL VISIT (OUTPATIENT)
Dept: INTERNAL MEDICINE | Facility: CLINIC | Age: 45
End: 2020-12-28
Payer: COMMERCIAL

## 2020-12-28 DIAGNOSIS — G89.4 CHRONIC PAIN SYNDROME: ICD-10-CM

## 2020-12-28 DIAGNOSIS — M10.00 IDIOPATHIC GOUT, UNSPECIFIED CHRONICITY, UNSPECIFIED SITE: ICD-10-CM

## 2020-12-28 DIAGNOSIS — G25.81 RESTLESS LEGS SYNDROME (RLS): ICD-10-CM

## 2020-12-28 DIAGNOSIS — K70.30 ALCOHOLIC CIRRHOSIS, UNSPECIFIED WHETHER ASCITES PRESENT (H): ICD-10-CM

## 2020-12-28 DIAGNOSIS — G71.02 FSHD (FACIOSCAPULOHUMERAL MUSCULAR DYSTROPHY) (H): Primary | ICD-10-CM

## 2020-12-28 PROCEDURE — 99214 OFFICE O/P EST MOD 30 MIN: CPT | Mod: GT | Performed by: INTERNAL MEDICINE

## 2020-12-28 RX ORDER — ROPINIROLE 0.5 MG/1
0.5 TABLET, FILM COATED ORAL 2 TIMES DAILY
Qty: 180 TABLET | Refills: 1 | Status: SHIPPED | OUTPATIENT
Start: 2020-12-28 | End: 2021-06-29

## 2020-12-28 RX ORDER — GABAPENTIN 100 MG/1
100 CAPSULE ORAL 3 TIMES DAILY
Qty: 90 CAPSULE | Refills: 0 | Status: SHIPPED | OUTPATIENT
Start: 2020-12-28 | End: 2021-01-18

## 2020-12-28 NOTE — PROGRESS NOTES
"Sp Plasencia is a 45 year old male who is being evaluated via a billable telephone visit.      The patient has been notified of following:     \"This telephone visit will be conducted via a call between you and your physician/provider. We have found that certain health care needs can be provided without the need for a physical exam.  This service lets us provide the care you need with a short phone conversation.  If a prescription is necessary we can send it directly to your pharmacy.  If lab work is needed we can place an order for that and you can then stop by our lab to have the test done at a later time.    Telephone visits are billed at different rates depending on your insurance coverage. During this emergency period, for some insurers they may be billed the same as an in-person visit.  Please reach out to your insurance provider with any questions.    If during the course of the call the physician/provider feels a telephone visit is not appropriate, you will not be charged for this service.\"    Patient has given verbal consent for Telephone visit?  Yes    What phone number would you like to be contacted at? [phone]      How would you like to obtain your AVS? MyChart    Subjective     Sp Plasencia is a 45 year old male who presents via phone visit today for the following health issues:    HPI     {SUPERLIST (Optional):510014}  {PEDS Chronic and Acute Problems (Optional):437607}     {additonal problems for provider to add (Optional):036675}    Review of Systems   {ROS COMP (Optional):285897}       Objective          Vitals:  No vitals were obtained today due to virtual visit.    {GENERAL APPEARANCE:50::\"healthy\",\"alert\",\"no distress\"}  PSYCH: Alert and oriented times 3; coherent speech, normal   rate and volume, able to articulate logical thoughts, able   to abstract reason, no tangential thoughts, no hallucinations   or delusions  His affect is { :7763366::\"normal\"}  RESP: No cough, no audible " wheezing, able to talk in full sentences  Remainder of exam unable to be completed due to telephone visits    {Diagnostic Test Results (Optional):478171}        Assessment/Plan:    {PROVIDER CHARTING PREFERENCE SOAPO:534534}    Phone call duration:  *** minutes

## 2020-12-28 NOTE — PROGRESS NOTES
"Sp Plasencia is a 45 year old male who is being evaluated via a billable video visit.      The patient has been notified of following:     \"This video visit will be conducted via a call between you and your physician/provider. We have found that certain health care needs can be provided without the need for an in-person physical exam.  This service lets us provide the care you need with a video conversation.  If a prescription is necessary we can send it directly to your pharmacy.  If lab work is needed we can place an order for that and you can then stop by our lab to have the test done at a later time.    Video visits are billed at different rates depending on your insurance coverage.  Please reach out to your insurance provider with any questions.    If during the course of the call the physician/provider feels a video visit is not appropriate, you will not be charged for this service.\"    Patient has given verbal consent for Video visit? Yes  How would you like to obtain your AVS? MyChart  If you are dropped from the video visit, the video invite should be resent to: Text to cell phone: .  Will anyone else be joining your video visit? No    Subjective     Sp Plasencia is a 45 year old male who presents today via video visit for the following health issues:    HPI     Alcoholic liver disease.  He has been sober for one year. He follows with liver specialist.  TIPS shunt placed.  Since then he has been doing well. He has not had to use the lasix.   He has a low MELD score so is not a candidate.     Chronic pain. The patient takes maximum tramadol 50mg twice per day. He is taking hydroxyzine.   He has pain in knees, shoulders, neck from muscular dystrophy. Walking is also painful.   He reports that he does not think he needs a muscle relaxant.   He reports that his mood is okay.  He is sleeping with ambien.     Gout.  He reports that his gout is stable.  Uric acid was normal 11/9/2020.     RLS. Patient is " taking requip.  He feels like he needs an extra dose during the day.      Video Start Time: 11:11am      Review of Systems   CONSTITUTIONAL: NEGATIVE for fever, chills, change in weight  RESP: NEGATIVE for significant cough or SOB  CV: NEGATIVE for chest pain, palpitations or peripheral edema  MSK: POS pain      Objective           Vitals:  No vitals were obtained today due to virtual visit.    Physical Exam     GENERAL: Healthy, alert and no distress  EYES: Eyes grossly normal to inspection.  No discharge or erythema, or obvious scleral/conjunctival abnormalities.  RESP: No audible wheeze, cough, or visible cyanosis.  No visible retractions or increased work of breathing.    SKIN: Visible skin clear. No significant rash, abnormal pigmentation or lesions.  NEURO: Cranial nerves grossly intact.  Mentation and speech appropriate for age.  PSYCH: Mentation appears normal, affect normal/bright, judgement and insight intact, normal speech and appearance well-groomed.              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 Increase requip from 0.25mg to 0.5mg      See Patient Instructions    Every 6 months for tramadol   Sooner if concerns    Deanna Barahona MD  [facility]      Video-Visit Details    Type of service:  Video Visit    Video End Time:11:31am    Originating Location (pt. Location): home    Distant Location (provider location):  home    Platform used for Video Visit: sherin

## 2021-01-04 DIAGNOSIS — G71.02 FSHD (FACIOSCAPULOHUMERAL MUSCULAR DYSTROPHY) (H): ICD-10-CM

## 2021-01-04 NOTE — TELEPHONE ENCOUNTER
"Requested Prescriptions   Pending Prescriptions Disp Refills     allopurinol (ZYLOPRIM) 300 MG tablet  Last Written Prescription Date:  04/10/20  Last Fill Quantity: 30,  # refills: 0   Last office visit: 2/18/2020 with prescribing provider:  JARETT 12/28/20   Future Office Visit:           30 tablet 0     Sig: Take 1 tablet (300 mg) by mouth daily       Gout Agents Protocol Failed - 1/4/2021  1:56 PM        Failed - Normal serum creatinine on file in the past 12 months     Recent Labs   Lab Test 11/09/20  1127 01/04/19  1614 01/04/19  1614   CR 0.53*   < >  --    CREAT  --   --  1.5*    < > = values in this interval not displayed.       Ok to refill medication if creatinine is low          Passed - CBC on file in past 12 months     Recent Labs   Lab Test 11/09/20  1127   WBC 4.7   RBC 4.13*   HGB 12.7*   HCT 37.3*   *                 Passed - ALT on file in past 12 months     Recent Labs   Lab Test 11/09/20  1127   ALT 38             Passed - Has Uric Acid on file in past 12 months and value is less than 6     Recent Labs   Lab Test 11/09/20  1127   URIC 3.9     If level is 6mg/dL or greater, ok to refill one time and refer to provider.           Passed - Recent (12 mo) or future (30 days) visit within the authorizing provider's specialty     Patient has had an office visit with the authorizing provider or a provider within the authorizing providers department within the previous 12 mos or has a future within next 30 days. See \"Patient Info\" tab in inbasket, or \"Choose Columns\" in Meds & Orders section of the refill encounter.              Passed - Medication is active on med list        Passed - Patient is age 18 or older             "

## 2021-01-06 NOTE — TELEPHONE ENCOUNTER
Routing refill request to provider for review/approval because:  Labs out of range:  Cr  Lila Tovar RN, BSN

## 2021-01-08 RX ORDER — ALLOPURINOL 300 MG/1
300 TABLET ORAL DAILY
Qty: 90 TABLET | Refills: 1 | Status: SHIPPED | OUTPATIENT
Start: 2021-01-08 | End: 2021-09-08

## 2021-01-12 ENCOUNTER — VIRTUAL VISIT (OUTPATIENT)
Dept: PHARMACY | Facility: CLINIC | Age: 46
End: 2021-01-12
Payer: COMMERCIAL

## 2021-01-12 DIAGNOSIS — G25.81 RESTLESS LEGS SYNDROME: Primary | ICD-10-CM

## 2021-01-12 DIAGNOSIS — R12 HEARTBURN: ICD-10-CM

## 2021-01-12 DIAGNOSIS — G47.00 INSOMNIA, UNSPECIFIED TYPE: ICD-10-CM

## 2021-01-12 DIAGNOSIS — R79.89 LOW VITAMIN D LEVEL: ICD-10-CM

## 2021-01-12 DIAGNOSIS — R52 PAIN: ICD-10-CM

## 2021-01-12 DIAGNOSIS — K76.82 HEPATIC ENCEPHALOPATHY (H): ICD-10-CM

## 2021-01-12 DIAGNOSIS — G89.4 CHRONIC PAIN SYNDROME: ICD-10-CM

## 2021-01-12 DIAGNOSIS — M10.9 GOUT, UNSPECIFIED CAUSE, UNSPECIFIED CHRONICITY, UNSPECIFIED SITE: ICD-10-CM

## 2021-01-12 PROCEDURE — 99607 MTMS BY PHARM ADDL 15 MIN: CPT | Mod: TEL | Performed by: PHARMACIST

## 2021-01-12 PROCEDURE — 99605 MTMS BY PHARM NP 15 MIN: CPT | Mod: TEL | Performed by: PHARMACIST

## 2021-01-12 NOTE — Clinical Note
Increased gabapentin to 200 mg three times daily (increase of 300 mg/day) per CPA. Please let me know if you have any concerns. Reviewed risks of multiple CNS depressants.

## 2021-01-12 NOTE — PROGRESS NOTES
Medication Therapy Management (MTM) Encounter    ASSESSMENT/PLAN:                            Medication Adherence/Access: No issues identified    Pain/Restless Legs: As noted below, appears the medication changes recently made at his visit with Dr. Barahona have improved his restless leg sensations, but pain remains a problem. Given positive risk/benefit profile of gabapentin, he may benefit from an increase in gabapentin dose. He is currently on a low dose and appears to be tolerating it. This will hopefully continue to aid in decreasing his demand for ibuprofen. Will discuss with Dr. Barahona. We did review the recommendation to not abruptly withdraw gabapentin given he was unfamiliar with this recommendation. We discussed possible medication causes of nausea, though as below, this is not a major concern of his at this point. I encouraged him to reach out if this becomes more of a problem.      Low Vitamin D: Sp appears to be tolerating therapy.    Gout: Uric acid at goal of < 5 mg/dL.     Hepatic Encephalopathy: Stable based on report.    Heartburn: Stable based on report. We have previously discussed staying on this as well due to chronic use of NSAIDs (ibuprofen), which he is trying to cut back on.    Insomnia: Last order on file was from April 2020 for #30 tablets suggesting infrequent use.    PLAN:     1. Dr. Barahona consider increase to gabapentin to 200 mg three times daily (staff message sent)    Future Consideration  --f/up nausea side effects    Will follow up in 4-6 weeks after dose change.    SUBJECTIVE/OBJECTIVE:                           Sp Plasencia is a 45 year old male called for a follow-up visit. He was referred to me from [facility].  Today's visit is a follow-up MTM visit from 12/8/20. First visit of 2021.    Reason for visit: medication follow-up    Allergies/ADRs: Reviewed in chart  Tobacco: He reports that he has been smoking cigarettes. He has never used smokeless  tobacco.Tobacco Cessation Action Plan:   no interested at this time    Alcohol: not currently using    Medication Adherence/Access: no issues reported    Pain/Restless Legs:  Gabapentin 100 mg three times daily (recently started)  Ropinirole 0.5 mg twice daily (dose increase)  Tramadol 50 mg twice daily as needed (dose increased)  Ibuprofen 200 mg (about 5 tablets daily -- dose reduced)  Hydroxyzine 25-50 mg every 6 hours as needed     Recently met with Dr. Barahona back on 12/28 with medication changes including increasing ropinirole to 0.5 mg twice daily, adding gabapentin 100 mg three times daily, cutting back on ibuprofen and continuing tramadol. Ibuprofen helps with joints. Notes trying to reduce ibuprofen around the same time as gabapentin start. Went down to zero ibuprofen then three tablets, then two tablets per day, now at five tablets per day. He notes that lower doses were not cutting it. Has tried celebrex in the past and felt it was a sugar pill. Shooting/restless leg thing seems to be more or less under control after medication changes, and he is not really sure if this is from the ropinirole, tramadol or gabapentin. Notes the medication changes didn't help with pain/joints (knees/shoulders) as much.  Started using tramadol twice daily to help reduce ibuprofen. Feels addiction isn't real as compared to side effects of ibuprofen on his body. He is more concerned about the long term effects of ibuprofen (especially at high doses) compared to the risk of addiction/dependence with tramadol. Reports having regular bowel movements for the most part. He does acknowledge some nausea. Confirmed he is still on esomeprazole. Unsure which medication it may be as he again states there was more than one medication change made at one time. When inquired further, he notes that nausea is the least of his concerns at this time.      Low Vitamin D:   Vitamin D 2,000 units daily    No reported concerns.    Vitamin D  Deficiency Screening Results:  Lab Results   Component Value Date    VITDT 23 11/09/2020      Gout:   Allopurinol 300 mg daily    No reported concerns.    Uric Acid   Date Value Ref Range Status   11/09/2020 3.9 3.5 - 7.2 mg/dL Final     Hepatic Encephalopathy:   Spironolactone 25 mg daily as needed   Furosemide 20 mg daily as needed   Lactulose 20 g daily  Xifaxan 550 mg twice daily     No reported concerns. Notes he feels Xifaxan is doing its job. Having regular bowel movements and if not, he will use more lactulose. Last visit with Dr. Winters 11/24/20, suggested follow-up three months.     Heartburn:   Esomeprazole 40 mg daily     No reported concerns.    Insomnia:   Zolpidem 5 mg nightly as needed    No reported concerns today.    I spent 34 minutes with this patient today. I offer these suggestions for consideration by his care team. A copy of the visit note was provided to the patient's primary care provider.    The patient was sent via shopkick a summary of these recommendations.     Laura Smith PharmD, BCACP  MTM Pharmacist   [facility]  Phone: [phone]    Telemedicine Visit Details  Type of service:  Telephone visit  Start Time: 9:30 AM  End Time: 10:04 AM  Originating Location (patient location): [place]  Distant Location (provider location):  [facility] [place]      Medication Therapy Recommendations  Pain    Current Medication: gabapentin (NEURONTIN) 100 MG capsule   Rationale: Dose too low - Dosage too low - Effectiveness   Recommendation: Increase Dose   Status: Contact Provider - Awaiting Response   Note: Consider increasing gabapentin to 200 mg by mouth three times daily          Rationale: Dose too low - Dosage too low - Effectiveness   Recommendation: Increase Dose - traMADol 50 MG tablet   Status: Accepted per Provider   Note: Consider increasing to 50 mg twice daily pending further workup         Restless legs syndrome    Rationale: Medication requires  monitoring - Needs additional monitoring   Recommendation: Order Lab   Status: No Longer Relevant

## 2021-01-15 ENCOUNTER — HEALTH MAINTENANCE LETTER (OUTPATIENT)
Age: 46
End: 2021-01-15

## 2021-01-16 NOTE — PATIENT INSTRUCTIONS
Recommendations from today's MTM visit:                                                       1. I will reach out to Dr. Barahona about increasing your gabapentin dose to see if this will help with your pain and hopefully decrease your need for ibuprofen. Continue with your same dose for now.    2. Please reach out to myself or your clinic if your nausea becomes more significant. As we discussed, this could be from a number of your medications and we should keep an eye on it.     It was great to speak with you today.  I value your experience and would be very thankful for your time with providing feedback on our clinic survey. You may receive a survey via email or text message in the next few days.     Next MTM visit: 4-6 weeks after dose change    To schedule another MTM appointment, please call the clinic directly or you may call the MTM scheduling line at [phone] or toll-free at [phone].     My Clinical Pharmacist's contact information:                                                      It was a pleasure talking with you today!  Please feel free to contact me with any questions or concerns you have.      Laura GarciaD, BCACP  MTM Pharmacist   [facility]  Phone: [phone]

## 2021-01-18 RX ORDER — GABAPENTIN 100 MG/1
200 CAPSULE ORAL 3 TIMES DAILY
Qty: 180 CAPSULE | Refills: 0 | Status: SHIPPED | OUTPATIENT
Start: 2021-01-18 | End: 2021-03-26

## 2021-01-18 NOTE — PROGRESS NOTES
Gabapentin increased to 200 mg three times daily per CPA (an increase of 300 mg/day).     Reviewed risks of multiple CNS depressants including zolpidem (as needed), tramadol, and gabapentin. He notes some tiredness with gabapentin and I encouraged him to continue to monitor this. Also discussed risk of respiratory depression. He cites taking zolpidem about 8/10 nights. Active order on file was written for 30 tablets back in 4/2020. He is not sure who is prescribing it, but states it has certainly been refilled. Encouraged proceeding with gabapentin dosage adjustments carefully, with the hope that gabapentin titration will allow for a reduction in the need for ibuprofen, tramadol, and/or zolpidem.    He notes that he has been able to get away with 3-5 ibuprofen daily, citing his knees as the primary motivator for continuing ibuprofen.     Offered to schedule follow-up. He prefers to wait at least 30 days, but says it would be okay if I reach out around then to see if we need to meet. Contact information also provided in the event he has questions or concerns in the meantime.    Addendum: Dr. Barahona responded to note she is in agreement with this plan.

## 2021-02-17 DIAGNOSIS — K70.31 ALCOHOLIC CIRRHOSIS OF LIVER WITH ASCITES (H): ICD-10-CM

## 2021-02-18 ENCOUNTER — HOSPITAL ENCOUNTER (OUTPATIENT)
Dept: ULTRASOUND IMAGING | Facility: CLINIC | Age: 46
Discharge: HOME | End: 2021-02-18
Attending: RADIOLOGY | Admitting: RADIOLOGY
Payer: COMMERCIAL

## 2021-02-18 DIAGNOSIS — K70.31 ALCOHOLIC CIRRHOSIS OF LIVER WITH ASCITES (H): ICD-10-CM

## 2021-02-18 DIAGNOSIS — K42.9 UMBILICAL HERNIA WITHOUT OBSTRUCTION AND WITHOUT GANGRENE: ICD-10-CM

## 2021-02-18 LAB
ERYTHROCYTE [DISTWIDTH] IN BLOOD BY AUTOMATED COUNT: 15.1 % (ref 10–15)
HCT VFR BLD AUTO: 39.4 % (ref 40–53)
HGB BLD-MCNC: 13.7 G/DL (ref 13.3–17.7)
INR PPP: 1.4 (ref 0.86–1.14)
MCH RBC QN AUTO: 29.2 PG (ref 26.5–33)
MCHC RBC AUTO-ENTMCNC: 34.8 G/DL (ref 31.5–36.5)
MCV RBC AUTO: 84 FL (ref 78–100)
PLATELET # BLD AUTO: 117 10E9/L (ref 150–450)
RBC # BLD AUTO: 4.69 10E12/L (ref 4.4–5.9)
WBC # BLD AUTO: 4.8 10E9/L (ref 4–11)

## 2021-02-18 PROCEDURE — 80053 COMPREHEN METABOLIC PANEL: CPT | Performed by: RADIOLOGY

## 2021-02-18 PROCEDURE — 36415 COLL VENOUS BLD VENIPUNCTURE: CPT | Performed by: RADIOLOGY

## 2021-02-18 PROCEDURE — 85610 PROTHROMBIN TIME: CPT | Performed by: RADIOLOGY

## 2021-02-18 PROCEDURE — 85027 COMPLETE CBC AUTOMATED: CPT | Performed by: RADIOLOGY

## 2021-02-18 PROCEDURE — 93975 VASCULAR STUDY: CPT

## 2021-02-18 RX ORDER — TRAMADOL HYDROCHLORIDE 50 MG/1
TABLET ORAL
Qty: 60 TABLET | Refills: 0 | Status: SHIPPED | OUTPATIENT
Start: 2021-02-18 | End: 2021-05-28

## 2021-02-18 NOTE — PROGRESS NOTES
"Sp is a 45 year old who is being evaluated via a billable telephone visit.       What phone number would you like to be contacted at? [phone]  How would you like to obtain your AVS? Mail a copy   Vitals - Patient Reported  Weight (Patient Reported): 90.7 kg (200 lb)  Height (Patient Reported): 185.4 cm (6' 1\")  BMI (Based on Pt Reported Ht/Wt): 26.39  Pain Score: No Pain (0)     Phone call duration: 10 minutes     Alis Matias MA          Interventional Radiology Clinic Visit    Date of this visit: 2/18/2021    Sp Plasencia returns for follow up post TIPS placement on 8/26/2020.     Primary Physician: Deanna Garay    Hepatologist: Perla Dickey         History Of Present Illness:    Sp Plasencia is a 45 year old male with muscular dystrophy and alcoholic cirrhosis who is status post transjugular intrahepatic portosystemic shunt (TIPS) placement on 8/26/2020 for refractory ascites.    This is his 6 months post TIPS visit. At this point he has no acute complaints. He is doing well since the procedure and has needed a paracentesis for a few months now. Denies shortness of breath, abdominal distention, ankle or leg swelling, confusion, altered mental status, or symptoms of hepatic encephalopathy.     Patient reports resolution of previously mentioned abdominal discomfort related to his hernia surgery.     Review of Systems:    As above.    Past Medical/Surgical History:    Past Medical History:   Diagnosis Date     Acid reflux      Anemia      Ascites      Esophageal varices (H)      FSHD (facioscapulohumeral muscular dystrophy) (H)      Gout      HTN (hypertension)      Jaundice      Liver cirrhosis (H)      Smoker      Thrombocytopenia (H)      Past Surgical History:   Procedure Laterality Date     IR PARACENTESIS  08/26/2020     IR TRANSVEN INTRAHEPATIC PORTOSYST SHUNT  08/26/2020     LAPAROSCOPIC HERNIORRHAPHY INGUINAL Right 10/14/2020    Procedure: HERNIORRHAPHY, RIGHT " INGUINAL, LAPAROSCOPIC.;  Surgeon: Chris Parker MD;  Location: [facility] OR       Current Medications:    Current Outpatient Medications   Medication Sig Dispense Refill     allopurinol (ZYLOPRIM) 300 MG tablet Take 1 tablet (300 mg) by mouth daily 90 tablet 1     esomeprazole (NEXIUM) 40 MG DR capsule Take 1 capsule (40 mg) by mouth daily Take 30-60 minutes before eating. (Patient taking differently: Take 40 mg by mouth every morning (before breakfast) Take 30-60 minutes before eating. ) 30 capsule 0     furosemide (LASIX) 20 MG tablet Take 20 mg by mouth daily       gabapentin (NEURONTIN) 100 MG capsule Take 2 capsules (200 mg) by mouth 3 times daily 180 capsule 0     hydrOXYzine (ATARAX) 25 MG tablet Take 1-2 tablets (25-50 mg) by mouth every 6 hours as needed for itching or other (adjuvant pain) 90 tablet 0     ibuprofen (ADVIL/MOTRIN) 200 MG capsule Take 600 mg by mouth 3 times daily        lactulose (CEPHULAC) 20 GM packet Take 1 packet (20 g) by mouth 2 times daily (Patient taking differently: Take 20 g by mouth daily ) 60 packet 1     Multiple Vitamin (DAILY MULTIVITAMIN PO) Take 1 tablet by mouth every evening        rOPINIRole (REQUIP) 0.5 MG tablet Take 1 tablet (0.5 mg) by mouth 2 times daily 180 tablet 1     spironolactone (ALDACTONE) 25 MG tablet TAKE ONE TABLET BY MOUTH DAILY (Patient taking differently: Take 25 mg by mouth daily as needed ) 30 tablet 0     traMADol (ULTRAM) 50 MG tablet TAKE ONE TABLET BY MOUTH TWICE DAILY AS NEEDED FOR SEVERE PAIN. 60 tablet 0     Vitamin D, Cholecalciferol, 25 MCG (1000 UT) TABS Take 2,000 Units by mouth daily       XIFAXAN 550 MG TABS tablet Take 1 tablet (550 mg) by mouth 2 times daily 60 tablet 11     zolpidem (AMBIEN) 5 MG tablet Take 1 tablet (5 mg) by mouth nightly as needed for sleep 30 tablet 0       Allergies:    Adhesive tape    Family History:    Family History   Problem Relation Age of Onset     Hypertension Father      Hypertension Paternal Grandfather         Social History:    Social History     Socioeconomic History     Marital status:      Spouse name: Not on file     Number of children: Not on file     Years of education: Not on file     Highest education level: Not on file   Occupational History     Not on file   Social Needs     Financial resource strain: Not on file     Food insecurity     Worry: Not on file     Inability: Not on file     Transportation needs     Medical: Not on file     Non-medical: Not on file   Tobacco Use     Smoking status: Current Some Day Smoker     Types: Cigarettes     Smokeless tobacco: Never Used     Tobacco comment: 1 pack per month for 15 years   Substance and Sexual Activity     Alcohol use: Not Currently     Comment: 1/1/2020     Drug use: Not Currently     Sexual activity: Yes     Partners: Female   Lifestyle     Physical activity     Days per week: Not on file     Minutes per session: Not on file     Stress: Not on file   Relationships     Social connections     Talks on phone: Not on file     Gets together: Not on file     Attends [employer] service: Not on file     Active member of club or organization: Not on file     Attends meetings of clubs or organizations: Not on file     Relationship status: Not on file     Intimate partner violence     Fear of current or ex partner: Not on file     Emotionally abused: Not on file     Physically abused: Not on file     Forced sexual activity: Not on file   Other Topics Concern     Parent/sibling w/ CABG, MI or angioplasty before 65F 55M? Not Asked   Social History Narrative     Not on file       Physical Exam:    CONSTITUTIONAL: healthy, alert and no distress.  PSYCHIATRIC: mentation appears normal and affect normal.  NEURO: Normal speech.  RESP: No audible cough or wheeze.      Laboratory Studies:    Lab Results   Component Value Date    HGB 13.7 02/18/2021     Lab Results   Component Value Date     02/18/2021     Lab Results   Component Value Date    WBC 4.8  02/18/2021       Lab Results   Component Value Date    INR 1.55 11/09/2020       Lab Results   Component Value Date    PROTTOTAL 6.7 11/09/2020      Lab Results   Component Value Date    ALBUMIN 3.3 11/09/2020     Lab Results   Component Value Date    BILITOTAL 2.4 11/09/2020     Lab Results   Component Value Date    ALKPHOS 119 11/09/2020     Lab Results   Component Value Date    AST 48 11/09/2020     Lab Results   Component Value Date    ALT 38 11/09/2020       Lab Results   Component Value Date    CR 0.53 11/09/2020     Lab Results   Component Value Date    BUN 11 11/09/2020       Alpha Fetoprotein   Date Value Ref Range Status   09/15/2020 1.8 0 - 8 ug/L Final     Comment:     Assay Method:  Chemiluminescence using Siemens Centaur XP       Imaging:     TIPS duplex ultrasound reviewed and interpreted by myself: 2/18/2021, 11/09/2020, and 9/15/2020     Most recent ultrasound 2/18/2021: There is appropriate rapid antegrade flow in the main portal vein.  There is appropriate retrograde flow in both RPV and LPV.  TIPS velocities are the upper end of normal, and improved in comparison to 11/9/2020 exam. No significant ascites.     ASSESSMENT/PLAN:   Sp Plasencia is a 45 year old male with refractory ascites due to alcoholic cirrhosis, who is 6 months post TIPS placement.  His ascites has resolved, he no longer requires paracentesis, and ultrasound shows the TIPS is functioning well without evidence of stenosis.  Patient will continue to follow with his GI/Hepatologist Dr. Winters. U/S to follow TIPS patency can be performed annually. No further IR clinic visit is warranted unless patient starts to develop symptoms or imaging findings concerning for TIPS malfunction.         Kerry Dunne MD. PGY-5 IR Resident  Department of Interventional Radiology  [facility]    Co-signed,  Magnolia Kingston M.D.    Interventional Radiology  Department of Radiology  [facility]  [facility]  Patient Care Team:  Deanna Barahona MD as PCP - General (Internal Medicine)  Maurilio Woodson MD as MD (Neurology)  Danielle Eller RN as Nurse Coordinator (Neurology)  Deanna Barahona MD as Assigned PCP  Maurilio Woodson MD as Assigned Neuroscience Provider  Perla Winters MD as Assigned Gastroenterology Provider  Chris Parker MD as Assigned Surgical Provider  Magnolia Villanueva MD as Assigned Heart and Vascular Provider  Laura Smith [facility] as Pharmacist (Pharmacist Ambulatory Care)  SELF, REFERRED      I, Dr Magnolia Kingston, was present with the fellow during the entire clinic visit, including the history and exam. I discussed the case with the fellow and agree with the findings as documented in the assessment and plan.      Review of the result(s) of each unique test - Ultrasound  Independent interpretation of a test performed by another physician/other qualified health care professional (not separately reported) - Ultrasound       20 minutes spent on the date of the encounter doing chart review, history and exam, documentation and further activities as noted above

## 2021-02-18 NOTE — TELEPHONE ENCOUNTER
Controlled Substance Refill Request for Tramadol  Problem List Complete:  No     PROVIDER TO CONSIDER COMPLETION OF PROBLEM LIST AND OVERVIEW/CONTROLLED SUBSTANCE AGREEMENT    Last Written Prescription Date:  12/13/20  Last Fill Quantity: 60,   # refills: 1    Last Office Visit with [facility] primary care provider: 12/28/20    Future Office visit:     Controlled substance agreement:   Encounter-Level CSA:    There are no encounter-level csa.     Patient-Level CSA:    There are no patient-level csa.         Last Urine Drug Screen: No results found for: CDAUT, No results found for: COMDAT, No results found for: THC13, PCP13, COC13, MAMP13, OPI13, AMP13, BZO13, TCA13, MTD13, BAR13, OXY13, PPX13, BUP13     [place] reviewed:  reviewed- no concerns  [place]:  [email or web address]

## 2021-02-19 ENCOUNTER — VIRTUAL VISIT (OUTPATIENT)
Dept: RADIOLOGY | Facility: CLINIC | Age: 46
End: 2021-02-19
Attending: RADIOLOGY
Payer: COMMERCIAL

## 2021-02-19 DIAGNOSIS — K70.31 ASCITES DUE TO ALCOHOLIC CIRRHOSIS (H): Primary | ICD-10-CM

## 2021-02-19 LAB
ALBUMIN SERPL-MCNC: 3.5 G/DL (ref 3.4–5)
ALP SERPL-CCNC: 173 U/L (ref 40–150)
ALT SERPL W P-5'-P-CCNC: 44 U/L (ref 0–70)
ANION GAP SERPL CALCULATED.3IONS-SCNC: 4 MMOL/L (ref 3–14)
AST SERPL W P-5'-P-CCNC: 43 U/L (ref 0–45)
BILIRUB SERPL-MCNC: 1.4 MG/DL (ref 0.2–1.3)
BUN SERPL-MCNC: 14 MG/DL (ref 7–30)
CALCIUM SERPL-MCNC: 9.6 MG/DL (ref 8.5–10.1)
CHLORIDE SERPL-SCNC: 112 MMOL/L (ref 94–109)
CO2 SERPL-SCNC: 26 MMOL/L (ref 20–32)
CREAT SERPL-MCNC: 0.6 MG/DL (ref 0.66–1.25)
GFR SERPL CREATININE-BSD FRML MDRD: >90 ML/MIN/{1.73_M2}
GLUCOSE SERPL-MCNC: 92 MG/DL (ref 70–99)
POTASSIUM SERPL-SCNC: 4.5 MMOL/L (ref 3.4–5.3)
PROT SERPL-MCNC: 7 G/DL (ref 6.8–8.8)
SODIUM SERPL-SCNC: 142 MMOL/L (ref 133–144)

## 2021-02-19 PROCEDURE — 999N001193 HC VIDEO/TELEPHONE VISIT; NO CHARGE

## 2021-02-19 PROCEDURE — 99214 OFFICE O/P EST MOD 30 MIN: CPT | Mod: GC | Performed by: RADIOLOGY

## 2021-02-19 NOTE — PROGRESS NOTES
"Sp is a 45 year old who is being evaluated via a billable telephone visit.      What phone number would you like to be contacted at? [phone]  How would you like to obtain your AVS? Mail a copy   Vitals - Patient Reported  Weight (Patient Reported): 90.7 kg (200 lb)  Height (Patient Reported): 185.4 cm (6' 1\")  BMI (Based on Pt Reported Ht/Wt): 26.39  Pain Score: No Pain (0)    Phone call duration: *** minutes    Alis Matias MA    "

## 2021-02-19 NOTE — LETTER
"    2/19/2021         RE: Sp Plasencia  [address] [facility] [address]        Dear Colleague,    Thank you for referring your patient, Sp Plasencia, to the [facility]. Please see a copy of my visit note below.    Sp is a 45 year old who is being evaluated via a billable telephone visit.       What phone number would you like to be contacted at? [phone]  How would you like to obtain your AVS? Mail a copy   Vitals - Patient Reported  Weight (Patient Reported): 90.7 kg (200 lb)  Height (Patient Reported): 185.4 cm (6' 1\")  BMI (Based on Pt Reported Ht/Wt): 26.39  Pain Score: No Pain (0)     Phone call duration: 10 minutes     Alis Matias MA      Interventional Radiology Clinic Visit    Date of this visit: 2/18/2021    Sp Plasencia returns for follow up post TIPS placement on 8/26/2020.     Primary Physician: Deanna Garay    Hepatologist: Perla Dickey         History Of Present Illness:    Sp Plasencia is a 45 year old male with muscular dystrophy and alcoholic cirrhosis who is status post transjugular intrahepatic portosystemic shunt (TIPS) placement on 8/26/2020 for refractory ascites.    This is his 6 months post TIPS visit. At this point he has no acute complaints. He is doing well since the procedure and has needed a paracentesis for a few months now. Denies shortness of breath, abdominal distention, ankle or leg swelling, confusion, altered mental status, or symptoms of hepatic encephalopathy.     Patient reports resolution of previously mentioned abdominal discomfort related to his hernia surgery.     Review of Systems:    As above.    Past Medical/Surgical History:    Past Medical History:   Diagnosis Date     Acid reflux      Anemia      Ascites      Esophageal varices (H)      FSHD (facioscapulohumeral muscular dystrophy) (H)      Gout      HTN (hypertension)      Jaundice      Liver cirrhosis (H)      Smoker      " Thrombocytopenia (H)      Past Surgical History:   Procedure Laterality Date     IR PARACENTESIS  08/26/2020     IR TRANSVEN INTRAHEPATIC PORTOSYST SHUNT  08/26/2020     LAPAROSCOPIC HERNIORRHAPHY INGUINAL Right 10/14/2020    Procedure: HERNIORRHAPHY, RIGHT INGUINAL, LAPAROSCOPIC.;  Surgeon: Chris Parker MD;  Location:  OR       Current Medications:    Current Outpatient Medications   Medication Sig Dispense Refill     allopurinol (ZYLOPRIM) 300 MG tablet Take 1 tablet (300 mg) by mouth daily 90 tablet 1     esomeprazole (NEXIUM) 40 MG DR capsule Take 1 capsule (40 mg) by mouth daily Take 30-60 minutes before eating. (Patient taking differently: Take 40 mg by mouth every morning (before breakfast) Take 30-60 minutes before eating. ) 30 capsule 0     furosemide (LASIX) 20 MG tablet Take 20 mg by mouth daily       gabapentin (NEURONTIN) 100 MG capsule Take 2 capsules (200 mg) by mouth 3 times daily 180 capsule 0     hydrOXYzine (ATARAX) 25 MG tablet Take 1-2 tablets (25-50 mg) by mouth every 6 hours as needed for itching or other (adjuvant pain) 90 tablet 0     ibuprofen (ADVIL/MOTRIN) 200 MG capsule Take 600 mg by mouth 3 times daily        lactulose (CEPHULAC) 20 GM packet Take 1 packet (20 g) by mouth 2 times daily (Patient taking differently: Take 20 g by mouth daily ) 60 packet 1     Multiple Vitamin (DAILY MULTIVITAMIN PO) Take 1 tablet by mouth every evening        rOPINIRole (REQUIP) 0.5 MG tablet Take 1 tablet (0.5 mg) by mouth 2 times daily 180 tablet 1     spironolactone (ALDACTONE) 25 MG tablet TAKE ONE TABLET BY MOUTH DAILY (Patient taking differently: Take 25 mg by mouth daily as needed ) 30 tablet 0     traMADol (ULTRAM) 50 MG tablet TAKE ONE TABLET BY MOUTH TWICE DAILY AS NEEDED FOR SEVERE PAIN. 60 tablet 0     Vitamin D, Cholecalciferol, 25 MCG (1000 UT) TABS Take 2,000 Units by mouth daily       XIFAXAN 550 MG TABS tablet Take 1 tablet (550 mg) by mouth 2 times daily 60 tablet 11      zolpidem (AMBIEN) 5 MG tablet Take 1 tablet (5 mg) by mouth nightly as needed for sleep 30 tablet 0       Allergies:    Adhesive tape    Family History:    Family History   Problem Relation Age of Onset     Hypertension Father      Hypertension Paternal Grandfather        Social History:    Social History     Socioeconomic History     Marital status:      Spouse name: Not on file     Number of children: Not on file     Years of education: Not on file     Highest education level: Not on file   Occupational History     Not on file   Social Needs     Financial resource strain: Not on file     Food insecurity     Worry: Not on file     Inability: Not on file     Transportation needs     Medical: Not on file     Non-medical: Not on file   Tobacco Use     Smoking status: Current Some Day Smoker     Types: Cigarettes     Smokeless tobacco: Never Used     Tobacco comment: 1 pack per month for 15 years   Substance and Sexual Activity     Alcohol use: Not Currently     Comment: 1/1/2020     Drug use: Not Currently     Sexual activity: Yes     Partners: Female   Lifestyle     Physical activity     Days per week: Not on file     Minutes per session: Not on file     Stress: Not on file   Relationships     Social connections     Talks on phone: Not on file     Gets together: Not on file     Attends [employer] service: Not on file     Active member of club or organization: Not on file     Attends meetings of clubs or organizations: Not on file     Relationship status: Not on file     Intimate partner violence     Fear of current or ex partner: Not on file     Emotionally abused: Not on file     Physically abused: Not on file     Forced sexual activity: Not on file   Other Topics Concern     Parent/sibling w/ CABG, MI or angioplasty before 65F 55M? Not Asked   Social History Narrative     Not on file       Physical Exam:    CONSTITUTIONAL: healthy, alert and no distress.  PSYCHIATRIC: mentation appears normal and affect  normal.  NEURO: Normal speech.  RESP: No audible cough or wheeze.      Laboratory Studies:    Lab Results   Component Value Date    HGB 13.7 02/18/2021     Lab Results   Component Value Date     02/18/2021     Lab Results   Component Value Date    WBC 4.8 02/18/2021       Lab Results   Component Value Date    INR 1.55 11/09/2020       Lab Results   Component Value Date    PROTTOTAL 6.7 11/09/2020      Lab Results   Component Value Date    ALBUMIN 3.3 11/09/2020     Lab Results   Component Value Date    BILITOTAL 2.4 11/09/2020     Lab Results   Component Value Date    ALKPHOS 119 11/09/2020     Lab Results   Component Value Date    AST 48 11/09/2020     Lab Results   Component Value Date    ALT 38 11/09/2020       Lab Results   Component Value Date    CR 0.53 11/09/2020     Lab Results   Component Value Date    BUN 11 11/09/2020       Alpha Fetoprotein   Date Value Ref Range Status   09/15/2020 1.8 0 - 8 ug/L Final     Comment:     Assay Method:  Chemiluminescence using Siemens Centaur XP       Imaging:     TIPS duplex ultrasound reviewed and interpreted by myself: 2/18/2021, 11/09/2020, and 9/15/2020     Most recent ultrasound 2/18/2021: There is appropriate rapid antegrade flow in the main portal vein.  There is appropriate retrograde flow in both RPV and LPV.  TIPS velocities are the upper end of normal, and improved in comparison to 11/9/2020 exam. No significant ascites.     ASSESSMENT/PLAN:   Sp Plasencia is a 45 year old male with refractory ascites due to alcoholic cirrhosis, who is 6 months post TIPS placement.  His ascites has resolved, he no longer requires paracentesis, and ultrasound shows the TIPS is functioning well without evidence of stenosis.  Patient will continue to follow with his GI/Hepatologist Dr. Winters. U/S to follow TIPS patency can be performed annually. No further IR clinic visit is warranted unless patient starts to develop symptoms or imaging findings concerning for TIPS  malfunction.         Kerry Dunne MD. PGY-5 IR Resident  Department of Interventional Radiology  [facility]    Co-signed,  Magnolia Kingston M.D.    Interventional Radiology  Department of Radiology  [facility]      [facility]  Patient Care Team:  Deanna Barahona MD as PCP - General (Internal Medicine)  Maurilio Woodson MD as MD (Neurology)  Danielle Eller RN as Nurse Coordinator (Neurology)  Deanna Barahona MD as Assigned PCP  Maurilio Woodson MD as Assigned Neuroscience Provider  Perla Winters MD as Assigned Gastroenterology Provider  Chris Parker MD as Assigned Surgical Provider  Magnolia Villanueva MD as Assigned Heart and Vascular Provider  Laura Smith [facility] as Pharmacist (Pharmacist Ambulatory Care)  SELF, REFERRED      I, Dr Magnolia Kingston, was present with the fellow during the entire clinic visit, including the history and exam. I discussed the case with the fellow and agree with the findings as documented in the assessment and plan.      Review of the result(s) of each unique test - Ultrasound  Independent interpretation of a test performed by another physician/other qualified health care professional (not separately reported) - Ultrasound       20 minutes spent on the date of the encounter doing chart review, history and exam, documentation and further activities as noted above       Again, thank you for allowing me to participate in the care of your patient.        Sincerely,        Magnolia Villanueva MD

## 2021-02-25 DIAGNOSIS — K70.30 ALCOHOLIC CIRRHOSIS, UNSPECIFIED WHETHER ASCITES PRESENT (H): ICD-10-CM

## 2021-02-25 RX ORDER — RIFAXIMIN 550 MG/1
550 TABLET ORAL 2 TIMES DAILY
Qty: 60 TABLET | Refills: 11 | Status: SHIPPED | OUTPATIENT
Start: 2021-02-25 | End: 2021-07-05

## 2021-03-02 DIAGNOSIS — F51.01 PRIMARY INSOMNIA: ICD-10-CM

## 2021-03-03 RX ORDER — ZOLPIDEM TARTRATE 5 MG/1
TABLET ORAL
Qty: 60 TABLET | Refills: 0 | Status: SHIPPED | OUTPATIENT
Start: 2021-03-03 | End: 2021-07-05

## 2021-03-03 NOTE — TELEPHONE ENCOUNTER
Pending Prescriptions:                       Disp   Refills    zolpidem (AMBIEN) 5 MG tablet [Pharmacy Me*60 tab*0        Sig: TAKE ONE TABLET BY MOUTH AT BEDTIME AS NEEDED     Routing refill request to provider for review/approval because:  Drug not on the [facility] refill protocol

## 2021-03-04 ENCOUNTER — TELEPHONE (OUTPATIENT)
Dept: PHARMACY | Facility: CLINIC | Age: 46
End: 2021-03-04

## 2021-03-04 NOTE — TELEPHONE ENCOUNTER
Called to schedule follow-up MTM appointment. Left generic message for return call at his convenience.

## 2021-03-25 DIAGNOSIS — G89.4 CHRONIC PAIN SYNDROME: ICD-10-CM

## 2021-03-26 RX ORDER — GABAPENTIN 100 MG/1
200 CAPSULE ORAL 3 TIMES DAILY
Qty: 180 CAPSULE | Refills: 0 | Status: SHIPPED | OUTPATIENT
Start: 2021-03-26 | End: 2021-05-13

## 2021-03-26 NOTE — TELEPHONE ENCOUNTER
Pending Prescriptions:                       Disp   Refills    gabapentin (NEURONTIN) 100 MG capsule [Pha*180 ca*0        Sig: Take 2 capsules (200 mg) by mouth 3 times daily    Routing refill request to provider for review/approval because:  Drug not on the [facility] refill protocol

## 2021-04-19 ENCOUNTER — TELEPHONE (OUTPATIENT)
Dept: PHARMACY | Facility: CLINIC | Age: 46
End: 2021-04-19

## 2021-04-19 NOTE — TELEPHONE ENCOUNTER
Nasim Outbound Call:    Reason for call: follow-up calendar for MTM Check in per Laura (Patient prefers phone call)  Call attempt: x1 (rang x2, sent to )  Voicemail left: yes - to call Medication Therapy Management (MTM) Coordinators scheduling line: [phone] to make an appointment if interested in MTM follow-up.    Marisol Ovalle PharmD  Medication Therapy Management (MTM) Pharmacist  [facility]  Pager: [phone]

## 2021-05-09 ENCOUNTER — HEALTH MAINTENANCE LETTER (OUTPATIENT)
Age: 46
End: 2021-05-09

## 2021-05-12 DIAGNOSIS — G89.4 CHRONIC PAIN SYNDROME: ICD-10-CM

## 2021-05-13 RX ORDER — GABAPENTIN 100 MG/1
CAPSULE ORAL
Qty: 180 CAPSULE | Refills: 0 | Status: SHIPPED | OUTPATIENT
Start: 2021-05-13 | End: 2021-06-29

## 2021-05-13 NOTE — TELEPHONE ENCOUNTER
Pending Prescriptions:                       Disp   Refills    gabapentin (NEURONTIN) 100 MG capsule [Pha*180 ca*0        Sig: TAKE TWO CAPSULES BY MOUTH THREE TIMES DAILY     Routing refill request to provider for review/approval because:  Drug not on the [facility] refill protocol

## 2021-05-13 NOTE — TELEPHONE ENCOUNTER
Controlled Substance Refill Request for   gabapentin (NEURONTIN) 100 MG capsule 180 capsule 0 3/26/2021       Problem List Complete:  No     PROVIDER TO CONSIDER COMPLETION OF PROBLEM LIST AND OVERVIEW/CONTROLLED SUBSTANCE AGREEMENT    Last Written Prescription Date:  3/26/2021  Last Fill Quantity: 180,   # refills: 0    THE MOST RECENT OFFICE VISIT MUST BE WITHIN THE PAST 3 MONTHS. AT LEAST ONE FACE TO FACE VISIT MUST OCCUR EVERY 6 MONTHS. ADDITIONAL VISITS CAN BE VIRTUAL.  (THIS STATEMENT SHOULD BE DELETED.)    [place] Office Visit with [facility] [place] care provider: 12/28/2020    Future Office visit:     Controlled substance agreement:   Encounter-Level CSA:    There are no encounter-level csa.     Patient-Level CSA:    There are no patient-level csa.         Last Urine Drug Screen: No results found for: CDAUT, No results found for: COMDAT, No results found for: THC13, PCP13, COC13, MAMP13, OPI13, AMP13, BZO13, TCA13, MTD13, BAR13, OXY13, PPX13, BUP13     Processing:  Rx to be electronically transmitted to pharmacy by provider      [email or web address]

## 2021-05-27 DIAGNOSIS — K70.31 ALCOHOLIC CIRRHOSIS OF LIVER WITH ASCITES (H): ICD-10-CM

## 2021-05-28 ENCOUNTER — TELEPHONE (OUTPATIENT)
Dept: INTERNAL MEDICINE | Facility: CLINIC | Age: 46
End: 2021-05-28

## 2021-05-28 RX ORDER — TRAMADOL HYDROCHLORIDE 50 MG/1
TABLET ORAL
Qty: 60 TABLET | Refills: 0 | Status: SHIPPED | OUTPATIENT
Start: 2021-05-28 | End: 2021-06-29

## 2021-05-28 NOTE — TELEPHONE ENCOUNTER
Prior Authorization Retail Medication Request    Medication/Dose: traMADol (ULTRAM) 50 MG tablet  ICD code (if different than what is on RX):  Alcoholic cirrhosis of liver with ascites (H) [K70.31]   Previously Tried and Failed:    Rationale:      Insurance Name:    Insurance ID:      covermymeds Key: [id number]      Pharmacy Information (if different than what is on RX)  Name:  [facility]  Phone:  [phone]

## 2021-05-28 NOTE — TELEPHONE ENCOUNTER
Pending Prescriptions:                       Disp   Refills    traMADol (ULTRAM) 50 MG tablet [Pharmacy M*60 tab*0        Sig: TAKE 1 TABLET BY MOUTH TWICE DAILY AS NEEDED FOR           SEVERE PAIN.    Routing refill request to provider for review/approval because:  Drug not on the [facility] refill protocol

## 2021-06-01 NOTE — TELEPHONE ENCOUNTER
Prior Authorization Approval    Authorization Effective Date: 6/1/2021  Authorization Expiration Date: 6/1/2022  Medication: traMADol (ULTRAM) 50 MG tablet  Approved Dose/Quantity:    Reference #:     Insurance Company: [employer] - Phone [phone] Fax [phone]  Expected CoPay:       CoPay Card Available:      Foundation Assistance Needed:    Which Pharmacy is filling the prescription (Not needed for infusion/clinic administered): [id number] [place], [place] - [address] [address]  Pharmacy Notified: Yes  Patient Notified: Yes  **Instructed pharmacy to notify patient when script is ready to /ship.**

## 2021-06-01 NOTE — TELEPHONE ENCOUNTER
Central Prior Authorization Team   Phone: [phone]      PA Initiation    Medication: traMADol (ULTRAM) 50 MG tablet  Insurance Company: [employer] - Phone [phone] Fax [phone]  Pharmacy Filling the Rx: [id number] [place], [place] - [address]  Filling Pharmacy Phone: [phone]  Filling Pharmacy Fax:    Start Date: 6/1/2021

## 2021-06-03 DIAGNOSIS — K76.82 HEPATIC ENCEPHALOPATHY (H): ICD-10-CM

## 2021-06-03 RX ORDER — LACTULOSE 20 G/30ML
30 SOLUTION ORAL 3 TIMES DAILY
Qty: 2500 ML | Refills: 11 | Status: SHIPPED | OUTPATIENT
Start: 2021-06-03 | End: 2021-07-05

## 2021-06-26 DIAGNOSIS — K70.31 ALCOHOLIC CIRRHOSIS OF LIVER WITH ASCITES (H): ICD-10-CM

## 2021-06-26 DIAGNOSIS — G25.81 RESTLESS LEGS SYNDROME (RLS): ICD-10-CM

## 2021-06-26 DIAGNOSIS — G89.4 CHRONIC PAIN SYNDROME: ICD-10-CM

## 2021-06-26 NOTE — LETTER
June 30, 2021      Sp Plasecnia  [address] [place] [id number]              Dear Sp,    Please schedule an appointment to establish care with another provider prior to your next medication refill.      Sincerely,      Deanna Barahona MD

## 2021-06-28 NOTE — TELEPHONE ENCOUNTER
Routing refill request to provider for review/approval because:  Drug not on the [facility] refill protocol   Labs out of range:    BP Readings from Last 3 Encounters:   12/09/20 (!) 149/79   10/15/20 133/71   10/13/20 126/75     Lila Tovar RN, BSN

## 2021-06-29 RX ORDER — GABAPENTIN 100 MG/1
CAPSULE ORAL
Qty: 180 CAPSULE | Refills: 0 | Status: SHIPPED | OUTPATIENT
Start: 2021-06-29 | End: 2021-09-08

## 2021-06-29 RX ORDER — TRAMADOL HYDROCHLORIDE 50 MG/1
TABLET ORAL
Qty: 60 TABLET | Refills: 0 | Status: SHIPPED | OUTPATIENT
Start: 2021-06-29 | End: 2021-07-21

## 2021-06-29 RX ORDER — ROPINIROLE 0.5 MG/1
TABLET, FILM COATED ORAL
Qty: 180 TABLET | Refills: 0 | Status: SHIPPED | OUTPATIENT
Start: 2021-06-29 | End: 2021-07-05

## 2021-06-29 NOTE — TELEPHONE ENCOUNTER
Patient's home number no answer/machine.  Patient's cell number message on machine to call back.

## 2021-06-30 ENCOUNTER — NURSE TRIAGE (OUTPATIENT)
Dept: NURSING | Facility: CLINIC | Age: 46
End: 2021-06-30

## 2021-07-01 NOTE — TELEPHONE ENCOUNTER
Patient returned call.  Message from provider relayed to patient:    Joon Castillo MD   4:17 PM  Note     Will refill, needs follow up visit / establish care.         Transferred patient to scheduling to set up appointment.    Veronica Nobles RN  Triage Nurse Advisor

## 2021-07-04 NOTE — TELEPHONE ENCOUNTER
Progress Notes by Jakub Andrea MD at 7/1/2021  9:40 AM     Author: Jakub Andrea MD Service: -- Author Type: Physician    Filed: 7/1/2021 10:50 AM Encounter Date: 7/1/2021 Status: Signed    : Jakub Andrea MD (Physician)         Office Visit - Follow Up   Bria Moore   54 y.o. female    Date of Visit: 7/1/2021    Chief Complaint   Patient presents with   ? Follow-up     BP. and is fasting today.         Assessment and Plan   1. Essential hypertension  Controlled. Continue amlodipine 10 mg in the morning and lisinopril 20 mg in the afternoon.   - amLODIPine (NORVASC) 10 MG tablet; Take 1 tablet (10 mg total) by mouth daily.  Dispense: 90 tablet; Refill: 3  - lisinopriL (PRINIVIL,ZESTRIL) 20 MG tablet; Take 1 tablet (20 mg total) by mouth daily.  - HM2(CBC w/o Differential)  - Basic Metabolic Panel    2. Mixed hyperlipidemia  Controlled. Last lipids were good. Continue simvastatin.   - Lipid Cascade  - Thyroid Stimulating Hormone (TSH)  - Hepatic Profile    3. Trochanteric bursitis of right hip  Complains of right hip pains. Come and go but getting more intense. Cannot sleep well at night because of her pains.  Informed she has trochanteric bursitis causing her right hip pains. Will refer her to orthopedics.   - Ambulatory referral to Orthopedics    4. Vitamin D deficiency  Supplemented by vitamin D.   - Vitamin D, Total (25-Hydroxy)      Follow up in 4 months.      History of Present Illness   This 54 y.o. old female is here for follow up. Has hypertension and hyperlipidemia controlled by her medications. Blood pressure and lipids are now normal. Complains only of right hip pains. Pains come and go. But lately pains have been more persisting and quite intense especially at night. Unable to sleep well because of her pains. Overall, she is stable.      Review of Systems   A 12 point comprehensive review of systems was negative except as noted..     Medications, Allergies and Problem List  Spoke with the patient and confirmed Transplant Surgeon on 5/14/20, Nutritionist on 5/15/20 and Neurology appointment on 5/28/20.  Informed patient an itinerary can be accessed on [email or web address], and will be sent via mail.   "  Reviewed and updated             Chief Complaint   Follow-up (BP. and is fasting today. )       Patient Profile   Social History     Social History Narrative    , 2 grown children, Ranulfo 26 years, Tiffanie May, 19 years. School kitchen cook and . Nonsmoker. Non alcohol drinker.         Past Medical History   Patient Active Problem List   Diagnosis   ? Back pain   ? Inguinal pain, right   ? Essential hypertension   ? Mixed hyperlipidemia   ? Vitamin D deficiency       Past Surgical History  She has a past surgical history that includes  section, classic (, ).       Medications and Allergies   Current Outpatient Medications   Medication Sig   ? amLODIPine (NORVASC) 10 MG tablet Take 1 tablet (10 mg total) by mouth daily.   ? amLODIPine (NORVASC) 5 MG tablet Take 1 tablet (5 mg total) by mouth 2 (two) times a day.   ? celecoxib (CELEBREX) 200 MG capsule Take 1 capsule (200 mg total) by mouth daily.   ? cholecalciferol, vitamin D3, 50 mcg (2,000 unit) Tab Take by mouth.   ? lisinopriL (PRINIVIL,ZESTRIL) 20 MG tablet Take 1 tablet (20 mg total) by mouth daily.   ? simvastatin (ZOCOR) 10 MG tablet Take 1 tablet (10 mg total) by mouth at bedtime.     No Known Allergies     Family and Social History   No family history on file.     Social History     Tobacco Use   ? Smoking status: Never Smoker   ? Smokeless tobacco: Never Used   Substance Use Topics   ? Alcohol use: Not on file   ? Drug use: Not on file        Physical Exam       Physical Exam  /82   Pulse 82   Ht 5' 0.25\" (1.53 m)   Wt 124 lb (56.2 kg)   SpO2 99%   BMI 24.02 kg/m    General appearance: alert, appears stated age, cooperative and no distress  Head: Normocephalic, without obvious abnormality, atraumatic  Throat: lips, mucosa, and tongue normal; teeth and gums normal  Neck: no adenopathy, no carotid bruit, no JVD, supple, symmetrical, trachea midline and thyroid not enlarged, symmetric, no tenderness/mass/nodules  Lungs: " clear to auscultation bilaterally  Heart: regular rate and rhythm, S1, S2 normal, no murmur, click, rub or gallop  Abdomen: soft, non-tender; bowel sounds normal; no masses,  no organomegaly  Extremities: extremities normal, atraumatic, no cyanosis or edema  Skin: Skin color, texture, turgor normal. No rashes or lesions  Musculoskeletal: tender right hip with normal range of motion     Additional Information   Post Discharge Medication Reconciliation Status: discharge medications reconciled, continue medications without change      Jakub Andrea MD  Internal Medicine  Contact me at [phone]     Additional Information   Current Outpatient Medications   Medication Sig   ? amLODIPine (NORVASC) 10 MG tablet Take 1 tablet (10 mg total) by mouth daily.   ? amLODIPine (NORVASC) 5 MG tablet Take 1 tablet (5 mg total) by mouth 2 (two) times a day.   ? celecoxib (CELEBREX) 200 MG capsule Take 1 capsule (200 mg total) by mouth daily.   ? cholecalciferol, vitamin D3, 50 mcg (2,000 unit) Tab Take by mouth.   ? lisinopriL (PRINIVIL,ZESTRIL) 20 MG tablet Take 1 tablet (20 mg total) by mouth daily.   ? simvastatin (ZOCOR) 10 MG tablet Take 1 tablet (10 mg total) by mouth at bedtime.     No Known Allergies  Social History     Tobacco Use   ? Smoking status: Never Smoker   ? Smokeless tobacco: Never Used   Substance Use Topics   ? Alcohol use: Not on file   ? Drug use: Not on file

## 2021-07-05 ENCOUNTER — VIRTUAL VISIT (OUTPATIENT)
Dept: INTERNAL MEDICINE | Facility: CLINIC | Age: 46
End: 2021-07-05
Payer: MEDICARE

## 2021-07-05 VITALS — WEIGHT: 215 LBS | DIASTOLIC BLOOD PRESSURE: 89 MMHG | SYSTOLIC BLOOD PRESSURE: 141 MMHG | BODY MASS INDEX: 28.37 KG/M2

## 2021-07-05 DIAGNOSIS — K21.9 GASTROESOPHAGEAL REFLUX DISEASE WITHOUT ESOPHAGITIS: ICD-10-CM

## 2021-07-05 DIAGNOSIS — R18.8 OTHER ASCITES: ICD-10-CM

## 2021-07-05 DIAGNOSIS — F51.01 PRIMARY INSOMNIA: ICD-10-CM

## 2021-07-05 DIAGNOSIS — I10 ESSENTIAL HYPERTENSION: Primary | ICD-10-CM

## 2021-07-05 DIAGNOSIS — G25.81 RESTLESS LEGS SYNDROME (RLS): ICD-10-CM

## 2021-07-05 DIAGNOSIS — L29.9 ITCHING: ICD-10-CM

## 2021-07-05 DIAGNOSIS — G71.02 FSHD (FACIOSCAPULOHUMERAL MUSCULAR DYSTROPHY) (H): ICD-10-CM

## 2021-07-05 PROCEDURE — 99214 OFFICE O/P EST MOD 30 MIN: CPT | Mod: TEL | Performed by: NURSE PRACTITIONER

## 2021-07-05 RX ORDER — LISINOPRIL 40 MG/1
40 TABLET ORAL DAILY
Qty: 90 TABLET | Refills: 0 | Status: SHIPPED | OUTPATIENT
Start: 2021-07-05 | End: 2021-09-08

## 2021-07-05 RX ORDER — AMLODIPINE BESYLATE 5 MG/1
5 TABLET ORAL DAILY
Qty: 90 TABLET | Refills: 0 | Status: SHIPPED | OUTPATIENT
Start: 2021-07-05 | End: 2021-09-08

## 2021-07-05 RX ORDER — LACTULOSE 10 G/15ML
20 SOLUTION ORAL DAILY
COMMUNITY

## 2021-07-05 RX ORDER — ZOLPIDEM TARTRATE 5 MG/1
5 TABLET ORAL
Qty: 30 TABLET | Refills: 0 | Status: SHIPPED | OUTPATIENT
Start: 2021-07-05 | End: 2021-08-12

## 2021-07-05 RX ORDER — ROPINIROLE 0.5 MG/1
0.5 TABLET, FILM COATED ORAL 2 TIMES DAILY
Qty: 180 TABLET | Refills: 0 | Status: SHIPPED | OUTPATIENT
Start: 2021-07-05 | End: 2021-09-08

## 2021-07-05 RX ORDER — LISINOPRIL 40 MG/1
40 TABLET ORAL DAILY
COMMUNITY
Start: 2021-05-13 | End: 2021-07-05

## 2021-07-05 RX ORDER — ESOMEPRAZOLE MAGNESIUM 40 MG/1
40 CAPSULE, DELAYED RELEASE ORAL
Qty: 90 CAPSULE | Refills: 0 | Status: SHIPPED | OUTPATIENT
Start: 2021-07-05 | End: 2021-09-08

## 2021-07-05 RX ORDER — AMLODIPINE BESYLATE 5 MG/1
5 TABLET ORAL DAILY
COMMUNITY
End: 2021-07-05

## 2021-07-05 RX ORDER — HYDROXYZINE HYDROCHLORIDE 25 MG/1
25-50 TABLET, FILM COATED ORAL EVERY 6 HOURS PRN
Qty: 90 TABLET | Refills: 0 | Status: SHIPPED | OUTPATIENT
Start: 2021-07-05 | End: 2021-09-21

## 2021-07-05 NOTE — PATIENT INSTRUCTIONS
Essential hypertension  - Rx refill:  - amLODIPine (NORVASC) 5 MG tablet; Take 1 tablet (5 mg) by mouth daily  - lisinopril (ZESTRIL) 40 MG tablet; Take 1 tablet (40 mg) by mouth daily    Restless legs syndrome (RLS)  - intermittent but stable:  - rOPINIRole (REQUIP) 0.5 MG tablet; Take 1 tablet (0.5 mg) by mouth 2 times daily  - hydrOXYzine (ATARAX) 25 MG tablet; Take 1-2 tablets (25-50 mg) by mouth every 6 hours as needed for itching or other (adjuvant pain)    Other ascites  - esomeprazole (NEXIUM) 40 MG DR capsule; Take 1 capsule (40 mg) by mouth every morning (before breakfast) Take 30-60 minutes before eating.  - hydrOXYzine (ATARAX) 25 MG tablet; Take 1-2 tablets (25-50 mg) by mouth every 6 hours as needed for itching or other (adjuvant pain)    Gastroesophageal reflux disease without esophagitis  - esomeprazole (NEXIUM) 40 MG DR capsule; Take 1 capsule (40 mg) by mouth every morning (before breakfast) Take 30-60 minutes before eating.    Itching  - hydrOXYzine (ATARAX) 25 MG tablet; Take 1-2 tablets (25-50 mg) by mouth every 6 hours as needed for itching or other (adjuvant pain)    Primary insomnia  - zolpidem (AMBIEN) 5 MG tablet; Take 1 tablet (5 mg) by mouth nightly as needed    FSHD (facioscapulohumeral muscular dystrophy) (H)  - causes chronic pain and on Tramadol and Gabapentin

## 2021-07-05 NOTE — PROGRESS NOTES
"Sp is a 46 year old who is being evaluated via a billable telephone visit.      What phone number would you like to be contacted at? [phone]  How would you like to obtain your AVS? Patient declined.        Assessment & Plan     Essential hypertension  - Rx refill:  - amLODIPine (NORVASC) 5 MG tablet; Take 1 tablet (5 mg) by mouth daily  - lisinopril (ZESTRIL) 40 MG tablet; Take 1 tablet (40 mg) by mouth daily    Restless legs syndrome (RLS)  - intermittent but stable:  - rOPINIRole (REQUIP) 0.5 MG tablet; Take 1 tablet (0.5 mg) by mouth 2 times daily  - hydrOXYzine (ATARAX) 25 MG tablet; Take 1-2 tablets (25-50 mg) by mouth every 6 hours as needed for itching or other (adjuvant pain)    Other ascites  - esomeprazole (NEXIUM) 40 MG DR capsule; Take 1 capsule (40 mg) by mouth every morning (before breakfast) Take 30-60 minutes before eating.  - hydrOXYzine (ATARAX) 25 MG tablet; Take 1-2 tablets (25-50 mg) by mouth every 6 hours as needed for itching or other (adjuvant pain)    Gastroesophageal reflux disease without esophagitis  - esomeprazole (NEXIUM) 40 MG DR capsule; Take 1 capsule (40 mg) by mouth every morning (before breakfast) Take 30-60 minutes before eating.    Itching  - hydrOXYzine (ATARAX) 25 MG tablet; Take 1-2 tablets (25-50 mg) by mouth every 6 hours as needed for itching or other (adjuvant pain)    Primary insomnia  - zolpidem (AMBIEN) 5 MG tablet; Take 1 tablet (5 mg) by mouth nightly as needed    FSHD (facioscapulohumeral muscular dystrophy) (H)  - causes chronic pain and on Tramadol and Gabapentin      956}     BMI:   Estimated body mass index is 28.37 kg/m  as calculated from the following:    Height as of 12/9/20: 1.854 m (6' 1\").    Weight as of this encounter: 97.5 kg (215 lb).       Patient instructions:  See above.    Return in about 2 months (around 9/5/2021) for Routine preventive, with any available provider, in person and for fasting labs.    Louisa Whitehead, CNP  [facility] " [facility] CONSTANCE Snowden is a 46 year old who presents for the following health issues     HPI     Hypertension Follow-up      Do you check your blood pressure regularly outside of the clinic? Yes     Are you following a low salt diet? No    Are your blood pressures ever more than 140 on the top number (systolic) OR more   than 90 on the bottom number (diastolic), for example 140/90? Yes      How many servings of fruits and vegetables do you eat daily?  2-3    On average, how many sweetened beverages do you drink each day (Examples: soda, juice, sweet tea, etc.  Do NOT count diet or artificially sweetened beverages)?   2    How many days per week do you exercise enough to make your heart beat faster? 7    How many minutes a day do you exercise enough to make your heart beat faster? 30 - 60    How many days per week do you miss taking your medication? 0    See above.  Called patient at 11:48 and left message to call back the clinic as he had an appt at 11:40 AM. Called again at 12:05 and was able to reach the patient    Today per telephone visit, needs Rx refills on most medications.  Due for fasting labs and physical + pain contract and drug urine screen. PCP was Dr. Barahona.    Overall doing ok.  No fever or cough.  No shortness of breathe or chest pain. States went back on Lisinopril and Amlodopine as my blood pressure was going back up; now BP is ok.      Review of Systems   Constitutional, HEENT, cardiovascular, pulmonary, gi and gu systems are negative, except as otherwise noted.      Objective           Vitals:  No vitals were obtained today due to virtual visit.    Physical Exam   alert and no distress  PSYCH: Alert and oriented times 3; coherent speech, normal   rate and volume, able to articulate logical thoughts, able   to abstract reason, no tangential thoughts, no hallucinations   or delusions  His affect is normal  RESP: No cough, no audible wheezing, able to talk in full  sentences  Remainder of exam unable to be completed due to telephone visits            Phone call duration: 18 minutes

## 2021-07-19 DIAGNOSIS — K70.31 ALCOHOLIC CIRRHOSIS OF LIVER WITH ASCITES (H): ICD-10-CM

## 2021-07-21 RX ORDER — TRAMADOL HYDROCHLORIDE 50 MG/1
TABLET ORAL
Qty: 60 TABLET | Refills: 0 | Status: SHIPPED | OUTPATIENT
Start: 2021-07-21 | End: 2021-09-08

## 2021-07-21 NOTE — TELEPHONE ENCOUNTER
Please notify this patient that for future refills on Tramadol, he will need an office visit to discuss a pain contract (not found in system) + needs Urine Drug screen.  This is required since changing providers.  Approved for now.

## 2021-07-24 ENCOUNTER — HOSPITAL ENCOUNTER (EMERGENCY)
Facility: CLINIC | Age: 46
Discharge: LEFT BEFORE DISCHARGE | End: 2021-07-25
Payer: COMMERCIAL

## 2021-07-24 VITALS
TEMPERATURE: 97.9 F | OXYGEN SATURATION: 98 % | SYSTOLIC BLOOD PRESSURE: 157 MMHG | DIASTOLIC BLOOD PRESSURE: 76 MMHG | RESPIRATION RATE: 20 BRPM | HEART RATE: 81 BPM

## 2021-07-25 NOTE — ED NOTES
"After pt was informed that his condition was stable enough to wait in the waiting room the patient asked how long the wait was. The difficult nature of ED wait times was explained as well as the current number of people waiting for beds. The patient stated \"Guess I am just going home to die then\" and left the department   "

## 2021-08-11 DIAGNOSIS — F51.01 PRIMARY INSOMNIA: ICD-10-CM

## 2021-08-12 RX ORDER — ZOLPIDEM TARTRATE 5 MG/1
5 TABLET ORAL
Qty: 30 TABLET | Refills: 1 | Status: SHIPPED | OUTPATIENT
Start: 2021-08-12 | End: 2021-10-19

## 2021-08-12 NOTE — TELEPHONE ENCOUNTER
Pending Prescriptions:                       Disp   Refills    zolpidem (AMBIEN) 5 MG tablet [Pharmacy Me*30 tab*0        Sig: Take 1 tablet (5 mg) by mouth nightly as needed    Routing refill request to provider for review/approval because:  Drug not on the [facility] refill protocol

## 2021-08-12 NOTE — TELEPHONE ENCOUNTER
Please notify patient that she is past due office visit for physical and labs.  Notify patient.  Approved for now.

## 2021-08-27 DIAGNOSIS — I10 ESSENTIAL HYPERTENSION: ICD-10-CM

## 2021-08-30 RX ORDER — LISINOPRIL 40 MG/1
40 TABLET ORAL DAILY
Qty: 90 TABLET | Refills: 0 | OUTPATIENT
Start: 2021-08-30

## 2021-08-30 NOTE — TELEPHONE ENCOUNTER
Too soon to refill.  90 day given   E-Prescribing Status: Receipt confirmed by pharmacy (7/5/2021 12:14 PM CDT)    Lila Tovar RN, BSN

## 2021-09-08 ENCOUNTER — OFFICE VISIT (OUTPATIENT)
Dept: INTERNAL MEDICINE | Facility: CLINIC | Age: 46
End: 2021-09-08
Payer: MEDICARE

## 2021-09-08 VITALS
DIASTOLIC BLOOD PRESSURE: 80 MMHG | HEIGHT: 73 IN | HEART RATE: 122 BPM | SYSTOLIC BLOOD PRESSURE: 130 MMHG | BODY MASS INDEX: 29.16 KG/M2 | RESPIRATION RATE: 12 BRPM | TEMPERATURE: 98 F | OXYGEN SATURATION: 97 % | WEIGHT: 220 LBS

## 2021-09-08 DIAGNOSIS — R18.8 OTHER ASCITES: ICD-10-CM

## 2021-09-08 DIAGNOSIS — G89.4 CHRONIC PAIN SYNDROME: ICD-10-CM

## 2021-09-08 DIAGNOSIS — G25.81 RESTLESS LEGS SYNDROME (RLS): ICD-10-CM

## 2021-09-08 DIAGNOSIS — K21.9 GASTROESOPHAGEAL REFLUX DISEASE WITHOUT ESOPHAGITIS: ICD-10-CM

## 2021-09-08 DIAGNOSIS — K70.31 ALCOHOLIC CIRRHOSIS OF LIVER WITH ASCITES (H): ICD-10-CM

## 2021-09-08 DIAGNOSIS — I10 ESSENTIAL HYPERTENSION: ICD-10-CM

## 2021-09-08 DIAGNOSIS — G71.02 FSHD (FACIOSCAPULOHUMERAL MUSCULAR DYSTROPHY) (H): ICD-10-CM

## 2021-09-08 LAB
ERYTHROCYTE [DISTWIDTH] IN BLOOD BY AUTOMATED COUNT: 14.6 % (ref 10–15)
HCT VFR BLD AUTO: 47.4 % (ref 40–53)
HGB BLD-MCNC: 15.3 G/DL (ref 13.3–17.7)
MCH RBC QN AUTO: 28.8 PG (ref 26.5–33)
MCHC RBC AUTO-ENTMCNC: 32.3 G/DL (ref 31.5–36.5)
MCV RBC AUTO: 89 FL (ref 78–100)
PLATELET # BLD AUTO: 109 10E3/UL (ref 150–450)
RBC # BLD AUTO: 5.32 10E6/UL (ref 4.4–5.9)
WBC # BLD AUTO: 4.7 10E3/UL (ref 4–11)

## 2021-09-08 PROCEDURE — 99214 OFFICE O/P EST MOD 30 MIN: CPT | Performed by: INTERNAL MEDICINE

## 2021-09-08 PROCEDURE — 36415 COLL VENOUS BLD VENIPUNCTURE: CPT | Performed by: INTERNAL MEDICINE

## 2021-09-08 PROCEDURE — 80053 COMPREHEN METABOLIC PANEL: CPT | Performed by: INTERNAL MEDICINE

## 2021-09-08 PROCEDURE — 85027 COMPLETE CBC AUTOMATED: CPT | Performed by: INTERNAL MEDICINE

## 2021-09-08 RX ORDER — TRAMADOL HYDROCHLORIDE 50 MG/1
TABLET ORAL
Qty: 60 TABLET | Refills: 0 | Status: SHIPPED | OUTPATIENT
Start: 2021-09-08 | End: 2021-11-09

## 2021-09-08 RX ORDER — LISINOPRIL 40 MG/1
40 TABLET ORAL DAILY
Qty: 90 TABLET | Refills: 3 | Status: SHIPPED | OUTPATIENT
Start: 2021-09-08 | End: 2022-01-01

## 2021-09-08 RX ORDER — ALLOPURINOL 300 MG/1
300 TABLET ORAL DAILY
Qty: 90 TABLET | Refills: 3 | Status: SHIPPED | OUTPATIENT
Start: 2021-09-08 | End: 2022-01-01

## 2021-09-08 RX ORDER — AMLODIPINE BESYLATE 5 MG/1
5 TABLET ORAL DAILY
Qty: 90 TABLET | Refills: 3 | Status: SHIPPED | OUTPATIENT
Start: 2021-09-08 | End: 2022-01-01

## 2021-09-08 RX ORDER — GABAPENTIN 100 MG/1
CAPSULE ORAL
Qty: 180 CAPSULE | Refills: 3 | Status: SHIPPED | OUTPATIENT
Start: 2021-09-08 | End: 2022-05-10

## 2021-09-08 RX ORDER — TRAMADOL HYDROCHLORIDE 50 MG/1
50 TABLET ORAL
Qty: 60 TABLET | Refills: 0 | Status: CANCELLED | OUTPATIENT
Start: 2021-09-08

## 2021-09-08 RX ORDER — ROPINIROLE 0.5 MG/1
0.5 TABLET, FILM COATED ORAL 2 TIMES DAILY
Qty: 180 TABLET | Refills: 3 | Status: SHIPPED | OUTPATIENT
Start: 2021-09-08 | End: 2022-01-01

## 2021-09-08 RX ORDER — ESOMEPRAZOLE MAGNESIUM 40 MG/1
40 CAPSULE, DELAYED RELEASE ORAL
Qty: 90 CAPSULE | Refills: 3 | Status: SHIPPED | OUTPATIENT
Start: 2021-09-08 | End: 2022-01-01

## 2021-09-08 ASSESSMENT — MIFFLIN-ST. JEOR: SCORE: 1932.82

## 2021-09-08 NOTE — NURSING NOTE
"/80   Pulse (!) 122   Temp 98  F (36.7  C) (Oral)   Resp 12   Ht 1.848 m (6' 0.75\")   Wt 99.8 kg (220 lb)   SpO2 97%   BMI 29.23 kg/m      "

## 2021-09-08 NOTE — PROGRESS NOTES
"SUBJECTIVE:  Sp Plasencia, a 46 year old male scheduled an appointment to discuss the following issues:     Alcoholic cirrhosis of liver with ascites (H)  FSHD (facioscapulohumeral muscular dystrophy) (H)  Essential hypertension  Other ascites  Gastroesophageal reflux disease without esophagitis  Chronic pain syndrome  Restless legs syndrome (RLS)      New patient seen for assessment of chronic medical conditions.   Has h/o muscular dystrophy, has muscle weakness, wasting of the muscles , pain in the knees related to early OA. Has  LE and back pain, has had recurrent falls, from muscles giving out. On Gabapentin and Tramadol treatment, helps with pain control, no side effects noted.   Has h/o liver cirrhosis , ETOH related. Seeing GI. Has stopped drinking alcohol , but now resumed in small amounts - 1-2 drinks a day.   Has h/o HTN. on medical treatment. BP has been controlled. No side effects from medications. No CP, HA, dizziness. good compliance with medications and low salt diet.  Has RLS, on Requip controlled.   Has h/o insomnia, on PRN Ambien. No side effects.     Medical, social, surgical, and family histories reviewed.    ROS:  Constitutional, HEENT, cardiovascular, pulmonary, gi and gu systems are negative, except as otherwise noted.    OBJECTIVE:  /80   Pulse (!) 122   Temp 98  F (36.7  C) (Oral)   Resp 12   Ht 1.848 m (6' 0.75\")   Wt 99.8 kg (220 lb)   SpO2 97%   BMI 29.23 kg/m    EXAM:  GENERAL APPEARANCE: healthy, alert and no distress  EYES: Eyes grossly normal to inspection, PERRL and conjunctivae and sclerae normal  HENT: ear canals and TM's normal and nose and mouth without ulcers or lesions  NECK: no adenopathy, no asymmetry, masses, or scars and thyroid normal to palpation  RESP: lungs clear to auscultation - no rales, rhonchi or wheezes  CV: regular rates and rhythm, normal S1 S2, no S3 or S4 and no murmur, click or rub  ABDOMEN: soft, nontender, without hepatosplenomegaly or " masses and bowel sounds normal  MS: extremities normal- no gross deformities noted and muscle wasting of the things and calves, abnormal gait related to weakness and foot drop   NEURO: Normal strength and tone of the UE , mentation intact, speech normal and LE weakness     ASSESSMENT/PLAN:  (K70.31) Alcoholic cirrhosis of liver with ascites (H)  Comment: follow up with GI   Plan: traMADol (ULTRAM) 50 MG tablet        Avoid alcohol     (G71.02) FSHD (facioscapulohumeral muscular dystrophy) (H)  Comment: continue treatment for pain control             (I10) Essential hypertension  Comment: controlled   Plan: amLODIPine (NORVASC) 5 MG tablet, lisinopril         (ZESTRIL) 40 MG tablet, CBC with platelets,         Comprehensive metabolic panel (BMP + Alb, Alk         Phos, ALT, AST, Total. Bili, TP)            (K21.9) Gastroesophageal reflux disease without esophagitis  Comment:   Plan: esomeprazole (NEXIUM) 40 MG DR capsule            (G89.4) Chronic pain syndrome  Comment:   Plan: gabapentin (NEURONTIN) 100 MG capsule            (G25.81) Restless legs syndrome (RLS)  Comment:   Plan: rOPINIRole (REQUIP) 0.5 MG tablet          Follow up in 6 months.     Joon Castillo MD

## 2021-09-09 LAB
ALBUMIN SERPL-MCNC: 3.9 G/DL (ref 3.4–5)
ALP SERPL-CCNC: 174 U/L (ref 40–150)
ALT SERPL W P-5'-P-CCNC: 73 U/L (ref 0–70)
ANION GAP SERPL CALCULATED.3IONS-SCNC: 2 MMOL/L (ref 3–14)
AST SERPL W P-5'-P-CCNC: 104 U/L (ref 0–45)
BILIRUB SERPL-MCNC: 2.1 MG/DL (ref 0.2–1.3)
BUN SERPL-MCNC: 7 MG/DL (ref 7–30)
CALCIUM SERPL-MCNC: 8.8 MG/DL (ref 8.5–10.1)
CHLORIDE BLD-SCNC: 109 MMOL/L (ref 94–109)
CO2 SERPL-SCNC: 26 MMOL/L (ref 20–32)
CREAT SERPL-MCNC: 0.62 MG/DL (ref 0.66–1.25)
GFR SERPL CREATININE-BSD FRML MDRD: >90 ML/MIN/1.73M2
GLUCOSE BLD-MCNC: 104 MG/DL (ref 70–99)
POTASSIUM BLD-SCNC: 4.1 MMOL/L (ref 3.4–5.3)
PROT SERPL-MCNC: 7.8 G/DL (ref 6.8–8.8)
SODIUM SERPL-SCNC: 137 MMOL/L (ref 133–144)

## 2021-10-15 DIAGNOSIS — F51.01 PRIMARY INSOMNIA: ICD-10-CM

## 2021-10-19 RX ORDER — ZOLPIDEM TARTRATE 5 MG/1
TABLET ORAL
Qty: 30 TABLET | Refills: 0 | Status: SHIPPED | OUTPATIENT
Start: 2021-10-19 | End: 2022-01-01

## 2021-10-19 NOTE — TELEPHONE ENCOUNTER
.Routing refill request to provider for review/approval because:  Drug not on the [facility] refill protocol

## 2021-10-24 ENCOUNTER — HEALTH MAINTENANCE LETTER (OUTPATIENT)
Age: 46
End: 2021-10-24

## 2021-11-02 ENCOUNTER — LAB (OUTPATIENT)
Dept: LAB | Facility: CLINIC | Age: 46
End: 2021-11-02
Attending: INTERNAL MEDICINE
Payer: MEDICARE

## 2021-11-02 ENCOUNTER — HOSPITAL ENCOUNTER (OUTPATIENT)
Dept: ULTRASOUND IMAGING | Facility: CLINIC | Age: 46
End: 2021-11-02
Attending: INTERNAL MEDICINE
Payer: MEDICARE

## 2021-11-02 DIAGNOSIS — K70.9 ALCOHOL LIVER DAMAGE (H): ICD-10-CM

## 2021-11-02 DIAGNOSIS — K70.9 ALCOHOLIC LIVER DISEASE (H): ICD-10-CM

## 2021-11-02 LAB
AFP SERPL-MCNC: 4.9 UG/L (ref 0–8)
ALBUMIN SERPL-MCNC: 3.6 G/DL (ref 3.4–5)
ALP SERPL-CCNC: 146 U/L (ref 40–150)
ALT SERPL W P-5'-P-CCNC: 69 U/L (ref 0–70)
ANION GAP SERPL CALCULATED.3IONS-SCNC: 6 MMOL/L (ref 3–14)
AST SERPL W P-5'-P-CCNC: 85 U/L (ref 0–45)
BILIRUB SERPL-MCNC: 2.1 MG/DL (ref 0.2–1.3)
BUN SERPL-MCNC: 9 MG/DL (ref 7–30)
CALCIUM SERPL-MCNC: 8.9 MG/DL (ref 8.5–10.1)
CHLORIDE BLD-SCNC: 111 MMOL/L (ref 94–109)
CO2 SERPL-SCNC: 25 MMOL/L (ref 20–32)
CREAT SERPL-MCNC: 0.49 MG/DL (ref 0.66–1.25)
ERYTHROCYTE [DISTWIDTH] IN BLOOD BY AUTOMATED COUNT: 15.1 % (ref 10–15)
GFR SERPL CREATININE-BSD FRML MDRD: >90 ML/MIN/1.73M2
GLUCOSE BLD-MCNC: 117 MG/DL (ref 70–99)
HCT VFR BLD AUTO: 45.8 % (ref 40–53)
HGB BLD-MCNC: 15.1 G/DL (ref 13.3–17.7)
INR PPP: 1.3 (ref 0.85–1.15)
MCH RBC QN AUTO: 29.4 PG (ref 26.5–33)
MCHC RBC AUTO-ENTMCNC: 33 G/DL (ref 31.5–36.5)
MCV RBC AUTO: 89 FL (ref 78–100)
PLATELET # BLD AUTO: 96 10E3/UL (ref 150–450)
POTASSIUM BLD-SCNC: 3.9 MMOL/L (ref 3.4–5.3)
PROT SERPL-MCNC: 7.5 G/DL (ref 6.8–8.8)
RBC # BLD AUTO: 5.14 10E6/UL (ref 4.4–5.9)
SODIUM SERPL-SCNC: 142 MMOL/L (ref 133–144)
WBC # BLD AUTO: 4.8 10E3/UL (ref 4–11)

## 2021-11-02 PROCEDURE — 85610 PROTHROMBIN TIME: CPT

## 2021-11-02 PROCEDURE — 80053 COMPREHEN METABOLIC PANEL: CPT

## 2021-11-02 PROCEDURE — 36415 COLL VENOUS BLD VENIPUNCTURE: CPT

## 2021-11-02 PROCEDURE — 85027 COMPLETE CBC AUTOMATED: CPT

## 2021-11-02 PROCEDURE — 82105 ALPHA-FETOPROTEIN SERUM: CPT

## 2021-11-02 PROCEDURE — 76705 ECHO EXAM OF ABDOMEN: CPT

## 2021-11-04 DIAGNOSIS — K76.82 HEPATIC ENCEPHALOPATHY (H): Primary | ICD-10-CM

## 2021-11-05 ENCOUNTER — TELEPHONE (OUTPATIENT)
Dept: GASTROENTEROLOGY | Facility: CLINIC | Age: 46
End: 2021-11-05
Payer: COMMERCIAL

## 2021-11-05 RX ORDER — RIFAXIMIN 550 MG/1
TABLET ORAL
Qty: 60 TABLET | Refills: 2 | Status: SHIPPED | OUTPATIENT
Start: 2021-11-05

## 2021-11-08 NOTE — TELEPHONE ENCOUNTER
Central Prior Authorization Team   Phone: [phone]      PA Initiation    Medication: XIFAXAN 550 MG TABS tablet (GRIS)  Insurance Company: [employer] - Phone [phone] Fax [phone]  Pharmacy Filling the Rx: [id number] [place], [place] - [address] [address]  Filling Pharmacy Phone: [phone]  Filling Pharmacy Fax:    Start Date: 11/8/2021

## 2021-11-09 DIAGNOSIS — K70.31 ALCOHOLIC CIRRHOSIS OF LIVER WITH ASCITES (H): ICD-10-CM

## 2021-11-09 RX ORDER — TRAMADOL HYDROCHLORIDE 50 MG/1
TABLET ORAL
Qty: 60 TABLET | Refills: 0 | Status: SHIPPED | OUTPATIENT
Start: 2021-11-09 | End: 2022-02-10

## 2021-11-10 NOTE — TELEPHONE ENCOUNTER
P/A already on file, previously approved.   Effective: 04/01/2020-04/21/2023.    Medication: XIFAXAN 550 MG TABS tablet (GRIS)  Insurance Company: [employer] - Phone [phone] Fax [phone]  Expected Co[employer]:      Pharmacy Filling the Rx: [id number] [place], [place] - [address] [address]  Pharmacy Notified: No  Patient Notified: No

## 2021-12-03 ENCOUNTER — TELEPHONE (OUTPATIENT)
Dept: INTERNAL MEDICINE | Facility: CLINIC | Age: 46
End: 2021-12-03
Payer: COMMERCIAL

## 2021-12-03 DIAGNOSIS — B34.9 VIRAL ILLNESS: Primary | ICD-10-CM

## 2021-12-03 NOTE — TELEPHONE ENCOUNTER
Patient requesting covid test. C/o fever, headache body ache. No known exposure       Call spouse when ordered has been entered per her request  [phone]

## 2022-01-01 ENCOUNTER — TELEPHONE (OUTPATIENT)
Dept: INTERNAL MEDICINE | Facility: CLINIC | Age: 47
End: 2022-01-01

## 2022-01-01 ENCOUNTER — HEALTH MAINTENANCE LETTER (OUTPATIENT)
Age: 47
End: 2022-01-01

## 2022-01-01 ENCOUNTER — OFFICE VISIT (OUTPATIENT)
Dept: INTERNAL MEDICINE | Facility: CLINIC | Age: 47
End: 2022-01-01
Payer: MEDICARE

## 2022-01-01 VITALS
RESPIRATION RATE: 18 BRPM | SYSTOLIC BLOOD PRESSURE: 140 MMHG | TEMPERATURE: 98.1 F | HEIGHT: 73 IN | OXYGEN SATURATION: 95 % | HEART RATE: 118 BPM | DIASTOLIC BLOOD PRESSURE: 62 MMHG | WEIGHT: 240.1 LBS | BODY MASS INDEX: 31.82 KG/M2

## 2022-01-01 DIAGNOSIS — R18.8 OTHER ASCITES: ICD-10-CM

## 2022-01-01 DIAGNOSIS — K70.31 ALCOHOLIC CIRRHOSIS OF LIVER WITH ASCITES (H): ICD-10-CM

## 2022-01-01 DIAGNOSIS — F51.01 PRIMARY INSOMNIA: ICD-10-CM

## 2022-01-01 DIAGNOSIS — M25.562 CHRONIC PAIN OF BOTH KNEES: ICD-10-CM

## 2022-01-01 DIAGNOSIS — G89.4 CHRONIC PAIN SYNDROME: ICD-10-CM

## 2022-01-01 DIAGNOSIS — I10 ESSENTIAL HYPERTENSION: ICD-10-CM

## 2022-01-01 DIAGNOSIS — G71.02 FSHD (FACIOSCAPULOHUMERAL MUSCULAR DYSTROPHY) (H): ICD-10-CM

## 2022-01-01 DIAGNOSIS — K70.30 ALCOHOLIC CIRRHOSIS, UNSPECIFIED WHETHER ASCITES PRESENT (H): ICD-10-CM

## 2022-01-01 DIAGNOSIS — Z13.220 SCREENING FOR HYPERLIPIDEMIA: ICD-10-CM

## 2022-01-01 DIAGNOSIS — G25.81 RESTLESS LEGS SYNDROME (RLS): ICD-10-CM

## 2022-01-01 DIAGNOSIS — M25.561 CHRONIC PAIN OF BOTH KNEES: ICD-10-CM

## 2022-01-01 DIAGNOSIS — K21.9 GASTROESOPHAGEAL REFLUX DISEASE WITHOUT ESOPHAGITIS: ICD-10-CM

## 2022-01-01 DIAGNOSIS — M10.00 IDIOPATHIC GOUT, UNSPECIFIED CHRONICITY, UNSPECIFIED SITE: ICD-10-CM

## 2022-01-01 DIAGNOSIS — I10 PRIMARY HYPERTENSION: Primary | ICD-10-CM

## 2022-01-01 DIAGNOSIS — G89.29 CHRONIC PAIN OF BOTH KNEES: ICD-10-CM

## 2022-01-01 LAB
ALBUMIN SERPL-MCNC: 3.6 G/DL (ref 3.4–5)
ALP SERPL-CCNC: 166 U/L (ref 40–150)
ALT SERPL W P-5'-P-CCNC: 60 U/L (ref 0–70)
ANION GAP SERPL CALCULATED.3IONS-SCNC: 6 MMOL/L (ref 3–14)
AST SERPL W P-5'-P-CCNC: 110 U/L (ref 0–45)
BILIRUB SERPL-MCNC: 1.4 MG/DL (ref 0.2–1.3)
BUN SERPL-MCNC: 10 MG/DL (ref 7–30)
CALCIUM SERPL-MCNC: 8.9 MG/DL (ref 8.5–10.1)
CHLORIDE BLD-SCNC: 110 MMOL/L (ref 94–109)
CHOLEST SERPL-MCNC: 159 MG/DL
CO2 SERPL-SCNC: 25 MMOL/L (ref 20–32)
CREAT SERPL-MCNC: 0.76 MG/DL (ref 0.66–1.25)
ERYTHROCYTE [DISTWIDTH] IN BLOOD BY AUTOMATED COUNT: 15.1 % (ref 10–15)
FASTING STATUS PATIENT QL REPORTED: YES
GFR SERPL CREATININE-BSD FRML MDRD: >90 ML/MIN/1.73M2
GLUCOSE BLD-MCNC: 109 MG/DL (ref 70–99)
HCT VFR BLD AUTO: 36.2 % (ref 40–53)
HDLC SERPL-MCNC: 30 MG/DL
HGB BLD-MCNC: 11.5 G/DL (ref 13.3–17.7)
LDLC SERPL CALC-MCNC: 105 MG/DL
MCH RBC QN AUTO: 30.5 PG (ref 26.5–33)
MCHC RBC AUTO-ENTMCNC: 31.8 G/DL (ref 31.5–36.5)
MCV RBC AUTO: 96 FL (ref 78–100)
NONHDLC SERPL-MCNC: 129 MG/DL
PLAT MORPH BLD: NORMAL
PLATELET # BLD AUTO: 77 10E3/UL (ref 150–450)
POTASSIUM BLD-SCNC: 4 MMOL/L (ref 3.4–5.3)
PROT SERPL-MCNC: 8.1 G/DL (ref 6.8–8.8)
RBC # BLD AUTO: 3.77 10E6/UL (ref 4.4–5.9)
RBC MORPH BLD: NORMAL
SODIUM SERPL-SCNC: 141 MMOL/L (ref 133–144)
TRIGL SERPL-MCNC: 120 MG/DL
WBC # BLD AUTO: 5.1 10E3/UL (ref 4–11)

## 2022-01-01 PROCEDURE — 80061 LIPID PANEL: CPT | Performed by: NURSE PRACTITIONER

## 2022-01-01 PROCEDURE — 99214 OFFICE O/P EST MOD 30 MIN: CPT | Performed by: NURSE PRACTITIONER

## 2022-01-01 PROCEDURE — 36415 COLL VENOUS BLD VENIPUNCTURE: CPT | Performed by: NURSE PRACTITIONER

## 2022-01-01 PROCEDURE — 85027 COMPLETE CBC AUTOMATED: CPT | Performed by: NURSE PRACTITIONER

## 2022-01-01 PROCEDURE — 80053 COMPREHEN METABOLIC PANEL: CPT | Performed by: NURSE PRACTITIONER

## 2022-01-01 RX ORDER — TRAMADOL HYDROCHLORIDE 50 MG/1
TABLET ORAL
Qty: 60 TABLET | Refills: 0 | Status: SHIPPED | OUTPATIENT
Start: 2022-01-01

## 2022-01-01 RX ORDER — TRAMADOL HYDROCHLORIDE 50 MG/1
TABLET ORAL
Qty: 60 TABLET | Refills: 0 | Status: SHIPPED | OUTPATIENT
Start: 2022-01-01 | End: 2022-01-01

## 2022-01-01 RX ORDER — ZOLPIDEM TARTRATE 5 MG/1
TABLET ORAL
Qty: 30 TABLET | Refills: 0 | Status: SHIPPED | OUTPATIENT
Start: 2022-01-01

## 2022-01-01 RX ORDER — ALLOPURINOL 300 MG/1
TABLET ORAL
Qty: 90 TABLET | Refills: 0 | Status: SHIPPED | OUTPATIENT
Start: 2022-01-01 | End: 2023-01-01

## 2022-01-01 RX ORDER — TRAMADOL HYDROCHLORIDE 50 MG/1
TABLET ORAL
Qty: 60 TABLET | Refills: 0 | OUTPATIENT
Start: 2022-01-01

## 2022-01-01 RX ORDER — LISINOPRIL 40 MG/1
40 TABLET ORAL DAILY
Qty: 90 TABLET | Refills: 3 | Status: SHIPPED | OUTPATIENT
Start: 2022-01-01

## 2022-01-01 RX ORDER — GABAPENTIN 100 MG/1
CAPSULE ORAL
Qty: 180 CAPSULE | Refills: 0 | Status: SHIPPED | OUTPATIENT
Start: 2022-01-01 | End: 2022-01-01

## 2022-01-01 RX ORDER — ROPINIROLE 0.5 MG/1
0.5 TABLET, FILM COATED ORAL 2 TIMES DAILY
Qty: 180 TABLET | Refills: 3 | Status: ON HOLD | OUTPATIENT
Start: 2022-01-01 | End: 2023-01-01

## 2022-01-01 RX ORDER — AMLODIPINE BESYLATE 5 MG/1
TABLET ORAL
Qty: 90 TABLET | Refills: 0 | OUTPATIENT
Start: 2022-01-01

## 2022-01-01 RX ORDER — AMLODIPINE BESYLATE 5 MG/1
5 TABLET ORAL DAILY
Qty: 90 TABLET | Refills: 3 | Status: SHIPPED | OUTPATIENT
Start: 2022-01-01

## 2022-01-01 RX ORDER — FUROSEMIDE 20 MG
20 TABLET ORAL DAILY PRN
Qty: 30 TABLET | Refills: 1 | Status: CANCELLED | OUTPATIENT
Start: 2022-01-01

## 2022-01-01 RX ORDER — LISINOPRIL 40 MG/1
TABLET ORAL
Qty: 90 TABLET | Refills: 0 | Status: SHIPPED | OUTPATIENT
Start: 2022-01-01 | End: 2022-01-01

## 2022-01-01 RX ORDER — ESOMEPRAZOLE MAGNESIUM 40 MG/1
40 CAPSULE, DELAYED RELEASE ORAL
Qty: 90 CAPSULE | Refills: 3 | Status: SHIPPED | OUTPATIENT
Start: 2022-01-01

## 2022-01-01 RX ORDER — GABAPENTIN 100 MG/1
CAPSULE ORAL
Qty: 180 CAPSULE | Refills: 3 | Status: SHIPPED | OUTPATIENT
Start: 2022-01-01 | End: 2023-01-01

## 2022-01-01 ASSESSMENT — PAIN SCALES - GENERAL: PAINLEVEL: EXTREME PAIN (8)

## 2022-01-01 ASSESSMENT — PATIENT HEALTH QUESTIONNAIRE - PHQ9: SUM OF ALL RESPONSES TO PHQ QUESTIONS 1-9: 5

## 2022-02-09 DIAGNOSIS — K70.31 ALCOHOLIC CIRRHOSIS OF LIVER WITH ASCITES (H): ICD-10-CM

## 2022-02-09 NOTE — TELEPHONE ENCOUNTER
Routing refill request to provider for review/approval because:  Drug not on the [facility] refill protocol     Maye Forbes RN

## 2022-02-10 RX ORDER — TRAMADOL HYDROCHLORIDE 50 MG/1
50 TABLET ORAL 2 TIMES DAILY PRN
Qty: 60 TABLET | Refills: 0 | Status: SHIPPED | OUTPATIENT
Start: 2022-02-10 | End: 2022-04-13

## 2022-04-10 ENCOUNTER — HEALTH MAINTENANCE LETTER (OUTPATIENT)
Age: 47
End: 2022-04-10

## 2022-04-13 DIAGNOSIS — K70.31 ALCOHOLIC CIRRHOSIS OF LIVER WITH ASCITES (H): ICD-10-CM

## 2022-04-13 RX ORDER — TRAMADOL HYDROCHLORIDE 50 MG/1
50 TABLET ORAL 2 TIMES DAILY PRN
Qty: 60 TABLET | Refills: 0 | Status: SHIPPED | OUTPATIENT
Start: 2022-04-13 | End: 2022-05-10

## 2022-04-13 RX ORDER — TRAMADOL HYDROCHLORIDE 50 MG/1
50 TABLET ORAL 2 TIMES DAILY PRN
Qty: 60 TABLET | Refills: 0 | Status: CANCELLED | OUTPATIENT
Start: 2022-04-13

## 2022-04-13 NOTE — TELEPHONE ENCOUNTER
Pending Prescriptions:                       Disp   Refills    traMADol (ULTRAM) 50 MG tablet             60 tab*0        Sig: Take 1 tablet (50 mg) by mouth 2 times daily as           needed for severe pain TAKE ONE TABLET BY MOUTH           TWICE A DAY AS NEEDED FOR SEVERE PAIN    Routing refill request to provider for review/approval because:  Drug not on the [facility] refill protocol       Tahira ZENDEJAS RN   [facility]

## 2022-05-06 DIAGNOSIS — K70.31 ALCOHOLIC CIRRHOSIS OF LIVER WITH ASCITES (H): ICD-10-CM

## 2022-05-06 DIAGNOSIS — G89.4 CHRONIC PAIN SYNDROME: ICD-10-CM

## 2022-05-06 NOTE — LETTER
[facility] [address], [address]  [place], [place]  [address]                                            TEL:[phone]  FAX:[phone]      Sp Plasencia  [address] [place] [id number]      May 17, 2022    Dear Sp,    We are concerned about your health care.  We recently provided you with a medication refill.  Many medications require routine follow-up with your provider.      At this time we ask that: You schedule a routine office visit with your physician to follow your medications    Your prescription: Has been refilled for 1 month so you may have time for the above noted follow-up.      Thank you,      Joon Castillo M.D.

## 2022-05-09 NOTE — TELEPHONE ENCOUNTER
Routing refill request to provider for review/approval because:  Drug not on the [facility] refill protocol   Lila ZENDEJAS RN, BSN

## 2022-05-10 RX ORDER — TRAMADOL HYDROCHLORIDE 50 MG/1
TABLET ORAL
Qty: 60 TABLET | Refills: 0 | Status: SHIPPED | OUTPATIENT
Start: 2022-05-10 | End: 2022-01-01

## 2022-05-10 RX ORDER — GABAPENTIN 100 MG/1
CAPSULE ORAL
Qty: 180 CAPSULE | Refills: 0 | Status: SHIPPED | OUTPATIENT
Start: 2022-05-10 | End: 2022-01-01

## 2022-08-04 NOTE — TELEPHONE ENCOUNTER
Tramadol 50 mg  Routing refill request to provider for review/approval because:  Maile given x1 and patient did not follow up, please advise  Drug not on the [facility] refill protocol     LOV 09/08/2021

## 2022-09-22 NOTE — TELEPHONE ENCOUNTER
Medication is being filled for 1 time refill only due to:  Patient needs to be seen because it has been more than one year since last visit.     Please contact patient and advise him to schedule appointment for annual exam/labs.     Rosa Abdi RN  [facility]

## 2022-09-28 NOTE — TELEPHONE ENCOUNTER
Provider approval needed.   Pt has upcoming appt with Amy.   Last OV on 9/8/21 with Dr Castillo.     Creatinine   Date Value Ref Range Status   11/02/2021 0.49 (L) 0.66 - 1.25 mg/dL Final   02/18/2021 0.60 (L) 0.66 - 1.25 mg/dL Final     Uric Acid   Date Value Ref Range Status   11/09/2020 3.9 3.5 - 7.2 mg/dL Final

## 2022-10-19 NOTE — PROGRESS NOTES
Assessment & Plan     Primary hypertension    - CBC with platelets; Future  - Comprehensive metabolic panel (BMP + Alb, Alk Phos, ALT, AST, Total. Bili, TP); Future  - CBC with platelets  - Comprehensive metabolic panel (BMP + Alb, Alk Phos, ALT, AST, Total. Bili, TP)    Chronic pain of both knees    - Orthopedic  Referral; Future    FSHD (facioscapulohumeral muscular dystrophy) (H)      Idiopathic gout, unspecified chronicity, unspecified site      Screening for hyperlipidemia    - Lipid panel reflex to direct LDL Non-fasting; Future  - Lipid panel reflex to direct LDL Non-fasting    Alcoholic cirrhosis, unspecified whether ascites present (H)      Essential hypertension    - amLODIPine (NORVASC) 5 MG tablet; Take 1 tablet (5 mg) by mouth daily  - lisinopril (ZESTRIL) 40 MG tablet; Take 1 tablet (40 mg) by mouth daily    Other ascites    - esomeprazole (NEXIUM) 40 MG DR capsule; Take 1 capsule (40 mg) by mouth every morning (before breakfast) Take 30-60 minutes before eating.    Gastroesophageal reflux disease without esophagitis    - esomeprazole (NEXIUM) 40 MG DR capsule; Take 1 capsule (40 mg) by mouth every morning (before breakfast) Take 30-60 minutes before eating.    Chronic pain syndrome    - gabapentin (NEURONTIN) 100 MG capsule; Take 2 capsules by mouth 3 times daily    Alcoholic cirrhosis of liver with ascites (H)    - traMADol (ULTRAM) 50 MG tablet; TAKE ONE TABLET BY MOUTH TWICE A DAY AS NEEDED FOR SEVERE PAIN    Primary insomnia    - zolpidem (AMBIEN) 5 MG tablet; Take 1 tablet by mouth nightly as needed, SCHEDULE APPOINTMENT FOR FURTHER REFILLS    The current medical regimen is effective;  continue present plan and medications.  Labs pending  Discussed controlled substance refill policies and protocols   Encouraged liver specialty f/u  Will update [employer] account, thinks his wife may have access to his EMR         Nicotine/Tobacco Cessation:  He reports that he has been smoking cigarettes.  "He has never used smokeless tobacco.  Nicotine/Tobacco Cessation Plan:   Information offered: Patient not interested at this time      BMI:   Estimated body mass index is 31.9 kg/m  as calculated from the following:    Height as of this encounter: 1.848 m (6' 0.75\").    Weight as of this encounter: 108.9 kg (240 lb 1.6 oz).           Amy Jha NP  [facility]DEBORAH Snowden is a 47 year old, presenting for the following health issues:  Recheck Medication      HPI     Hypertension Follow-up      Do you check your blood pressure regularly outside of the clinic? Yes     Are you following a low salt diet? No    Are your blood pressures ever more than 140 on the top number (systolic) OR more   than 90 on the bottom number (diastolic), for example 140/90? Yes once in a while      How many servings of fruits and vegetables do you eat daily?  2-3    On average, how many sweetened beverages do you drink each day (Examples: soda, juice, sweet tea, etc.  Do NOT count diet or artificially sweetened beverages)?   1    How many days per week do you exercise enough to make your heart beat faster? 3 or less    How many minutes a day do you exercise enough to make your heart beat faster? 9 or less    How many days per week do you miss taking your medication? 0    Concern - bilat knee pain  Onset: years  Description: bilat knee pain, Locking  Intensity: mild, moderate  Progression of Symptoms:  same  Accompanying Signs & Symptoms: none  Previous history of similar problem: h/o muscular dystrophy, has had no f/u  Precipitating factors:        Worsened by: none  Alleviating factors:        Improved by: meds  Therapies tried and outcome: gabapentin, tramadol      Review of Systems   Constitutional, HEENT, cardiovascular, pulmonary, gi and gu systems are negative, except as otherwise noted.      Objective    BP (!) 140/62 (BP Location: Right arm, Cuff Size: Adult Regular)   Pulse 118   Temp " "98.1  F (36.7  C) (Tympanic)   Resp 18   Ht 1.848 m (6' 0.75\")   Wt 108.9 kg (240 lb 1.6 oz)   SpO2 95%   BMI 31.90 kg/m    Body mass index is 31.9 kg/m .  Physical Exam   GENERAL: healthy, alert and no distress  PSYCH: affect flat and poor, vague historian, poor insight into health care needs and obtaining appropriate care.                    "

## 2022-10-20 NOTE — TELEPHONE ENCOUNTER
Pending Prescriptions:                       Disp   Refills    traMADol (ULTRAM) 50 MG tablet [Pharmacy M*60 tab*0        Sig: TAKE ONE TABLET BY MOUTH TWICE A DAY AS NEEDED FOR           SEVERE PAIN    Routing refill request to provider for review/approval because:  Drug not on the [facility] refill protocol     Please advise, thanks.

## 2022-10-20 NOTE — TELEPHONE ENCOUNTER
Please advise pt LFTs remain elevated, continue to abstain from ETOH and recheck q 3 months.  Amy Jha CNP

## 2022-10-20 NOTE — LETTER
[facility] [address], [address]  [place] [address]  [phone]        October 25, 2022    Sp Plasencia  [address] [place] [id number]            Dear Mr. Sp Plasencia:      The results of your recent Liver Panel were ELEVATED.  Continue to abstain from Alcohol.   Recheck again in 3 months.  If you have any further questions or problems, please contact our office.    Sincerely,        Amy Jha C.N.P.

## 2022-11-03 NOTE — TELEPHONE ENCOUNTER
Pending Prescriptions:                       Disp   Refills    rOPINIRole (REQUIP) 0.5 MG tablet          180 ta*3        Sig: Take 1 tablet (0.5 mg) by mouth 2 times daily  Routing refill request to provider for review/approval because:  Fails protocol  Last ov 10/19/2022

## 2022-11-21 NOTE — TELEPHONE ENCOUNTER
Patient Quality Outreach    Patient is due for the following:   Chronic Opioid Use -  Urine Drug Screen and [place]-7    Next Steps:   Patient declined follow up at this time.    Type of outreach:    spoke with patient eden stated that he was recently seen and urine tox screen and questionnaire should of been done at that time. Patient refused to come in and states that he will find another provider.    Next Steps:  Reach out within 90 days via patient is looking for another provider.    Max number of attempts reached: Yes. Will try again in 90 days if patient still on fail list.    Questions for provider review:    None     Misty Palafox MA  Chart routed to no one.

## 2022-12-14 NOTE — TELEPHONE ENCOUNTER
Routing refill request to provider for review/approval because:  Drug not on the [facility] refill protocol   Llia Tovar RN, BSN

## 2022-12-22 NOTE — IR NOTE
ULTRASOUND GUIDED PARACENTESIS   6/29/2020 11:48 AM     HISTORY:  If more than 5L removed, please replace IV albumin. For every 3L removed, 25g albumin not to exceed 75g total.; Alcoholic liver disease, unspecified (H); Other ascites    PROCEDURE:   Informed consent was obtained from the patient prior to the procedure with discussion including the possible risks of bleeding, infection and organ injury . Using 5 mL of 1% lidocaine for local anesthesia, sterile technique, and sonographic guidance with permanent image documentation, I placed an 8F paracentesis catheter into the peritoneal fluid collection. This was used to aspirate 4600 mL of yellow, serous fluid in vacuum bottles, and some of this was sent for any laboratory studies that had been ordered. There were no immediate complications.  Intravenous albumen replacement was performed according to protocol.    IMPRESSION:  Ultrasound guided paracentesis.   How Severe Is Your Skin Lesion?: mild Has Your Skin Lesion Been Treated?: not been treated Is This A New Presentation, Or A Follow-Up?: Growth Additional History: Pt would like growth removed if possible. Is This A New Presentation, Or A Follow-Up?: Skin Lesion

## 2023-01-01 ENCOUNTER — APPOINTMENT (OUTPATIENT)
Dept: CARDIOLOGY | Facility: CLINIC | Age: 48
DRG: 432 | End: 2023-01-01
Attending: INTERNAL MEDICINE
Payer: MEDICARE

## 2023-01-01 ENCOUNTER — APPOINTMENT (OUTPATIENT)
Dept: GENERAL RADIOLOGY | Facility: CLINIC | Age: 48
DRG: 432 | End: 2023-01-01
Attending: INTERNAL MEDICINE
Payer: MEDICARE

## 2023-01-01 ENCOUNTER — APPOINTMENT (OUTPATIENT)
Dept: CT IMAGING | Facility: CLINIC | Age: 48
DRG: 432 | End: 2023-01-01
Attending: SURGERY
Payer: MEDICARE

## 2023-01-01 ENCOUNTER — APPOINTMENT (OUTPATIENT)
Dept: GENERAL RADIOLOGY | Facility: CLINIC | Age: 48
DRG: 432 | End: 2023-01-01
Attending: HOSPITALIST
Payer: MEDICARE

## 2023-01-01 ENCOUNTER — MEDICAL CORRESPONDENCE (OUTPATIENT)
Dept: HEALTH INFORMATION MANAGEMENT | Facility: CLINIC | Age: 48
End: 2023-01-01
Payer: COMMERCIAL

## 2023-01-01 ENCOUNTER — TRANSFERRED RECORDS (OUTPATIENT)
Dept: HEALTH INFORMATION MANAGEMENT | Facility: CLINIC | Age: 48
End: 2023-01-01

## 2023-01-01 ENCOUNTER — APPOINTMENT (OUTPATIENT)
Dept: ULTRASOUND IMAGING | Facility: CLINIC | Age: 48
DRG: 432 | End: 2023-01-01
Attending: RADIOLOGY
Payer: MEDICARE

## 2023-01-01 ENCOUNTER — HOSPITAL ENCOUNTER (INPATIENT)
Facility: CLINIC | Age: 48
LOS: 14 days | DRG: 432 | End: 2023-05-19
Attending: INTERNAL MEDICINE | Admitting: SURGERY
Payer: MEDICARE

## 2023-01-01 ENCOUNTER — APPOINTMENT (OUTPATIENT)
Dept: SURGERY | Facility: PHYSICIAN GROUP | Age: 48
End: 2023-01-01
Payer: MEDICARE

## 2023-01-01 ENCOUNTER — HOSPITAL ENCOUNTER (OUTPATIENT)
Facility: CLINIC | Age: 48
End: 2023-01-01
Attending: HOSPITALIST | Admitting: HOSPITALIST
Payer: COMMERCIAL

## 2023-01-01 ENCOUNTER — APPOINTMENT (OUTPATIENT)
Dept: OCCUPATIONAL THERAPY | Facility: CLINIC | Age: 48
DRG: 432 | End: 2023-01-01
Attending: INTERNAL MEDICINE
Payer: MEDICARE

## 2023-01-01 ENCOUNTER — APPOINTMENT (OUTPATIENT)
Dept: PHYSICAL THERAPY | Facility: CLINIC | Age: 48
DRG: 432 | End: 2023-01-01
Attending: INTERNAL MEDICINE
Payer: MEDICARE

## 2023-01-01 ENCOUNTER — APPOINTMENT (OUTPATIENT)
Dept: ULTRASOUND IMAGING | Facility: CLINIC | Age: 48
DRG: 432 | End: 2023-01-01
Attending: SURGERY
Payer: MEDICARE

## 2023-01-01 ENCOUNTER — TRANSFERRED RECORDS (OUTPATIENT)
Dept: HEALTH INFORMATION MANAGEMENT | Facility: CLINIC | Age: 48
End: 2023-01-01
Payer: COMMERCIAL

## 2023-01-01 VITALS
TEMPERATURE: 98.5 F | OXYGEN SATURATION: 97 % | HEART RATE: 109 BPM | RESPIRATION RATE: 8 BRPM | HEIGHT: 72 IN | SYSTOLIC BLOOD PRESSURE: 104 MMHG | BODY MASS INDEX: 34.9 KG/M2 | WEIGHT: 257.63 LBS | DIASTOLIC BLOOD PRESSURE: 44 MMHG

## 2023-01-01 DIAGNOSIS — F51.01 PRIMARY INSOMNIA: ICD-10-CM

## 2023-01-01 DIAGNOSIS — T14.8XXA HEMATOMA OF SKIN: Primary | ICD-10-CM

## 2023-01-01 DIAGNOSIS — G71.02 FSHD (FACIOSCAPULOHUMERAL MUSCULAR DYSTROPHY) (H): ICD-10-CM

## 2023-01-01 LAB
% LINING CELLS, BODY FLUID: 28 %
ABO/RH(D): NORMAL
ABSOLUTE NEUTROPHILS, BODY FLUID: 37.8 /UL
AFP SERPL-MCNC: 1.9 NG/ML
ALBUMIN BODY FLUID SOURCE: NORMAL
ALBUMIN FLD-MCNC: 0.3 G/DL
ALBUMIN SERPL BCG-MCNC: 1.5 G/DL (ref 3.5–5.2)
ALBUMIN SERPL BCG-MCNC: 2.1 G/DL (ref 3.5–5.2)
ALBUMIN SERPL BCG-MCNC: 2.3 G/DL (ref 3.5–5.2)
ALBUMIN SERPL BCG-MCNC: 2.4 G/DL (ref 3.5–5.2)
ALBUMIN SERPL BCG-MCNC: 2.6 G/DL (ref 3.5–5.2)
ALBUMIN SERPL BCG-MCNC: 2.8 G/DL (ref 3.5–5.2)
ALBUMIN SERPL BCG-MCNC: 3 G/DL (ref 3.5–5.2)
ALBUMIN SERPL BCG-MCNC: 3.1 G/DL (ref 3.5–5.2)
ALBUMIN UR-MCNC: NEGATIVE MG/DL
ALLEN'S TEST: YES
ALLEN'S TEST: YES
ALP SERPL-CCNC: 117 U/L (ref 40–129)
ALP SERPL-CCNC: 137 U/L (ref 40–129)
ALP SERPL-CCNC: 141 U/L (ref 40–129)
ALP SERPL-CCNC: 147 U/L (ref 40–129)
ALP SERPL-CCNC: 149 U/L (ref 40–129)
ALP SERPL-CCNC: 152 U/L (ref 40–129)
ALP SERPL-CCNC: 161 U/L (ref 40–129)
ALP SERPL-CCNC: 163 U/L (ref 40–129)
ALP SERPL-CCNC: 176 U/L (ref 40–129)
ALP SERPL-CCNC: 176 U/L (ref 40–129)
ALP SERPL-CCNC: 184 U/L (ref 40–129)
ALP SERPL-CCNC: 185 U/L (ref 40–129)
ALP SERPL-CCNC: 185 U/L (ref 40–129)
ALP SERPL-CCNC: 225 U/L (ref 40–129)
ALT SERPL W P-5'-P-CCNC: 102 U/L (ref 10–50)
ALT SERPL W P-5'-P-CCNC: 107 U/L (ref 10–50)
ALT SERPL W P-5'-P-CCNC: 107 U/L (ref 10–50)
ALT SERPL W P-5'-P-CCNC: 112 U/L (ref 10–50)
ALT SERPL W P-5'-P-CCNC: 113 U/L (ref 10–50)
ALT SERPL W P-5'-P-CCNC: 116 U/L (ref 10–50)
ALT SERPL W P-5'-P-CCNC: 119 U/L (ref 10–50)
ALT SERPL W P-5'-P-CCNC: 119 U/L (ref 10–50)
ALT SERPL W P-5'-P-CCNC: 138 U/L (ref 10–50)
ALT SERPL W P-5'-P-CCNC: 151 U/L (ref 10–50)
ALT SERPL W P-5'-P-CCNC: 153 U/L (ref 10–50)
ALT SERPL W P-5'-P-CCNC: 183 U/L (ref 10–50)
ALT SERPL W P-5'-P-CCNC: 293 U/L (ref 10–50)
ALT SERPL W P-5'-P-CCNC: 307 U/L (ref 10–50)
AMMONIA PLAS-SCNC: 34 UMOL/L (ref 16–60)
AMMONIA PLAS-SCNC: 37 UMOL/L (ref 16–60)
AMMONIA PLAS-SCNC: 42 UMOL/L (ref 16–60)
AMMONIA PLAS-SCNC: 43 UMOL/L (ref 16–60)
AMMONIA PLAS-SCNC: 47 UMOL/L (ref 16–60)
AMMONIA PLAS-SCNC: 58 UMOL/L (ref 16–60)
AMYLASE SERPL-CCNC: 96 U/L (ref 28–100)
ANION GAP SERPL CALCULATED.3IONS-SCNC: 10 MMOL/L (ref 7–15)
ANION GAP SERPL CALCULATED.3IONS-SCNC: 11 MMOL/L (ref 7–15)
ANION GAP SERPL CALCULATED.3IONS-SCNC: 12 MMOL/L (ref 7–15)
ANION GAP SERPL CALCULATED.3IONS-SCNC: 12 MMOL/L (ref 7–15)
ANION GAP SERPL CALCULATED.3IONS-SCNC: 13 MMOL/L (ref 7–15)
ANION GAP SERPL CALCULATED.3IONS-SCNC: 14 MMOL/L (ref 7–15)
ANION GAP SERPL CALCULATED.3IONS-SCNC: 14 MMOL/L (ref 7–15)
ANION GAP SERPL CALCULATED.3IONS-SCNC: 24 MMOL/L (ref 7–15)
ANION GAP SERPL CALCULATED.3IONS-SCNC: 7 MMOL/L (ref 7–15)
ANION GAP SERPL CALCULATED.3IONS-SCNC: 8 MMOL/L (ref 7–15)
ANION GAP SERPL CALCULATED.3IONS-SCNC: 9 MMOL/L (ref 7–15)
ANION GAP SERPL CALCULATED.3IONS-SCNC: <1 MMOL/L (ref 7–15)
ANTIBODY SCREEN: NEGATIVE
APPEARANCE FLD: CLEAR
APPEARANCE UR: ABNORMAL
APTT PPP: 52 SECONDS (ref 22–38)
APTT PPP: 65 SECONDS (ref 22–38)
AST SERPL W P-5'-P-CCNC: 1063 U/L (ref 10–50)
AST SERPL W P-5'-P-CCNC: 1918 U/L (ref 10–50)
AST SERPL W P-5'-P-CCNC: 2040 U/L (ref 10–50)
AST SERPL W P-5'-P-CCNC: 309 U/L (ref 10–50)
AST SERPL W P-5'-P-CCNC: 337 U/L (ref 10–50)
AST SERPL W P-5'-P-CCNC: 347 U/L (ref 10–50)
AST SERPL W P-5'-P-CCNC: 352 U/L (ref 10–50)
AST SERPL W P-5'-P-CCNC: 354 U/L (ref 10–50)
AST SERPL W P-5'-P-CCNC: 361 U/L (ref 10–50)
AST SERPL W P-5'-P-CCNC: 362 U/L (ref 10–50)
AST SERPL W P-5'-P-CCNC: 389 U/L (ref 10–50)
AST SERPL W P-5'-P-CCNC: 469 U/L (ref 10–50)
AST SERPL W P-5'-P-CCNC: 627 U/L (ref 10–50)
AST SERPL W P-5'-P-CCNC: 934 U/L (ref 10–50)
BACTERIA BLD CULT: NO GROWTH
BACTERIA FLD CULT: NO GROWTH
BASE EXCESS BLDA CALC-SCNC: -0.5 MMOL/L (ref -9–1.8)
BASE EXCESS BLDA CALC-SCNC: -1 MMOL/L (ref -9–1.8)
BASE EXCESS BLDV CALC-SCNC: -6.3 MMOL/L (ref -7.7–1.9)
BASE EXCESS BLDV CALC-SCNC: 0.1 MMOL/L (ref -7.7–1.9)
BASE EXCESS BLDV CALC-SCNC: 0.9 MMOL/L (ref -7.7–1.9)
BASOPHILS # BLD AUTO: 0.1 10E3/UL (ref 0–0.2)
BASOPHILS # BLD AUTO: 0.1 10E3/UL (ref 0–0.2)
BASOPHILS NFR BLD AUTO: 1 %
BASOPHILS NFR BLD AUTO: 1 %
BILIRUB DIRECT SERPL-MCNC: 8.94 MG/DL (ref 0–0.3)
BILIRUB DIRECT SERPL-MCNC: >10 MG/DL (ref 0–0.3)
BILIRUB SERPL-MCNC: 11.1 MG/DL
BILIRUB SERPL-MCNC: 14.1 MG/DL
BILIRUB SERPL-MCNC: 14.3 MG/DL
BILIRUB SERPL-MCNC: 14.7 MG/DL
BILIRUB SERPL-MCNC: 16 MG/DL
BILIRUB SERPL-MCNC: 19.7 MG/DL
BILIRUB SERPL-MCNC: 21.8 MG/DL
BILIRUB SERPL-MCNC: 24 MG/DL
BILIRUB SERPL-MCNC: 25.1 MG/DL
BILIRUB SERPL-MCNC: 29.2 MG/DL
BILIRUB SERPL-MCNC: 31.5 MG/DL
BILIRUB SERPL-MCNC: 34.8 MG/DL
BILIRUB SERPL-MCNC: 35 MG/DL
BILIRUB SERPL-MCNC: 35.5 MG/DL
BILIRUB UR QL STRIP: ABNORMAL
BLD PROD TYP BPU: NORMAL
BLOOD COMPONENT TYPE: NORMAL
BUN SERPL-MCNC: 22.7 MG/DL (ref 6–20)
BUN SERPL-MCNC: 23.7 MG/DL (ref 6–20)
BUN SERPL-MCNC: 24.4 MG/DL (ref 6–20)
BUN SERPL-MCNC: 25.1 MG/DL (ref 6–20)
BUN SERPL-MCNC: 26.2 MG/DL (ref 6–20)
BUN SERPL-MCNC: 27.5 MG/DL (ref 6–20)
BUN SERPL-MCNC: 27.7 MG/DL (ref 6–20)
BUN SERPL-MCNC: 29.8 MG/DL (ref 6–20)
BUN SERPL-MCNC: 29.9 MG/DL (ref 6–20)
BUN SERPL-MCNC: 30.2 MG/DL (ref 6–20)
BUN SERPL-MCNC: 30.7 MG/DL (ref 6–20)
BUN SERPL-MCNC: 30.7 MG/DL (ref 6–20)
BUN SERPL-MCNC: 37.3 MG/DL (ref 6–20)
BUN SERPL-MCNC: 37.8 MG/DL (ref 6–20)
BUN SERPL-MCNC: 39.7 MG/DL (ref 6–20)
BUN SERPL-MCNC: 41.5 MG/DL (ref 6–20)
BUN SERPL-MCNC: 47.3 MG/DL (ref 6–20)
BUN SERPL-MCNC: 48.4 MG/DL (ref 6–20)
BUN SERPL-MCNC: 54.3 MG/DL (ref 6–20)
BUN SERPL-MCNC: 54.5 MG/DL (ref 6–20)
BUN SERPL-MCNC: 57.5 MG/DL (ref 6–20)
BUN SERPL-MCNC: 60.5 MG/DL (ref 6–20)
BUN SERPL-MCNC: 62.2 MG/DL (ref 6–20)
BUN SERPL-MCNC: 74.9 MG/DL (ref 6–20)
CA-I BLD-MCNC: 1.6 MG/DL (ref 4.4–5.2)
CA-I BLD-MCNC: 3.6 MG/DL (ref 4.4–5.2)
CA-I BLD-MCNC: 4 MG/DL (ref 4.4–5.2)
CA-I BLD-MCNC: 4.7 MG/DL (ref 4.4–5.2)
CALCIUM SERPL-MCNC: 7.2 MG/DL (ref 8.6–10)
CALCIUM SERPL-MCNC: 7.3 MG/DL (ref 8.6–10)
CALCIUM SERPL-MCNC: 7.7 MG/DL (ref 8.6–10)
CALCIUM SERPL-MCNC: 7.7 MG/DL (ref 8.6–10)
CALCIUM SERPL-MCNC: 7.9 MG/DL (ref 8.6–10)
CALCIUM SERPL-MCNC: 8 MG/DL (ref 8.6–10)
CALCIUM SERPL-MCNC: 8.2 MG/DL (ref 8.6–10)
CALCIUM SERPL-MCNC: 8.2 MG/DL (ref 8.6–10)
CALCIUM SERPL-MCNC: 8.4 MG/DL (ref 8.6–10)
CALCIUM SERPL-MCNC: 8.4 MG/DL (ref 8.6–10)
CALCIUM SERPL-MCNC: 8.6 MG/DL (ref 8.6–10)
CALCIUM SERPL-MCNC: 8.6 MG/DL (ref 8.6–10)
CALCIUM SERPL-MCNC: 8.7 MG/DL (ref 8.6–10)
CALCIUM SERPL-MCNC: 8.8 MG/DL (ref 8.6–10)
CALCIUM SERPL-MCNC: 8.8 MG/DL (ref 8.6–10)
CALCIUM SERPL-MCNC: 9.1 MG/DL (ref 8.6–10)
CALCIUM SERPL-MCNC: 9.1 MG/DL (ref 8.6–10)
CALCIUM SERPL-MCNC: 9.2 MG/DL (ref 8.6–10)
CALCIUM SERPL-MCNC: 9.3 MG/DL (ref 8.6–10)
CALCIUM SERPL-MCNC: 9.5 MG/DL (ref 8.6–10)
CELL COUNT BODY FLUID SOURCE: NORMAL
CF REDUC 60M P MA LENFR BLD TEG: 0 % (ref 0–15)
CF REDUC 60M P MA LENFR BLD TEG: 0.1 % (ref 0–15)
CF REDUC 60M P MA LENFR BLD TEG: 0.9 % (ref 0–15)
CFT BLD TEG: 1.7 MINUTE (ref 1–3)
CFT BLD TEG: 2.1 MINUTE (ref 1–3)
CFT BLD TEG: 3.5 MINUTE (ref 1–3)
CHLORIDE SERPL-SCNC: 100 MMOL/L (ref 98–107)
CHLORIDE SERPL-SCNC: 101 MMOL/L (ref 98–107)
CHLORIDE SERPL-SCNC: 104 MMOL/L (ref 98–107)
CHLORIDE SERPL-SCNC: 108 MMOL/L (ref 98–107)
CHLORIDE SERPL-SCNC: 109 MMOL/L (ref 98–107)
CHLORIDE SERPL-SCNC: 85 MMOL/L (ref 98–107)
CHLORIDE SERPL-SCNC: 86 MMOL/L (ref 98–107)
CHLORIDE SERPL-SCNC: 87 MMOL/L (ref 98–107)
CHLORIDE SERPL-SCNC: 88 MMOL/L (ref 98–107)
CHLORIDE SERPL-SCNC: 88 MMOL/L (ref 98–107)
CHLORIDE SERPL-SCNC: 90 MMOL/L (ref 98–107)
CHLORIDE SERPL-SCNC: 90 MMOL/L (ref 98–107)
CHLORIDE SERPL-SCNC: 91 MMOL/L (ref 98–107)
CHLORIDE SERPL-SCNC: 92 MMOL/L (ref 98–107)
CHLORIDE SERPL-SCNC: 95 MMOL/L (ref 98–107)
CHLORIDE SERPL-SCNC: 96 MMOL/L (ref 98–107)
CHLORIDE SERPL-SCNC: 97 MMOL/L (ref 98–107)
CHLORIDE SERPL-SCNC: 97 MMOL/L (ref 98–107)
CHLORIDE SERPL-SCNC: 98 MMOL/L (ref 98–107)
CI (COAGULATION INDEX)(Z) NON NATIVE: -0.6 (ref -3–3)
CI (COAGULATION INDEX)(Z) NON NATIVE: -1 (ref -3–3)
CI (COAGULATION INDEX)(Z) NON NATIVE: -4 (ref -3–3)
CK SERPL-CCNC: 471 U/L (ref 39–308)
CLOT ANGLE BLD TEG: 58.4 DEGREES (ref 53–72)
CLOT ANGLE BLD TEG: 67.8 DEGREES (ref 53–72)
CLOT ANGLE BLD TEG: 69.9 DEGREES (ref 53–72)
CLOT INIT BLD TEG: 4.8 MINUTE (ref 5–10)
CLOT INIT BLD TEG: 5.5 MINUTE (ref 5–10)
CLOT INIT BLD TEG: 6.2 MINUTE (ref 5–10)
CLOT LYSIS 30M P MA LENFR BLD TEG: 0 % (ref 0–8)
CLOT STRENGTH BLD TEG: 3.1 KD/SC (ref 4.5–11)
CLOT STRENGTH BLD TEG: 4.1 KD/SC (ref 4.5–11)
CLOT STRENGTH BLD TEG: 4.9 KD/SC (ref 4.5–11)
CODING SYSTEM: NORMAL
COLOR FLD: YELLOW
COLOR UR AUTO: ABNORMAL
CREAT BLD-MCNC: 1.8 MG/DL (ref 0.7–1.3)
CREAT SERPL-MCNC: 0.74 MG/DL (ref 0.67–1.17)
CREAT SERPL-MCNC: 0.83 MG/DL (ref 0.67–1.17)
CREAT SERPL-MCNC: 0.92 MG/DL (ref 0.67–1.17)
CREAT SERPL-MCNC: 0.99 MG/DL (ref 0.67–1.17)
CREAT SERPL-MCNC: 0.99 MG/DL (ref 0.67–1.17)
CREAT SERPL-MCNC: 1.06 MG/DL (ref 0.67–1.17)
CREAT SERPL-MCNC: 1.07 MG/DL (ref 0.67–1.17)
CREAT SERPL-MCNC: 1.08 MG/DL (ref 0.67–1.17)
CREAT SERPL-MCNC: 1.09 MG/DL (ref 0.67–1.17)
CREAT SERPL-MCNC: 1.09 MG/DL (ref 0.67–1.17)
CREAT SERPL-MCNC: 1.11 MG/DL (ref 0.67–1.17)
CREAT SERPL-MCNC: 1.11 MG/DL (ref 0.67–1.17)
CREAT SERPL-MCNC: 1.12 MG/DL (ref 0.67–1.17)
CREAT SERPL-MCNC: 1.19 MG/DL (ref 0.67–1.17)
CREAT SERPL-MCNC: 1.2 MG/DL (ref 0.67–1.17)
CREAT SERPL-MCNC: 1.2 MG/DL (ref 0.67–1.17)
CREAT SERPL-MCNC: 1.22 MG/DL (ref 0.67–1.17)
CREAT SERPL-MCNC: 1.22 MG/DL (ref 0.67–1.17)
CREAT SERPL-MCNC: 1.23 MG/DL (ref 0.67–1.17)
CREAT SERPL-MCNC: 1.31 MG/DL (ref 0.67–1.17)
CREAT SERPL-MCNC: 1.37 MG/DL (ref 0.67–1.17)
CREAT SERPL-MCNC: 1.54 MG/DL (ref 0.67–1.17)
CROSSMATCH: NORMAL
DEPRECATED HCO3 PLAS-SCNC: 17 MMOL/L (ref 22–29)
DEPRECATED HCO3 PLAS-SCNC: 17 MMOL/L (ref 22–29)
DEPRECATED HCO3 PLAS-SCNC: 21 MMOL/L (ref 22–29)
DEPRECATED HCO3 PLAS-SCNC: 22 MMOL/L (ref 22–29)
DEPRECATED HCO3 PLAS-SCNC: 23 MMOL/L (ref 22–29)
DEPRECATED HCO3 PLAS-SCNC: 24 MMOL/L (ref 22–29)
DEPRECATED HCO3 PLAS-SCNC: 24 MMOL/L (ref 22–29)
DEPRECATED HCO3 PLAS-SCNC: 25 MMOL/L (ref 22–29)
DEPRECATED HCO3 PLAS-SCNC: 47 MMOL/L (ref 22–29)
EOSINOPHIL # BLD AUTO: 0.3 10E3/UL (ref 0–0.7)
EOSINOPHIL # BLD AUTO: 0.4 10E3/UL (ref 0–0.7)
EOSINOPHIL NFR BLD AUTO: 2 %
EOSINOPHIL NFR BLD AUTO: 4 %
ERYTHROCYTE [DISTWIDTH] IN BLOOD BY AUTOMATED COUNT: 15.5 % (ref 10–15)
ERYTHROCYTE [DISTWIDTH] IN BLOOD BY AUTOMATED COUNT: 15.6 % (ref 10–15)
ERYTHROCYTE [DISTWIDTH] IN BLOOD BY AUTOMATED COUNT: 15.6 % (ref 10–15)
ERYTHROCYTE [DISTWIDTH] IN BLOOD BY AUTOMATED COUNT: 15.7 % (ref 10–15)
ERYTHROCYTE [DISTWIDTH] IN BLOOD BY AUTOMATED COUNT: 15.7 % (ref 10–15)
ERYTHROCYTE [DISTWIDTH] IN BLOOD BY AUTOMATED COUNT: 15.8 % (ref 10–15)
ERYTHROCYTE [DISTWIDTH] IN BLOOD BY AUTOMATED COUNT: 15.9 % (ref 10–15)
ERYTHROCYTE [DISTWIDTH] IN BLOOD BY AUTOMATED COUNT: 16 % (ref 10–15)
ERYTHROCYTE [DISTWIDTH] IN BLOOD BY AUTOMATED COUNT: 16.2 % (ref 10–15)
ERYTHROCYTE [DISTWIDTH] IN BLOOD BY AUTOMATED COUNT: 16.2 % (ref 10–15)
ERYTHROCYTE [DISTWIDTH] IN BLOOD BY AUTOMATED COUNT: 16.5 % (ref 10–15)
ERYTHROCYTE [DISTWIDTH] IN BLOOD BY AUTOMATED COUNT: 17.2 % (ref 10–15)
ERYTHROCYTE [DISTWIDTH] IN BLOOD BY AUTOMATED COUNT: 17.2 % (ref 10–15)
ERYTHROCYTE [DISTWIDTH] IN BLOOD BY AUTOMATED COUNT: 17.5 % (ref 10–15)
ERYTHROCYTE [DISTWIDTH] IN BLOOD BY AUTOMATED COUNT: 17.7 % (ref 10–15)
ERYTHROCYTE [DISTWIDTH] IN BLOOD BY AUTOMATED COUNT: 18 % (ref 10–15)
ERYTHROCYTE [DISTWIDTH] IN BLOOD BY AUTOMATED COUNT: 18 % (ref 10–15)
ERYTHROCYTE [DISTWIDTH] IN BLOOD BY AUTOMATED COUNT: 18.1 % (ref 10–15)
ERYTHROCYTE [DISTWIDTH] IN BLOOD BY AUTOMATED COUNT: 18.3 % (ref 10–15)
ERYTHROCYTE [DISTWIDTH] IN BLOOD BY AUTOMATED COUNT: 18.9 % (ref 10–15)
ERYTHROCYTE [DISTWIDTH] IN BLOOD BY AUTOMATED COUNT: 19 % (ref 10–15)
ERYTHROCYTE [DISTWIDTH] IN BLOOD BY AUTOMATED COUNT: 19.3 % (ref 10–15)
ERYTHROCYTE [DISTWIDTH] IN BLOOD BY AUTOMATED COUNT: 19.4 % (ref 10–15)
ERYTHROCYTE [DISTWIDTH] IN BLOOD BY AUTOMATED COUNT: 19.4 % (ref 10–15)
ERYTHROCYTE [DISTWIDTH] IN BLOOD BY AUTOMATED COUNT: 19.9 % (ref 10–15)
ERYTHROCYTE [DISTWIDTH] IN BLOOD BY AUTOMATED COUNT: 20.4 % (ref 10–15)
ERYTHROCYTE [DISTWIDTH] IN BLOOD BY AUTOMATED COUNT: 20.6 % (ref 10–15)
ERYTHROCYTE [DISTWIDTH] IN BLOOD BY AUTOMATED COUNT: 20.8 % (ref 10–15)
ERYTHROCYTE [DISTWIDTH] IN BLOOD BY AUTOMATED COUNT: 21.1 % (ref 10–15)
ERYTHROCYTE [DISTWIDTH] IN BLOOD BY AUTOMATED COUNT: 22.9 % (ref 10–15)
ERYTHROCYTE [DISTWIDTH] IN BLOOD BY AUTOMATED COUNT: 23.9 % (ref 10–15)
ERYTHROCYTE [DISTWIDTH] IN BLOOD BY AUTOMATED COUNT: 24.2 % (ref 10–15)
FIBRINOGEN PPP-MCNC: 131 MG/DL (ref 170–490)
FIBRINOGEN PPP-MCNC: 137 MG/DL (ref 170–490)
FIBRINOGEN PPP-MCNC: 138 MG/DL (ref 170–490)
FIBRINOGEN PPP-MCNC: 141 MG/DL (ref 170–490)
FIBRINOGEN PPP-MCNC: 147 MG/DL (ref 170–490)
FIBRINOGEN PPP-MCNC: 149 MG/DL (ref 170–490)
FIBRINOGEN PPP-MCNC: 162 MG/DL (ref 170–490)
FIBRINOGEN PPP-MCNC: 173 MG/DL (ref 170–490)
FIBRINOGEN PPP-MCNC: 83 MG/DL (ref 170–490)
GFR SERPL CREATININE-BSD FRML MDRD: 46 ML/MIN/1.73M2
GFR SERPL CREATININE-BSD FRML MDRD: 56 ML/MIN/1.73M2
GFR SERPL CREATININE-BSD FRML MDRD: 64 ML/MIN/1.73M2
GFR SERPL CREATININE-BSD FRML MDRD: 68 ML/MIN/1.73M2
GFR SERPL CREATININE-BSD FRML MDRD: 73 ML/MIN/1.73M2
GFR SERPL CREATININE-BSD FRML MDRD: 74 ML/MIN/1.73M2
GFR SERPL CREATININE-BSD FRML MDRD: 74 ML/MIN/1.73M2
GFR SERPL CREATININE-BSD FRML MDRD: 75 ML/MIN/1.73M2
GFR SERPL CREATININE-BSD FRML MDRD: 75 ML/MIN/1.73M2
GFR SERPL CREATININE-BSD FRML MDRD: 76 ML/MIN/1.73M2
GFR SERPL CREATININE-BSD FRML MDRD: 82 ML/MIN/1.73M2
GFR SERPL CREATININE-BSD FRML MDRD: 84 ML/MIN/1.73M2
GFR SERPL CREATININE-BSD FRML MDRD: 84 ML/MIN/1.73M2
GFR SERPL CREATININE-BSD FRML MDRD: 85 ML/MIN/1.73M2
GFR SERPL CREATININE-BSD FRML MDRD: 86 ML/MIN/1.73M2
GFR SERPL CREATININE-BSD FRML MDRD: 87 ML/MIN/1.73M2
GFR SERPL CREATININE-BSD FRML MDRD: >90 ML/MIN/1.73M2
GLUCOSE BLDC GLUCOMTR-MCNC: 103 MG/DL (ref 70–99)
GLUCOSE BLDC GLUCOMTR-MCNC: 103 MG/DL (ref 70–99)
GLUCOSE BLDC GLUCOMTR-MCNC: 109 MG/DL (ref 70–99)
GLUCOSE BLDC GLUCOMTR-MCNC: 114 MG/DL (ref 70–99)
GLUCOSE BLDC GLUCOMTR-MCNC: 115 MG/DL (ref 70–99)
GLUCOSE BLDC GLUCOMTR-MCNC: 137 MG/DL (ref 70–99)
GLUCOSE BLDC GLUCOMTR-MCNC: 63 MG/DL (ref 70–99)
GLUCOSE BLDC GLUCOMTR-MCNC: 72 MG/DL (ref 70–99)
GLUCOSE BLDC GLUCOMTR-MCNC: 78 MG/DL (ref 70–99)
GLUCOSE BLDC GLUCOMTR-MCNC: 80 MG/DL (ref 70–99)
GLUCOSE BLDC GLUCOMTR-MCNC: 82 MG/DL (ref 70–99)
GLUCOSE BLDC GLUCOMTR-MCNC: 85 MG/DL (ref 70–99)
GLUCOSE BLDC GLUCOMTR-MCNC: 87 MG/DL (ref 70–99)
GLUCOSE BLDC GLUCOMTR-MCNC: 88 MG/DL (ref 70–99)
GLUCOSE BLDC GLUCOMTR-MCNC: 90 MG/DL (ref 70–99)
GLUCOSE BLDC GLUCOMTR-MCNC: 95 MG/DL (ref 70–99)
GLUCOSE BLDC GLUCOMTR-MCNC: 96 MG/DL (ref 70–99)
GLUCOSE BLDC GLUCOMTR-MCNC: 97 MG/DL (ref 70–99)
GLUCOSE BLDC GLUCOMTR-MCNC: 99 MG/DL (ref 70–99)
GLUCOSE SERPL-MCNC: 100 MG/DL (ref 70–99)
GLUCOSE SERPL-MCNC: 106 MG/DL (ref 70–99)
GLUCOSE SERPL-MCNC: 107 MG/DL (ref 70–99)
GLUCOSE SERPL-MCNC: 108 MG/DL (ref 70–99)
GLUCOSE SERPL-MCNC: 113 MG/DL (ref 70–99)
GLUCOSE SERPL-MCNC: 113 MG/DL (ref 70–99)
GLUCOSE SERPL-MCNC: 114 MG/DL (ref 70–99)
GLUCOSE SERPL-MCNC: 128 MG/DL (ref 70–99)
GLUCOSE SERPL-MCNC: 129 MG/DL (ref 70–99)
GLUCOSE SERPL-MCNC: 131 MG/DL (ref 70–99)
GLUCOSE SERPL-MCNC: 142 MG/DL (ref 70–99)
GLUCOSE SERPL-MCNC: 336 MG/DL (ref 70–99)
GLUCOSE SERPL-MCNC: 75 MG/DL (ref 70–99)
GLUCOSE SERPL-MCNC: 77 MG/DL (ref 70–99)
GLUCOSE SERPL-MCNC: 79 MG/DL (ref 70–99)
GLUCOSE SERPL-MCNC: 79 MG/DL (ref 70–99)
GLUCOSE SERPL-MCNC: 86 MG/DL (ref 70–99)
GLUCOSE SERPL-MCNC: 91 MG/DL (ref 70–99)
GLUCOSE SERPL-MCNC: 92 MG/DL (ref 70–99)
GLUCOSE SERPL-MCNC: 92 MG/DL (ref 70–99)
GLUCOSE SERPL-MCNC: 93 MG/DL (ref 70–99)
GLUCOSE SERPL-MCNC: 96 MG/DL (ref 70–99)
GLUCOSE SERPL-MCNC: 96 MG/DL (ref 70–99)
GLUCOSE SERPL-MCNC: 97 MG/DL (ref 70–99)
GLUCOSE SERPL-MCNC: 98 MG/DL (ref 70–99)
GLUCOSE UR STRIP-MCNC: NEGATIVE MG/DL
GRAM STAIN RESULT: NORMAL
GRAM STAIN RESULT: NORMAL
HCO3 BLD-SCNC: 23 MMOL/L (ref 21–28)
HCO3 BLD-SCNC: 23 MMOL/L (ref 21–28)
HCO3 BLDV-SCNC: 18 MMOL/L (ref 21–28)
HCO3 BLDV-SCNC: 25 MMOL/L (ref 21–28)
HCO3 BLDV-SCNC: 25 MMOL/L (ref 21–28)
HCT VFR BLD AUTO: 17.4 % (ref 40–53)
HCT VFR BLD AUTO: 18 % (ref 40–53)
HCT VFR BLD AUTO: 18.5 % (ref 40–53)
HCT VFR BLD AUTO: 18.7 % (ref 40–53)
HCT VFR BLD AUTO: 18.8 % (ref 40–53)
HCT VFR BLD AUTO: 18.8 % (ref 40–53)
HCT VFR BLD AUTO: 19 % (ref 40–53)
HCT VFR BLD AUTO: 19.1 % (ref 40–53)
HCT VFR BLD AUTO: 19.5 % (ref 40–53)
HCT VFR BLD AUTO: 19.9 % (ref 40–53)
HCT VFR BLD AUTO: 19.9 % (ref 40–53)
HCT VFR BLD AUTO: 20.1 % (ref 40–53)
HCT VFR BLD AUTO: 20.3 % (ref 40–53)
HCT VFR BLD AUTO: 20.3 % (ref 40–53)
HCT VFR BLD AUTO: 20.5 % (ref 40–53)
HCT VFR BLD AUTO: 20.7 % (ref 40–53)
HCT VFR BLD AUTO: 21.1 % (ref 40–53)
HCT VFR BLD AUTO: 21.4 % (ref 40–53)
HCT VFR BLD AUTO: 21.5 % (ref 40–53)
HCT VFR BLD AUTO: 21.6 % (ref 40–53)
HCT VFR BLD AUTO: 21.7 % (ref 40–53)
HCT VFR BLD AUTO: 21.9 % (ref 40–53)
HCT VFR BLD AUTO: 22 % (ref 40–53)
HCT VFR BLD AUTO: 22.3 % (ref 40–53)
HCT VFR BLD AUTO: 22.4 % (ref 40–53)
HCT VFR BLD AUTO: 22.5 % (ref 40–53)
HCT VFR BLD AUTO: 23 % (ref 40–53)
HCT VFR BLD AUTO: 23.1 % (ref 40–53)
HCT VFR BLD AUTO: 23.3 % (ref 40–53)
HCT VFR BLD AUTO: 23.9 % (ref 40–53)
HGB BLD-MCNC: 5.8 G/DL (ref 13.3–17.7)
HGB BLD-MCNC: 6 G/DL (ref 13.3–17.7)
HGB BLD-MCNC: 6.1 G/DL (ref 13.3–17.7)
HGB BLD-MCNC: 6.3 G/DL (ref 13.3–17.7)
HGB BLD-MCNC: 6.3 G/DL (ref 13.3–17.7)
HGB BLD-MCNC: 6.4 G/DL (ref 13.3–17.7)
HGB BLD-MCNC: 6.5 G/DL (ref 13.3–17.7)
HGB BLD-MCNC: 6.5 G/DL (ref 13.3–17.7)
HGB BLD-MCNC: 6.7 G/DL (ref 13.3–17.7)
HGB BLD-MCNC: 6.9 G/DL (ref 13.3–17.7)
HGB BLD-MCNC: 6.9 G/DL (ref 13.3–17.7)
HGB BLD-MCNC: 7 G/DL (ref 13.3–17.7)
HGB BLD-MCNC: 7.1 G/DL (ref 13.3–17.7)
HGB BLD-MCNC: 7.1 G/DL (ref 13.3–17.7)
HGB BLD-MCNC: 7.2 G/DL (ref 13.3–17.7)
HGB BLD-MCNC: 7.3 G/DL (ref 13.3–17.7)
HGB BLD-MCNC: 7.4 G/DL (ref 13.3–17.7)
HGB BLD-MCNC: 7.6 G/DL (ref 13.3–17.7)
HGB BLD-MCNC: 7.6 G/DL (ref 13.3–17.7)
HGB BLD-MCNC: 7.7 G/DL (ref 13.3–17.7)
HGB BLD-MCNC: 7.7 G/DL (ref 13.3–17.7)
HGB BLD-MCNC: 7.8 G/DL (ref 13.3–17.7)
HGB BLD-MCNC: 7.9 G/DL (ref 13.3–17.7)
HGB BLD-MCNC: 8.1 G/DL (ref 13.3–17.7)
HGB BLD-MCNC: 8.2 G/DL (ref 13.3–17.7)
HGB BLD-MCNC: 8.6 G/DL (ref 13.3–17.7)
HGB BLD-MCNC: NORMAL G/DL
HGB UR QL STRIP: ABNORMAL
IMM GRANULOCYTES # BLD: 0.1 10E3/UL
IMM GRANULOCYTES # BLD: 0.3 10E3/UL
IMM GRANULOCYTES NFR BLD: 2 %
IMM GRANULOCYTES NFR BLD: 2 %
INR PPP: 1.93 (ref 0.85–1.15)
INR PPP: 2.04 (ref 0.85–1.15)
INR PPP: 2.35 (ref 0.85–1.15)
INR PPP: 2.61 (ref 0.85–1.15)
INR PPP: 2.65 (ref 0.85–1.15)
INR PPP: 2.72 (ref 0.85–1.15)
INR PPP: 2.82 (ref 0.85–1.15)
INR PPP: 2.94 (ref 0.85–1.15)
INR PPP: 2.96 (ref 0.85–1.15)
INR PPP: 3.15 (ref 0.85–1.15)
INR PPP: 3.15 (ref 0.85–1.15)
INR PPP: 3.17 (ref 0.85–1.15)
INR PPP: 3.19 (ref 0.85–1.15)
INR PPP: 3.25 (ref 0.85–1.15)
INR PPP: 3.26 (ref 0.85–1.15)
INR PPP: 3.3 (ref 0.85–1.15)
INR PPP: 3.44 (ref 0.85–1.15)
INR PPP: 3.62 (ref 0.85–1.15)
INR PPP: 3.91 (ref 0.85–1.15)
INR PPP: 3.91 (ref 0.85–1.15)
INR PPP: 4.01 (ref 0.85–1.15)
INR PPP: 4.19 (ref 0.85–1.15)
INR PPP: 4.23 (ref 0.85–1.15)
INR PPP: 4.3 (ref 0.85–1.15)
INR PPP: 4.91 (ref 0.85–1.15)
INR PPP: 5.08 (ref 0.85–1.15)
ISSUE DATE AND TIME: NORMAL
KETONES UR STRIP-MCNC: NEGATIVE MG/DL
LACTATE SERPL-SCNC: 1.6 MMOL/L (ref 0.7–2)
LACTATE SERPL-SCNC: 1.7 MMOL/L (ref 0.7–2)
LACTATE SERPL-SCNC: 1.7 MMOL/L (ref 0.7–2)
LACTATE SERPL-SCNC: 3.3 MMOL/L (ref 0.7–2)
LEUKOCYTE ESTERASE UR QL STRIP: ABNORMAL
LIPASE SERPL-CCNC: 171 U/L (ref 13–60)
LVEF ECHO: NORMAL
LYMPHOCYTES # BLD AUTO: 0.6 10E3/UL (ref 0.8–5.3)
LYMPHOCYTES # BLD AUTO: 0.8 10E3/UL (ref 0.8–5.3)
LYMPHOCYTES NFR BLD AUTO: 5 %
LYMPHOCYTES NFR BLD AUTO: 6 %
LYMPHOCYTES NFR FLD MANUAL: 5 %
MAGNESIUM SERPL-MCNC: 1.5 MG/DL (ref 1.7–2.3)
MAGNESIUM SERPL-MCNC: 1.8 MG/DL (ref 1.7–2.3)
MAGNESIUM SERPL-MCNC: 1.9 MG/DL (ref 1.7–2.3)
MAGNESIUM SERPL-MCNC: 2 MG/DL (ref 1.7–2.3)
MAGNESIUM SERPL-MCNC: 2 MG/DL (ref 1.7–2.3)
MAGNESIUM SERPL-MCNC: 2.1 MG/DL (ref 1.7–2.3)
MAGNESIUM SERPL-MCNC: 2.3 MG/DL (ref 1.7–2.3)
MCF BLD TEG: 38.5 MM (ref 50–70)
MCF BLD TEG: 45.1 MM (ref 50–70)
MCF BLD TEG: 49.5 MM (ref 50–70)
MCH RBC QN AUTO: 31 PG (ref 26.5–33)
MCH RBC QN AUTO: 31.2 PG (ref 26.5–33)
MCH RBC QN AUTO: 31.2 PG (ref 26.5–33)
MCH RBC QN AUTO: 31.3 PG (ref 26.5–33)
MCH RBC QN AUTO: 31.5 PG (ref 26.5–33)
MCH RBC QN AUTO: 31.6 PG (ref 26.5–33)
MCH RBC QN AUTO: 31.7 PG (ref 26.5–33)
MCH RBC QN AUTO: 31.7 PG (ref 26.5–33)
MCH RBC QN AUTO: 31.8 PG (ref 26.5–33)
MCH RBC QN AUTO: 31.9 PG (ref 26.5–33)
MCH RBC QN AUTO: 32 PG (ref 26.5–33)
MCH RBC QN AUTO: 32.1 PG (ref 26.5–33)
MCH RBC QN AUTO: 32.1 PG (ref 26.5–33)
MCH RBC QN AUTO: 32.4 PG (ref 26.5–33)
MCH RBC QN AUTO: 32.6 PG (ref 26.5–33)
MCH RBC QN AUTO: 32.7 PG (ref 26.5–33)
MCH RBC QN AUTO: 33.3 PG (ref 26.5–33)
MCHC RBC AUTO-ENTMCNC: 33.5 G/DL (ref 31.5–36.5)
MCHC RBC AUTO-ENTMCNC: 33.6 G/DL (ref 31.5–36.5)
MCHC RBC AUTO-ENTMCNC: 33.7 G/DL (ref 31.5–36.5)
MCHC RBC AUTO-ENTMCNC: 33.7 G/DL (ref 31.5–36.5)
MCHC RBC AUTO-ENTMCNC: 33.8 G/DL (ref 31.5–36.5)
MCHC RBC AUTO-ENTMCNC: 33.9 G/DL (ref 31.5–36.5)
MCHC RBC AUTO-ENTMCNC: 34 G/DL (ref 31.5–36.5)
MCHC RBC AUTO-ENTMCNC: 34.6 G/DL (ref 31.5–36.5)
MCHC RBC AUTO-ENTMCNC: 34.6 G/DL (ref 31.5–36.5)
MCHC RBC AUTO-ENTMCNC: 34.7 G/DL (ref 31.5–36.5)
MCHC RBC AUTO-ENTMCNC: 35 G/DL (ref 31.5–36.5)
MCHC RBC AUTO-ENTMCNC: 35.1 G/DL (ref 31.5–36.5)
MCHC RBC AUTO-ENTMCNC: 35.2 G/DL (ref 31.5–36.5)
MCHC RBC AUTO-ENTMCNC: 35.3 G/DL (ref 31.5–36.5)
MCHC RBC AUTO-ENTMCNC: 35.3 G/DL (ref 31.5–36.5)
MCHC RBC AUTO-ENTMCNC: 35.5 G/DL (ref 31.5–36.5)
MCHC RBC AUTO-ENTMCNC: 35.5 G/DL (ref 31.5–36.5)
MCHC RBC AUTO-ENTMCNC: 35.6 G/DL (ref 31.5–36.5)
MCHC RBC AUTO-ENTMCNC: 35.9 G/DL (ref 31.5–36.5)
MCHC RBC AUTO-ENTMCNC: 36 G/DL (ref 31.5–36.5)
MCHC RBC AUTO-ENTMCNC: 36 G/DL (ref 31.5–36.5)
MCHC RBC AUTO-ENTMCNC: 36.1 G/DL (ref 31.5–36.5)
MCHC RBC AUTO-ENTMCNC: 36.2 G/DL (ref 31.5–36.5)
MCHC RBC AUTO-ENTMCNC: 36.3 G/DL (ref 31.5–36.5)
MCHC RBC AUTO-ENTMCNC: 36.3 G/DL (ref 31.5–36.5)
MCV RBC AUTO: 88 FL (ref 78–100)
MCV RBC AUTO: 89 FL (ref 78–100)
MCV RBC AUTO: 90 FL (ref 78–100)
MCV RBC AUTO: 91 FL (ref 78–100)
MCV RBC AUTO: 92 FL (ref 78–100)
MCV RBC AUTO: 93 FL (ref 78–100)
MCV RBC AUTO: 94 FL (ref 78–100)
MCV RBC AUTO: 96 FL (ref 78–100)
MCV RBC AUTO: 97 FL (ref 78–100)
MCV RBC AUTO: 98 FL (ref 78–100)
MONOCYTES # BLD AUTO: 0.9 10E3/UL (ref 0–1.3)
MONOCYTES # BLD AUTO: 1.2 10E3/UL (ref 0–1.3)
MONOCYTES NFR BLD AUTO: 8 %
MONOCYTES NFR BLD AUTO: 9 %
MONOS+MACROS NFR FLD MANUAL: 22 %
MRSA DNA SPEC QL NAA+PROBE: NEGATIVE
MUCOUS THREADS #/AREA URNS LPF: PRESENT /LPF
NEUTROPHILS # BLD AUTO: 12.3 10E3/UL (ref 1.6–8.3)
NEUTROPHILS # BLD AUTO: 7.4 10E3/UL (ref 1.6–8.3)
NEUTROPHILS NFR BLD AUTO: 78 %
NEUTROPHILS NFR BLD AUTO: 82 %
NEUTS BAND NFR FLD MANUAL: 43 %
NITRATE UR QL: NEGATIVE
NRBC # BLD AUTO: 0 10E3/UL
NRBC # BLD AUTO: 0 10E3/UL
NRBC BLD AUTO-RTO: 0 /100
NRBC BLD AUTO-RTO: 0 /100
NT-PROBNP SERPL-MCNC: 334 PG/ML (ref 0–450)
NT-PROBNP SERPL-MCNC: 358 PG/ML (ref 0–450)
NT-PROBNP SERPL-MCNC: 462 PG/ML (ref 0–450)
NT-PROBNP SERPL-MCNC: 472 PG/ML (ref 0–450)
O2/TOTAL GAS SETTING VFR VENT: 21 %
O2/TOTAL GAS SETTING VFR VENT: 28 %
O2/TOTAL GAS SETTING VFR VENT: 28 %
O2/TOTAL GAS SETTING VFR VENT: 38 %
O2/TOTAL GAS SETTING VFR VENT: 42 %
OTHER CELLS FLD MANUAL: 2 %
OXYHGB MFR BLDV: 63 % (ref 70–75)
PCO2 BLD: 34 MM HG (ref 35–45)
PCO2 BLD: 36 MM HG (ref 35–45)
PCO2 BLDV: 32 MM HG (ref 40–50)
PCO2 BLDV: 36 MM HG (ref 40–50)
PCO2 BLDV: 38 MM HG (ref 40–50)
PH BLD: 7.42 [PH] (ref 7.35–7.45)
PH BLD: 7.45 [PH] (ref 7.35–7.45)
PH BLDV: 7.37 [PH] (ref 7.32–7.43)
PH BLDV: 7.42 [PH] (ref 7.32–7.43)
PH BLDV: 7.45 [PH] (ref 7.32–7.43)
PH UR STRIP: 5.5 [PH] (ref 5–7)
PHOSPHATE SERPL-MCNC: 2.8 MG/DL (ref 2.5–4.5)
PHOSPHATE SERPL-MCNC: 3.2 MG/DL (ref 2.5–4.5)
PHOSPHATE SERPL-MCNC: 3.3 MG/DL (ref 2.5–4.5)
PHOSPHATE SERPL-MCNC: 3.6 MG/DL (ref 2.5–4.5)
PHOSPHATE SERPL-MCNC: 3.8 MG/DL (ref 2.5–4.5)
PHOSPHATE SERPL-MCNC: 4 MG/DL (ref 2.5–4.5)
PHOSPHATE SERPL-MCNC: 4 MG/DL (ref 2.5–4.5)
PHOSPHATE SERPL-MCNC: 4.1 MG/DL (ref 2.5–4.5)
PHOSPHATE SERPL-MCNC: 4.7 MG/DL (ref 2.5–4.5)
PLAT MORPH BLD: ABNORMAL
PLAT MORPH BLD: NORMAL
PLATELET # BLD AUTO: 36 10E3/UL (ref 150–450)
PLATELET # BLD AUTO: 38 10E3/UL (ref 150–450)
PLATELET # BLD AUTO: 38 10E3/UL (ref 150–450)
PLATELET # BLD AUTO: 43 10E3/UL (ref 150–450)
PLATELET # BLD AUTO: 44 10E3/UL (ref 150–450)
PLATELET # BLD AUTO: 48 10E3/UL (ref 150–450)
PLATELET # BLD AUTO: 49 10E3/UL (ref 150–450)
PLATELET # BLD AUTO: 49 10E3/UL (ref 150–450)
PLATELET # BLD AUTO: 50 10E3/UL (ref 150–450)
PLATELET # BLD AUTO: 51 10E3/UL (ref 150–450)
PLATELET # BLD AUTO: 58 10E3/UL (ref 150–450)
PLATELET # BLD AUTO: 60 10E3/UL (ref 150–450)
PLATELET # BLD AUTO: 62 10E3/UL (ref 150–450)
PLATELET # BLD AUTO: 63 10E3/UL (ref 150–450)
PLATELET # BLD AUTO: 66 10E3/UL (ref 150–450)
PLATELET # BLD AUTO: 69 10E3/UL (ref 150–450)
PLATELET # BLD AUTO: 72 10E3/UL (ref 150–450)
PLATELET # BLD AUTO: 73 10E3/UL (ref 150–450)
PLATELET # BLD AUTO: 74 10E3/UL (ref 150–450)
PLATELET # BLD AUTO: 75 10E3/UL (ref 150–450)
PLATELET # BLD AUTO: 76 10E3/UL (ref 150–450)
PLATELET # BLD AUTO: 79 10E3/UL (ref 150–450)
PLATELET # BLD AUTO: 83 10E3/UL (ref 150–450)
PLATELET # BLD AUTO: 85 10E3/UL (ref 150–450)
PLATELET # BLD AUTO: 88 10E3/UL (ref 150–450)
PLATELET # BLD AUTO: 90 10E3/UL (ref 150–450)
PLATELET # BLD AUTO: 90 10E3/UL (ref 150–450)
PLATELET # BLD AUTO: 91 10E3/UL (ref 150–450)
PLATELET # BLD AUTO: 99 10E3/UL (ref 150–450)
PLPETH BLD-MCNC: 43 NG/ML
PO2 BLD: 45 MM HG (ref 80–105)
PO2 BLD: 68 MM HG (ref 80–105)
PO2 BLDV: 34 MM HG (ref 25–47)
PO2 BLDV: 35 MM HG (ref 25–47)
PO2 BLDV: 41 MM HG (ref 25–47)
POPETH BLD-MCNC: 44 NG/ML
POTASSIUM SERPL-SCNC: 3.8 MMOL/L (ref 3.4–5.3)
POTASSIUM SERPL-SCNC: 3.9 MMOL/L (ref 3.4–5.3)
POTASSIUM SERPL-SCNC: 4.1 MMOL/L (ref 3.4–5.3)
POTASSIUM SERPL-SCNC: 4.2 MMOL/L (ref 3.4–5.3)
POTASSIUM SERPL-SCNC: 4.4 MMOL/L (ref 3.4–5.3)
POTASSIUM SERPL-SCNC: 4.4 MMOL/L (ref 3.4–5.3)
POTASSIUM SERPL-SCNC: 4.5 MMOL/L (ref 3.4–5.3)
POTASSIUM SERPL-SCNC: 4.5 MMOL/L (ref 3.4–5.3)
POTASSIUM SERPL-SCNC: 4.8 MMOL/L (ref 3.4–5.3)
POTASSIUM SERPL-SCNC: 4.9 MMOL/L (ref 3.4–5.3)
POTASSIUM SERPL-SCNC: 4.9 MMOL/L (ref 3.4–5.3)
POTASSIUM SERPL-SCNC: 5 MMOL/L (ref 3.4–5.3)
POTASSIUM SERPL-SCNC: 5.1 MMOL/L (ref 3.4–5.3)
POTASSIUM SERPL-SCNC: 5.2 MMOL/L (ref 3.4–5.3)
POTASSIUM SERPL-SCNC: 5.2 MMOL/L (ref 3.4–5.3)
POTASSIUM SERPL-SCNC: 5.5 MMOL/L (ref 3.4–5.3)
PROCALCITONIN SERPL IA-MCNC: 0.47 NG/ML
PROT FLD-MCNC: 0.5 G/DL
PROT SERPL-MCNC: 3.8 G/DL (ref 6.4–8.3)
PROT SERPL-MCNC: 4.3 G/DL (ref 6.4–8.3)
PROT SERPL-MCNC: 4.4 G/DL (ref 6.4–8.3)
PROT SERPL-MCNC: ABNORMAL G/DL
PROTEIN BODY FLUID SOURCE: NORMAL
RBC # BLD AUTO: 1.92 10E6/UL (ref 4.4–5.9)
RBC # BLD AUTO: 1.93 10E6/UL (ref 4.4–5.9)
RBC # BLD AUTO: 1.97 10E6/UL (ref 4.4–5.9)
RBC # BLD AUTO: 2 10E6/UL (ref 4.4–5.9)
RBC # BLD AUTO: 2.03 10E6/UL (ref 4.4–5.9)
RBC # BLD AUTO: 2.05 10E6/UL (ref 4.4–5.9)
RBC # BLD AUTO: 2.11 10E6/UL (ref 4.4–5.9)
RBC # BLD AUTO: 2.11 10E6/UL (ref 4.4–5.9)
RBC # BLD AUTO: 2.13 10E6/UL (ref 4.4–5.9)
RBC # BLD AUTO: 2.16 10E6/UL (ref 4.4–5.9)
RBC # BLD AUTO: 2.17 10E6/UL (ref 4.4–5.9)
RBC # BLD AUTO: 2.17 10E6/UL (ref 4.4–5.9)
RBC # BLD AUTO: 2.19 10E6/UL (ref 4.4–5.9)
RBC # BLD AUTO: 2.24 10E6/UL (ref 4.4–5.9)
RBC # BLD AUTO: 2.24 10E6/UL (ref 4.4–5.9)
RBC # BLD AUTO: 2.25 10E6/UL (ref 4.4–5.9)
RBC # BLD AUTO: 2.25 10E6/UL (ref 4.4–5.9)
RBC # BLD AUTO: 2.28 10E6/UL (ref 4.4–5.9)
RBC # BLD AUTO: 2.34 10E6/UL (ref 4.4–5.9)
RBC # BLD AUTO: 2.37 10E6/UL (ref 4.4–5.9)
RBC # BLD AUTO: 2.41 10E6/UL (ref 4.4–5.9)
RBC # BLD AUTO: 2.42 10E6/UL (ref 4.4–5.9)
RBC # BLD AUTO: 2.45 10E6/UL (ref 4.4–5.9)
RBC # BLD AUTO: 2.49 10E6/UL (ref 4.4–5.9)
RBC # BLD AUTO: 2.49 10E6/UL (ref 4.4–5.9)
RBC # BLD AUTO: 2.5 10E6/UL (ref 4.4–5.9)
RBC # BLD AUTO: 2.5 10E6/UL (ref 4.4–5.9)
RBC # BLD AUTO: 2.53 10E6/UL (ref 4.4–5.9)
RBC # BLD AUTO: 2.54 10E6/UL (ref 4.4–5.9)
RBC # BLD AUTO: 2.54 10E6/UL (ref 4.4–5.9)
RBC # BLD AUTO: 2.56 10E6/UL (ref 4.4–5.9)
RBC # BLD AUTO: 2.6 10E6/UL (ref 4.4–5.9)
RBC # BLD AUTO: 2.62 10E6/UL (ref 4.4–5.9)
RBC # BLD AUTO: 2.7 10E6/UL (ref 4.4–5.9)
RBC MORPH BLD: ABNORMAL
RBC MORPH BLD: NORMAL
RBC URINE: 8 /HPF
SA TARGET DNA: POSITIVE
SARS-COV-2 RNA RESP QL NAA+PROBE: NEGATIVE
SODIUM SERPL-SCNC: 116 MMOL/L (ref 136–145)
SODIUM SERPL-SCNC: 116 MMOL/L (ref 136–145)
SODIUM SERPL-SCNC: 119 MMOL/L (ref 136–145)
SODIUM SERPL-SCNC: 120 MMOL/L (ref 136–145)
SODIUM SERPL-SCNC: 121 MMOL/L (ref 136–145)
SODIUM SERPL-SCNC: 121 MMOL/L (ref 136–145)
SODIUM SERPL-SCNC: 122 MMOL/L (ref 136–145)
SODIUM SERPL-SCNC: 123 MMOL/L (ref 136–145)
SODIUM SERPL-SCNC: 124 MMOL/L (ref 136–145)
SODIUM SERPL-SCNC: 124 MMOL/L (ref 136–145)
SODIUM SERPL-SCNC: 125 MMOL/L (ref 136–145)
SODIUM SERPL-SCNC: 125 MMOL/L (ref 136–145)
SODIUM SERPL-SCNC: 127 MMOL/L (ref 136–145)
SODIUM SERPL-SCNC: 128 MMOL/L (ref 136–145)
SODIUM SERPL-SCNC: 128 MMOL/L (ref 136–145)
SODIUM SERPL-SCNC: 129 MMOL/L (ref 136–145)
SODIUM SERPL-SCNC: 132 MMOL/L (ref 136–145)
SODIUM SERPL-SCNC: 133 MMOL/L (ref 136–145)
SODIUM SERPL-SCNC: 134 MMOL/L (ref 136–145)
SODIUM SERPL-SCNC: 135 MMOL/L (ref 136–145)
SODIUM SERPL-SCNC: 137 MMOL/L (ref 136–145)
SODIUM SERPL-SCNC: 141 MMOL/L (ref 136–145)
SODIUM SERPL-SCNC: 144 MMOL/L (ref 136–145)
SODIUM SERPL-SCNC: 144 MMOL/L (ref 136–145)
SODIUM SERPL-SCNC: NORMAL MMOL/L
SODIUM UR-SCNC: 20 MMOL/L
SP GR UR STRIP: 1.01 (ref 1–1.03)
SPECIMEN EXPIRATION DATE: NORMAL
SPHEROCYTES BLD QL SMEAR: SLIGHT
SQUAMOUS EPITHELIAL: 1 /HPF
UNIT ABO/RH: NORMAL
UNIT NUMBER: NORMAL
UNIT STATUS: NORMAL
UNIT TYPE ISBT: 1700
UNIT TYPE ISBT: 5100
UNIT TYPE ISBT: 7300
UROBILINOGEN UR STRIP-MCNC: NORMAL MG/DL
WBC # BLD AUTO: 10.2 10E3/UL (ref 4–11)
WBC # BLD AUTO: 10.3 10E3/UL (ref 4–11)
WBC # BLD AUTO: 10.4 10E3/UL (ref 4–11)
WBC # BLD AUTO: 10.5 10E3/UL (ref 4–11)
WBC # BLD AUTO: 10.6 10E3/UL (ref 4–11)
WBC # BLD AUTO: 10.6 10E3/UL (ref 4–11)
WBC # BLD AUTO: 11.5 10E3/UL (ref 4–11)
WBC # BLD AUTO: 12 10E3/UL (ref 4–11)
WBC # BLD AUTO: 12.4 10E3/UL (ref 4–11)
WBC # BLD AUTO: 12.5 10E3/UL (ref 4–11)
WBC # BLD AUTO: 12.8 10E3/UL (ref 4–11)
WBC # BLD AUTO: 12.8 10E3/UL (ref 4–11)
WBC # BLD AUTO: 12.9 10E3/UL (ref 4–11)
WBC # BLD AUTO: 13.5 10E3/UL (ref 4–11)
WBC # BLD AUTO: 13.9 10E3/UL (ref 4–11)
WBC # BLD AUTO: 14.1 10E3/UL (ref 4–11)
WBC # BLD AUTO: 14.3 10E3/UL (ref 4–11)
WBC # BLD AUTO: 14.4 10E3/UL (ref 4–11)
WBC # BLD AUTO: 14.9 10E3/UL (ref 4–11)
WBC # BLD AUTO: 14.9 10E3/UL (ref 4–11)
WBC # BLD AUTO: 16.6 10E3/UL (ref 4–11)
WBC # BLD AUTO: 16.7 10E3/UL (ref 4–11)
WBC # BLD AUTO: 17 10E3/UL (ref 4–11)
WBC # BLD AUTO: 17.1 10E3/UL (ref 4–11)
WBC # BLD AUTO: 17.7 10E3/UL (ref 4–11)
WBC # BLD AUTO: 18 10E3/UL (ref 4–11)
WBC # BLD AUTO: 18.2 10E3/UL (ref 4–11)
WBC # BLD AUTO: 18.7 10E3/UL (ref 4–11)
WBC # BLD AUTO: 23.7 10E3/UL (ref 4–11)
WBC # BLD AUTO: 7.4 10E3/UL (ref 4–11)
WBC # BLD AUTO: 7.5 10E3/UL (ref 4–11)
WBC # BLD AUTO: 8.1 10E3/UL (ref 4–11)
WBC # BLD AUTO: 8.7 10E3/UL (ref 4–11)
WBC # BLD AUTO: 9.5 10E3/UL (ref 4–11)
WBC # FLD AUTO: 88 /UL
WBC URINE: 6 /HPF

## 2023-01-01 PROCEDURE — 85027 COMPLETE CBC AUTOMATED: CPT | Performed by: STUDENT IN AN ORGANIZED HEALTH CARE EDUCATION/TRAINING PROGRAM

## 2023-01-01 PROCEDURE — 87070 CULTURE OTHR SPECIMN AEROBIC: CPT | Performed by: STUDENT IN AN ORGANIZED HEALTH CARE EDUCATION/TRAINING PROGRAM

## 2023-01-01 PROCEDURE — 83735 ASSAY OF MAGNESIUM: CPT | Performed by: STUDENT IN AN ORGANIZED HEALTH CARE EDUCATION/TRAINING PROGRAM

## 2023-01-01 PROCEDURE — 85384 FIBRINOGEN ACTIVITY: CPT | Performed by: SURGERY

## 2023-01-01 PROCEDURE — 82140 ASSAY OF AMMONIA: CPT | Performed by: STUDENT IN AN ORGANIZED HEALTH CARE EDUCATION/TRAINING PROGRAM

## 2023-01-01 PROCEDURE — 200N000001 HC R&B ICU

## 2023-01-01 PROCEDURE — 82042 OTHER SOURCE ALBUMIN QUAN EA: CPT | Performed by: STUDENT IN AN ORGANIZED HEALTH CARE EDUCATION/TRAINING PROGRAM

## 2023-01-01 PROCEDURE — 86923 COMPATIBILITY TEST ELECTRIC: CPT | Performed by: ANESTHESIOLOGY

## 2023-01-01 PROCEDURE — P9059 PLASMA, FRZ BETWEEN 8-24HOUR: HCPCS

## 2023-01-01 PROCEDURE — 99231 SBSQ HOSP IP/OBS SF/LOW 25: CPT | Performed by: PHYSICIAN ASSISTANT

## 2023-01-01 PROCEDURE — 85027 COMPLETE CBC AUTOMATED: CPT | Performed by: SURGERY

## 2023-01-01 PROCEDURE — 250N000011 HC RX IP 250 OP 636: Performed by: STUDENT IN AN ORGANIZED HEALTH CARE EDUCATION/TRAINING PROGRAM

## 2023-01-01 PROCEDURE — 99291 CRITICAL CARE FIRST HOUR: CPT | Performed by: INTERNAL MEDICINE

## 2023-01-01 PROCEDURE — 80069 RENAL FUNCTION PANEL: CPT | Performed by: STUDENT IN AN ORGANIZED HEALTH CARE EDUCATION/TRAINING PROGRAM

## 2023-01-01 PROCEDURE — 250N000009 HC RX 250: Performed by: INTERNAL MEDICINE

## 2023-01-01 PROCEDURE — 250N000013 HC RX MED GY IP 250 OP 250 PS 637: Performed by: SURGERY

## 2023-01-01 PROCEDURE — 85027 COMPLETE CBC AUTOMATED: CPT | Performed by: INTERNAL MEDICINE

## 2023-01-01 PROCEDURE — 94640 AIRWAY INHALATION TREATMENT: CPT

## 2023-01-01 PROCEDURE — P9016 RBC LEUKOCYTES REDUCED: HCPCS | Performed by: INTERNAL MEDICINE

## 2023-01-01 PROCEDURE — 83605 ASSAY OF LACTIC ACID: CPT | Performed by: INTERNAL MEDICINE

## 2023-01-01 PROCEDURE — 82565 ASSAY OF CREATININE: CPT

## 2023-01-01 PROCEDURE — 272N000452 HC KIT SHRLOCK 5FR POWER PICC TRIPLE LUMEN

## 2023-01-01 PROCEDURE — HZ2ZZZZ DETOXIFICATION SERVICES FOR SUBSTANCE ABUSE TREATMENT: ICD-10-PCS | Performed by: SURGERY

## 2023-01-01 PROCEDURE — P9016 RBC LEUKOCYTES REDUCED: HCPCS | Performed by: STUDENT IN AN ORGANIZED HEALTH CARE EDUCATION/TRAINING PROGRAM

## 2023-01-01 PROCEDURE — 258N000003 HC RX IP 258 OP 636: Performed by: SURGERY

## 2023-01-01 PROCEDURE — 80048 BASIC METABOLIC PNL TOTAL CA: CPT | Performed by: INTERNAL MEDICINE

## 2023-01-01 PROCEDURE — 250N000009 HC RX 250: Performed by: SURGERY

## 2023-01-01 PROCEDURE — 250N000011 HC RX IP 250 OP 636: Performed by: INTERNAL MEDICINE

## 2023-01-01 PROCEDURE — 83880 ASSAY OF NATRIURETIC PEPTIDE: CPT | Performed by: STUDENT IN AN ORGANIZED HEALTH CARE EDUCATION/TRAINING PROGRAM

## 2023-01-01 PROCEDURE — 250N000009 HC RX 250: Performed by: HOSPITALIST

## 2023-01-01 PROCEDURE — 250N000009 HC RX 250: Performed by: STUDENT IN AN ORGANIZED HEALTH CARE EDUCATION/TRAINING PROGRAM

## 2023-01-01 PROCEDURE — 85730 THROMBOPLASTIN TIME PARTIAL: CPT | Performed by: INTERNAL MEDICINE

## 2023-01-01 PROCEDURE — 84295 ASSAY OF SERUM SODIUM: CPT | Performed by: INTERNAL MEDICINE

## 2023-01-01 PROCEDURE — 85018 HEMOGLOBIN: CPT | Performed by: INTERNAL MEDICINE

## 2023-01-01 PROCEDURE — 999N000208 ECHOCARDIOGRAM COMPLETE

## 2023-01-01 PROCEDURE — 250N000011 HC RX IP 250 OP 636: Performed by: SURGERY

## 2023-01-01 PROCEDURE — 82947 ASSAY GLUCOSE BLOOD QUANT: CPT | Performed by: INTERNAL MEDICINE

## 2023-01-01 PROCEDURE — 250N000013 HC RX MED GY IP 250 OP 250 PS 637: Performed by: STUDENT IN AN ORGANIZED HEALTH CARE EDUCATION/TRAINING PROGRAM

## 2023-01-01 PROCEDURE — 85610 PROTHROMBIN TIME: CPT | Performed by: STUDENT IN AN ORGANIZED HEALTH CARE EDUCATION/TRAINING PROGRAM

## 2023-01-01 PROCEDURE — 250N000013 HC RX MED GY IP 250 OP 250 PS 637: Performed by: INTERNAL MEDICINE

## 2023-01-01 PROCEDURE — 82150 ASSAY OF AMYLASE: CPT | Performed by: SURGERY

## 2023-01-01 PROCEDURE — 250N000013 HC RX MED GY IP 250 OP 250 PS 637

## 2023-01-01 PROCEDURE — 99231 SBSQ HOSP IP/OBS SF/LOW 25: CPT | Performed by: SURGERY

## 2023-01-01 PROCEDURE — 71045 X-RAY EXAM CHEST 1 VIEW: CPT

## 2023-01-01 PROCEDURE — 83735 ASSAY OF MAGNESIUM: CPT | Performed by: SURGERY

## 2023-01-01 PROCEDURE — 82140 ASSAY OF AMMONIA: CPT | Performed by: SURGERY

## 2023-01-01 PROCEDURE — 84460 ALANINE AMINO (ALT) (SGPT): CPT | Performed by: STUDENT IN AN ORGANIZED HEALTH CARE EDUCATION/TRAINING PROGRAM

## 2023-01-01 PROCEDURE — 82962 GLUCOSE BLOOD TEST: CPT

## 2023-01-01 PROCEDURE — 99418 PROLNG IP/OBS E/M EA 15 MIN: CPT | Performed by: REGISTERED NURSE

## 2023-01-01 PROCEDURE — 99231 SBSQ HOSP IP/OBS SF/LOW 25: CPT | Mod: 25 | Performed by: SURGERY

## 2023-01-01 PROCEDURE — 999N000040 HC STATISTIC CONSULT NO CHARGE VASC ACCESS

## 2023-01-01 PROCEDURE — 85610 PROTHROMBIN TIME: CPT | Performed by: SURGERY

## 2023-01-01 PROCEDURE — P9059 PLASMA, FRZ BETWEEN 8-24HOUR: HCPCS | Performed by: STUDENT IN AN ORGANIZED HEALTH CARE EDUCATION/TRAINING PROGRAM

## 2023-01-01 PROCEDURE — 82803 BLOOD GASES ANY COMBINATION: CPT

## 2023-01-01 PROCEDURE — 85610 PROTHROMBIN TIME: CPT | Performed by: INTERNAL MEDICINE

## 2023-01-01 PROCEDURE — P9016 RBC LEUKOCYTES REDUCED: HCPCS | Performed by: SURGERY

## 2023-01-01 PROCEDURE — 36569 INSJ PICC 5 YR+ W/O IMAGING: CPT

## 2023-01-01 PROCEDURE — 97530 THERAPEUTIC ACTIVITIES: CPT | Mod: GP | Performed by: PHYSICAL THERAPIST

## 2023-01-01 PROCEDURE — P9012 CRYOPRECIPITATE EACH UNIT: HCPCS | Performed by: SURGERY

## 2023-01-01 PROCEDURE — P9047 ALBUMIN (HUMAN), 25%, 50ML: HCPCS | Performed by: SURGERY

## 2023-01-01 PROCEDURE — 80053 COMPREHEN METABOLIC PANEL: CPT | Performed by: SURGERY

## 2023-01-01 PROCEDURE — 86923 COMPATIBILITY TEST ELECTRIC: CPT | Performed by: SURGERY

## 2023-01-01 PROCEDURE — 999N000190 HC STATISTIC VAT ROUNDS

## 2023-01-01 PROCEDURE — 82330 ASSAY OF CALCIUM: CPT | Performed by: INTERNAL MEDICINE

## 2023-01-01 PROCEDURE — P9047 ALBUMIN (HUMAN), 25%, 50ML: HCPCS | Performed by: STUDENT IN AN ORGANIZED HEALTH CARE EDUCATION/TRAINING PROGRAM

## 2023-01-01 PROCEDURE — 84132 ASSAY OF SERUM POTASSIUM: CPT | Performed by: INTERNAL MEDICINE

## 2023-01-01 PROCEDURE — 97167 OT EVAL HIGH COMPLEX 60 MIN: CPT | Mod: GO | Performed by: OCCUPATIONAL THERAPIST

## 2023-01-01 PROCEDURE — 82803 BLOOD GASES ANY COMBINATION: CPT | Performed by: SURGERY

## 2023-01-01 PROCEDURE — P9035 PLATELET PHERES LEUKOREDUCED: HCPCS | Performed by: SURGERY

## 2023-01-01 PROCEDURE — 89051 BODY FLUID CELL COUNT: CPT | Performed by: STUDENT IN AN ORGANIZED HEALTH CARE EDUCATION/TRAINING PROGRAM

## 2023-01-01 PROCEDURE — 82803 BLOOD GASES ANY COMBINATION: CPT | Performed by: INTERNAL MEDICINE

## 2023-01-01 PROCEDURE — P9035 PLATELET PHERES LEUKOREDUCED: HCPCS | Performed by: INTERNAL MEDICINE

## 2023-01-01 PROCEDURE — G0463 HOSPITAL OUTPT CLINIC VISIT: HCPCS

## 2023-01-01 PROCEDURE — 82330 ASSAY OF CALCIUM: CPT | Performed by: SURGERY

## 2023-01-01 PROCEDURE — 84075 ASSAY ALKALINE PHOSPHATASE: CPT | Performed by: STUDENT IN AN ORGANIZED HEALTH CARE EDUCATION/TRAINING PROGRAM

## 2023-01-01 PROCEDURE — 250N000009 HC RX 250: Performed by: RADIOLOGY

## 2023-01-01 PROCEDURE — 82040 ASSAY OF SERUM ALBUMIN: CPT | Performed by: STUDENT IN AN ORGANIZED HEALTH CARE EDUCATION/TRAINING PROGRAM

## 2023-01-01 PROCEDURE — P9045 ALBUMIN (HUMAN), 5%, 250 ML: HCPCS | Performed by: INTERNAL MEDICINE

## 2023-01-01 PROCEDURE — 83735 ASSAY OF MAGNESIUM: CPT | Performed by: INTERNAL MEDICINE

## 2023-01-01 PROCEDURE — 97535 SELF CARE MNGMENT TRAINING: CPT | Mod: GO | Performed by: OCCUPATIONAL THERAPIST

## 2023-01-01 PROCEDURE — 999N000157 HC STATISTIC RCP TIME EA 10 MIN

## 2023-01-01 PROCEDURE — 85384 FIBRINOGEN ACTIVITY: CPT | Performed by: INTERNAL MEDICINE

## 2023-01-01 PROCEDURE — 81001 URINALYSIS AUTO W/SCOPE: CPT | Performed by: INTERNAL MEDICINE

## 2023-01-01 PROCEDURE — 85730 THROMBOPLASTIN TIME PARTIAL: CPT | Performed by: SURGERY

## 2023-01-01 PROCEDURE — 84100 ASSAY OF PHOSPHORUS: CPT | Performed by: STUDENT IN AN ORGANIZED HEALTH CARE EDUCATION/TRAINING PROGRAM

## 2023-01-01 PROCEDURE — 84295 ASSAY OF SERUM SODIUM: CPT | Performed by: STUDENT IN AN ORGANIZED HEALTH CARE EDUCATION/TRAINING PROGRAM

## 2023-01-01 PROCEDURE — 86923 COMPATIBILITY TEST ELECTRIC: CPT | Performed by: INTERNAL MEDICINE

## 2023-01-01 PROCEDURE — U0003 INFECTIOUS AGENT DETECTION BY NUCLEIC ACID (DNA OR RNA); SEVERE ACUTE RESPIRATORY SYNDROME CORONAVIRUS 2 (SARS-COV-2) (CORONAVIRUS DISEASE [COVID-19]), AMPLIFIED PROBE TECHNIQUE, MAKING USE OF HIGH THROUGHPUT TECHNOLOGIES AS DESCRIBED BY CMS-2020-01-R: HCPCS | Performed by: SURGERY

## 2023-01-01 PROCEDURE — 97530 THERAPEUTIC ACTIVITIES: CPT | Mod: GO | Performed by: OCCUPATIONAL THERAPIST

## 2023-01-01 PROCEDURE — P9059 PLASMA, FRZ BETWEEN 8-24HOUR: HCPCS | Performed by: SURGERY

## 2023-01-01 PROCEDURE — 86850 RBC ANTIBODY SCREEN: CPT | Performed by: INTERNAL MEDICINE

## 2023-01-01 PROCEDURE — 84100 ASSAY OF PHOSPHORUS: CPT | Performed by: INTERNAL MEDICINE

## 2023-01-01 PROCEDURE — 84157 ASSAY OF PROTEIN OTHER: CPT | Performed by: STUDENT IN AN ORGANIZED HEALTH CARE EDUCATION/TRAINING PROGRAM

## 2023-01-01 PROCEDURE — 99233 SBSQ HOSP IP/OBS HIGH 50: CPT | Mod: GC

## 2023-01-01 PROCEDURE — 86923 COMPATIBILITY TEST ELECTRIC: CPT

## 2023-01-01 PROCEDURE — 80053 COMPREHEN METABOLIC PANEL: CPT | Performed by: STUDENT IN AN ORGANIZED HEALTH CARE EDUCATION/TRAINING PROGRAM

## 2023-01-01 PROCEDURE — 258N000003 HC RX IP 258 OP 636: Performed by: INTERNAL MEDICINE

## 2023-01-01 PROCEDURE — 258N000001 HC RX 258: Performed by: SURGERY

## 2023-01-01 PROCEDURE — 82248 BILIRUBIN DIRECT: CPT | Performed by: INTERNAL MEDICINE

## 2023-01-01 PROCEDURE — 85014 HEMATOCRIT: CPT | Performed by: STUDENT IN AN ORGANIZED HEALTH CARE EDUCATION/TRAINING PROGRAM

## 2023-01-01 PROCEDURE — P9035 PLATELET PHERES LEUKOREDUCED: HCPCS

## 2023-01-01 PROCEDURE — 85396 CLOTTING ASSAY WHOLE BLOOD: CPT | Performed by: INTERNAL MEDICINE

## 2023-01-01 PROCEDURE — 99291 CRITICAL CARE FIRST HOUR: CPT | Mod: GC

## 2023-01-01 PROCEDURE — 120N000001 HC R&B MED SURG/OB

## 2023-01-01 PROCEDURE — 83690 ASSAY OF LIPASE: CPT | Performed by: SURGERY

## 2023-01-01 PROCEDURE — 99233 SBSQ HOSP IP/OBS HIGH 50: CPT

## 2023-01-01 PROCEDURE — 999N000065 XR ABDOMEN PORT 1 VIEW

## 2023-01-01 PROCEDURE — 250N000011 HC RX IP 250 OP 636

## 2023-01-01 PROCEDURE — 80048 BASIC METABOLIC PNL TOTAL CA: CPT

## 2023-01-01 PROCEDURE — P9047 ALBUMIN (HUMAN), 25%, 50ML: HCPCS

## 2023-01-01 PROCEDURE — 0W9G3ZZ DRAINAGE OF PERITONEAL CAVITY, PERCUTANEOUS APPROACH: ICD-10-PCS | Performed by: RADIOLOGY

## 2023-01-01 PROCEDURE — 93975 VASCULAR STUDY: CPT

## 2023-01-01 PROCEDURE — 82550 ASSAY OF CK (CPK): CPT | Performed by: INTERNAL MEDICINE

## 2023-01-01 PROCEDURE — 93306 TTE W/DOPPLER COMPLETE: CPT | Mod: 26 | Performed by: INTERNAL MEDICINE

## 2023-01-01 PROCEDURE — 84300 ASSAY OF URINE SODIUM: CPT | Performed by: SURGERY

## 2023-01-01 PROCEDURE — 36592 COLLECT BLOOD FROM PICC: CPT | Performed by: STUDENT IN AN ORGANIZED HEALTH CARE EDUCATION/TRAINING PROGRAM

## 2023-01-01 PROCEDURE — 89050 BODY FLUID CELL COUNT: CPT | Performed by: STUDENT IN AN ORGANIZED HEALTH CARE EDUCATION/TRAINING PROGRAM

## 2023-01-01 PROCEDURE — 0W9G3ZZ DRAINAGE OF PERITONEAL CAVITY, PERCUTANEOUS APPROACH: ICD-10-PCS | Performed by: STUDENT IN AN ORGANIZED HEALTH CARE EDUCATION/TRAINING PROGRAM

## 2023-01-01 PROCEDURE — 99223 1ST HOSP IP/OBS HIGH 75: CPT | Performed by: FAMILY MEDICINE

## 2023-01-01 PROCEDURE — 49082 ABD PARACENTESIS: CPT | Mod: GC | Performed by: STUDENT IN AN ORGANIZED HEALTH CARE EDUCATION/TRAINING PROGRAM

## 2023-01-01 PROCEDURE — 87040 BLOOD CULTURE FOR BACTERIA: CPT | Performed by: INTERNAL MEDICINE

## 2023-01-01 PROCEDURE — 87040 BLOOD CULTURE FOR BACTERIA: CPT | Performed by: STUDENT IN AN ORGANIZED HEALTH CARE EDUCATION/TRAINING PROGRAM

## 2023-01-01 PROCEDURE — G1010 CDSM STANSON: HCPCS

## 2023-01-01 PROCEDURE — 258N000003 HC RX IP 258 OP 636

## 2023-01-01 PROCEDURE — 85384 FIBRINOGEN ACTIVITY: CPT | Performed by: STUDENT IN AN ORGANIZED HEALTH CARE EDUCATION/TRAINING PROGRAM

## 2023-01-01 PROCEDURE — P9016 RBC LEUKOCYTES REDUCED: HCPCS

## 2023-01-01 PROCEDURE — 99233 SBSQ HOSP IP/OBS HIGH 50: CPT | Performed by: REGISTERED NURSE

## 2023-01-01 PROCEDURE — 36415 COLL VENOUS BLD VENIPUNCTURE: CPT | Performed by: INTERNAL MEDICINE

## 2023-01-01 PROCEDURE — 82805 BLOOD GASES W/O2 SATURATION: CPT | Performed by: SURGERY

## 2023-01-01 PROCEDURE — 84295 ASSAY OF SERUM SODIUM: CPT | Performed by: SURGERY

## 2023-01-01 PROCEDURE — 80053 COMPREHEN METABOLIC PANEL: CPT | Performed by: INTERNAL MEDICINE

## 2023-01-01 PROCEDURE — 85396 CLOTTING ASSAY WHOLE BLOOD: CPT | Performed by: STUDENT IN AN ORGANIZED HEALTH CARE EDUCATION/TRAINING PROGRAM

## 2023-01-01 PROCEDURE — 86901 BLOOD TYPING SEROLOGIC RH(D): CPT | Performed by: STUDENT IN AN ORGANIZED HEALTH CARE EDUCATION/TRAINING PROGRAM

## 2023-01-01 PROCEDURE — 0DH97UZ INSERTION OF FEEDING DEVICE INTO DUODENUM, VIA NATURAL OR ARTIFICIAL OPENING: ICD-10-PCS | Performed by: RADIOLOGY

## 2023-01-01 PROCEDURE — 84145 PROCALCITONIN (PCT): CPT | Performed by: SURGERY

## 2023-01-01 PROCEDURE — 82105 ALPHA-FETOPROTEIN SERUM: CPT | Performed by: INTERNAL MEDICINE

## 2023-01-01 PROCEDURE — 272N000001 HC OR GENERAL SUPPLY STERILE: Performed by: SURGERY

## 2023-01-01 PROCEDURE — 250N000012 HC RX MED GY IP 250 OP 636 PS 637: Performed by: SURGERY

## 2023-01-01 PROCEDURE — 85027 COMPLETE CBC AUTOMATED: CPT

## 2023-01-01 PROCEDURE — 74174 CTA ABD&PLVS W/CONTRAST: CPT | Mod: MG

## 2023-01-01 PROCEDURE — 99223 1ST HOSP IP/OBS HIGH 75: CPT | Mod: GV | Performed by: STUDENT IN AN ORGANIZED HEALTH CARE EDUCATION/TRAINING PROGRAM

## 2023-01-01 PROCEDURE — 99238 HOSP IP/OBS DSCHRG MGMT 30/<: CPT

## 2023-01-01 PROCEDURE — 85049 AUTOMATED PLATELET COUNT: CPT | Performed by: STUDENT IN AN ORGANIZED HEALTH CARE EDUCATION/TRAINING PROGRAM

## 2023-01-01 PROCEDURE — P9012 CRYOPRECIPITATE EACH UNIT: HCPCS | Performed by: INTERNAL MEDICINE

## 2023-01-01 PROCEDURE — 86923 COMPATIBILITY TEST ELECTRIC: CPT | Performed by: STUDENT IN AN ORGANIZED HEALTH CARE EDUCATION/TRAINING PROGRAM

## 2023-01-01 PROCEDURE — P9047 ALBUMIN (HUMAN), 25%, 50ML: HCPCS | Performed by: INTERNAL MEDICINE

## 2023-01-01 PROCEDURE — 87641 MR-STAPH DNA AMP PROBE: CPT | Performed by: INTERNAL MEDICINE

## 2023-01-01 PROCEDURE — P9016 RBC LEUKOCYTES REDUCED: HCPCS | Performed by: ANESTHESIOLOGY

## 2023-01-01 PROCEDURE — 86901 BLOOD TYPING SEROLOGIC RH(D): CPT | Performed by: SURGERY

## 2023-01-01 PROCEDURE — 83735 ASSAY OF MAGNESIUM: CPT

## 2023-01-01 PROCEDURE — 97161 PT EVAL LOW COMPLEX 20 MIN: CPT | Mod: GP | Performed by: PHYSICAL THERAPIST

## 2023-01-01 PROCEDURE — 83605 ASSAY OF LACTIC ACID: CPT | Performed by: SURGERY

## 2023-01-01 PROCEDURE — 84100 ASSAY OF PHOSPHORUS: CPT | Performed by: SURGERY

## 2023-01-01 PROCEDURE — 272N000706 US PARACENTESIS WITH ALBUMIN

## 2023-01-01 PROCEDURE — P9059 PLASMA, FRZ BETWEEN 8-24HOUR: HCPCS | Performed by: ANESTHESIOLOGY

## 2023-01-01 PROCEDURE — 255N000002 HC RX 255 OP 636: Performed by: INTERNAL MEDICINE

## 2023-01-01 PROCEDURE — 3E043XZ INTRODUCTION OF VASOPRESSOR INTO CENTRAL VEIN, PERCUTANEOUS APPROACH: ICD-10-PCS | Performed by: SURGERY

## 2023-01-01 PROCEDURE — 36415 COLL VENOUS BLD VENIPUNCTURE: CPT | Performed by: STUDENT IN AN ORGANIZED HEALTH CARE EDUCATION/TRAINING PROGRAM

## 2023-01-01 PROCEDURE — 99221 1ST HOSP IP/OBS SF/LOW 40: CPT | Performed by: SURGERY

## 2023-01-01 PROCEDURE — 999N000065 XR CHEST PORT 1 VIEW

## 2023-01-01 PROCEDURE — 80321 ALCOHOLS BIOMARKERS 1OR 2: CPT | Performed by: INTERNAL MEDICINE

## 2023-01-01 RX ORDER — LACTULOSE 10 G/15ML
20 SOLUTION ORAL 3 TIMES DAILY
Status: DISCONTINUED | OUTPATIENT
Start: 2023-01-01 | End: 2023-01-01

## 2023-01-01 RX ORDER — LIDOCAINE 40 MG/G
CREAM TOPICAL
Status: ACTIVE | OUTPATIENT
Start: 2023-01-01 | End: 2023-01-01

## 2023-01-01 RX ORDER — GABAPENTIN 300 MG/1
300 CAPSULE ORAL EVERY 8 HOURS
Status: COMPLETED | OUTPATIENT
Start: 2023-01-01 | End: 2023-01-01

## 2023-01-01 RX ORDER — NALOXONE HYDROCHLORIDE 0.4 MG/ML
0.4 INJECTION, SOLUTION INTRAMUSCULAR; INTRAVENOUS; SUBCUTANEOUS
Status: DISCONTINUED | OUTPATIENT
Start: 2023-01-01 | End: 2023-01-01 | Stop reason: HOSPADM

## 2023-01-01 RX ORDER — CALCIUM CHLORIDE 100 MG/ML
1 INJECTION INTRAVENOUS; INTRAVENTRICULAR ONCE
Status: COMPLETED | OUTPATIENT
Start: 2023-01-01 | End: 2023-01-01

## 2023-01-01 RX ORDER — UREA 10 %
500 LOTION (ML) TOPICAL DAILY
Status: DISCONTINUED | OUTPATIENT
Start: 2023-01-01 | End: 2023-01-01

## 2023-01-01 RX ORDER — ALLOPURINOL 300 MG/1
1 TABLET ORAL DAILY
Qty: 90 TABLET | Refills: 0 | Status: SHIPPED | OUTPATIENT
Start: 2023-01-01

## 2023-01-01 RX ORDER — NALOXONE HYDROCHLORIDE 0.4 MG/ML
0.2 INJECTION, SOLUTION INTRAMUSCULAR; INTRAVENOUS; SUBCUTANEOUS
Status: DISCONTINUED | OUTPATIENT
Start: 2023-01-01 | End: 2023-01-01 | Stop reason: HOSPADM

## 2023-01-01 RX ORDER — FOLIC ACID 1 MG/1
1 TABLET ORAL DAILY
Status: DISCONTINUED | OUTPATIENT
Start: 2023-01-01 | End: 2023-01-01

## 2023-01-01 RX ORDER — ONDANSETRON 2 MG/ML
4 INJECTION INTRAMUSCULAR; INTRAVENOUS ONCE
Status: COMPLETED | OUTPATIENT
Start: 2023-01-01 | End: 2023-01-01

## 2023-01-01 RX ORDER — DEXTROSE MONOHYDRATE 25 G/50ML
50 INJECTION, SOLUTION INTRAVENOUS ONCE
Status: COMPLETED | OUTPATIENT
Start: 2023-01-01 | End: 2023-01-01

## 2023-01-01 RX ORDER — MAGNESIUM SULFATE HEPTAHYDRATE 40 MG/ML
4 INJECTION, SOLUTION INTRAVENOUS ONCE
Status: COMPLETED | OUTPATIENT
Start: 2023-01-01 | End: 2023-01-01

## 2023-01-01 RX ORDER — SODIUM CHLORIDE, SODIUM LACTATE, POTASSIUM CHLORIDE, CALCIUM CHLORIDE 600; 310; 30; 20 MG/100ML; MG/100ML; MG/100ML; MG/100ML
INJECTION, SOLUTION INTRAVENOUS CONTINUOUS
Status: DISCONTINUED | OUTPATIENT
Start: 2023-01-01 | End: 2023-01-01

## 2023-01-01 RX ORDER — FUROSEMIDE 10 MG/ML
20 INJECTION INTRAMUSCULAR; INTRAVENOUS ONCE
Status: COMPLETED | OUTPATIENT
Start: 2023-01-01 | End: 2023-01-01

## 2023-01-01 RX ORDER — CEFTRIAXONE 2 G/1
2 INJECTION, POWDER, FOR SOLUTION INTRAMUSCULAR; INTRAVENOUS EVERY 24 HOURS
Status: DISCONTINUED | OUTPATIENT
Start: 2023-01-01 | End: 2023-01-01

## 2023-01-01 RX ORDER — TRANEXAMIC ACID 10 MG/ML
1 INJECTION, SOLUTION INTRAVENOUS ONCE
Status: COMPLETED | OUTPATIENT
Start: 2023-01-01 | End: 2023-01-01

## 2023-01-01 RX ORDER — LIDOCAINE 40 MG/G
CREAM TOPICAL EVERY 8 HOURS PRN
Status: COMPLETED | OUTPATIENT
Start: 2023-01-01 | End: 2023-01-01

## 2023-01-01 RX ORDER — FLUMAZENIL 0.1 MG/ML
0.2 INJECTION, SOLUTION INTRAVENOUS
Status: DISCONTINUED | OUTPATIENT
Start: 2023-01-01 | End: 2023-01-01 | Stop reason: HOSPADM

## 2023-01-01 RX ORDER — ROPINIROLE 0.5 MG/1
0.5 TABLET, FILM COATED ORAL DAILY PRN
Status: DISCONTINUED | OUTPATIENT
Start: 2023-01-01 | End: 2023-01-01

## 2023-01-01 RX ORDER — DEXMEDETOMIDINE HYDROCHLORIDE 4 UG/ML
.1-1.2 INJECTION, SOLUTION INTRAVENOUS CONTINUOUS
Status: DISCONTINUED | OUTPATIENT
Start: 2023-01-01 | End: 2023-01-01

## 2023-01-01 RX ORDER — BUMETANIDE 0.25 MG/ML
1 INJECTION INTRAMUSCULAR; INTRAVENOUS ONCE
Status: COMPLETED | OUTPATIENT
Start: 2023-01-01 | End: 2023-01-01

## 2023-01-01 RX ORDER — LACTULOSE 10 G/15ML
20 SOLUTION ORAL 2 TIMES DAILY
Status: DISCONTINUED | OUTPATIENT
Start: 2023-01-01 | End: 2023-01-01

## 2023-01-01 RX ORDER — ALBUMIN (HUMAN) 12.5 G/50ML
25 SOLUTION INTRAVENOUS ONCE
Status: COMPLETED | OUTPATIENT
Start: 2023-01-01 | End: 2023-01-01

## 2023-01-01 RX ORDER — HALOPERIDOL 5 MG/ML
2.5-5 INJECTION INTRAMUSCULAR EVERY 4 HOURS PRN
Status: DISCONTINUED | OUTPATIENT
Start: 2023-01-01 | End: 2023-01-01 | Stop reason: HOSPADM

## 2023-01-01 RX ORDER — SODIUM CHLORIDE 9 MG/ML
INJECTION, SOLUTION INTRAVENOUS CONTINUOUS
Status: DISCONTINUED | OUTPATIENT
Start: 2023-01-01 | End: 2023-01-01

## 2023-01-01 RX ORDER — GLYCOPYRROLATE 0.2 MG/ML
0.1 INJECTION, SOLUTION INTRAMUSCULAR; INTRAVENOUS EVERY 4 HOURS PRN
Status: DISCONTINUED | OUTPATIENT
Start: 2023-01-01 | End: 2023-01-01 | Stop reason: HOSPADM

## 2023-01-01 RX ORDER — LIDOCAINE HYDROCHLORIDE 20 MG/ML
JELLY TOPICAL ONCE
Status: COMPLETED | OUTPATIENT
Start: 2023-01-01 | End: 2023-01-01

## 2023-01-01 RX ORDER — CEFAZOLIN SODIUM IN 0.9 % NACL 3 G/100 ML
3 INTRAVENOUS SOLUTION, PIGGYBACK (ML) INTRAVENOUS SEE ADMIN INSTRUCTIONS
Status: CANCELLED | OUTPATIENT
Start: 2023-01-01

## 2023-01-01 RX ORDER — OXYCODONE HCL 5 MG/5 ML
5 SOLUTION, ORAL ORAL EVERY 6 HOURS
Status: DISCONTINUED | OUTPATIENT
Start: 2023-01-01 | End: 2023-01-01

## 2023-01-01 RX ORDER — LORAZEPAM 2 MG/ML
0.25 INJECTION INTRAMUSCULAR
Status: COMPLETED | OUTPATIENT
Start: 2023-01-01 | End: 2023-01-01

## 2023-01-01 RX ORDER — DIAZEPAM 5 MG
10 TABLET ORAL EVERY 30 MIN PRN
Status: DISCONTINUED | OUTPATIENT
Start: 2023-01-01 | End: 2023-01-01

## 2023-01-01 RX ORDER — ONDANSETRON 2 MG/ML
4 INJECTION INTRAMUSCULAR; INTRAVENOUS EVERY 6 HOURS PRN
Status: DISCONTINUED | OUTPATIENT
Start: 2023-01-01 | End: 2023-01-01 | Stop reason: HOSPADM

## 2023-01-01 RX ORDER — NOREPINEPHRINE BITARTRATE 0.02 MG/ML
.01-.6 INJECTION, SOLUTION INTRAVENOUS CONTINUOUS
Status: DISCONTINUED | OUTPATIENT
Start: 2023-01-01 | End: 2023-01-01

## 2023-01-01 RX ORDER — LACTULOSE 10 G/15ML
20 SOLUTION ORAL EVERY 6 HOURS
Status: DISCONTINUED | OUTPATIENT
Start: 2023-01-01 | End: 2023-01-01

## 2023-01-01 RX ORDER — ONDANSETRON 4 MG/1
4 TABLET, ORALLY DISINTEGRATING ORAL EVERY 6 HOURS PRN
Status: DISCONTINUED | OUTPATIENT
Start: 2023-01-01 | End: 2023-01-01 | Stop reason: HOSPADM

## 2023-01-01 RX ORDER — FUROSEMIDE 10 MG/ML
60 INJECTION INTRAMUSCULAR; INTRAVENOUS ONCE
Status: COMPLETED | OUTPATIENT
Start: 2023-01-01 | End: 2023-01-01

## 2023-01-01 RX ORDER — IOPAMIDOL 755 MG/ML
135 INJECTION, SOLUTION INTRAVASCULAR ONCE
Status: COMPLETED | OUTPATIENT
Start: 2023-01-01 | End: 2023-01-01

## 2023-01-01 RX ORDER — LACTULOSE 10 G/15ML
20 SOLUTION ORAL DAILY
Status: DISCONTINUED | OUTPATIENT
Start: 2023-01-01 | End: 2023-01-01

## 2023-01-01 RX ORDER — HYDROMORPHONE HYDROCHLORIDE 1 MG/ML
0.5 INJECTION, SOLUTION INTRAMUSCULAR; INTRAVENOUS; SUBCUTANEOUS ONCE
Status: COMPLETED | OUTPATIENT
Start: 2023-01-01 | End: 2023-01-01

## 2023-01-01 RX ORDER — CARBOXYMETHYLCELLULOSE SODIUM 5 MG/ML
1 SOLUTION/ DROPS OPHTHALMIC EVERY 8 HOURS PRN
Status: DISCONTINUED | OUTPATIENT
Start: 2023-01-01 | End: 2023-01-01 | Stop reason: HOSPADM

## 2023-01-01 RX ORDER — LACTULOSE 10 G/15ML
20 SOLUTION ORAL 2 TIMES DAILY
Status: CANCELLED | OUTPATIENT
Start: 2023-01-01

## 2023-01-01 RX ORDER — ACETAMINOPHEN 325 MG/1
650 TABLET ORAL EVERY 6 HOURS PRN
Status: DISCONTINUED | OUTPATIENT
Start: 2023-01-01 | End: 2023-01-01

## 2023-01-01 RX ORDER — MULTIPLE VITAMINS W/ MINERALS TAB 9MG-400MCG
1 TAB ORAL DAILY
Status: DISCONTINUED | OUTPATIENT
Start: 2023-01-01 | End: 2023-01-01

## 2023-01-01 RX ORDER — HYDROMORPHONE HYDROCHLORIDE 1 MG/ML
0.3 INJECTION, SOLUTION INTRAMUSCULAR; INTRAVENOUS; SUBCUTANEOUS EVERY 30 MIN PRN
Status: DISCONTINUED | OUTPATIENT
Start: 2023-01-01 | End: 2023-01-01

## 2023-01-01 RX ORDER — ALBUMIN (HUMAN) 12.5 G/50ML
25 SOLUTION INTRAVENOUS EVERY 6 HOURS
Status: DISCONTINUED | OUTPATIENT
Start: 2023-01-01 | End: 2023-01-01

## 2023-01-01 RX ORDER — DIAZEPAM 10 MG/2ML
5-10 INJECTION, SOLUTION INTRAMUSCULAR; INTRAVENOUS EVERY 30 MIN PRN
Status: DISCONTINUED | OUTPATIENT
Start: 2023-01-01 | End: 2023-01-01

## 2023-01-01 RX ORDER — METOPROLOL TARTRATE 1 MG/ML
5 INJECTION, SOLUTION INTRAVENOUS EVERY 6 HOURS PRN
Status: DISCONTINUED | OUTPATIENT
Start: 2023-01-01 | End: 2023-01-01

## 2023-01-01 RX ORDER — HYDROMORPHONE HYDROCHLORIDE 1 MG/ML
0.5 INJECTION, SOLUTION INTRAMUSCULAR; INTRAVENOUS; SUBCUTANEOUS
Status: DISCONTINUED | OUTPATIENT
Start: 2023-01-01 | End: 2023-01-01 | Stop reason: HOSPADM

## 2023-01-01 RX ORDER — GABAPENTIN 300 MG/1
900 CAPSULE ORAL EVERY 8 HOURS
Status: COMPLETED | OUTPATIENT
Start: 2023-01-01 | End: 2023-01-01

## 2023-01-01 RX ORDER — ZOLPIDEM TARTRATE 5 MG/1
TABLET ORAL
Qty: 30 TABLET | Refills: 0 | OUTPATIENT
Start: 2023-01-01

## 2023-01-01 RX ORDER — NICOTINE POLACRILEX 4 MG
15-30 LOZENGE BUCCAL
Status: DISCONTINUED | OUTPATIENT
Start: 2023-01-01 | End: 2023-01-01 | Stop reason: HOSPADM

## 2023-01-01 RX ORDER — HYDROXYZINE HYDROCHLORIDE 25 MG/1
25 TABLET, FILM COATED ORAL 2 TIMES DAILY PRN
COMMUNITY

## 2023-01-01 RX ORDER — ROPINIROLE 0.5 MG/1
0.5 TABLET, FILM COATED ORAL DAILY PRN
COMMUNITY

## 2023-01-01 RX ORDER — DEXTROSE MONOHYDRATE 25 G/50ML
25-50 INJECTION, SOLUTION INTRAVENOUS
Status: DISCONTINUED | OUTPATIENT
Start: 2023-01-01 | End: 2023-01-01 | Stop reason: HOSPADM

## 2023-01-01 RX ORDER — LORAZEPAM 2 MG/ML
1 INJECTION INTRAMUSCULAR EVERY 10 MIN PRN
Status: DISCONTINUED | OUTPATIENT
Start: 2023-01-01 | End: 2023-01-01 | Stop reason: HOSPADM

## 2023-01-01 RX ORDER — CIPROFLOXACIN 2 MG/ML
400 INJECTION, SOLUTION INTRAVENOUS EVERY 12 HOURS
Status: DISCONTINUED | OUTPATIENT
Start: 2023-01-01 | End: 2023-01-01

## 2023-01-01 RX ORDER — ALBUMIN (HUMAN) 12.5 G/50ML
50 SOLUTION INTRAVENOUS ONCE
Status: COMPLETED | OUTPATIENT
Start: 2023-01-01 | End: 2023-01-01

## 2023-01-01 RX ORDER — LORAZEPAM 2 MG/ML
1 INJECTION INTRAMUSCULAR EVERY 10 MIN PRN
Status: DISCONTINUED | OUTPATIENT
Start: 2023-01-01 | End: 2023-01-01

## 2023-01-01 RX ORDER — HYDROMORPHONE HCL IN WATER/PF 6 MG/30 ML
0.2 PATIENT CONTROLLED ANALGESIA SYRINGE INTRAVENOUS
Status: DISCONTINUED | OUTPATIENT
Start: 2023-01-01 | End: 2023-01-01

## 2023-01-01 RX ORDER — GABAPENTIN 100 MG/1
100 CAPSULE ORAL EVERY 8 HOURS
Status: COMPLETED | OUTPATIENT
Start: 2023-01-01 | End: 2023-01-01

## 2023-01-01 RX ORDER — GABAPENTIN 600 MG/1
1200 TABLET ORAL ONCE
Status: COMPLETED | OUTPATIENT
Start: 2023-01-01 | End: 2023-01-01

## 2023-01-01 RX ORDER — NAPHAZOLINE/ZINC SULF/GLYCERIN 0.012-0.25
1 DROPS OPHTHALMIC (EYE) PRN
COMMUNITY

## 2023-01-01 RX ORDER — CLONIDINE HYDROCHLORIDE 0.1 MG/1
0.1 TABLET ORAL EVERY 8 HOURS
Status: DISCONTINUED | OUTPATIENT
Start: 2023-01-01 | End: 2023-01-01

## 2023-01-01 RX ORDER — DIAZEPAM 5 MG
5 TABLET ORAL EVERY 6 HOURS PRN
Status: DISCONTINUED | OUTPATIENT
Start: 2023-01-01 | End: 2023-01-01

## 2023-01-01 RX ORDER — MIDODRINE HYDROCHLORIDE 5 MG/1
5 TABLET ORAL
Status: DISCONTINUED | OUTPATIENT
Start: 2023-01-01 | End: 2023-01-01

## 2023-01-01 RX ORDER — METOCLOPRAMIDE HYDROCHLORIDE 5 MG/ML
10 INJECTION INTRAMUSCULAR; INTRAVENOUS ONCE
Status: COMPLETED | OUTPATIENT
Start: 2023-01-01 | End: 2023-01-01

## 2023-01-01 RX ORDER — ALLOPURINOL 300 MG/1
1 TABLET ORAL DAILY
Qty: 90 TABLET | Refills: 0 | Status: SHIPPED | OUTPATIENT
Start: 2023-01-01 | End: 2023-01-01

## 2023-01-01 RX ORDER — FOLIC ACID 5 MG/ML
1 INJECTION, SOLUTION INTRAMUSCULAR; INTRAVENOUS; SUBCUTANEOUS ONCE
Status: COMPLETED | OUTPATIENT
Start: 2023-01-01 | End: 2023-01-01

## 2023-01-01 RX ORDER — SULFAMETHOXAZOLE/TRIMETHOPRIM 800-160 MG
1 TABLET ORAL DAILY
Status: DISCONTINUED | OUTPATIENT
Start: 2023-01-01 | End: 2023-01-01

## 2023-01-01 RX ORDER — PIPERACILLIN SODIUM, TAZOBACTAM SODIUM 4; .5 G/20ML; G/20ML
4.5 INJECTION, POWDER, LYOPHILIZED, FOR SOLUTION INTRAVENOUS EVERY 6 HOURS
Status: DISCONTINUED | OUTPATIENT
Start: 2023-01-01 | End: 2023-01-01

## 2023-01-01 RX ORDER — FOLIC ACID 5 MG/ML
1 INJECTION, SOLUTION INTRAMUSCULAR; INTRAVENOUS; SUBCUTANEOUS DAILY
Status: COMPLETED | OUTPATIENT
Start: 2023-01-01 | End: 2023-01-01

## 2023-01-01 RX ORDER — DIAZEPAM 10 MG/2ML
5 INJECTION, SOLUTION INTRAMUSCULAR; INTRAVENOUS EVERY 6 HOURS
Status: DISCONTINUED | OUTPATIENT
Start: 2023-01-01 | End: 2023-01-01

## 2023-01-01 RX ORDER — ROPINIROLE 0.5 MG/1
1 TABLET, FILM COATED ORAL AT BEDTIME
COMMUNITY

## 2023-01-01 RX ORDER — DIPHENHYDRAMINE HYDROCHLORIDE 50 MG/ML
25 INJECTION INTRAMUSCULAR; INTRAVENOUS EVERY 6 HOURS PRN
Status: DISCONTINUED | OUTPATIENT
Start: 2023-01-01 | End: 2023-01-01 | Stop reason: HOSPADM

## 2023-01-01 RX ORDER — LEVALBUTEROL INHALATION SOLUTION 1.25 MG/3ML
0.63 SOLUTION RESPIRATORY (INHALATION) ONCE
Status: COMPLETED | OUTPATIENT
Start: 2023-01-01 | End: 2023-01-01

## 2023-01-01 RX ORDER — FUROSEMIDE 10 MG/ML
80 INJECTION INTRAMUSCULAR; INTRAVENOUS ONCE
Status: COMPLETED | OUTPATIENT
Start: 2023-01-01 | End: 2023-01-01

## 2023-01-01 RX ORDER — ATROPINE SULFATE 10 MG/ML
1 SOLUTION/ DROPS OPHTHALMIC
Status: DISCONTINUED | OUTPATIENT
Start: 2023-01-01 | End: 2023-01-01 | Stop reason: HOSPADM

## 2023-01-01 RX ORDER — DEXTROSE MONOHYDRATE 100 MG/ML
INJECTION, SOLUTION INTRAVENOUS CONTINUOUS PRN
Status: DISCONTINUED | OUTPATIENT
Start: 2023-01-01 | End: 2023-01-01

## 2023-01-01 RX ORDER — LIDOCAINE 40 MG/G
CREAM TOPICAL EVERY 8 HOURS PRN
Status: DISCONTINUED | OUTPATIENT
Start: 2023-01-01 | End: 2023-01-01 | Stop reason: HOSPADM

## 2023-01-01 RX ORDER — GABAPENTIN 300 MG/1
600 CAPSULE ORAL EVERY 8 HOURS
Status: COMPLETED | OUTPATIENT
Start: 2023-01-01 | End: 2023-01-01

## 2023-01-01 RX ORDER — HYDROMORPHONE HYDROCHLORIDE 1 MG/ML
0.3 INJECTION, SOLUTION INTRAMUSCULAR; INTRAVENOUS; SUBCUTANEOUS EVERY 10 MIN PRN
Status: DISCONTINUED | OUTPATIENT
Start: 2023-01-01 | End: 2023-01-01

## 2023-01-01 RX ORDER — LIDOCAINE 5% 5 G/100G
CREAM TOPICAL EVERY 8 HOURS PRN
COMMUNITY

## 2023-01-01 RX ORDER — LIDOCAINE HYDROCHLORIDE 20 MG/ML
JELLY TOPICAL EVERY 4 HOURS PRN
Status: DISCONTINUED | OUTPATIENT
Start: 2023-01-01 | End: 2023-01-01 | Stop reason: HOSPADM

## 2023-01-01 RX ORDER — HYDROMORPHONE HYDROCHLORIDE 1 MG/ML
.3-.5 INJECTION, SOLUTION INTRAMUSCULAR; INTRAVENOUS; SUBCUTANEOUS
Status: DISCONTINUED | OUTPATIENT
Start: 2023-01-01 | End: 2023-01-01

## 2023-01-01 RX ORDER — LIDOCAINE HYDROCHLORIDE 10 MG/ML
10 INJECTION, SOLUTION EPIDURAL; INFILTRATION; INTRACAUDAL; PERINEURAL ONCE
Status: COMPLETED | OUTPATIENT
Start: 2023-01-01 | End: 2023-01-01

## 2023-01-01 RX ORDER — CEFAZOLIN SODIUM IN 0.9 % NACL 3 G/100 ML
3 INTRAVENOUS SOLUTION, PIGGYBACK (ML) INTRAVENOUS
Status: CANCELLED | OUTPATIENT
Start: 2023-01-01

## 2023-01-01 RX ORDER — ROPINIROLE 1 MG/1
1 TABLET, FILM COATED ORAL AT BEDTIME
Status: DISCONTINUED | OUTPATIENT
Start: 2023-01-01 | End: 2023-01-01

## 2023-01-01 RX ORDER — PIPERACILLIN SODIUM, TAZOBACTAM SODIUM 3; .375 G/15ML; G/15ML
3.38 INJECTION, POWDER, LYOPHILIZED, FOR SOLUTION INTRAVENOUS EVERY 6 HOURS
Status: DISCONTINUED | OUTPATIENT
Start: 2023-01-01 | End: 2023-01-01

## 2023-01-01 RX ORDER — SODIUM CHLORIDE, SODIUM LACTATE, POTASSIUM CHLORIDE, CALCIUM CHLORIDE 600; 310; 30; 20 MG/100ML; MG/100ML; MG/100ML; MG/100ML
INJECTION, SOLUTION INTRAVENOUS CONTINUOUS
Status: CANCELLED | OUTPATIENT
Start: 2023-01-01

## 2023-01-01 RX ORDER — LIDOCAINE 40 MG/G
CREAM TOPICAL ONCE
Status: COMPLETED | OUTPATIENT
Start: 2023-01-01 | End: 2023-01-01

## 2023-01-01 RX ORDER — LANOLIN ALCOHOL/MO/W.PET/CERES
3 CREAM (GRAM) TOPICAL
Status: DISCONTINUED | OUTPATIENT
Start: 2023-01-01 | End: 2023-01-01

## 2023-01-01 RX ORDER — ONDANSETRON 2 MG/ML
INJECTION INTRAMUSCULAR; INTRAVENOUS
Status: COMPLETED
Start: 2023-01-01 | End: 2023-01-01

## 2023-01-01 RX ORDER — TRANEXAMIC ACID 10 MG/ML
125 INJECTION, SOLUTION INTRAVENOUS CONTINUOUS
Status: DISCONTINUED | OUTPATIENT
Start: 2023-01-01 | End: 2023-01-01

## 2023-01-01 RX ORDER — LACTULOSE 10 G/15ML
10 SOLUTION ORAL DAILY
Status: DISCONTINUED | OUTPATIENT
Start: 2023-01-01 | End: 2023-01-01

## 2023-01-01 RX ADMIN — HYDROMORPHONE HYDROCHLORIDE 0.5 MG: 1 INJECTION, SOLUTION INTRAMUSCULAR; INTRAVENOUS; SUBCUTANEOUS at 01:59

## 2023-01-01 RX ADMIN — GABAPENTIN 300 MG: 300 CAPSULE ORAL at 08:44

## 2023-01-01 RX ADMIN — HUMAN ALBUMIN MICROSPHERES AND PERFLUTREN 3 ML: 10; .22 INJECTION, SOLUTION INTRAVENOUS at 09:57

## 2023-01-01 RX ADMIN — Medication 500 MCG: at 16:16

## 2023-01-01 RX ADMIN — RIFAXIMIN 550 MG: 550 TABLET ORAL at 08:03

## 2023-01-01 RX ADMIN — GABAPENTIN 300 MG: 300 CAPSULE ORAL at 10:30

## 2023-01-01 RX ADMIN — RIFAXIMIN 550 MG: 550 TABLET ORAL at 09:36

## 2023-01-01 RX ADMIN — DEXTROSE MONOHYDRATE 50 ML: 25 INJECTION, SOLUTION INTRAVENOUS at 18:39

## 2023-01-01 RX ADMIN — SODIUM CHLORIDE, POTASSIUM CHLORIDE, SODIUM LACTATE AND CALCIUM CHLORIDE 50 ML/HR: 600; 310; 30; 20 INJECTION, SOLUTION INTRAVENOUS at 14:24

## 2023-01-01 RX ADMIN — HYDROMORPHONE HYDROCHLORIDE 0.5 MG: 1 INJECTION, SOLUTION INTRAMUSCULAR; INTRAVENOUS; SUBCUTANEOUS at 12:05

## 2023-01-01 RX ADMIN — HYDROMORPHONE HYDROCHLORIDE 0.5 MG: 1 INJECTION, SOLUTION INTRAMUSCULAR; INTRAVENOUS; SUBCUTANEOUS at 20:50

## 2023-01-01 RX ADMIN — FOLIC ACID 1 MG: 5 INJECTION, SOLUTION INTRAMUSCULAR; INTRAVENOUS; SUBCUTANEOUS at 10:47

## 2023-01-01 RX ADMIN — LACTULOSE 20 G: 20 SOLUTION ORAL at 16:45

## 2023-01-01 RX ADMIN — MIDODRINE HYDROCHLORIDE 5 MG: 5 TABLET ORAL at 12:00

## 2023-01-01 RX ADMIN — DIPHENHYDRAMINE HYDROCHLORIDE 25 MG: 50 INJECTION, SOLUTION INTRAMUSCULAR; INTRAVENOUS at 08:41

## 2023-01-01 RX ADMIN — LORAZEPAM 1 MG: 2 INJECTION INTRAMUSCULAR; INTRAVENOUS at 19:22

## 2023-01-01 RX ADMIN — Medication 0.08 MCG/KG/MIN: at 11:38

## 2023-01-01 RX ADMIN — FOLIC ACID 1 MG: 1 TABLET ORAL at 09:36

## 2023-01-01 RX ADMIN — LIDOCAINE 4%: 4 CREAM TOPICAL at 19:30

## 2023-01-01 RX ADMIN — ALBUMIN HUMAN 25 G: 0.25 SOLUTION INTRAVENOUS at 14:40

## 2023-01-01 RX ADMIN — BUMETANIDE 1 MG: 0.25 INJECTION INTRAMUSCULAR; INTRAVENOUS at 14:31

## 2023-01-01 RX ADMIN — DIAZEPAM 10 MG: 5 TABLET ORAL at 08:04

## 2023-01-01 RX ADMIN — RIFAXIMIN 550 MG: 550 TABLET ORAL at 08:04

## 2023-01-01 RX ADMIN — MULTIPLE VITAMINS W/ MINERALS TAB 1 TABLET: TAB at 08:45

## 2023-01-01 RX ADMIN — PIPERACILLIN AND TAZOBACTAM 3.38 G: 3; .375 INJECTION, POWDER, FOR SOLUTION INTRAVENOUS at 03:49

## 2023-01-01 RX ADMIN — FAMOTIDINE 20 MG: 10 INJECTION INTRAVENOUS at 08:01

## 2023-01-01 RX ADMIN — MIDODRINE HYDROCHLORIDE 5 MG: 5 TABLET ORAL at 17:49

## 2023-01-01 RX ADMIN — THIAMINE HYDROCHLORIDE 200 MG: 100 INJECTION, SOLUTION INTRAMUSCULAR; INTRAVENOUS at 22:08

## 2023-01-01 RX ADMIN — LACTULOSE 20 G: 20 SOLUTION ORAL at 09:40

## 2023-01-01 RX ADMIN — PIPERACILLIN AND TAZOBACTAM 3.38 G: 3; .375 INJECTION, POWDER, FOR SOLUTION INTRAVENOUS at 03:58

## 2023-01-01 RX ADMIN — HYDROMORPHONE HYDROCHLORIDE 0.5 MG: 1 INJECTION, SOLUTION INTRAMUSCULAR; INTRAVENOUS; SUBCUTANEOUS at 16:00

## 2023-01-01 RX ADMIN — PIPERACILLIN AND TAZOBACTAM 4.5 G: 4; .5 INJECTION, POWDER, FOR SOLUTION INTRAVENOUS at 03:32

## 2023-01-01 RX ADMIN — CALCIUM GLUCONATE 4 G: 98 INJECTION, SOLUTION INTRAVENOUS at 22:50

## 2023-01-01 RX ADMIN — HYDROMORPHONE HYDROCHLORIDE 0.5 MG: 1 INJECTION, SOLUTION INTRAMUSCULAR; INTRAVENOUS; SUBCUTANEOUS at 23:42

## 2023-01-01 RX ADMIN — DIAZEPAM 10 MG: 5 INJECTION INTRAMUSCULAR; INTRAVENOUS at 06:43

## 2023-01-01 RX ADMIN — VASOPRESSIN: 20 INJECTION, SOLUTION INTRAVENOUS at 19:37

## 2023-01-01 RX ADMIN — Medication 0.14 MCG/KG/MIN: at 07:08

## 2023-01-01 RX ADMIN — THIAMINE HCL TAB 100 MG 100 MG: 100 TAB at 09:40

## 2023-01-01 RX ADMIN — RIFAXIMIN 550 MG: 550 TABLET ORAL at 21:38

## 2023-01-01 RX ADMIN — HYDROMORPHONE HYDROCHLORIDE 0.3 MG: 1 INJECTION, SOLUTION INTRAMUSCULAR; INTRAVENOUS; SUBCUTANEOUS at 14:05

## 2023-01-01 RX ADMIN — GABAPENTIN 600 MG: 300 CAPSULE ORAL at 08:32

## 2023-01-01 RX ADMIN — PIPERACILLIN AND TAZOBACTAM 3.38 G: 3; .375 INJECTION, POWDER, FOR SOLUTION INTRAVENOUS at 15:29

## 2023-01-01 RX ADMIN — LIDOCAINE 4%: 4 CREAM TOPICAL at 13:29

## 2023-01-01 RX ADMIN — FOLIC ACID 1 MG: 1 TABLET ORAL at 08:32

## 2023-01-01 RX ADMIN — RIFAXIMIN 550 MG: 550 TABLET ORAL at 09:41

## 2023-01-01 RX ADMIN — METOCLOPRAMIDE 10 MG: 5 INJECTION, SOLUTION INTRAMUSCULAR; INTRAVENOUS at 13:41

## 2023-01-01 RX ADMIN — CIPROFLOXACIN 400 MG: 2 INJECTION, SOLUTION INTRAVENOUS at 03:46

## 2023-01-01 RX ADMIN — OXYCODONE HYDROCHLORIDE 5 MG: 5 SOLUTION ORAL at 19:40

## 2023-01-01 RX ADMIN — THIAMINE HCL TAB 100 MG 100 MG: 100 TAB at 08:33

## 2023-01-01 RX ADMIN — DIAZEPAM 10 MG: 5 TABLET ORAL at 12:59

## 2023-01-01 RX ADMIN — RIFAXIMIN 550 MG: 550 TABLET ORAL at 08:01

## 2023-01-01 RX ADMIN — PIPERACILLIN AND TAZOBACTAM 3.38 G: 3; .375 INJECTION, POWDER, FOR SOLUTION INTRAVENOUS at 21:55

## 2023-01-01 RX ADMIN — Medication 500 MCG: at 11:26

## 2023-01-01 RX ADMIN — LIDOCAINE HYDROCHLORIDE: 20 JELLY TOPICAL at 09:37

## 2023-01-01 RX ADMIN — HYDROMORPHONE HYDROCHLORIDE 0.5 MG: 1 INJECTION, SOLUTION INTRAMUSCULAR; INTRAVENOUS; SUBCUTANEOUS at 16:02

## 2023-01-01 RX ADMIN — GABAPENTIN 900 MG: 300 CAPSULE ORAL at 11:26

## 2023-01-01 RX ADMIN — FUROSEMIDE 20 MG: 10 INJECTION, SOLUTION INTRAMUSCULAR; INTRAVENOUS at 03:10

## 2023-01-01 RX ADMIN — DIPHENHYDRAMINE HYDROCHLORIDE 25 MG: 50 INJECTION, SOLUTION INTRAMUSCULAR; INTRAVENOUS at 01:59

## 2023-01-01 RX ADMIN — MIDODRINE HYDROCHLORIDE 5 MG: 5 TABLET ORAL at 08:00

## 2023-01-01 RX ADMIN — CEFTRIAXONE SODIUM 2 G: 2 INJECTION, POWDER, FOR SOLUTION INTRAMUSCULAR; INTRAVENOUS at 14:13

## 2023-01-01 RX ADMIN — HYDROMORPHONE HYDROCHLORIDE 0.5 MG: 1 INJECTION, SOLUTION INTRAMUSCULAR; INTRAVENOUS; SUBCUTANEOUS at 11:14

## 2023-01-01 RX ADMIN — HYDROMORPHONE HYDROCHLORIDE 0.5 MG: 1 INJECTION, SOLUTION INTRAMUSCULAR; INTRAVENOUS; SUBCUTANEOUS at 02:08

## 2023-01-01 RX ADMIN — DIPHENHYDRAMINE HYDROCHLORIDE 25 MG: 50 INJECTION, SOLUTION INTRAMUSCULAR; INTRAVENOUS at 09:24

## 2023-01-01 RX ADMIN — FAMOTIDINE 20 MG: 10 INJECTION INTRAVENOUS at 20:36

## 2023-01-01 RX ADMIN — LIDOCAINE 4%: 4 CREAM TOPICAL at 07:48

## 2023-01-01 RX ADMIN — OXYCODONE HYDROCHLORIDE 5 MG: 5 SOLUTION ORAL at 14:53

## 2023-01-01 RX ADMIN — HYDROMORPHONE HYDROCHLORIDE 0.5 MG: 1 INJECTION, SOLUTION INTRAMUSCULAR; INTRAVENOUS; SUBCUTANEOUS at 10:01

## 2023-01-01 RX ADMIN — CIPROFLOXACIN 400 MG: 2 INJECTION, SOLUTION INTRAVENOUS at 03:14

## 2023-01-01 RX ADMIN — HYDROMORPHONE HYDROCHLORIDE 0.5 MG: 1 INJECTION, SOLUTION INTRAMUSCULAR; INTRAVENOUS; SUBCUTANEOUS at 01:53

## 2023-01-01 RX ADMIN — FAMOTIDINE 20 MG: 10 INJECTION INTRAVENOUS at 08:36

## 2023-01-01 RX ADMIN — Medication 500 MCG: at 08:44

## 2023-01-01 RX ADMIN — FUROSEMIDE 80 MG: 10 INJECTION, SOLUTION INTRAVENOUS at 13:55

## 2023-01-01 RX ADMIN — MAGNESIUM SULFATE HEPTAHYDRATE 4 G: 40 INJECTION, SOLUTION INTRAVENOUS at 09:08

## 2023-01-01 RX ADMIN — ALBUMIN HUMAN 25 G: 0.05 INJECTION, SOLUTION INTRAVENOUS at 08:54

## 2023-01-01 RX ADMIN — CIPROFLOXACIN 400 MG: 2 INJECTION, SOLUTION INTRAVENOUS at 15:01

## 2023-01-01 RX ADMIN — MIDODRINE HYDROCHLORIDE 5 MG: 5 TABLET ORAL at 12:09

## 2023-01-01 RX ADMIN — DIPHENHYDRAMINE HYDROCHLORIDE 25 MG: 50 INJECTION, SOLUTION INTRAMUSCULAR; INTRAVENOUS at 17:32

## 2023-01-01 RX ADMIN — LACTULOSE 20 G: 20 SOLUTION ORAL at 03:59

## 2023-01-01 RX ADMIN — HYDROMORPHONE HYDROCHLORIDE 0.5 MG: 1 INJECTION, SOLUTION INTRAMUSCULAR; INTRAVENOUS; SUBCUTANEOUS at 08:26

## 2023-01-01 RX ADMIN — HYDROMORPHONE HYDROCHLORIDE 0.5 MG: 1 INJECTION, SOLUTION INTRAMUSCULAR; INTRAVENOUS; SUBCUTANEOUS at 19:16

## 2023-01-01 RX ADMIN — GABAPENTIN 600 MG: 300 CAPSULE ORAL at 16:50

## 2023-01-01 RX ADMIN — LIDOCAINE HYDROCHLORIDE: 20 JELLY TOPICAL at 18:16

## 2023-01-01 RX ADMIN — FAMOTIDINE 20 MG: 10 INJECTION INTRAVENOUS at 20:25

## 2023-01-01 RX ADMIN — PIPERACILLIN AND TAZOBACTAM 3.38 G: 3; .375 INJECTION, POWDER, FOR SOLUTION INTRAVENOUS at 15:19

## 2023-01-01 RX ADMIN — ONDANSETRON 4 MG: 2 INJECTION INTRAMUSCULAR; INTRAVENOUS at 15:26

## 2023-01-01 RX ADMIN — FOLIC ACID 1 MG: 1 TABLET ORAL at 08:26

## 2023-01-01 RX ADMIN — FUROSEMIDE 80 MG: 10 INJECTION, SOLUTION INTRAVENOUS at 09:36

## 2023-01-01 RX ADMIN — Medication 500 MCG: at 08:04

## 2023-01-01 RX ADMIN — DEXTROSE MONOHYDRATE 50 ML: 25 INJECTION, SOLUTION INTRAVENOUS at 19:03

## 2023-01-01 RX ADMIN — HYDROMORPHONE HYDROCHLORIDE 0.5 MG: 1 INJECTION, SOLUTION INTRAMUSCULAR; INTRAVENOUS; SUBCUTANEOUS at 06:47

## 2023-01-01 RX ADMIN — RIFAXIMIN 550 MG: 550 TABLET ORAL at 20:20

## 2023-01-01 RX ADMIN — LORAZEPAM 0.25 MG: 2 INJECTION INTRAMUSCULAR; INTRAVENOUS at 01:22

## 2023-01-01 RX ADMIN — PIPERACILLIN AND TAZOBACTAM 3.38 G: 3; .375 INJECTION, POWDER, FOR SOLUTION INTRAVENOUS at 20:40

## 2023-01-01 RX ADMIN — PIPERACILLIN AND TAZOBACTAM 3.38 G: 3; .375 INJECTION, POWDER, FOR SOLUTION INTRAVENOUS at 21:41

## 2023-01-01 RX ADMIN — HYDROMORPHONE HYDROCHLORIDE 0.5 MG: 1 INJECTION, SOLUTION INTRAMUSCULAR; INTRAVENOUS; SUBCUTANEOUS at 21:53

## 2023-01-01 RX ADMIN — THIAMINE HCL TAB 100 MG 100 MG: 100 TAB at 08:27

## 2023-01-01 RX ADMIN — MULTIPLE VITAMINS W/ MINERALS TAB 1 TABLET: TAB at 08:01

## 2023-01-01 RX ADMIN — HYDROMORPHONE HYDROCHLORIDE 0.5 MG: 1 INJECTION, SOLUTION INTRAMUSCULAR; INTRAVENOUS; SUBCUTANEOUS at 18:22

## 2023-01-01 RX ADMIN — FUROSEMIDE 20 MG: 10 INJECTION, SOLUTION INTRAMUSCULAR; INTRAVENOUS at 00:07

## 2023-01-01 RX ADMIN — MIDODRINE HYDROCHLORIDE 5 MG: 5 TABLET ORAL at 08:04

## 2023-01-01 RX ADMIN — SODIUM CHLORIDE, POTASSIUM CHLORIDE, SODIUM LACTATE AND CALCIUM CHLORIDE 500 ML: 600; 310; 30; 20 INJECTION, SOLUTION INTRAVENOUS at 02:06

## 2023-01-01 RX ADMIN — THIAMINE HCL TAB 100 MG 100 MG: 100 TAB at 10:30

## 2023-01-01 RX ADMIN — HYDROMORPHONE HYDROCHLORIDE 0.5 MG: 1 INJECTION, SOLUTION INTRAMUSCULAR; INTRAVENOUS; SUBCUTANEOUS at 12:09

## 2023-01-01 RX ADMIN — SODIUM CHLORIDE 250 ML: 9 INJECTION, SOLUTION INTRAVENOUS at 14:40

## 2023-01-01 RX ADMIN — LACTULOSE 20 G: 20 SOLUTION ORAL at 11:23

## 2023-01-01 RX ADMIN — DIAZEPAM 10 MG: 5 INJECTION INTRAMUSCULAR; INTRAVENOUS at 05:53

## 2023-01-01 RX ADMIN — Medication 5 MG: at 08:53

## 2023-01-01 RX ADMIN — Medication 500 MCG: at 08:33

## 2023-01-01 RX ADMIN — HYDROMORPHONE HYDROCHLORIDE 0.5 MG: 1 INJECTION, SOLUTION INTRAMUSCULAR; INTRAVENOUS; SUBCUTANEOUS at 09:07

## 2023-01-01 RX ADMIN — PIPERACILLIN AND TAZOBACTAM 4.5 G: 4; .5 INJECTION, POWDER, FOR SOLUTION INTRAVENOUS at 18:53

## 2023-01-01 RX ADMIN — FOLIC ACID 1 MG: 1 TABLET ORAL at 09:41

## 2023-01-01 RX ADMIN — HYDROMORPHONE HYDROCHLORIDE 0.5 MG: 1 INJECTION, SOLUTION INTRAMUSCULAR; INTRAVENOUS; SUBCUTANEOUS at 22:04

## 2023-01-01 RX ADMIN — Medication 500 MCG: at 08:00

## 2023-01-01 RX ADMIN — FAMOTIDINE 20 MG: 10 INJECTION INTRAVENOUS at 08:05

## 2023-01-01 RX ADMIN — RIFAXIMIN 550 MG: 550 TABLET ORAL at 21:51

## 2023-01-01 RX ADMIN — FAMOTIDINE 20 MG: 10 INJECTION INTRAVENOUS at 20:20

## 2023-01-01 RX ADMIN — GABAPENTIN 900 MG: 300 CAPSULE ORAL at 16:35

## 2023-01-01 RX ADMIN — THIAMINE HCL TAB 100 MG 100 MG: 100 TAB at 17:37

## 2023-01-01 RX ADMIN — ROPINIROLE 0.5 MG: 0.5 TABLET, FILM COATED ORAL at 16:45

## 2023-01-01 RX ADMIN — HALOPERIDOL LACTATE 5 MG: 5 INJECTION, SOLUTION INTRAMUSCULAR at 19:32

## 2023-01-01 RX ADMIN — PIPERACILLIN AND TAZOBACTAM 4.5 G: 4; .5 INJECTION, POWDER, FOR SOLUTION INTRAVENOUS at 10:37

## 2023-01-01 RX ADMIN — FAMOTIDINE 20 MG: 10 INJECTION INTRAVENOUS at 09:37

## 2023-01-01 RX ADMIN — FAMOTIDINE 20 MG: 10 INJECTION INTRAVENOUS at 20:41

## 2023-01-01 RX ADMIN — RIFAXIMIN 550 MG: 550 TABLET ORAL at 08:32

## 2023-01-01 RX ADMIN — MULTIPLE VITAMINS W/ MINERALS TAB 1 TABLET: TAB at 09:41

## 2023-01-01 RX ADMIN — DIAZEPAM 5 MG: 5 TABLET ORAL at 11:30

## 2023-01-01 RX ADMIN — HYDROMORPHONE HYDROCHLORIDE 0.5 MG: 1 INJECTION, SOLUTION INTRAMUSCULAR; INTRAVENOUS; SUBCUTANEOUS at 19:32

## 2023-01-01 RX ADMIN — MULTIPLE VITAMINS W/ MINERALS TAB 1 TABLET: TAB at 12:11

## 2023-01-01 RX ADMIN — FUROSEMIDE 20 MG: 10 INJECTION, SOLUTION INTRAMUSCULAR; INTRAVENOUS at 12:14

## 2023-01-01 RX ADMIN — FUROSEMIDE 20 MG: 10 INJECTION, SOLUTION INTRAMUSCULAR; INTRAVENOUS at 12:18

## 2023-01-01 RX ADMIN — HYDROMORPHONE HYDROCHLORIDE 0.5 MG: 1 INJECTION, SOLUTION INTRAMUSCULAR; INTRAVENOUS; SUBCUTANEOUS at 15:39

## 2023-01-01 RX ADMIN — RIFAXIMIN 550 MG: 550 TABLET ORAL at 12:10

## 2023-01-01 RX ADMIN — FUROSEMIDE 60 MG: 10 INJECTION, SOLUTION INTRAMUSCULAR; INTRAVENOUS at 08:38

## 2023-01-01 RX ADMIN — THIAMINE HCL TAB 100 MG 100 MG: 100 TAB at 09:36

## 2023-01-01 RX ADMIN — FAMOTIDINE 20 MG: 10 INJECTION INTRAVENOUS at 08:46

## 2023-01-01 RX ADMIN — FOLIC ACID 1 MG: 1 TABLET ORAL at 10:30

## 2023-01-01 RX ADMIN — HYDROMORPHONE HYDROCHLORIDE 0.5 MG: 1 INJECTION, SOLUTION INTRAMUSCULAR; INTRAVENOUS; SUBCUTANEOUS at 20:36

## 2023-01-01 RX ADMIN — GABAPENTIN 600 MG: 300 CAPSULE ORAL at 02:06

## 2023-01-01 RX ADMIN — FOLIC ACID 1 MG: 5 INJECTION, SOLUTION INTRAMUSCULAR; INTRAVENOUS; SUBCUTANEOUS at 08:44

## 2023-01-01 RX ADMIN — RIFAXIMIN 550 MG: 550 TABLET ORAL at 20:25

## 2023-01-01 RX ADMIN — HYDROMORPHONE HYDROCHLORIDE 0.5 MG: 1 INJECTION, SOLUTION INTRAMUSCULAR; INTRAVENOUS; SUBCUTANEOUS at 13:41

## 2023-01-01 RX ADMIN — HYDROMORPHONE HYDROCHLORIDE 0.5 MG: 1 INJECTION, SOLUTION INTRAMUSCULAR; INTRAVENOUS; SUBCUTANEOUS at 13:08

## 2023-01-01 RX ADMIN — MULTIPLE VITAMINS W/ MINERALS TAB 1 TABLET: TAB at 10:30

## 2023-01-01 RX ADMIN — LIDOCAINE 4%: 4 CREAM TOPICAL at 11:05

## 2023-01-01 RX ADMIN — HYDROMORPHONE HYDROCHLORIDE 0.5 MG: 1 INJECTION, SOLUTION INTRAMUSCULAR; INTRAVENOUS; SUBCUTANEOUS at 17:06

## 2023-01-01 RX ADMIN — GABAPENTIN 100 MG: 100 CAPSULE ORAL at 09:41

## 2023-01-01 RX ADMIN — HYDROMORPHONE HYDROCHLORIDE 0.5 MG: 1 INJECTION, SOLUTION INTRAMUSCULAR; INTRAVENOUS; SUBCUTANEOUS at 03:14

## 2023-01-01 RX ADMIN — CEFTRIAXONE SODIUM 2 G: 2 INJECTION, POWDER, FOR SOLUTION INTRAMUSCULAR; INTRAVENOUS at 13:06

## 2023-01-01 RX ADMIN — ATROPINE SULFATE 1 DROP: 10 SOLUTION/ DROPS OPHTHALMIC at 02:58

## 2023-01-01 RX ADMIN — Medication 5 MG: at 09:13

## 2023-01-01 RX ADMIN — HYDROMORPHONE HYDROCHLORIDE 0.5 MG: 1 INJECTION, SOLUTION INTRAMUSCULAR; INTRAVENOUS; SUBCUTANEOUS at 08:52

## 2023-01-01 RX ADMIN — HYDROMORPHONE HYDROCHLORIDE 0.5 MG: 1 INJECTION, SOLUTION INTRAMUSCULAR; INTRAVENOUS; SUBCUTANEOUS at 07:34

## 2023-01-01 RX ADMIN — Medication 0.14 MCG/KG/MIN: at 19:31

## 2023-01-01 RX ADMIN — LACTULOSE 20 G: 20 SOLUTION ORAL at 22:21

## 2023-01-01 RX ADMIN — GABAPENTIN 600 MG: 300 CAPSULE ORAL at 15:30

## 2023-01-01 RX ADMIN — HYDROMORPHONE HYDROCHLORIDE 0.5 MG: 1 INJECTION, SOLUTION INTRAMUSCULAR; INTRAVENOUS; SUBCUTANEOUS at 08:43

## 2023-01-01 RX ADMIN — ALBUMIN HUMAN 25 G: 0.25 SOLUTION INTRAVENOUS at 21:49

## 2023-01-01 RX ADMIN — PIPERACILLIN AND TAZOBACTAM 3.38 G: 3; .375 INJECTION, POWDER, FOR SOLUTION INTRAVENOUS at 16:14

## 2023-01-01 RX ADMIN — THIAMINE HYDROCHLORIDE 200 MG: 100 INJECTION, SOLUTION INTRAMUSCULAR; INTRAVENOUS at 18:04

## 2023-01-01 RX ADMIN — LIDOCAINE HYDROCHLORIDE: 20 JELLY TOPICAL at 03:14

## 2023-01-01 RX ADMIN — FAMOTIDINE 20 MG: 10 INJECTION INTRAVENOUS at 10:32

## 2023-01-01 RX ADMIN — SODIUM CHLORIDE 10 UNITS: 9 INJECTION, SOLUTION INTRAVENOUS at 19:04

## 2023-01-01 RX ADMIN — HYDROMORPHONE HYDROCHLORIDE 0.5 MG: 1 INJECTION, SOLUTION INTRAMUSCULAR; INTRAVENOUS; SUBCUTANEOUS at 02:28

## 2023-01-01 RX ADMIN — THIAMINE HYDROCHLORIDE 200 MG: 100 INJECTION, SOLUTION INTRAMUSCULAR; INTRAVENOUS at 22:45

## 2023-01-01 RX ADMIN — MIDODRINE HYDROCHLORIDE 5 MG: 5 TABLET ORAL at 12:40

## 2023-01-01 RX ADMIN — Medication 0.13 MCG/KG/MIN: at 02:54

## 2023-01-01 RX ADMIN — GABAPENTIN 100 MG: 100 CAPSULE ORAL at 23:30

## 2023-01-01 RX ADMIN — HYDROMORPHONE HYDROCHLORIDE 0.5 MG: 1 INJECTION, SOLUTION INTRAMUSCULAR; INTRAVENOUS; SUBCUTANEOUS at 03:32

## 2023-01-01 RX ADMIN — THIAMINE HCL TAB 100 MG 100 MG: 100 TAB at 08:52

## 2023-01-01 RX ADMIN — HYDROMORPHONE HYDROCHLORIDE 0.5 MG: 1 INJECTION, SOLUTION INTRAMUSCULAR; INTRAVENOUS; SUBCUTANEOUS at 20:27

## 2023-01-01 RX ADMIN — MIDODRINE HYDROCHLORIDE 5 MG: 5 TABLET ORAL at 17:38

## 2023-01-01 RX ADMIN — LIDOCAINE HYDROCHLORIDE: 20 JELLY TOPICAL at 15:39

## 2023-01-01 RX ADMIN — Medication 0.05 MCG/KG/MIN: at 14:44

## 2023-01-01 RX ADMIN — LIDOCAINE 4%: 4 CREAM TOPICAL at 21:57

## 2023-01-01 RX ADMIN — PIPERACILLIN AND TAZOBACTAM 3.38 G: 3; .375 INJECTION, POWDER, FOR SOLUTION INTRAVENOUS at 21:51

## 2023-01-01 RX ADMIN — ALBUMIN HUMAN 25 G: 0.25 SOLUTION INTRAVENOUS at 02:39

## 2023-01-01 RX ADMIN — LIDOCAINE HYDROCHLORIDE: 20 JELLY TOPICAL at 08:04

## 2023-01-01 RX ADMIN — LIDOCAINE HYDROCHLORIDE: 20 JELLY TOPICAL at 13:47

## 2023-01-01 RX ADMIN — PIPERACILLIN AND TAZOBACTAM 3.38 G: 3; .375 INJECTION, POWDER, FOR SOLUTION INTRAVENOUS at 02:58

## 2023-01-01 RX ADMIN — FAMOTIDINE 20 MG: 10 INJECTION INTRAVENOUS at 08:27

## 2023-01-01 RX ADMIN — LORAZEPAM 1 MG: 2 INJECTION INTRAMUSCULAR; INTRAVENOUS at 02:29

## 2023-01-01 RX ADMIN — TRANEXAMIC ACID 125 MG/HR: 10 INJECTION, SOLUTION INTRAVENOUS at 03:28

## 2023-01-01 RX ADMIN — DIAZEPAM 5 MG: 5 INJECTION INTRAMUSCULAR; INTRAVENOUS at 18:06

## 2023-01-01 RX ADMIN — ROPINIROLE HYDROCHLORIDE 1 MG: 1 TABLET, FILM COATED ORAL at 21:38

## 2023-01-01 RX ADMIN — MELATONIN TAB 3 MG 3 MG: 3 TAB at 20:41

## 2023-01-01 RX ADMIN — GABAPENTIN 600 MG: 300 CAPSULE ORAL at 00:53

## 2023-01-01 RX ADMIN — CIPROFLOXACIN 400 MG: 2 INJECTION, SOLUTION INTRAVENOUS at 15:13

## 2023-01-01 RX ADMIN — OXYCODONE HYDROCHLORIDE 5 MG: 5 SOLUTION ORAL at 13:52

## 2023-01-01 RX ADMIN — Medication 0.02 MCG/KG/MIN: at 00:53

## 2023-01-01 RX ADMIN — LACTULOSE 20 G: 20 SOLUTION ORAL at 08:45

## 2023-01-01 RX ADMIN — HYDROMORPHONE HYDROCHLORIDE 0.5 MG: 1 INJECTION, SOLUTION INTRAMUSCULAR; INTRAVENOUS; SUBCUTANEOUS at 20:00

## 2023-01-01 RX ADMIN — DIAZEPAM 10 MG: 5 INJECTION INTRAMUSCULAR; INTRAVENOUS at 22:08

## 2023-01-01 RX ADMIN — FAMOTIDINE 20 MG: 10 INJECTION INTRAVENOUS at 21:29

## 2023-01-01 RX ADMIN — FOLIC ACID 1 MG: 5 INJECTION, SOLUTION INTRAMUSCULAR; INTRAVENOUS; SUBCUTANEOUS at 22:44

## 2023-01-01 RX ADMIN — BUMETANIDE 1 MG: 0.25 INJECTION INTRAMUSCULAR; INTRAVENOUS at 15:01

## 2023-01-01 RX ADMIN — FAMOTIDINE 20 MG: 10 INJECTION INTRAVENOUS at 08:52

## 2023-01-01 RX ADMIN — DIAZEPAM 5 MG: 5 TABLET ORAL at 12:09

## 2023-01-01 RX ADMIN — RIFAXIMIN 550 MG: 550 TABLET ORAL at 08:52

## 2023-01-01 RX ADMIN — PIPERACILLIN AND TAZOBACTAM 4.5 G: 4; .5 INJECTION, POWDER, FOR SOLUTION INTRAVENOUS at 03:55

## 2023-01-01 RX ADMIN — DIAZEPAM 5 MG: 5 TABLET ORAL at 20:41

## 2023-01-01 RX ADMIN — HYDROMORPHONE HYDROCHLORIDE 0.5 MG: 1 INJECTION, SOLUTION INTRAMUSCULAR; INTRAVENOUS; SUBCUTANEOUS at 09:25

## 2023-01-01 RX ADMIN — THIAMINE HCL TAB 100 MG 100 MG: 100 TAB at 08:00

## 2023-01-01 RX ADMIN — LACTULOSE 10 G: 20 SOLUTION ORAL at 08:28

## 2023-01-01 RX ADMIN — SODIUM CHLORIDE 85 ML: 9 INJECTION, SOLUTION INTRAVENOUS at 17:44

## 2023-01-01 RX ADMIN — THIAMINE HYDROCHLORIDE 200 MG: 100 INJECTION, SOLUTION INTRAMUSCULAR; INTRAVENOUS at 12:22

## 2023-01-01 RX ADMIN — FAMOTIDINE 20 MG: 10 INJECTION INTRAVENOUS at 09:15

## 2023-01-01 RX ADMIN — THIAMINE HCL TAB 100 MG 100 MG: 100 TAB at 11:27

## 2023-01-01 RX ADMIN — PIPERACILLIN AND TAZOBACTAM 3.38 G: 3; .375 INJECTION, POWDER, FOR SOLUTION INTRAVENOUS at 16:02

## 2023-01-01 RX ADMIN — MIDODRINE HYDROCHLORIDE 5 MG: 5 TABLET ORAL at 08:32

## 2023-01-01 RX ADMIN — HYDROMORPHONE HYDROCHLORIDE 0.2 MG: 0.2 INJECTION, SOLUTION INTRAMUSCULAR; INTRAVENOUS; SUBCUTANEOUS at 20:34

## 2023-01-01 RX ADMIN — THIAMINE HCL TAB 100 MG 100 MG: 100 TAB at 16:43

## 2023-01-01 RX ADMIN — ALBUMIN HUMAN 50 G: 0.25 SOLUTION INTRAVENOUS at 14:11

## 2023-01-01 RX ADMIN — FAMOTIDINE 20 MG: 10 INJECTION INTRAVENOUS at 21:23

## 2023-01-01 RX ADMIN — LACTULOSE 20 G: 20 SOLUTION ORAL at 09:56

## 2023-01-01 RX ADMIN — LIDOCAINE 4%: 4 CREAM TOPICAL at 13:11

## 2023-01-01 RX ADMIN — HYDROMORPHONE HYDROCHLORIDE 0.5 MG: 1 INJECTION, SOLUTION INTRAMUSCULAR; INTRAVENOUS; SUBCUTANEOUS at 17:50

## 2023-01-01 RX ADMIN — DIAZEPAM 5 MG: 5 TABLET ORAL at 23:43

## 2023-01-01 RX ADMIN — Medication 0.03 MCG/KG/MIN: at 20:05

## 2023-01-01 RX ADMIN — HYDROMORPHONE HYDROCHLORIDE 0.3 MG: 1 INJECTION, SOLUTION INTRAMUSCULAR; INTRAVENOUS; SUBCUTANEOUS at 13:55

## 2023-01-01 RX ADMIN — HYDROMORPHONE HYDROCHLORIDE 0.5 MG: 1 INJECTION, SOLUTION INTRAMUSCULAR; INTRAVENOUS; SUBCUTANEOUS at 19:25

## 2023-01-01 RX ADMIN — FOLIC ACID 1 MG: 1 TABLET ORAL at 08:33

## 2023-01-01 RX ADMIN — SODIUM CHLORIDE: 9 INJECTION, SOLUTION INTRAVENOUS at 10:55

## 2023-01-01 RX ADMIN — TRANEXAMIC ACID 125 MG/HR: 10 INJECTION, SOLUTION INTRAVENOUS at 19:16

## 2023-01-01 RX ADMIN — LIDOCAINE: 40 CREAM TOPICAL at 04:09

## 2023-01-01 RX ADMIN — PIPERACILLIN AND TAZOBACTAM 4.5 G: 4; .5 INJECTION, POWDER, FOR SOLUTION INTRAVENOUS at 16:35

## 2023-01-01 RX ADMIN — GABAPENTIN 600 MG: 300 CAPSULE ORAL at 09:36

## 2023-01-01 RX ADMIN — FAMOTIDINE 20 MG: 10 INJECTION INTRAVENOUS at 21:24

## 2023-01-01 RX ADMIN — DIPHENHYDRAMINE HYDROCHLORIDE 25 MG: 50 INJECTION, SOLUTION INTRAMUSCULAR; INTRAVENOUS at 14:36

## 2023-01-01 RX ADMIN — PIPERACILLIN AND TAZOBACTAM 3.38 G: 3; .375 INJECTION, POWDER, FOR SOLUTION INTRAVENOUS at 02:39

## 2023-01-01 RX ADMIN — RIFAXIMIN 550 MG: 550 TABLET ORAL at 10:30

## 2023-01-01 RX ADMIN — FAMOTIDINE 20 MG: 10 INJECTION INTRAVENOUS at 08:04

## 2023-01-01 RX ADMIN — RIFAXIMIN 550 MG: 550 TABLET ORAL at 20:01

## 2023-01-01 RX ADMIN — PIPERACILLIN AND TAZOBACTAM 3.38 G: 3; .375 INJECTION, POWDER, FOR SOLUTION INTRAVENOUS at 10:50

## 2023-01-01 RX ADMIN — OXYCODONE HYDROCHLORIDE 5 MG: 5 SOLUTION ORAL at 21:30

## 2023-01-01 RX ADMIN — DEXMEDETOMIDINE HYDROCHLORIDE 0.2 MCG/KG/HR: 400 INJECTION INTRAVENOUS at 07:47

## 2023-01-01 RX ADMIN — RIFAXIMIN 550 MG: 550 TABLET ORAL at 23:32

## 2023-01-01 RX ADMIN — Medication 0.3 MG/HR: at 11:12

## 2023-01-01 RX ADMIN — Medication 5 MG: at 11:09

## 2023-01-01 RX ADMIN — HYDROMORPHONE HYDROCHLORIDE 0.5 MG: 1 INJECTION, SOLUTION INTRAMUSCULAR; INTRAVENOUS; SUBCUTANEOUS at 07:19

## 2023-01-01 RX ADMIN — THIAMINE HCL TAB 100 MG 100 MG: 100 TAB at 21:37

## 2023-01-01 RX ADMIN — Medication 0.05 MCG/KG/MIN: at 00:46

## 2023-01-01 RX ADMIN — THIAMINE HCL TAB 100 MG 100 MG: 100 TAB at 15:18

## 2023-01-01 RX ADMIN — PIPERACILLIN AND TAZOBACTAM 4.5 G: 4; .5 INJECTION, POWDER, FOR SOLUTION INTRAVENOUS at 10:01

## 2023-01-01 RX ADMIN — PIPERACILLIN AND TAZOBACTAM 3.38 G: 3; .375 INJECTION, POWDER, FOR SOLUTION INTRAVENOUS at 03:59

## 2023-01-01 RX ADMIN — DIAZEPAM 10 MG: 5 INJECTION INTRAMUSCULAR; INTRAVENOUS at 23:45

## 2023-01-01 RX ADMIN — MIDODRINE HYDROCHLORIDE 5 MG: 5 TABLET ORAL at 08:52

## 2023-01-01 RX ADMIN — PIPERACILLIN AND TAZOBACTAM 3.38 G: 3; .375 INJECTION, POWDER, FOR SOLUTION INTRAVENOUS at 22:22

## 2023-01-01 RX ADMIN — Medication 500 MCG: at 09:36

## 2023-01-01 RX ADMIN — LACTULOSE 10 G: 20 SOLUTION ORAL at 12:12

## 2023-01-01 RX ADMIN — LIDOCAINE HYDROCHLORIDE: 20 JELLY TOPICAL at 20:44

## 2023-01-01 RX ADMIN — MULTIPLE VITAMINS W/ MINERALS TAB 1 TABLET: TAB at 09:36

## 2023-01-01 RX ADMIN — THIAMINE HCL TAB 100 MG 100 MG: 100 TAB at 22:19

## 2023-01-01 RX ADMIN — RIFAXIMIN 550 MG: 550 TABLET ORAL at 20:41

## 2023-01-01 RX ADMIN — THIAMINE HCL TAB 100 MG 100 MG: 100 TAB at 15:29

## 2023-01-01 RX ADMIN — LACTULOSE 20 G: 20 SOLUTION ORAL at 15:18

## 2023-01-01 RX ADMIN — THIAMINE HCL TAB 100 MG 100 MG: 100 TAB at 16:50

## 2023-01-01 RX ADMIN — RIFAXIMIN 550 MG: 550 TABLET ORAL at 08:33

## 2023-01-01 RX ADMIN — THIAMINE HCL TAB 100 MG 100 MG: 100 TAB at 08:45

## 2023-01-01 RX ADMIN — LORAZEPAM 1 MG: 2 INJECTION INTRAMUSCULAR; INTRAVENOUS at 20:04

## 2023-01-01 RX ADMIN — LIDOCAINE HYDROCHLORIDE: 20 JELLY TOPICAL at 18:48

## 2023-01-01 RX ADMIN — Medication 5 MG: at 11:37

## 2023-01-01 RX ADMIN — Medication 500 MCG: at 08:32

## 2023-01-01 RX ADMIN — GABAPENTIN 100 MG: 100 CAPSULE ORAL at 00:52

## 2023-01-01 RX ADMIN — FAMOTIDINE 20 MG: 10 INJECTION INTRAVENOUS at 21:42

## 2023-01-01 RX ADMIN — FOLIC ACID 1 MG: 1 TABLET ORAL at 08:52

## 2023-01-01 RX ADMIN — OXYCODONE HYDROCHLORIDE 5 MG: 5 SOLUTION ORAL at 01:59

## 2023-01-01 RX ADMIN — GABAPENTIN 100 MG: 100 CAPSULE ORAL at 23:32

## 2023-01-01 RX ADMIN — DIAZEPAM 10 MG: 5 TABLET ORAL at 18:39

## 2023-01-01 RX ADMIN — HYDROMORPHONE HYDROCHLORIDE 0.5 MG: 1 INJECTION, SOLUTION INTRAMUSCULAR; INTRAVENOUS; SUBCUTANEOUS at 01:46

## 2023-01-01 RX ADMIN — SULFAMETHOXAZOLE AND TRIMETHOPRIM 1 TABLET: 800; 160 TABLET ORAL at 08:45

## 2023-01-01 RX ADMIN — ALBUMIN HUMAN 50 G: 0.25 SOLUTION INTRAVENOUS at 21:21

## 2023-01-01 RX ADMIN — PIPERACILLIN AND TAZOBACTAM 4.5 G: 4; .5 INJECTION, POWDER, FOR SOLUTION INTRAVENOUS at 22:47

## 2023-01-01 RX ADMIN — Medication 0.16 MCG/KG/MIN: at 10:48

## 2023-01-01 RX ADMIN — LEVALBUTEROL HYDROCHLORIDE 0.63 MG: 1.25 SOLUTION RESPIRATORY (INHALATION) at 20:45

## 2023-01-01 RX ADMIN — LIDOCAINE HYDROCHLORIDE ANHYDROUS 1 ML: 10 INJECTION, SOLUTION INFILTRATION at 16:48

## 2023-01-01 RX ADMIN — MELATONIN TAB 3 MG 3 MG: 3 TAB at 23:42

## 2023-01-01 RX ADMIN — RIFAXIMIN 550 MG: 550 TABLET ORAL at 11:26

## 2023-01-01 RX ADMIN — FOLIC ACID 1 MG: 1 TABLET ORAL at 08:45

## 2023-01-01 RX ADMIN — Medication 5 MG: at 08:52

## 2023-01-01 RX ADMIN — HYDROMORPHONE HYDROCHLORIDE 0.5 MG: 1 INJECTION, SOLUTION INTRAMUSCULAR; INTRAVENOUS; SUBCUTANEOUS at 06:01

## 2023-01-01 RX ADMIN — BUMETANIDE 1 MG: 0.25 INJECTION INTRAMUSCULAR; INTRAVENOUS at 09:25

## 2023-01-01 RX ADMIN — DIPHENHYDRAMINE HYDROCHLORIDE 25 MG: 50 INJECTION, SOLUTION INTRAMUSCULAR; INTRAVENOUS at 20:04

## 2023-01-01 RX ADMIN — LACTULOSE 20 G: 20 SOLUTION ORAL at 08:32

## 2023-01-01 RX ADMIN — FAMOTIDINE 20 MG: 10 INJECTION INTRAVENOUS at 20:39

## 2023-01-01 RX ADMIN — RIFAXIMIN 550 MG: 550 TABLET ORAL at 08:45

## 2023-01-01 RX ADMIN — GABAPENTIN 100 MG: 100 CAPSULE ORAL at 08:05

## 2023-01-01 RX ADMIN — SULFAMETHOXAZOLE AND TRIMETHOPRIM 1 TABLET: 800; 160 TABLET ORAL at 08:32

## 2023-01-01 RX ADMIN — PIPERACILLIN AND TAZOBACTAM 3.38 G: 3; .375 INJECTION, POWDER, FOR SOLUTION INTRAVENOUS at 09:44

## 2023-01-01 RX ADMIN — LORAZEPAM 0.25 MG: 2 INJECTION INTRAMUSCULAR; INTRAVENOUS at 01:06

## 2023-01-01 RX ADMIN — GABAPENTIN 900 MG: 300 CAPSULE ORAL at 08:04

## 2023-01-01 RX ADMIN — IOPAMIDOL 135 ML: 755 INJECTION, SOLUTION INTRAVENOUS at 17:43

## 2023-01-01 RX ADMIN — Medication 500 MCG: at 10:30

## 2023-01-01 RX ADMIN — HYDROMORPHONE HYDROCHLORIDE 0.5 MG: 1 INJECTION, SOLUTION INTRAMUSCULAR; INTRAVENOUS; SUBCUTANEOUS at 14:49

## 2023-01-01 RX ADMIN — LIDOCAINE HYDROCHLORIDE: 20 JELLY TOPICAL at 12:16

## 2023-01-01 RX ADMIN — PIPERACILLIN AND TAZOBACTAM 3.38 G: 3; .375 INJECTION, POWDER, FOR SOLUTION INTRAVENOUS at 20:34

## 2023-01-01 RX ADMIN — RIFAXIMIN 550 MG: 550 TABLET ORAL at 21:27

## 2023-01-01 RX ADMIN — Medication 500 MCG: at 09:41

## 2023-01-01 RX ADMIN — MIDODRINE HYDROCHLORIDE 5 MG: 5 TABLET ORAL at 08:27

## 2023-01-01 RX ADMIN — GABAPENTIN 100 MG: 100 CAPSULE ORAL at 08:32

## 2023-01-01 RX ADMIN — DIAZEPAM 10 MG: 5 INJECTION INTRAMUSCULAR; INTRAVENOUS at 07:08

## 2023-01-01 RX ADMIN — RIFAXIMIN 550 MG: 550 TABLET ORAL at 08:26

## 2023-01-01 RX ADMIN — FAMOTIDINE 20 MG: 10 INJECTION INTRAVENOUS at 08:32

## 2023-01-01 RX ADMIN — FUROSEMIDE 60 MG: 10 INJECTION, SOLUTION INTRAMUSCULAR; INTRAVENOUS at 16:48

## 2023-01-01 RX ADMIN — DIAZEPAM 5 MG: 5 INJECTION INTRAMUSCULAR; INTRAVENOUS at 12:15

## 2023-01-01 RX ADMIN — CALCIUM CHLORIDE 1 G: 100 INJECTION INTRAVENOUS; INTRAVENTRICULAR at 18:59

## 2023-01-01 RX ADMIN — THIAMINE HCL TAB 100 MG 100 MG: 100 TAB at 22:51

## 2023-01-01 RX ADMIN — MIDODRINE HYDROCHLORIDE 5 MG: 5 TABLET ORAL at 18:21

## 2023-01-01 RX ADMIN — MIDODRINE HYDROCHLORIDE 5 MG: 5 TABLET ORAL at 09:41

## 2023-01-01 RX ADMIN — MULTIPLE VITAMINS W/ MINERALS TAB 1 TABLET: TAB at 08:52

## 2023-01-01 RX ADMIN — HYDROMORPHONE HYDROCHLORIDE 0.5 MG: 1 INJECTION, SOLUTION INTRAMUSCULAR; INTRAVENOUS; SUBCUTANEOUS at 06:02

## 2023-01-01 RX ADMIN — MULTIPLE VITAMINS W/ MINERALS TAB 1 TABLET: TAB at 08:04

## 2023-01-01 RX ADMIN — Medication 0.1 MCG/KG/MIN: at 22:29

## 2023-01-01 RX ADMIN — DIAZEPAM 5 MG: 5 TABLET ORAL at 08:01

## 2023-01-01 RX ADMIN — PIPERACILLIN AND TAZOBACTAM 3.38 G: 3; .375 INJECTION, POWDER, FOR SOLUTION INTRAVENOUS at 04:01

## 2023-01-01 RX ADMIN — LACTULOSE 20 G: 20 SOLUTION ORAL at 20:01

## 2023-01-01 RX ADMIN — GABAPENTIN 900 MG: 300 CAPSULE ORAL at 21:37

## 2023-01-01 RX ADMIN — THIAMINE HYDROCHLORIDE 200 MG: 100 INJECTION, SOLUTION INTRAMUSCULAR; INTRAVENOUS at 10:47

## 2023-01-01 RX ADMIN — LACTULOSE 10 G: 20 SOLUTION ORAL at 23:32

## 2023-01-01 RX ADMIN — PIPERACILLIN AND TAZOBACTAM 3.38 G: 3; .375 INJECTION, POWDER, FOR SOLUTION INTRAVENOUS at 21:26

## 2023-01-01 RX ADMIN — MULTIPLE VITAMINS W/ MINERALS TAB 1 TABLET: TAB at 08:26

## 2023-01-01 RX ADMIN — ROPINIROLE 0.5 MG: 0.5 TABLET, FILM COATED ORAL at 08:52

## 2023-01-01 RX ADMIN — LIDOCAINE 4%: 4 CREAM TOPICAL at 03:57

## 2023-01-01 RX ADMIN — CYANOCOBALAMIN TAB 500 MCG 500 MCG: 500 TAB at 08:27

## 2023-01-01 RX ADMIN — LIDOCAINE 4%: 4 CREAM TOPICAL at 15:44

## 2023-01-01 RX ADMIN — DIAZEPAM 10 MG: 5 INJECTION INTRAMUSCULAR; INTRAVENOUS at 07:58

## 2023-01-01 RX ADMIN — HYDROMORPHONE HYDROCHLORIDE 0.5 MG: 1 INJECTION, SOLUTION INTRAMUSCULAR; INTRAVENOUS; SUBCUTANEOUS at 18:21

## 2023-01-01 RX ADMIN — GABAPENTIN 900 MG: 300 CAPSULE ORAL at 20:24

## 2023-01-01 RX ADMIN — PIPERACILLIN AND TAZOBACTAM 3.38 G: 3; .375 INJECTION, POWDER, FOR SOLUTION INTRAVENOUS at 15:30

## 2023-01-01 RX ADMIN — MULTIPLE VITAMINS W/ MINERALS TAB 1 TABLET: TAB at 08:33

## 2023-01-01 RX ADMIN — LACTULOSE 20 G: 20 SOLUTION ORAL at 16:14

## 2023-01-01 RX ADMIN — OXYCODONE HYDROCHLORIDE 5 MG: 5 SOLUTION ORAL at 08:27

## 2023-01-01 RX ADMIN — SODIUM BICARBONATE 50 MEQ: 84 INJECTION, SOLUTION INTRAVENOUS at 19:04

## 2023-01-01 RX ADMIN — Medication 0.05 MCG/KG/MIN: at 23:25

## 2023-01-01 RX ADMIN — PIPERACILLIN AND TAZOBACTAM 3.38 G: 3; .375 INJECTION, POWDER, FOR SOLUTION INTRAVENOUS at 09:19

## 2023-01-01 RX ADMIN — LACTULOSE 20 G: 20 SOLUTION ORAL at 20:24

## 2023-01-01 RX ADMIN — GABAPENTIN 300 MG: 300 CAPSULE ORAL at 15:33

## 2023-01-01 RX ADMIN — SULFAMETHOXAZOLE AND TRIMETHOPRIM 1 TABLET: 800; 160 TABLET ORAL at 09:41

## 2023-01-01 RX ADMIN — MULTIPLE VITAMINS W/ MINERALS TAB 1 TABLET: TAB at 11:26

## 2023-01-01 RX ADMIN — HYDROMORPHONE HYDROCHLORIDE 0.5 MG: 1 INJECTION, SOLUTION INTRAMUSCULAR; INTRAVENOUS; SUBCUTANEOUS at 23:05

## 2023-01-01 RX ADMIN — MULTIPLE VITAMINS W/ MINERALS TAB 1 TABLET: TAB at 08:32

## 2023-01-01 RX ADMIN — GABAPENTIN 100 MG: 100 CAPSULE ORAL at 16:14

## 2023-01-01 RX ADMIN — PIPERACILLIN AND TAZOBACTAM 3.38 G: 3; .375 INJECTION, POWDER, FOR SOLUTION INTRAVENOUS at 11:23

## 2023-01-01 RX ADMIN — GABAPENTIN 100 MG: 100 CAPSULE ORAL at 15:30

## 2023-01-01 RX ADMIN — SODIUM CHLORIDE, POTASSIUM CHLORIDE, SODIUM LACTATE AND CALCIUM CHLORIDE: 600; 310; 30; 20 INJECTION, SOLUTION INTRAVENOUS at 09:44

## 2023-01-01 RX ADMIN — FOLIC ACID 1 MG: 1 TABLET ORAL at 08:04

## 2023-01-01 RX ADMIN — LACTULOSE 20 G: 20 SOLUTION ORAL at 11:16

## 2023-01-01 RX ADMIN — PIPERACILLIN AND TAZOBACTAM 4.5 G: 4; .5 INJECTION, POWDER, FOR SOLUTION INTRAVENOUS at 21:42

## 2023-01-01 RX ADMIN — PIPERACILLIN AND TAZOBACTAM 3.38 G: 3; .375 INJECTION, POWDER, FOR SOLUTION INTRAVENOUS at 08:32

## 2023-01-01 RX ADMIN — LACTULOSE 20 G: 20 SOLUTION ORAL at 11:27

## 2023-01-01 RX ADMIN — Medication 5 MG: at 09:41

## 2023-01-01 RX ADMIN — HYDROMORPHONE HYDROCHLORIDE 0.5 MG: 1 INJECTION, SOLUTION INTRAMUSCULAR; INTRAVENOUS; SUBCUTANEOUS at 05:23

## 2023-01-01 RX ADMIN — DIAZEPAM 10 MG: 5 INJECTION INTRAMUSCULAR; INTRAVENOUS at 20:33

## 2023-01-01 RX ADMIN — THIAMINE HCL TAB 100 MG 100 MG: 100 TAB at 08:32

## 2023-01-01 RX ADMIN — HYDROMORPHONE HYDROCHLORIDE 0.5 MG: 1 INJECTION, SOLUTION INTRAMUSCULAR; INTRAVENOUS; SUBCUTANEOUS at 00:17

## 2023-01-01 RX ADMIN — LACTULOSE 20 G: 20 SOLUTION ORAL at 21:51

## 2023-01-01 RX ADMIN — GABAPENTIN 900 MG: 300 CAPSULE ORAL at 03:14

## 2023-01-01 RX ADMIN — LIDOCAINE HYDROCHLORIDE 10 ML: 10 INJECTION, SOLUTION EPIDURAL; INFILTRATION; INTRACAUDAL; PERINEURAL at 14:50

## 2023-01-01 RX ADMIN — SULFAMETHOXAZOLE AND TRIMETHOPRIM 1 TABLET: 800; 160 TABLET ORAL at 15:06

## 2023-01-01 RX ADMIN — FAMOTIDINE 20 MG: 10 INJECTION INTRAVENOUS at 22:04

## 2023-01-01 RX ADMIN — FOLIC ACID 1 MG: 1 TABLET ORAL at 08:00

## 2023-01-01 RX ADMIN — Medication 500 MCG: at 08:52

## 2023-01-01 RX ADMIN — LIDOCAINE HYDROCHLORIDE ANHYDROUS 1 ML: 10 INJECTION, SOLUTION INFILTRATION at 12:29

## 2023-01-01 RX ADMIN — LACTULOSE 20 G: 20 SOLUTION ORAL at 09:19

## 2023-01-01 RX ADMIN — PIPERACILLIN AND TAZOBACTAM 3.38 G: 3; .375 INJECTION, POWDER, FOR SOLUTION INTRAVENOUS at 14:40

## 2023-01-01 RX ADMIN — HYDROMORPHONE HYDROCHLORIDE 0.5 MG: 1 INJECTION, SOLUTION INTRAMUSCULAR; INTRAVENOUS; SUBCUTANEOUS at 13:43

## 2023-01-01 RX ADMIN — HYDROMORPHONE HYDROCHLORIDE 0.5 MG: 1 INJECTION, SOLUTION INTRAMUSCULAR; INTRAVENOUS; SUBCUTANEOUS at 13:32

## 2023-01-01 RX ADMIN — LIDOCAINE: 40 CREAM TOPICAL at 20:09

## 2023-01-01 RX ADMIN — HYDROMORPHONE HYDROCHLORIDE 0.3 MG: 1 INJECTION, SOLUTION INTRAMUSCULAR; INTRAVENOUS; SUBCUTANEOUS at 20:21

## 2023-01-01 RX ADMIN — Medication 0.09 MCG/KG/MIN: at 14:50

## 2023-01-01 RX ADMIN — GABAPENTIN 300 MG: 300 CAPSULE ORAL at 17:37

## 2023-01-01 RX ADMIN — HYDROMORPHONE HYDROCHLORIDE 0.5 MG: 1 INJECTION, SOLUTION INTRAMUSCULAR; INTRAVENOUS; SUBCUTANEOUS at 10:36

## 2023-01-01 RX ADMIN — THIAMINE HCL TAB 100 MG 100 MG: 100 TAB at 08:04

## 2023-01-01 RX ADMIN — Medication 0.12 MCG/KG/MIN: at 14:57

## 2023-01-01 RX ADMIN — PIPERACILLIN AND TAZOBACTAM 3.38 G: 3; .375 INJECTION, POWDER, FOR SOLUTION INTRAVENOUS at 03:41

## 2023-01-01 RX ADMIN — ALBUMIN HUMAN 25 G: 0.25 SOLUTION INTRAVENOUS at 13:52

## 2023-01-01 RX ADMIN — Medication 0.1 MCG/KG/MIN: at 09:12

## 2023-01-01 RX ADMIN — GABAPENTIN 900 MG: 300 CAPSULE ORAL at 12:07

## 2023-01-01 RX ADMIN — FOLIC ACID 1 MG: 1 TABLET ORAL at 11:27

## 2023-01-01 RX ADMIN — PIPERACILLIN AND TAZOBACTAM 3.38 G: 3; .375 INJECTION, POWDER, FOR SOLUTION INTRAVENOUS at 09:39

## 2023-01-01 RX ADMIN — TRANEXAMIC ACID 1 G: 10 INJECTION, SOLUTION INTRAVENOUS at 18:54

## 2023-01-01 RX ADMIN — GABAPENTIN 100 MG: 100 CAPSULE ORAL at 16:45

## 2023-01-01 RX ADMIN — FAMOTIDINE 20 MG: 10 INJECTION INTRAVENOUS at 09:39

## 2023-01-01 RX ADMIN — HYDROMORPHONE HYDROCHLORIDE 0.5 MG: 1 INJECTION, SOLUTION INTRAMUSCULAR; INTRAVENOUS; SUBCUTANEOUS at 12:16

## 2023-01-01 RX ADMIN — PIPERACILLIN AND TAZOBACTAM 3.38 G: 3; .375 INJECTION, POWDER, FOR SOLUTION INTRAVENOUS at 08:53

## 2023-01-01 RX ADMIN — FAMOTIDINE 20 MG: 10 INJECTION INTRAVENOUS at 08:41

## 2023-01-01 RX ADMIN — MIDODRINE HYDROCHLORIDE 5 MG: 5 TABLET ORAL at 17:35

## 2023-01-01 RX ADMIN — DEXMEDETOMIDINE HYDROCHLORIDE 0.2 MCG/KG/HR: 400 INJECTION INTRAVENOUS at 01:51

## 2023-01-01 ASSESSMENT — ACTIVITIES OF DAILY LIVING (ADL)
ADLS_ACUITY_SCORE: 36
ADLS_ACUITY_SCORE: 36
ADLS_ACUITY_SCORE: 38
ADLS_ACUITY_SCORE: 36
ADLS_ACUITY_SCORE: 44
ADLS_ACUITY_SCORE: 37
ADLS_ACUITY_SCORE: 36
ADLS_ACUITY_SCORE: 34
ADLS_ACUITY_SCORE: 36
ADLS_ACUITY_SCORE: 38
ADLS_ACUITY_SCORE: 35
ADLS_ACUITY_SCORE: 36
ADLS_ACUITY_SCORE: 49
ADLS_ACUITY_SCORE: 44
ADLS_ACUITY_SCORE: 34
ADLS_ACUITY_SCORE: 36
ADLS_ACUITY_SCORE: 38
ADLS_ACUITY_SCORE: 34
ADLS_ACUITY_SCORE: 44
ADLS_ACUITY_SCORE: 36
ADLS_ACUITY_SCORE: 36
ADLS_ACUITY_SCORE: 34
TRANSFERRING: 0-->INDEPENDENT
ADLS_ACUITY_SCORE: 34
ADLS_ACUITY_SCORE: 36
ADLS_ACUITY_SCORE: 37
ADLS_ACUITY_SCORE: 36
ADLS_ACUITY_SCORE: 36
ADLS_ACUITY_SCORE: 47
ADLS_ACUITY_SCORE: 36
ADLS_ACUITY_SCORE: 36
ADLS_ACUITY_SCORE: 37
ADLS_ACUITY_SCORE: 43
ADLS_ACUITY_SCORE: 36
ADLS_ACUITY_SCORE: 34
ADLS_ACUITY_SCORE: 36
ADLS_ACUITY_SCORE: 34
ADLS_ACUITY_SCORE: 34
ADLS_ACUITY_SCORE: 36
ADLS_ACUITY_SCORE: 36
ADLS_ACUITY_SCORE: 44
ADLS_ACUITY_SCORE: 36
ADLS_ACUITY_SCORE: 36
ADLS_ACUITY_SCORE: 44
ADLS_ACUITY_SCORE: 38
ADLS_ACUITY_SCORE: 34
DRESSING/BATHING_DIFFICULTY: NO
ADLS_ACUITY_SCORE: 47
DIFFICULTY_COMMUNICATING: NO
ADLS_ACUITY_SCORE: 45
ADLS_ACUITY_SCORE: 34
ADLS_ACUITY_SCORE: 30
ADLS_ACUITY_SCORE: 45
WALKING_OR_CLIMBING_STAIRS: OTHER (SEE COMMENTS)
ADLS_ACUITY_SCORE: 36
ADLS_ACUITY_SCORE: 36
ADLS_ACUITY_SCORE: 37
ADLS_ACUITY_SCORE: 34
ADLS_ACUITY_SCORE: 36
ADLS_ACUITY_SCORE: 34
ADLS_ACUITY_SCORE: 43
ADLS_ACUITY_SCORE: 36
DOING_ERRANDS_INDEPENDENTLY_DIFFICULTY: NO
ADLS_ACUITY_SCORE: 34
ADLS_ACUITY_SCORE: 36
ADLS_ACUITY_SCORE: 36
ADLS_ACUITY_SCORE: 34
ADLS_ACUITY_SCORE: 34
ADLS_ACUITY_SCORE: 38
ADLS_ACUITY_SCORE: 34
ADLS_ACUITY_SCORE: 34
FALL_HISTORY_WITHIN_LAST_SIX_MONTHS: YES
ADLS_ACUITY_SCORE: 43
ADLS_ACUITY_SCORE: 44
ADLS_ACUITY_SCORE: 34
ADLS_ACUITY_SCORE: 37
ADLS_ACUITY_SCORE: 28
ADLS_ACUITY_SCORE: 34
DIFFICULTY_EATING/SWALLOWING: NO
ADLS_ACUITY_SCORE: 38
ADLS_ACUITY_SCORE: 34
ADLS_ACUITY_SCORE: 34
ADLS_ACUITY_SCORE: 36
ADLS_ACUITY_SCORE: 34
ADLS_ACUITY_SCORE: 36
ADLS_ACUITY_SCORE: 44
ADLS_ACUITY_SCORE: 34
ADLS_ACUITY_SCORE: 45
ADLS_ACUITY_SCORE: 30
ADLS_ACUITY_SCORE: 38
ADLS_ACUITY_SCORE: 36
ADLS_ACUITY_SCORE: 36
ADLS_ACUITY_SCORE: 34
ADLS_ACUITY_SCORE: 36
ADLS_ACUITY_SCORE: 44
ADLS_ACUITY_SCORE: 43
ADLS_ACUITY_SCORE: 38
ADLS_ACUITY_SCORE: 36
ADLS_ACUITY_SCORE: 44
ADLS_ACUITY_SCORE: 36
ADLS_ACUITY_SCORE: 34
ADLS_ACUITY_SCORE: 34
ADLS_ACUITY_SCORE: 36
ADLS_ACUITY_SCORE: 36
ADLS_ACUITY_SCORE: 34
ADLS_ACUITY_SCORE: 44
ADLS_ACUITY_SCORE: 35
ADLS_ACUITY_SCORE: 37
ADLS_ACUITY_SCORE: 36
ADLS_ACUITY_SCORE: 34
WEAR_GLASSES_OR_BLIND: NO
ADLS_ACUITY_SCORE: 34
ADLS_ACUITY_SCORE: 34
ADLS_ACUITY_SCORE: 36
ADLS_ACUITY_SCORE: 44
ADLS_ACUITY_SCORE: 36
ADLS_ACUITY_SCORE: 34
ADLS_ACUITY_SCORE: 34
ADLS_ACUITY_SCORE: 38
ADLS_ACUITY_SCORE: 36
ADLS_ACUITY_SCORE: 36
ADLS_ACUITY_SCORE: 47
ADLS_ACUITY_SCORE: 48
ADLS_ACUITY_SCORE: 34
ADLS_ACUITY_SCORE: 36
NUMBER_OF_TIMES_PATIENT_HAS_FALLEN_WITHIN_LAST_SIX_MONTHS: 3
ADLS_ACUITY_SCORE: 36
ADLS_ACUITY_SCORE: 34
ADLS_ACUITY_SCORE: 43
ADLS_ACUITY_SCORE: 44
ADLS_ACUITY_SCORE: 36
ADLS_ACUITY_SCORE: 44
ADLS_ACUITY_SCORE: 34
ADLS_ACUITY_SCORE: 44
ADLS_ACUITY_SCORE: 38
CHANGE_IN_FUNCTIONAL_STATUS_SINCE_ONSET_OF_CURRENT_ILLNESS/INJURY: YES
ADLS_ACUITY_SCORE: 45
ADLS_ACUITY_SCORE: 34
ADLS_ACUITY_SCORE: 36
CONCENTRATING,_REMEMBERING_OR_MAKING_DECISIONS_DIFFICULTY: NO
TOILETING_ISSUES: NO
ADLS_ACUITY_SCORE: 30
ADLS_ACUITY_SCORE: 36
ADLS_ACUITY_SCORE: 45
ADLS_ACUITY_SCORE: 44
ADLS_ACUITY_SCORE: 36
ADLS_ACUITY_SCORE: 44
ADLS_ACUITY_SCORE: 34
WALKING_OR_CLIMBING_STAIRS_DIFFICULTY: YES
ADLS_ACUITY_SCORE: 36
ADLS_ACUITY_SCORE: 37
ADLS_ACUITY_SCORE: 34
TRANSFERRING: 0-->INDEPENDENT
ADLS_ACUITY_SCORE: 36
ADLS_ACUITY_SCORE: 44
ADLS_ACUITY_SCORE: 34
ADLS_ACUITY_SCORE: 38
ADLS_ACUITY_SCORE: 38
ADLS_ACUITY_SCORE: 36
ADLS_ACUITY_SCORE: 36
ADLS_ACUITY_SCORE: 44
ADLS_ACUITY_SCORE: 36
ADLS_ACUITY_SCORE: 44
ADLS_ACUITY_SCORE: 45
HEARING_DIFFICULTY_OR_DEAF: NO
ADLS_ACUITY_SCORE: 34

## 2023-01-20 NOTE — TELEPHONE ENCOUNTER
Requested Prescriptions   Pending Prescriptions Disp Refills     allopurinol (ZYLOPRIM) 300 MG tablet  Last Written Prescription Date:  09/28/22  Last Fill Quantity: 90,  # refills: 0   Last office visit: 10/19/2022 with prescribing provider:  10/19/22   Future Office Visit:           90 tablet 0     Sig: Take 1 tablet (300 mg) by mouth daily       There is no refill protocol information for this order

## 2023-01-25 NOTE — TELEPHONE ENCOUNTER
Pending Prescriptions:                       Disp   Refills    allopurinol (ZYLOPRIM) 300 MG tablet       90 tab*0        Sig: Take 1 tablet (300 mg) by mouth daily    Routing refill request to provider for review/approval because:  Needs provider review

## 2023-04-18 NOTE — TELEPHONE ENCOUNTER
Pending Prescriptions:                       Disp   Refills    allopurinol (ZYLOPRIM) 300 MG tablet [Phar*90 tab*0        Sig: Take 1 tablet (300 mg) by mouth daily    Routing refill request to provider for review/approval because:  Needs provider review

## 2023-05-05 PROBLEM — R58 HEMORRHAGE: Status: ACTIVE | Noted: 2023-01-01

## 2023-05-05 NOTE — PHARMACY-ADMISSION MEDICATION HISTORY
Pharmacist Admission Medication History    Admission medication history is complete. The information provided in this note is only as accurate as the sources available at the time of the update.    Medication reconciliation/reorder completed by provider prior to medication history? No    Information Source(s): Patient and CareEverywhere/SureScripts via in-person    Pertinent Information: None    Changes made to PTA medication list:    Added: clear eyes, lidocaine    Deleted: None    Changed: lisinopril, amlodipine- alternates these daily.  Does not take Lactulose due to issue of stooling.  Modified Hydroxyzine and Ropinorole    Medication Affordability:  Not including over the counter (OTC) medications, was there a time in the past 12 months when you did not take your medications as prescribed because of cost?: No    Allergies reviewed with patient and updates made in EHR: yes    Medication History Completed By: Isidoro Crawford RPH 5/5/2023 4:21 PM      Prior to Admission medications    Medication Sig Last Dose Taking? Auth Provider Long Term End Date   allopurinol (ZYLOPRIM) 300 MG tablet TAKE 1 TABLET (300 MG) BY MOUTH DAILY 5/4/2023 Yes Joon Castillo MD     amLODIPine (NORVASC) 5 MG tablet Take 1 tablet (5 mg) by mouth daily  Patient taking differently: Take 5 mg by mouth every other day Alternates daily with Lisinopril Past Week Yes Amy Jha NP Yes    Carboxymethylcellul-Glycerin (CLEAR EYES FOR DRY EYES) 1-0.25 % SOLN Place 1 drop into both eyes as needed Unknown Yes Unknown, Entered By History     esomeprazole (NEXIUM) 40 MG DR capsule Take 1 capsule (40 mg) by mouth every morning (before breakfast) Take 30-60 minutes before eating. 5/4/2023 Yes Amy Jha NP     hydrOXYzine (ATARAX) 25 MG tablet Take 25 mg by mouth 2 times daily as needed for itching Past Week Yes Unknown, Entered By History     Lidocaine 0.5 % AERO Externally apply 1 spray topically every 8 hours as needed (to  legs) Unknown Yes Unknown, Entered By History     Lidocaine 5 % CREA Externally apply topically every 8 hours as needed (to legs) Unknown Yes Unknown, Entered By History     lisinopril (ZESTRIL) 40 MG tablet Take 1 tablet (40 mg) by mouth daily  Patient taking differently: Take 40 mg by mouth every other day Alternates daily with Amlodipine Past Week Yes Amy Jha NP Yes    Multiple Vitamin (DAILY MULTIVITAMIN PO) Take 1 tablet by mouth every evening  5/3/2023 Yes Reported, Patient     rOPINIRole (REQUIP) 0.5 MG tablet Take 0.5 mg by mouth daily as needed (restless legs) Unknown Yes Unknown, Entered By History No    rOPINIRole (REQUIP) 0.5 MG tablet Take 1 mg by mouth At Bedtime 5/3/2023 Yes Unknown, Entered By History No    traMADol (ULTRAM) 50 MG tablet TAKE ONE TABLET BY MOUTH TWICE A DAY AS NEEDED FOR SEVERE PAIN Past Week Yes Amy Jha NP     XIFAXAN 550 MG TABS tablet TAKE ONE TABLET BY MOUTH TWICE DAILY  5/4/2023 Yes Perla Winters MD     zolpidem (AMBIEN) 5 MG tablet Take 1 tablet by mouth nightly as needed, SCHEDULE APPOINTMENT FOR FURTHER REFILLS Past Week Yes Amy Jha NP     gabapentin (NEURONTIN) 100 MG capsule TAKE 2 CAPSULES BY MOUTH 3 TIMES DAILY 5/4/23 Yes Awilda Jaeger, APRN CNP Yes    lactulose (CHRONULAC) 10 GM/15ML solution Take 20 g by mouth daily  Patient not taking: Reported on 5/5/2023 Not Taking  Reported, Patient

## 2023-05-05 NOTE — H&P
Critical Care Progress Note      05/05/2023    Name: Sp Plasencia MRN#: [id number]   Age: 48 year old YOB: 1975     [facility] Day# 0  ICU DAY #    MV DAY #             Problem List:   Principal Problem:    Hemorrhage           Summary/Hospital Course:     Sp Plasencia is a 48 year old man who transfered to [facility] ICU on 5/5/2023 for acute blood loss anemia. He has a history of chronic liver disease requiring paracentesis s/p tips procedure, alcohol abuse, tobacco use, muscular dystrophy, bilateral calf pain and calf cellulitis, and insomnia. He presented to an outside hospital on 5/4/2023 for increasing jaundice, confusion, weakness/falls and epistaxis x3 days. He had been on lactulose for liver failure with recurrent ascites but had discontinued it due to diarrhea.     He had active nasal bleeding on presentation and a nasal clamp was placed which controlled bleeding. On arrival to OSH he was tachycardic and afebrile. Labs were notable for a hemoglobin of 6.3, platelets 87, hematocrit 17.5, INR of 2.69, sodium 118, lactate 3.9. He received 4 units of blood. For hyponatremia, PTA amlodipine and lisinopril were stopped and he was given midodrine.     Liver panel showed total bili 10.4 with direct bili of 6.7, , ALT 65, and alk phos 260. His alcohol level was 0.13, he states he had not had a drink in a few days. He did not have an abdominal ultrasound or CT at outside hospital. With concerns of hip pain and recent falls, he received chest/abdominal X-rays which were unremarkable. Of note, he was previously on the liver transplant list at [facility] in 2020 with a MELD score of 29 at the highest. At the time he was getting weekly paracentesis; following a TIPS procedure and alcohol cessation he improved enough to get off the liver transplant list. He has no known history of esophageal varices or other GI bleed. He has been drinking again (reports modestly)  since 2021.     Given concerns for bleeding and severe liver disease he was transferred to [facility] on 5/5/2023 for further workup and management.         Assessment and plan :     Sp Plasencia IS a 48 year old man admitted to [facility] from an OSH on 5/5/2023 for acute blood loss anemia. He hs a history of chronic liver disease requiring paracentesis s/p tips procedure, alcohol abuse, tobacco use, muscular dystrophy, and PARAG calf cellulitis.   I have personally reviewed the daily labs, imaging studies, cultures and discussed the case with referring physician and consulting physicians.     My assessment and plan by system for this patient is as follows:    Neurology/Psychiatry:   1. Risk for hepatic encephalopathy  2. Alcohol abuse, at risk for alcohol withdrawal  3. Insomnia  Plan  - monitor neurologic status   - restart PTA rifaximin and lactulose   - ICU alcohol withdrawal protocol  - consider addiction medicine consult   - lidocaine topical, dilaudid IV for pain    Cardiovascular:   1. Hemodynamic instability, hemorrhagic shock 2/2 blood loss  2. History of hypertension  3. Venous insufficiency  Plan  - goal MAP >65  - pressors - Levophed  - PICC placement for blood products  - cardiac tele     Pulmonary/Ventilator Management:   1. Respiratory support as needed, currently on room air  Plan  - venous blood gas  - CMP    GI and Nutrition :   1. End-stage alcoholic cirrhosis  2. Likely ascites  3. Concern for SBP  Plan  - gastroenterology consulted  - resume PTA lactulose and rifaximin  - Zosyn  - Zofran for nausea  - CT abdomen/pelvis with contrast  - Famotidine for PUD ppx    Renal/Fluids/Electrolytes:   1. Hyponatremia  2. Hypomagnesemia  Plan  - monitor function and electrolytes as needed with replacement per ICU protocols.  - generally avoid nephrotoxic agents such as NSAID, IV contrast unless specifically required  - adjust medications as needed for renal clearance  - follow  I/O's as appropriate.    Infectious Disease:   1.  Venous stasis dermatitis PARAG lower extremities  2. Risk for SBP  Plan  - Zosyn  - Check CBC, lactic acid, procal    Endocrine:   1. Stress induced hyperglycemia  Plan  - ICU insulin protocol, goal sugar <180    Hematology/Oncology:   1. Acute blood loss anemia  2. Coagulopathy likely 2/2 end stage liver disease   3, Thrombocytopenia  Plan  - Transfuse as necessary for Hgb <7.0, platelets <50  - ABO/Rh type and screen  - Check CBC, INR  - PICC placement     MSKL/Rheum:  1. Facioscapulohumeral muscular dystrophy (FSMD)  Plan  -  Slowly progressive disease  -  monitor for decreased respiratory effort     IV/Access:   1. Venous access - PICC line to be placed      ICU Prophylaxis:   1. DVT: Hep Subq/ LMWH/mechanical held due concerns for active bleed  2. VAP: HOB 30 degrees, chlorhexidine rinse  3. Stress Ulcer: famotidine  4. Restraints: Nonviolent soft two point restraints required and necessary for patient safety and continued cares and good effect as patient continues to pull at necessary lines, tubes despite education and distraction. Will readdress daily.   5. Wound care - per unit routine   6. Feeding - NPO  7. Family Update: family updated at bedside  8. Disposition - ICU      Key goals for next 24 hours:   1. Hemodynamic stability  2. Monitor neurologic status, at risk for alcohol withdrawal          Interim History:     Arrived today from outside hospital. Notes increased abdominal distension over the past few days. Previously requiring weekly paracentesis before receiving TIPS procedure.   He also has significant discomfort in PARAG lower extremities - has a history of muscular dystrophy and venous stasis dermatitis.            Key Medications:     PTA Meds:  Tramadol 50mg PO BID  Ropinirole 0.5-1mg BID  Zolpidem 5mg at bedtime   Rifaximin 550 mg BID  Furosemide 20mg PO prn   Spironolactone 50mg BID   Oxycodone 5mg   Lisinopril 40mg   Esomeprazole 40mg    Amlodipine 5mg   Gabapentin 100mg   Allopurinol 300mg   Baclofen 5mg   Prednisone 20mg BID  Hydroxyzine 25mg BID    Antibiotics 5/5/2023:  Doxycycline 100mg BID  Cefadroxil 500mg BID   Levofloxacin 500mg BID             Physical Examination:      No intake or output data in the 24 hours ending 05/05/23 1514  Wt Readings from Last 4 Encounters:   10/19/22 108.9 kg (240 lb 1.6 oz)   09/08/21 99.8 kg (220 lb)   07/05/21 97.5 kg (215 lb)   12/09/20 92.9 kg (204 lb 14.4 oz)        No data recorded  No lab results found in last 7 days.    GEN: ill appearing  HEENT: head ncat, OP patent, scleral icterus  PULM: unlabored breathing, normal respiratory rate  CV/COR: RRR S1S2 no gallop,  No obvious rub, no murmur  ABD: markedly distended   EXT:  Perfused, edema in bilateral lower extremities  NEURO: grossly intact, weakness consistent with muscular dystrophy  SKIN: multiple spider angiomas on upper chest, skin is jaundiced, venous stasis dermatitis in PARAG lower extremities         Data:   All data and imaging reviewed     ROUTINE ICU LABS (Last four results)  CMP  Recent Labs   Lab 05/05/23  1501   GLC 82     CBCNo lab results found in last 7 days.  INRNo lab results found in last 7 days.  Arterial Blood GasNo lab results found in last 7 days.    All cultures:  No results for input(s): CULT in the last 168 hours.  No results found for this or any previous visit (from the past 24 hour(s)).      Billing: This patient is critically ill: Yes. Total critical care time today 35 min.    >>>>>>>>>>>>>>>>>>>>>>>>>>>>>>>>>>>>>>>>>>>>>>>>>>>>>>>>>>>>>>>>>>>>>>>>>>>>>>>>>>    ICU Staff:    I attended the multidisciplinary rounds today. I examined the patient at the bedside in the [facility] ICU. I managed the patient at the bedside in the [facility] ICU for their critical illness. I reviewed the patient's medical records, progress notes, and imaging studies. I discussed the patient history, labs, imaging studies, and  ICU care  plan with the ICU fellow today. The patient is a 48-year-old man admitted to the ICU for hemorrhage and shock. The patient's prior medical issues include chroni  liver disease requiring paracentesis s/p tips procedure, alcohol abuse, tobacco use, muscular dystrophy, bilateral calf pain and calf cellulitis, and insomnia. The patient was previously on lactulose for liver failure with recurrent ascites; however, he discontinued the lactulose due to diarrhea.     PAST MEDICAL HISTORY:  Past Medical History:   Diagnosis Date     Acid reflux      Anemia      Ascites      Esophageal varices (H)      FSHD (facioscapulohumeral muscular dystrophy) (H)      Gout      HTN (hypertension)      Jaundice      Liver cirrhosis (H)      Smoker      Thrombocytopenia (H)      PAST SURGICAL HISTORY:  Past Surgical History:   Procedure Laterality Date     IR PARACENTESIS  08/26/2020     IR TRANSVEN INTRAHEPATIC PORTOSYST SHUNT  08/26/2020     LAPAROSCOPIC HERNIORRHAPHY INGUINAL Right 10/14/2020    Procedure: HERNIORRHAPHY, RIGHT INGUINAL, LAPAROSCOPIC.;  Surgeon: Chris Parker MD;  Location: [facility] OR     MEDICATIONS:  Current Facility-Administered Medications   Medication     cloNIDine (CATAPRES) tablet 0.1 mg     Contraindications to both pharmacological and mechanical prophylaxis (must document contraindications for both in this order)     glucose gel 15-30 g    Or     dextrose 50 % injection 25-50 mL    Or     glucagon injection 1 mg     diazepam (VALIUM) tablet 10 mg    Or     diazepam (VALIUM) injection 5-10 mg     famotidine (PEPCID) injection 20 mg     flumazenil (ROMAZICON) injection 0.2 mg     [START ON 5/8/2023] folic acid (FOLVITE) tablet 1 mg     folic acid injection 1 mg     [START ON 5/6/2023] folic acid injection 1 mg     [START ON 5/13/2023] gabapentin (NEURONTIN) capsule 100 mg     [START ON 5/11/2023] gabapentin (NEURONTIN) capsule 300 mg     [START ON 5/9/2023] gabapentin (NEURONTIN) capsule 600 mg     [START ON  5/6/2023] gabapentin (NEURONTIN) capsule 900 mg     gabapentin (NEURONTIN) tablet 1,200 mg     haloperidol lactate (HALDOL) injection 2.5-5 mg     HYDROmorphone (DILAUDID) injection 0.2 mg     lactulose (CHRONULAC) solution 10 g     lidocaine (LMX4) cream     lidocaine 1 % 0.1-5 mL     lidocaine 1 % 0.1-5 mL     metoprolol (LOPRESSOR) injection 5 mg     multivitamin w/minerals (THERA-VIT-M) tablet 1 tablet     naloxone (NARCAN) injection 0.2 mg    Or     naloxone (NARCAN) injection 0.4 mg    Or     naloxone (NARCAN) injection 0.2 mg    Or     naloxone (NARCAN) injection 0.4 mg     norepinephrine (LEVOPHED) 4 mg in  mL infusion PREMIX     piperacillin-tazobactam (ZOSYN) 4.5 g vial to attach to  mL bag     rifaximin (XIFAXAN) tablet 550 mg     sodium chloride (PF) 0.9% PF flush 10-20 mL     sodium chloride (PF) 0.9% PF flush 10-40 mL     sodium chloride (PF) 0.9% PF flush 10-40 mL     sodium chloride (PF) 0.9% PF flush 10-40 mL     sodium chloride (PF) 0.9% PF flush 10-40 mL     thiamine (B-1) 200 mg in sodium chloride 0.9 % 50 mL intermittent infusion     [START ON 5/7/2023] thiamine (B-1) tablet 100 mg     [START ON 5/13/2023] thiamine (B-1) tablet 100 mg     ALLERGIES:  Allergies   Allergen Reactions     Adhesive Tape Blisters     Skin breakdown, open sores     SOCIAL HISTORY:  Social History     Socioeconomic History     Marital status:    Tobacco Use     Smoking status: Some Days     Types: Cigarettes     Smokeless tobacco: Never     Tobacco comments:     1 pack per month for 15 years   Substance and Sexual Activity     Alcohol use: Not Currently     Comment: 1/1/2020     Drug use: Not Currently     Sexual activity: Yes     Partners: Female     FAMILY HISTORY:  Family History   Problem Relation Age of Onset     Hypertension Father      Hypertension Paternal Grandfather      PHYSICAL EXAM:  Vital Signs: /48   Pulse (!) 121   Temp (!) 96.7  F (35.9  C) (Axillary)   Resp 24   Ht 1.829 m  (6')   Wt 120.4 kg (265 lb 6.9 oz)   SpO2 100%   BMI 36.00 kg/m    GEN: the patient appear in distress due to abdominal distention and bilaterally open wound of the legs.    HEENT:   scleral icterus pupils 2 mm bilaerally  Chest: coarse BS bilaterally antierorly  COR: regular tachycardia.    ABD: markedly distended, firm, mild difuse tenderness.   EXT:  Perfused, edema in bilateral lower extremities, open wound of the anteriorand latera legs with open area and weeping from the erythematous skin. [  NEURO: weakness consistent with muscular dystrophy    A/P: I personally examined and evaluated the patient today in the [facility] ICU. All of the ICU patient care orders and treatments were placed at my direction. The patient has a high probability of imminent or life-threatening deterioration. I personally managed the patient's sedation, pain control and analgesia,  metabolic abnormalities, nutritional status, and vasoactive medications.    Clay Seo MD, PhD    >>>>>>>>>>>>>>>>>>>>>>>>>>>>>>>>>>>>>>>>>>>>>>>>>>>>>>>>>>>>>>>>>>>>>>>>>>>>>>>>>>

## 2023-05-05 NOTE — PROCEDURES
[facility]    Triple Lumen PICC Placement    Date/Time: 5/5/2023 5:25 PM    Performed by: Kate Shearer RN  Authorized by: Clay Seo MD  Indications: vascular access      UNIVERSAL PROTOCOL   Site Marked: Yes  Prior Images Obtained and Reviewed:  Yes  Required items: Required blood products, implants, devices and special equipment available    Patient identity confirmed:  Verbally with patient, arm band, provided demographic data and hospital-assigned identification number  NA - No sedation, light sedation, or local anesthesia  Confirmation Checklist:  Patient's identity using two indicators, relevant allergies, procedure was appropriate and matched the consent or emergent situation and correct equipment/implants were available  Time out: Immediately prior to the procedure a time out was called    Universal Protocol: the Joint Commission Universal Protocol was followed    Preparation: Patient was prepped and draped in usual sterile fashion       ANESTHESIA    Anesthesia: Local infiltration  Local Anesthetic:  Lidocaine 1% without epinephrine  Anesthetic Total (mL):  1      SEDATION    Patient Sedated: No        Preparation: skin prepped with 2% chlorhexidine  Skin prep agent: skin prep agent completely dried prior to procedure  Sterile barriers: maximum sterile barriers were used: cap, mask, sterile gown, sterile gloves, and large sterile sheet  Hand hygiene: hand hygiene performed prior to central venous catheter insertion  Type of line used: PICC  Catheter type: triple lumen  Lumen type: valved and power PICC  Lumen Identification: White, Gray and Red  Catheter size: 5 Fr  Brand: Bard  Lot number: [id number]  Placement method: MST, ultrasound and tip navigation system  Number of attempts: 1  Difficulty threading catheter: no  Successful placement: yes  Orientation: right    Location: brachial vein (medial)  Tip Location: SVC/RA Junction  Arm circumference: adults 10  cm  Extremity circumference: 29  Visible catheter length: 2  Total catheter length: 51  Dressing and securement: alcohol impregnated caps, gloves changed prior to final dressing, chlorhexidine disc applied, subcutaneous anchor securement system and sterile dressing applied  Post procedure assessment: blood return through all ports and placement verified by 3CG technology  PROCEDURE   Disposal: sharps and needle count correct at the end of procedure, needles and guidewire disposed in sharps container

## 2023-05-06 NOTE — CONSULTS
[facility] - Gastroenterology Consultation    Consultation Assessment/Plan:    1.) Acute/Chronic Liver  -chronic alcohol cirrhosis with acute injury most likely due to ischemic liver injury (hypotension exacerbated by acute blood loss into hematoma & epistaxis), less likely a component of rhabdomyolysis (CK only 471)  -no signs of GI bleeding on CTA scan and with functioning TIPS (based on US today) he should be at low risk for variceal hemorrhage  -Rifaximin and lactulose for encephalopathy  -Zosyn prophylaxis given ascites and bleeding  -tending LFTs  -primarily needing ICU supportive care at this time, no additional GI interventions necessary        Oli Varela MD    Office: [phone]    ---------------------------------------------------------------------------------------------------------------------------  Patient Name: Sp Plasencia      YOB: 1975 (Age: 48 year old)   Medical Record #: [id number]       Primary Physician: No Ref-Primary, Physician   Referring Provider: Nedra Short, *   Admit Date/Time: 5/5/2023  2:42 PM       I was asked to see this patient by Nedra Short, * for evaluation of anemia, alcohol cirrhosis.    History of Present Illness:  49 y/o male with history active alcohol abuse, EtOH cirrhosis (c/b ascites/TIPS, encephalopathy, no varices) and muscular dystrophy who is transferred from OSH due to anemia (hgb=6.3), epistaxis and modest hypotension (tx: Norepinephrine)  who was subsequently found to have large right thigh hematoma (due to falls at home) and elevated LFTs (ALT>1000, Tbili=14.1).  He takes Rifaximin and Lactulose but hasn't been taking Lactulose at home due to diarrhea side-effects.  He continues to drink alcohol.  Upon visiting today it is apparent that he has modest altered mental status (encephalopathy and EtOH withdrawal).  No recent new medications.  He was transfused with massive transfusion protocol.    Past Medical  History:  Past Medical History:   Diagnosis Date     Acid reflux      Anemia      Ascites      Esophageal varices (H)      FSHD (facioscapulohumeral muscular dystrophy) (H)      Gout      HTN (hypertension)      Jaundice      Liver cirrhosis (H)      Smoker      Thrombocytopenia (H)      Past Surgical History:   Procedure Laterality Date     IR PARACENTESIS  08/26/2020     IR TRANSVEN INTRAHEPATIC PORTOSYST SHUNT  08/26/2020     LAPAROSCOPIC HERNIORRHAPHY INGUINAL Right 10/14/2020    Procedure: HERNIORRHAPHY, RIGHT INGUINAL, LAPAROSCOPIC.;  Surgeon: Chris Parker MD;  Location: [facility] OR       Review of Systems: A comprehensive review of systems was negative except for items noted in HPI/Subjective.    Current Medications:  No current outpatient medications on file.       Allergies/Sensitivities:   Allergies   Allergen Reactions     Adhesive Tape Blisters     Skin breakdown, open sores          Social History:   Social History     Socioeconomic History     Marital status:      Spouse name: Not on file     Number of children: Not on file     Years of education: Not on file     Highest education level: Not on file   Occupational History     Not on file   Tobacco Use     Smoking status: Some Days     Types: Cigarettes     Smokeless tobacco: Never     Tobacco comments:     1 pack per month for 15 years   Vaping Use     Vaping status: Not on file   Substance and Sexual Activity     Alcohol use: Not Currently     Comment: 1/1/2020     Drug use: Not Currently     Sexual activity: Yes     Partners: Female   Other Topics Concern     Parent/sibling w/ CABG, MI or angioplasty before 65F 55M? Not Asked   Social History Narrative     Not on file     Social Determinants of Health     Financial Resource Strain: Not on file   Food Insecurity: Not on file   Transportation Needs: Not on file   Physical Activity: Not on file   Stress: Not on file   Social Connections: Not on file   Intimate Partner Violence: Not on file    Housing Stability: Not on file     Family History:   Family History   Problem Relation Age of Onset     Hypertension Father      Hypertension Paternal Grandfather        Physical Exam:  /46   Pulse 117   Temp 97  F (36.1  C) (Bladder)   Resp 14   Ht 1.829 m (6')   Wt 124 kg (273 lb 5.9 oz)   SpO2 96%   BMI 37.08 kg/m     General Appearance: Comfortable, laying in bed  Eyes: Normal  HEENT: Normal  Neck: Normal, no palpable lymph nodes  Respiratory: Normal  Cardiovascular: Normal  Gastrointestinal: Normal bowel sounds  Musculoskeletal: Normal  Lymphatic: Normal  Skin: Normal  Neurologic: Normal     Labs/Imaging:  Recent Labs   Lab Test 05/06/23  1446 05/06/23  1008 05/06/23  0559 05/06/23  0305 05/05/23 2218 05/05/23  1657 10/19/22  0848 11/02/21  1123 09/08/21  1105 02/18/21  1126 11/09/20  1127 10/15/20  0656   WBC 18.2* 16.7* 14.4* 14.1* 17.7* 23.7* 5.1 4.8   < > 4.8 4.7  --    HGB 8.1* 8.1* 7.9* 8.6* 7.0* 6.1* 11.5* 15.1   < > 13.7 12.7*  --    MCV 88 88 88 89 89 88 96 89   < > 84 90  --    PLT 90* 76* 58* 44* 69* 66* 77* 96*   < > 117* 144*  --    INR  --   --  2.04*  --  1.93* 4.19*  --  1.30*  --  1.40* 1.55* 1.67*    < > = values in this interval not displayed.     No lab results found.    Invalid input(s): FERRITIN  Recent Labs   Lab Test 05/06/23  1008 05/06/23  0559 05/06/23  0305 05/05/23 2218 05/05/23  1657 10/19/22  0848 11/02/21  1123   POTASSIUM 5.2 4.9 4.9 4.8 5.5* 4.0 3.9   CHLORIDE 90* 88* 87* 87* 85* 110* 111*   BUN 26.2* 24.4* 23.7* 22.7* 25.1* 10 9     Recent Labs   Lab Test 05/06/23  0559 05/05/23  1657 10/19/22  0848 11/02/21  1123 09/08/21  1105 02/18/21  1126 11/09/20  1127 04/09/20  0723 04/08/20  1351 02/12/20  0637 02/11/20  1120 02/11/20  1057 01/27/20  0000 01/04/19  1542   PROTEIN  --   --   --   --   --   --   --   --   --   --  20*  --   --  Negative   ALBUMIN 2.3* 1.5* 3.6 3.6 3.9 3.5 3.3*   < > 2.5*   < >  --  2.1*  --   --    BILITOTAL 14.1* 11.1* 1.4* 2.1* 2.1*  1.4* 2.4*   < > 24.9*   < >  --  18.9*  --   --    AST 2,040* 1,063* 110* 85* 104* 43 48*   < > 133*   < >  --  177*   < >  --    * 151* 60 69 73* 44 38   < > 65   < >  --  64   < >  --    AMYLASE  --  96  --   --   --   --   --   --   --   --   --   --   --   --    LIPASE  --  171*  --   --   --   --   --   --  205  --   --  200  --   --     < > = values in this interval not displayed.

## 2023-05-06 NOTE — PROGRESS NOTES
ICU Staff:     I attended the multidisciplinary rounds today. I examined the patient at the bedside in the [facility] ICU. I managed the patient at the bedside in the [facility] ICU for their critical illness. I reviewed the patient's medical records, progress notes, and imaging studies. I discussed the patient history, labs, imaging studies, and ICU care plan with the ICU fellow today. The patient is a 48-year-old man admitted to the ICU for hemorrhage and shock. The bleeding appears to have stopped, and the patient does not require additional blood products. The patient's prior medical issues include chronic liver disease requiring paracentesis s/p tips procedure, alcohol abuse, tobacco use, muscular dystrophy, bilateral calf pain and calf cellulitis, and insomnia. The patient was previously on lactulose for liver failure with recurrent ascites; however, he discontinued the lactulose due to diarrhea.     PAST MEDICAL HISTORY:  Past Medical History:   Diagnosis Date     Acid reflux      Anemia      Ascites      Esophageal varices (H)      FSHD (facioscapulohumeral muscular dystrophy) (H)      Gout      HTN (hypertension)      Jaundice      Liver cirrhosis (H)      Smoker      Thrombocytopenia (H)       PAST SURGICAL HISTORY:  Past Surgical History:   Procedure Laterality Date     IR PARACENTESIS  08/26/2020     IR TRANSVEN INTRAHEPATIC PORTOSYST SHUNT  08/26/2020     LAPAROSCOPIC HERNIORRHAPHY INGUINAL Right 10/14/2020    Procedure: HERNIORRHAPHY, RIGHT INGUINAL, LAPAROSCOPIC.;  Surgeon: Chris Parker MD;  Location: [facility] OR     MEDICATIONS:  Current Facility-Administered Medications   Medication     cloNIDine (CATAPRES) tablet 0.1 mg     Contraindications to both pharmacological and mechanical prophylaxis (must document contraindications for both in this order)     dextrose 10% and 0.45% NaCl infusion (CMPD)     glucose gel 15-30 g    Or     dextrose 50 % injection 25-50 mL    Or     glucagon injection 1  mg     diazepam (VALIUM) tablet 10 mg    Or     diazepam (VALIUM) injection 5-10 mg     famotidine (PEPCID) injection 20 mg     flumazenil (ROMAZICON) injection 0.2 mg     [START ON 5/8/2023] folic acid (FOLVITE) tablet 1 mg     folic acid injection 1 mg     [START ON 5/13/2023] gabapentin (NEURONTIN) capsule 100 mg     [START ON 5/11/2023] gabapentin (NEURONTIN) capsule 300 mg     [START ON 5/9/2023] gabapentin (NEURONTIN) capsule 600 mg     gabapentin (NEURONTIN) capsule 900 mg     haloperidol lactate (HALDOL) injection 2.5-5 mg     HYDROmorphone (PF) (DILAUDID) injection 0.3-0.5 mg     lactulose (CHRONULAC) solution 10 g     lidocaine (LMX4) cream     lidocaine (LMX4) cream     lidocaine (XYLOCAINE) 2 % external gel     lidocaine 1 % 0.1-5 mL     lidocaine 1 % 0.1-5 mL     metoprolol (LOPRESSOR) injection 5 mg     multivitamin w/minerals (THERA-VIT-M) tablet 1 tablet     naloxone (NARCAN) injection 0.2 mg    Or     naloxone (NARCAN) injection 0.4 mg    Or     naloxone (NARCAN) injection 0.2 mg    Or     naloxone (NARCAN) injection 0.4 mg     norepinephrine (LEVOPHED) 4 mg in  mL infusion PREMIX     piperacillin-tazobactam (ZOSYN) 4.5 g vial to attach to  mL bag     rifaximin (XIFAXAN) tablet 550 mg     rOPINIRole (REQUIP) tablet 0.5 mg     rOPINIRole (REQUIP) tablet 1 mg     sodium chloride (PF) 0.9% PF flush 10-20 mL     sodium chloride (PF) 0.9% PF flush 10-40 mL     sodium chloride (PF) 0.9% PF flush 10-40 mL     sodium chloride (PF) 0.9% PF flush 10-40 mL     sodium chloride (PF) 0.9% PF flush 10-40 mL     thiamine (B-1) 200 mg in sodium chloride 0.9 % 50 mL intermittent infusion     [START ON 5/7/2023] thiamine (B-1) tablet 100 mg     [START ON 5/13/2023] thiamine (B-1) tablet 100 mg        ALLERGIES:  Allergies   Allergen Reactions     Adhesive Tape Blisters     Skin breakdown, open sores        SOCIAL HISTORY:  Social History     Socioeconomic History     Marital status:    Tobacco Use      Smoking status: Some Days     Types: Cigarettes     Smokeless tobacco: Never     Tobacco comments:     1 pack per month for 15 years   Substance and Sexual Activity     Alcohol use: Not Currently     Comment: 1/1/2020     Drug use: Not Currently     Sexual activity: Yes     Partners: Female       FAMILY HISTORY:  Family History   Problem Relation Age of Onset     Hypertension Father      Hypertension Paternal Grandfather      PHYSICAL EXAM:  Vital Signs: /46   Pulse 117   Temp 97  F (36.1  C) (Bladder)   Resp 14   Ht 1.829 m (6')   Wt 124 kg (273 lb 5.9 oz)   SpO2 96%   BMI 37.08 kg/m    GEN: the patient appears comfortable  HEENT:   scleral icterus pupils 2 mm bilaterally  Chest: coarse BS bilaterally anteriorly  COR: regular tachycardia.    ABD: markedly distended, firm, mild diffuse tenderness.   EXT:  Perfused, edema in bilateral lower extremities, open wound of the anterior and lateral legs with open area, and demonstrates weeping from the erythematous skin. [  NEURO: Weakness consistent with muscular dystrophy     A/P: I personally examined and evaluated the patient today in the [facility] ICU. All of the ICU patient care orders and treatments were placed at my direction. The patient has a high probability of imminent or life-threatening deterioration. I personally managed the patient's sedation, pain control and analgesia, metabolic abnormalities, nutritional status, and vasoactive medications.    Cardiac: Hemodynamically stable; the bleeding has stopped.      Pulm: No acute issues.     GI: End-stage alcoholic cirrhosis with ascites. Will avoid paracentesis today. Will integrate the TIPSS by US today. Appreciate gastroenterology input.     : Hyponatremia; will follow serum Na.  Will follow the serum Cr. and UO.     ID: Will continue Zosyn for bilateral lower ext infection and the GI bleed in the setting of ascites.     Heme:  Acute blood loss anemia. The bleeding has stopped. Will  follow the Hgb today. Transfuse as necessary for Hgb <7.0 gm/dL, platelets <50 K.     Clay Seo MD, PhD

## 2023-05-06 NOTE — PLAN OF CARE
Tx from [facility] ~1500. Pt declining- worsening labs, MTP activated. Pt awake, however increasing drowsiness, concern for encephalopathy. PICC placed. Hyperkalemia and hypoglycemia treated. Ct. Family updated on POC at bedside.      Plan of Care Reviewed With: patient, parent, spouse, child    Overall Patient Progress: decliningOverall Patient Progress: declining       Sivan Spencer RN

## 2023-05-06 NOTE — PROGRESS NOTES
"Intensivist  Patient with active bleeding into hematoma on right.   Case discussed with IR who recommends aggressive transfusion with RBC and coagulation components to stop what is likely \"medical\" bleeding (I agree).    2200 pm labs noted after 4 rbc's, 4 ffp, and 1 platelets. We will give 2 more rbc's 2 more FFP 1 more platelets, and 5 units cryoprecipitate, and follow up labs in 4 hours.    He is on a small dose of levophed (0.05) and will continue to monitor closely.     We will place kulkarni as patient unable to urinate.     I spent an additional 45 minutes of critical care time with him thus far today.    logan weiner  May 5, 2023    Addendum: hemorrhagic shock, and acute blood loss anemia. milad  "

## 2023-05-06 NOTE — PROGRESS NOTES
Requested to place additional line for mass transfusion protocol, but PICC working sufficiently, so canceled by ICU MD.  PICC placed earlier also redressed by ICU staff with thrombix, and no saturation of dressing noted.  VAT will reassess 5/6/23.

## 2023-05-06 NOTE — PLAN OF CARE
Oxygen: Room air to 2Loxymask (sleeping)  Continuous Drips: TXA, D10 0.45%, Levophed off around 0000 5/6  RASS: 0/-1  Pain/CPOT: Managed with topical lidocaine, PRN dilaudid  Mobility: Bedrest  Restraints: NA  Lines: R x3 lumen PICC, R PIV  Bo: Present, retention and strict I/O  Skin: Mepilex is on, BLE wounds, R hip hematoma and bruising on back/elbow from home fall  Diet/Bowel: Last BM 5/4, NPO except ice chips  DVT/GI Prophylaxis: Famotidine, no heparin per bleed risk  Glucose: Hypoglycemic start of shift, resolved  Antibiotics: Zosyn as scheduled  Events: MTP throughout shift, 6 PRBCs, 6 FFP, 4 PLT, 1 cryo, calcium gluconate given for critical iCa, most recent HgB 8.6, most recent PLT finished w/ CBC due 0600  Social: Start of shift bedside - patient's mom, ex-wife, 3 kids. Significant other called and was given update over phone, mom called around 0000 - update given.

## 2023-05-06 NOTE — PROGRESS NOTES
SPIRITUAL HEALTH SERVICES Progress Note  SH      Saw pt Sp Plasencia's mother Flor and isha Villavicencio per their request for a .    Illness Narrative - Pt not awake since being admitted. Pt reportedly in a relationship transition, being engaged to Maye, who is present at hospital.     Distress - Pt's mother Flor inquiring as to whether  visit and ritual support might be helpful to her son the pt. She and isha declined any needs for  support or for this 's support beyond triaging appropriateness of need for pt to be seen by a  and receiving a rosary when offered.      Coping - Pt's mother and isha Villavicencio acknowledged pt does not identify as Jainism now, and would likely, if asked, declined a  visit and anointing. Pt's mother wanted to make sure, given pt grew up Jainism, that he receive any support deemed necessary. This  offered emotional support as well as availability of  if pt expresses desire for , or if his health status declines, and family expresses desire for .     Meaning-Making - Pt's mother Flor and isha Villavicencio present as two primary sources of emotional support to pt. Per their report, pt does not look to the Jainism terri support at this time in his life.     Plan - If  or  support is requested, to triage any expressed desires pt might have for  support/ritual of anointing, or any other ways we might support he and his loved ones. Spiritual Health Services remains available as requested by pt/family.     Faraz Tsang M.Div.   Staff   Pager [phone]

## 2023-05-07 NOTE — PROGRESS NOTES
ICU Staff:    I attended the multidisciplinary rounds today. I examined the patient at the bedside in the [facility] ICU. I managed the patient at the bedside in the [facility] ICU for their critical illness. I reviewed the patient's medical records, progress notes, and imaging studies. I discussed the patient history, labs, imaging studies, and ICU care plan with the ICU fellow today. The patient is a 48-year-old man admitted to the ICU for hemorrhage and shock. The bleeding appears to have stopped, and the patient does not require additional blood products. The patient's prior medical issues include chronic liver disease requiring paracentesis s/p tips procedure, alcohol abuse, tobacco use, muscular dystrophy, bilateral calf pain and calf cellulitis, and insomnia. The patient was previously on lactulose for liver failure with recurrent ascites; however, he discontinued the lactulose due to diarrhea. I updated the patient's family members today in the ICU they wish to avoid feeding tube placement today.  Hgb this afternoon is 5 gm/dL; will transfuse 2 units PRBCs and 2 units FP now and then recheck the Hgb at 22:00 this evening.  Full informed consent for the transfusion of blood component with the patient family members today.  The family understood the risk of  transfusion and wished to proceed.      PAST MEDICAL HISTORY:  Past Medical History:   Diagnosis Date     Acid reflux      Anemia      Ascites      Esophageal varices (H)      FSHD (facioscapulohumeral muscular dystrophy) (H)      Gout      HTN (hypertension)      Jaundice      Liver cirrhosis (H)      Smoker      Thrombocytopenia (H)       PAST SURGICAL HISTORY:  Past Surgical History:   Procedure Laterality Date     IR PARACENTESIS  08/26/2020     IR TRANSVEN INTRAHEPATIC PORTOSYST SHUNT  08/26/2020     LAPAROSCOPIC HERNIORRHAPHY INGUINAL Right 10/14/2020    Procedure: HERNIORRHAPHY, RIGHT INGUINAL, LAPAROSCOPIC.;  Surgeon: Chris Parker MD;   Location: [facility] OR     MEDICATIONS:  Current Facility-Administered Medications   Medication     albumin human 25 % injection 25 g     ciprofloxacin (CIPRO) infusion 400 mg     cloNIDine (CATAPRES) tablet 0.1 mg     Contraindications to both pharmacological and mechanical prophylaxis (must document contraindications for both in this order)     dexmedetomidine (PRECEDEX) 4 mcg/mL in NS infusion     glucose gel 15-30 g    Or     dextrose 50 % injection 25-50 mL    Or     glucagon injection 1 mg     diazepam (VALIUM) injection 5 mg     famotidine (PEPCID) injection 20 mg     flumazenil (ROMAZICON) injection 0.2 mg     [START ON 5/8/2023] folic acid (FOLVITE) tablet 1 mg     [START ON 5/13/2023] gabapentin (NEURONTIN) capsule 100 mg     [START ON 5/11/2023] gabapentin (NEURONTIN) capsule 300 mg     [START ON 5/9/2023] gabapentin (NEURONTIN) capsule 600 mg     gabapentin (NEURONTIN) capsule 900 mg     haloperidol lactate (HALDOL) injection 2.5-5 mg     HYDROmorphone (PF) (DILAUDID) injection 0.3-0.5 mg     lactulose (CHRONULAC) solution 10 g     lidocaine (LMX4) cream     lidocaine (LMX4) cream     lidocaine (XYLOCAINE) 2 % external gel     lidocaine 1 % 0.1-5 mL     lidocaine 1 % 0.1-5 mL     metoprolol (LOPRESSOR) injection 5 mg     multivitamin w/minerals (THERA-VIT-M) tablet 1 tablet     naloxone (NARCAN) injection 0.2 mg    Or     naloxone (NARCAN) injection 0.4 mg    Or     naloxone (NARCAN) injection 0.2 mg    Or     naloxone (NARCAN) injection 0.4 mg     norepinephrine (LEVOPHED) 4 mg in  mL infusion PREMIX     rifaximin (XIFAXAN) tablet 550 mg     rOPINIRole (REQUIP) tablet 0.5 mg     rOPINIRole (REQUIP) tablet 1 mg     sodium chloride (PF) 0.9% PF flush 10-20 mL     sodium chloride (PF) 0.9% PF flush 10-40 mL     sodium chloride (PF) 0.9% PF flush 10-40 mL     sodium chloride (PF) 0.9% PF flush 10-40 mL     sodium chloride (PF) 0.9% PF flush 10-40 mL     thiamine (B-1) 200 mg in sodium chloride 0.9 % 50 mL  intermittent infusion     thiamine (B-1) tablet 100 mg     [START ON 5/13/2023] thiamine (B-1) tablet 100 mg     ALLERGIES:  Allergies   Allergen Reactions     Adhesive Tape Blisters     Skin breakdown, open sores     SOCIAL HISTORY:  Social History     Socioeconomic History     Marital status:    Tobacco Use     Smoking status: Some Days     Types: Cigarettes     Smokeless tobacco: Never     Tobacco comments:     1 pack per month for 15 years   Substance and Sexual Activity     Alcohol use: Not Currently     Comment: 1/1/2020     Drug use: Not Currently     Sexual activity: Yes     Partners: Female     FAMILY HISTORY:  Family History   Problem Relation Age of Onset     Hypertension Father      Hypertension Paternal Grandfather      PHYSICAL EXAM:  Vital Signs: /42   Pulse (!) 123   Temp 97.7  F (36.5  C) (Oral)   Resp 30   Ht 1.829 m (6')   Wt 124.1 kg (273 lb 9.5 oz)   SpO2 (!) 88%   BMI 37.11 kg/m    GEN: The patient appears comfortable  HEENT:   scleral icterus pupils 2 mm bilaterally  Chest: coarse BS bilaterally anteriorly  COR: regular tachycardia.    ABD: markedly distended, firm, mild diffuse tenderness.   EXT:  Perfused, edema in bilateral lower extremities, open wound of the anterior and lateral legs with open area, and demonstrates weeping from the erythematous skin. [  NEURO: Weakness consistent with muscular dystrophy     A/P: The patient is a 48-year-old man admitted to the ICU for hemorrhage and shock. The bleeding appears to have stopped, and the patient does not require additional blood products. The patient's prior medical issues include chronic liver disease requiring paracentesis s/p tips procedure, alcohol abuse, tobacco use, muscular dystrophy, bilateral calf pain and calf cellulitis, and insomnia. The patient was previously on lactulose for liver failure with recurrent ascites; however, he discontinued the lactulose due to diarrhea.      Cardiac: Hemodynamically stable;  the bleeding has stopped.       Pulm: No acute issues.      GI: End-stage alcoholic cirrhosis with ascites. TIPSS is working well by US examination. Will continue to avoid paracentesis today based on the patient recent life-threatening bleed. . Appreciate gastroenterology input. Family members wish to avoid feeding tube placement.       : Hyponatremia; will continue to follow serum Na. Will follow the serum Cr. and UO.      ID: Will continue Zosyn for bilateral lower ext infection and the GI bleed in the setting of ascites.      Heme:  Acute blood loss anemia. The bleeding has stopped. Will follow the Hgb today.  Hgb this afternoon is 5 gm/dL; will transfuse 2 units PRBCs and 2 units FP now and then recheck the Hgb at 22:00 this evening.  Full informed consent for the transfusion of blood component with the patient family members today.  The family understood the risk of  transfusion and wished to proceed.        I personally examined and evaluated the patient today in the [facility] ICU. I personally managed the patient's sedation, pain control and analgesia, metabolic abnormalities, nutritional status, and vasoactive medications.  I updated the patient's family members today in the ICU.   Hgb this afternoon is 5 gm/dL; will transfuse 2 units PRBCs and 2 units FP now and then recheck the Hgb at 22:00 this evening.  Full informed consent for the transfusion of blood component with the patient family members today.  The family understood the risk of  transfusion and wished to proceed.         Clay Seo MD, PhD

## 2023-05-07 NOTE — PLAN OF CARE
Pt agitated and impulsive this shift, treating CIWA PRN w/ improvement s/s. Bedrest. Tolerating clear liquids. No stools. Labs improving. Repeated requests, inappropriate speech, pt requires firm boundaries and reassurance and education.            Sivan Spencer RN

## 2023-05-07 NOTE — PLAN OF CARE
"Oxygen: Room air to 2L oxymask (sleeping)  Continuous Drips: D10 0.45%, Levophed   RASS: Agitated to drowsy  Pain/CPOT: Managed with PRN dilaudid x1 dose  Mobility: Bedrest  Restraints: NA  Lines: R x3 lumen PICC, R PIV  Bo: Present, retention and strict I/O  Skin: Mepilex is on, BLE wounds, R hip hematoma and bruising on back/elbow from home fall  Diet/Bowel: Last BM 5/4, tolerating clear liquid diet  DVT/GI Prophylaxis: Famotidine, no heparin per bleed risk  Glucose: No remarks  Antibiotics: Zosyn as scheduled  Events: Agitated start of shift, hallucinations of \"all the drawers [being] open\" managed with PRN haldol x1 dose, drowsy for majority of shift, transfusion requirements unmet overnight  Social: Start of shift bedside - patient's mom, ex-wife, 3 kids. Significant other called and was given update over phone, mom called around - update given.   "

## 2023-05-07 NOTE — PROGRESS NOTES
[facility] - Gastroenterology Progress Note    Subjective:  Remains confused, agitated.  Going through withdrawal.    Objective:  Physical Exam:  /43   Pulse 114   Temp 97.8  F (36.6  C) (Oral)   Resp 13   Ht 1.829 m (6')   Wt 124.1 kg (273 lb 9.5 oz)   SpO2 95%   BMI 37.11 kg/m    Temp (24hrs), Av.8  F (36.6  C), Min:97.8  F (36.6  C), Max:97.8  F (36.6  C)    Patient Vitals for the past 72 hrs:   Weight   23 0600 124.1 kg (273 lb 9.5 oz)   23 0600 124 kg (273 lb 5.9 oz)   23 1540 120.4 kg (265 lb 6.9 oz)       Intake/Output Summary (Last 24 hours) at 2023 1013  Last data filed at 2023 0810  Gross per 24 hour   Intake 1929.71 ml   Output 775 ml   Net 1154.71 ml     General Appearance: Comfortable, laying in bed  Abdomen: Normal    Labs / Imaging:  Recent Labs   Lab Test 23  0420 23  1806 23  1446 23  1008 23  0559 23  0305 23  2218 23  1657 10/19/22  0848 21  1123 21  1105 21  1126 20  1127 10/15/20  0656   WBC 18.0* 18.7* 18.2* 16.7* 14.4* 14.1* 17.7* 23.7*   < > 4.8   < > 4.8 4.7  --    HGB 7.8* 8.1* 8.1* 8.1* 7.9* 8.6* 7.0* 6.1*   < > 15.1   < > 13.7 12.7*  --    MCV 88 88 88 88 88 89 89 88   < > 89   < > 84 90  --    PLT 99* 83* 90* 76* 58* 44* 69* 66*   < > 96*   < > 117* 144*  --    INR  --   --   --   --  2.04*  --  1.93* 4.19*  --  1.30*  --  1.40* 1.55* 1.67*    < > = values in this interval not displayed.     No lab results found.    Invalid input(s): FERRITIN  Recent Labs   Lab Test 23  0420 23  1806 23  1446 23  1008 23  0559 23  0305 23  2218   POTASSIUM 5.0 5.1 5.2 5.2 4.9 4.9 4.8   CHLORIDE 88* 87* 91* 90* 88* 87* 87*   BUN 30.7* 27.7* 27.5* 26.2* 24.4* 23.7* 22.7*     Recent Labs   Lab Test 23  0420 23  0559 23  1657 10/19/22  0848 21  1123 21  1105 21  1126 20  0723 20  1351  02/12/20  0637 02/11/20  1120 02/11/20  1057 01/27/20  0000 01/04/19  1542   PROTEIN  --   --   --   --   --   --   --   --   --   --  20*  --   --  Negative   ALBUMIN 2.1* 2.3* 1.5* 3.6 3.6 3.9 3.5   < > 2.5*   < >  --  2.1*  --   --    BILITOTAL 14.7* 14.1* 11.1* 1.4* 2.1* 2.1* 1.4*   < > 24.9*   < >  --  18.9*  --   --    AST 1,918* 2,040* 1,063* 110* 85* 104* 43   < > 133*   < >  --  177*   < >  --    * 307* 151* 60 69 73* 44   < > 65   < >  --  64   < >  --    AMYLASE  --   --  96  --   --   --   --   --   --   --   --   --   --   --    LIPASE  --   --  171*  --   --   --   --   --  205  --   --  200  --   --     < > = values in this interval not displayed.       Assessment / Plan:  47 y/o male with history active alcohol abuse, EtOH cirrhosis (c/b ascites/TIPS, encephalopathy, no varices) and muscular dystrophy who is transferred from OSH due to anemia (hgb=6.3), epistaxis and modest hypotension (tx: Norepinephrine)  who was subsequently found to have large right thigh hematoma (due to falls at home, tx: massive transfusion protocol) and elevated LFTs (ALT>1000, Tbili=14.1) suspicious for ischemic liver injury (still on Norepinephrine).   LFTs appear to have peaked. Doppler US shows patent TIPS w/o stenosis.  No signs of GI bleeding.    - following LFTs (seem to have peaked)  - Rifaximin, Lactulose for encephalopathy  - on going ICU management for alcohol withdrawal, acute blood loss, hypotension  - no new GI recommendations    Oli Varela MD    Office: [phone]

## 2023-05-07 NOTE — IR NOTE
ULTRASOUND GUIDED PARACENTESIS   5/7/2023 3:20 PM     HISTORY: Ascites.    PROCEDURE:   Informed consent was obtained from the patient prior to the procedure with discussion including the possible risks of bleeding, infection and organ injury . Using 5 mL of 1% lidocaine for local anesthesia, sterile technique, and sonographic guidance with permanent image documentation, I placed an 8F paracentesis catheter into the peritoneal fluid collection. This was used to aspirate 4975 mL of yellow, serous fluid in vacuum bottles, and some of this was sent for any laboratory studies that had been ordered. There were no immediate complications.  Intravenous albumen replacement was performed according to protocol. As requested by patient, stitch was placed at the catheter entry site using 3-0 Vicryl. This was supplemented with Dermabond.    IMPRESSION:  Ultrasound guided paracentesis.

## 2023-05-08 NOTE — PROVIDER NOTIFICATION
2040: clarified blood product orders vs conflicting reports. Draw hgb after both PRBC units, added cryo x1, ABGs, lactic, and ammonia added.     2230: redraw ABG, HFNC ordered when resulted.     0145: CIWA protocol discontinued in AM, pt now increasingly tremulous; auditory, visual, and tactile hallucinations now present; anxious and fixating on trying to get OOB. Precedex restarted per intensivist.    0435: orders requested and received for PRBC and plt d/t critical labs

## 2023-05-08 NOTE — PROGRESS NOTES
Notified provider about indwelling kulkarni catheter discussed removal or continued need.    Did provider choose to remove indwelling kulkarni catheter?no    Provider's kulkarni indication for keeping indwelling kulkarni catheter:critically ill on vasopressor    Is there an order for indwelling kulkarni catheter?yes    *If there is a plan to keep kulkarni catheter in place at discharge daily notification with provider is not necessary, but please add a notation in the treatment team sticky note that the patient will be discharging with the catheter.

## 2023-05-08 NOTE — PLAN OF CARE
Pt. On oxymask most of shift, particularly when asleep, diminished lung sounds, CV:ST, adequate perfusion and remains on levophed, pt. Very irritated and combative and disoriented at start of shift, Valium given, MD aware and precedex ordered which was on for a biref time, low to adequate concentrated urine outout into kulkarni pt, was much more agreeable and oriented after an early rest period and precedex left off, pt's mother and girlfriend were at bedside and were supportive, pt. OK for renal diet and did eat a significant amount of fruit and fruit juices, tolerated well, two loose stools today, bedside paracentesis performed for nearly 5 liters removed, low HGB and high INR obtained, blood and plasma were ordered.

## 2023-05-08 NOTE — PROGRESS NOTES
GASTROENTEROLOGY PROGRESS NOTE    CC:     SUBJECTIVE:  Pt with delirium, unable to voice complaints. Three BM yesterday per RN    OBJECTIVE:  General Appearance:  Awake but confused, jaundiced. NAD  /49   Pulse 112   Temp 97.2  F (36.2  C) (Axillary)   Resp 19   Ht 1.829 m (6')   Wt 125 kg (275 lb 9.2 oz)   SpO2 94%   BMI 37.37 kg/m    Temp (24hrs), Av.3  F (36.3  C), Min:95.9  F (35.5  C), Max:98.1  F (36.7  C)    Patient Vitals for the past 72 hrs:   Weight   23 0400 125 kg (275 lb 9.2 oz)   23 0600 124.1 kg (273 lb 9.5 oz)   23 0600 124 kg (273 lb 5.9 oz)   23 1540 120.4 kg (265 lb 6.9 oz)       Intake/Output Summary (Last 24 hours) at 2023 0826  Last data filed at 2023 0720  Gross per 24 hour   Intake 6382.4 ml   Output 2035 ml   Net 4347.4 ml       PHYSICAL EXAM    Cor: tachy RR  Lungs: CTA wicho  Abd: protuberant, NT +bs  Ext: severe edema, venous stasis dermatitis. Thigh hematoma  Neuro: +asterixis      Additional Comments:  ROS, FH, SH: See initial GI consult for details.    I have reviewed the patient's new clinical lab results:    Recent Labs   Lab Test 23  2215 23  1711 23  1601 23  0420   WBC 7.4  --  10.2  --  18.0*   HGB 6.7* 8.1* 6.5*  --  7.8*   MCV 90  --  90  --  88   PLT 38*  --  63*  --  99*   INR 2.82*  --  4.01* 4.91*  --      Recent Labs   Lab Test 23  0411 23  0420 23  1806   POTASSIUM 4.5 5.0 5.1   CHLORIDE 91* 88* 87*   CO2    BUN 29.8* 30.7* 27.7*   ANIONGAP 9 9 10     Recent Labs   Lab Test 23  0411 23  0420 23  0559 23  1657 20  0723 20  1351 20  0637 20  1120 20  1057 20  0000 19  1542   ALBUMIN 2.4* 2.1* 2.3* 1.5*   < > 2.5*   < >  --  2.1*  --   --    BILITOTAL 14.3* 14.7* 14.1* 11.1*   < > 24.9*   < >  --  18.9*  --   --    * 293* 307* 151*   < > 65   < >  --  64   < >  --    * 1,918* 2,040*  1,063*   < > 133*   < >  --  177*   < >  --    PROTEIN  --   --   --   --   --   --   --  20*  --   --  Negative   LIPASE  --   --   --  171*  --  205  --   --  200  --   --    AMYLASE  --   --   --  96  --   --   --   --   --   --   --     < > = values in this interval not displayed.         Principal Problem: Decompensated ETOH cirrhosis, hepatitis. Admitted with severe anemia due to LE hematoma    1. Delirium: likely combination of ETOH withdrawal and hepatic encephalopathy: Will increase lactulose to 20 g BID, continue rifaxamin  2. HCC screening: Had CT on admit. AFP pending  3. Ascites: TIPSS Doppler done on 5/6. Appears patent.  4. MELD Na score today=33. Portends 65 % 90 day mortality risk  5. High INR due to liver disease but will give 3 d course of Vit K to replete stores        Pema Diez MD  [facility]  Pager: [phone]  Office: [phone]

## 2023-05-08 NOTE — PLAN OF CARE
Goal Outcome Evaluation: Care from [id number]    Neuro: Variable, see flowsheets for details. Consistently disoriented to time, intermittently situation. MD called to bedside for R eye disconjugate movements, decreased strength, decreased responsiveness at start of shift. Medication changes made. Dex restarted per intensivist after rapid increase of CIWA sx, currently off.   CV/VS: Levo titrated as able to maintain MAP >65, tele ST, lisa hugger applied at end of shift.   Respiratory: Started HFNC d/t low O2 on ABG 55%-->70%/35L, lungs diminished.   GI/: Loose BM x1, lasix x1, good UOP through entire shift.   Skin: Oozing blood and blisters noted at R hip hematoma, dressed as able, may need WOC consult. Scattered scabs, petechiae. Cellulitis ulcers to LEs. 3-4+ edema.  Pain: PRN dilaudid x2.  Labs: See results tab, critical values communicated per protocol and treated per intensivist  Activity: Bedrest, repositioned Q2H.   Diet: Tolerating renal diet and thin liquids when alert.   Plan/Changes: See flowsheets, MAR, and provider notification notes. Ideally, more imaging today.

## 2023-05-08 NOTE — CONSULTS
General surgery consult note    We have been asked to see Mr. Plasencia as he has developed an enlarging hematoma on his right hip/thigh area.  It appears he failed and hit that area about 1-1/2 to 2 weeks ago, it began as a small bruise/hematoma.  Now it has turned into a sizable hematoma with significant discoloration throughout.  There is some area of bullae developing but the area feels full but soft.  He continues to be coagulopathic, his hemoglobin has stabilized.  Situation was discussed with his fiancée and with the ICU staff.  The options of potential surgical drainage once INR corrected versus observation also correcting the INR were discussed.  At this point he appears to have stable hemoglobin and no evidence of ongoing bleeding.  Overlying skin is in evolution, pending how his hemoglobin trends in relationship to any further swelling of the area and how well his skin handles this situation we might proceed with surgical incision and drainage in the near future.    No intervention immediately.    Total encounter time 30 minutes, more than half spent in counseling, review of data, and coordination of care.

## 2023-05-08 NOTE — PROGRESS NOTES
Critical Care Progress Note      05/08/2023    Name: pS Plasencia MRN#: [id number]   Age: 48 year old YOB: 1975     Hsptl Day# 3  ICU DAY # 3    MV DAY # 3             Problem List:   Principal Problem:    Hemorrhage           Summary/Hospital Course:     Sp Plasencia is a 48 year old man who transfered to [facility] ICU on 5/5/2023 for acute blood loss anemia. He has a history of chronic liver disease requiring paracentesis s/p tips procedure, alcohol abuse, tobacco use, muscular dystrophy, bilateral calf pain and calf cellulitis, and insomnia. He presented to an outside hospital on 5/4/2023 for increasing jaundice, confusion, weakness/falls and epistaxis x3 days. He had been on lactulose for liver failure with recurrent ascites but had discontinued it due to diarrhea.     He had active nasal bleeding on presentation and a nasal clamp was placed which controlled bleeding. On arrival to OSH he was tachycardic and afebrile. Labs were notable for a hemoglobin of 6.3, platelets 87, hematocrit 17.5, INR of 2.69, sodium 118, lactate 3.9. He received 4 units of blood. For hyponatremia, PTA amlodipine and lisinopril were stopped and he was given midodrine.     Liver panel showed total bili 10.4 with direct bili of 6.7, , ALT 65, and alk phos 260. His alcohol level was 0.13, he states he had not had a drink in a few days. He did not have an abdominal ultrasound or CT at outside hospital. With concerns of hip pain and recent falls, he received chest/abdominal X-rays which were unremarkable. Of note, he was previously on the liver transplant list at [facility] in 2020 with a MELD score of 29 at the highest. At the time he was getting weekly paracentesis; following a TIPS procedure and alcohol cessation he improved enough to get off the liver transplant list. He has no known history of esophageal varices or other GI bleed. He has been drinking again (reports modestly)  since 2021.     Given concerns for bleeding and severe liver disease he was transferred to [facility] on 5/5/2023 for further workup and management. He has been given multiple units of blood products and hemoglobin continues to drop. He received paracentesis 5/7/2023 with drainage of 5L fluid. Large R sided thigh hematoma is persistent.         Assessment and plan :     Sp Plasencia IS a 48 year old man admitted to [facility] from an OSH on 5/5/2023 for acute blood loss anemia. He hs a history of chronic liver disease requiring paracentesis s/p tips procedure, alcohol abuse, tobacco use, muscular dystrophy, and PARAG calf cellulitis.   I have personally reviewed the daily labs, imaging studies, cultures and discussed the case with referring physician and consulting physicians.   My assessment and plan by system for this patient is as follows:    Neurology/Psychiatry:   1. Risk for hepatic encephalopathy  2. Alcohol withdrawal   3. Insomnia  Plan  - monitor neurologic status   - wean off Precedex due to AMS  - hold CIWA protocol   - Rifaximin, lactulose   - monitor daily ammonia levels   - consider addiction medicine consult   - lidocaine topical, dilaudid IV for pain    Cardiovascular:   1. Hemorrhagic shock from right thigh hematoma   2. History of hypertension  3. Venous insufficiency  Plan  - goal MAP >60  - continue levo for now  - HOLD PTA lisinopril and amlodipine     Pulmonary/Ventilator Management:   1. Hypoxic respiratory insufficiency   2. Epistaxis, right; resolved   - HFNC 65% and 35 LPM   - wean as tolerated  - CXR in am   - hold sedating meds  - removed right nasal packing  - keep additional nasal packing available at bedside    GI and Nutrition :   1. End-stage alcoholic cirrhosis  2. Ascites  3. Concern for SBP  Plan  - gastroenterology consulted  - TIPSS working well by ultrasound examination   - Famotidine for PUD ppx  - Follow LFTs  - Rifaximin,  lactulose    Renal/Fluids/Electrolytes:   1. Hyponatremia  2. Hypomagnesemia  3. Hypervolemia / anasarca   Plan  - replace electrolytes PRN   - kulkarni for strict I/Os  - trial of lasix   - s/p ascites removal 5/7 with removal of 5L fluid     Infectious Disease:   1.  Venous stasis dermatitis PARAG lower extremities  2.  Risk for SBP  Plan  - cipro for SBP ppx for now   - no current signs of infection     Endocrine:   1. Stress induced hyperglycemia  Plan  - ICU insulin protocol, goal sugar <180    Hematology/Oncology:   1. Hemorrhagic shock from right thigh hematoma   2. Coagulopathy 2/2 ESLD vs hemorrhage   3. Thrombocytopenia 2/2 ESLD   4. R lateral thigh hematoma  Plan  - Transfuse as necessary for Hgb <7.0, platelets <50  - check CBC and INR q6hr   - General surgery consult for R hip/thigh hematoma, possible surgical evacuation     MSKL/Rheum:  1. Facioscapulohumeral muscular dystrophy (FSMD)  Plan  -  Slowly progressive disease  -  monitor for decreased respiratory effort     IV/Access:   1. Venous access - PICC line      ICU Prophylaxis:   1. DVT: Hep held due concerns for active bleed  2. VAP: HOB 30 degrees, chlorhexidine rinse  3. Stress Ulcer: famotidine  4. Restraints: None  5. Wound care - per unit routine   6. Feeding - NPO  7. Family Update: family updated at bedside  8. Disposition - ICU      Key goals for next 24 hours:   1. Monitor blood counts  2. Hemodynamic stability  3. General Surgery recs for right thigh          Interim History:     Tremulous and agitated with A/V/T hallucinations, was anxious and fixated on getting out of bed. Precedex and CIWA now held due to excess sedation. Received PRBC and platelets. Kulkarni catheter in place. Tolerating renal diet with two loose stools yesterday. Paracentesis performed by IR with 5L removed, albumin given. Still on 0.1 Levo this am.            Key Medications:       Current Facility-Administered Medications:      ciprofloxacin (CIPRO) infusion 400 mg, 400  mg, Intravenous, Q12H, Clay Seo MD, 400 mg at 05/08/23 0314     Contraindications to both pharmacological and mechanical prophylaxis (must document contraindications for both in this order), , Does not apply, See Admin Instructions, Clay Seo MD     cyanocobalamin (VITAMIN B-12) sublingual tablet 500 mcg, 500 mcg, Sublingual, Daily, Clay Seo MD, 500 mcg at 05/07/23 1616     dexmedetomidine (PRECEDEX) 4 mcg/mL in NS infusion, 0.1-1.2 mcg/kg/hr, Intravenous, Continuous, Cho, Jose Haque MD, Stopped at 05/08/23 0545     glucose gel 15-30 g, 15-30 g, Oral, Q15 Min PRN **OR** dextrose 50 % injection 25-50 mL, 25-50 mL, Intravenous, Q15 Min PRN, 50 mL at 05/05/23 1839 **OR** glucagon injection 1 mg, 1 mg, Subcutaneous, Q15 Min PRN, Clay Seo MD     famotidine (PEPCID) injection 20 mg, 20 mg, Intravenous, Q12H, Clay Seo MD, 20 mg at 05/07/23 2204     flumazenil (ROMAZICON) injection 0.2 mg, 0.2 mg, Intravenous, q1 min prn, Clay Seo MD     folic acid (FOLVITE) tablet 1 mg, 1 mg, Oral, Daily, Clay Seo MD     [START ON 5/13/2023] gabapentin (NEURONTIN) capsule 100 mg, 100 mg, Oral, Q8H, Clay Seo MD     [START ON 5/11/2023] gabapentin (NEURONTIN) capsule 300 mg, 300 mg, Oral, Q8H, Clay Seo MD     [START ON 5/9/2023] gabapentin (NEURONTIN) capsule 600 mg, 600 mg, Oral, Q8H, Clay Seo MD     gabapentin (NEURONTIN) capsule 900 mg, 900 mg, Oral, Q8H, Clay Seo MD, 900 mg at 05/08/23 0314     haloperidol lactate (HALDOL) injection 2.5-5 mg, 2.5-5 mg, Intravenous, Q4H PRN, Clay Seo MD, 5 mg at 05/06/23 1932     HYDROmorphone (PF) (DILAUDID) injection 0.3-0.5 mg, 0.3-0.5 mg, Intravenous, Q1H PRN, Ko Ferguson MD, 0.5 mg at 05/08/23 0314     lactulose (CHRONULAC) solution 10 g, 10 g, Oral, Daily, Clay Seo MD, 10 g at 05/07/23 1212     lidocaine (LMX4) cream, , Topical, Q1H PRN,  Clay Seo MD, Given at 05/06/23 0409     lidocaine (LMX4) cream, , Topical, Q8H PRN, Clay Seo MD, Given at 05/07/23 2157     lidocaine (XYLOCAINE) 2 % external gel, , Urethral, Q4H PRN, Clay Seo MD, Given at 05/08/23 0314     lidocaine 1 % 0.1-5 mL, 0.1-5 mL, Other, Q1H PRN, Clay Seo MD, 1 mL at 05/05/23 1648     lidocaine 1 % 0.1-5 mL, 0.1-5 mL, Other, Q1H PRN, Clay Seo MD     metoprolol (LOPRESSOR) injection 5 mg, 5 mg, Intravenous, Q6H PRN, Clay Seo MD     multivitamin w/minerals (THERA-VIT-M) tablet 1 tablet, 1 tablet, Oral, Daily, Clay Seo MD, 1 tablet at 05/07/23 1211     naloxone (NARCAN) injection 0.2 mg, 0.2 mg, Intravenous, Q2 Min PRN **OR** naloxone (NARCAN) injection 0.4 mg, 0.4 mg, Intravenous, Q2 Min PRN **OR** naloxone (NARCAN) injection 0.2 mg, 0.2 mg, Intramuscular, Q2 Min PRN **OR** naloxone (NARCAN) injection 0.4 mg, 0.4 mg, Intramuscular, Q2 Min PRN, Nedra Short MD     norepinephrine (LEVOPHED) 4 mg in  mL infusion PREMIX, 0.01-0.6 mcg/kg/min, Intravenous, Continuous, Clay Seo MD, Last Rate: 45.2 mL/hr at 05/08/23 0632, 0.1 mcg/kg/min at 05/08/23 0632     rifaximin (XIFAXAN) tablet 550 mg, 550 mg, Oral, BID, Clay Seo MD, 550 mg at 05/07/23 2138     rOPINIRole (REQUIP) tablet 0.5 mg, 0.5 mg, Oral, Daily PRN, Clay Seo MD     [Held by provider] rOPINIRole (REQUIP) tablet 1 mg, 1 mg, Oral, At Bedtime, Clay Seo MD, 1 mg at 05/07/23 2138     sodium chloride (PF) 0.9% PF flush 10-20 mL, 10-20 mL, Intracatheter, q1 min prn, Clay Seo MD     sodium chloride (PF) 0.9% PF flush 10-40 mL, 10-40 mL, Intracatheter, Once PRN, Clay Seo MD     sodium chloride (PF) 0.9% PF flush 10-40 mL, 10-40 mL, Intracatheter, Q7 Days, Clay Seo MD, 10 mL at 05/05/23 2003     sodium chloride (PF) 0.9% PF flush 10-40 mL, 10-40 mL, Intracatheter, Q1H PRN, Rayray  Clay Andrade MD     sodium chloride (PF) 0.9% PF flush 10-40 mL, 10-40 mL, Intracatheter, Once PRN, Clay Seo MD, 40 mL at 05/05/23 1648     thiamine (B-1) tablet 100 mg, 100 mg, Oral, TID, Clay Seo MD, 100 mg at 05/07/23 2137     [START ON 5/13/2023] thiamine (B-1) tablet 100 mg, 100 mg, Oral, Daily, Clay Seo MD            Physical Examination:   Temp:  [95.9  F (35.5  C)-98.1  F (36.7  C)] 95.9  F (35.5  C)  Pulse:  [] 104  Resp:  [0-58] 11  BP: ()/() 118/56  FiO2 (%):  [35 %-70 %] 70 %  SpO2:  [88 %-99 %] 94 %  No intake or output data in the 24 hours ending 05/05/23 1514  Wt Readings from Last 4 Encounters:   05/08/23 125 kg (275 lb 9.2 oz)   10/19/22 108.9 kg (240 lb 1.6 oz)   09/08/21 99.8 kg (220 lb)   07/05/21 97.5 kg (215 lb)     BP - Mean:  [] 72  FiO2 (%): 70 %  Resp: 11    Recent Labs   Lab 05/07/23  2233 05/07/23  2115 05/05/23  1657   PH 7.42 7.45  --    PCO2 36 34*  --    PO2 68* 45*  --    HCO3 23 23  --    O2PER 28 28 21       GEN: ill appearing, somnolent, calm  HEENT: head ncat, OP patent, scleral icterus  PULM: unlabored breathing, on HFNC   CV/COR: RRR S1S2 no gallop,  No obvious rub, no murmur  ABD: markedly distended, no apparent tenderness   EXT:  Perfused, 3+ edema in bilateral lower extremities and scrotum  NEURO: sedated and lethargic, tremor, weakness consistent with muscular dystrophy  SKIN: skin is jaundiced, venous stasis dermatitis in PARAG lower extremities. Large hematoma R thigh with blistering and skin breakdown          Data:   All data and imaging reviewed     ROUTINE ICU LABS (Last four results)  CMP  Recent Labs   Lab 05/08/23  0411 05/07/23  1711 05/07/23  0420 05/06/23  1811 05/06/23  1806 05/06/23  1452 05/06/23  1446 05/06/23  0759 05/06/23  0559 05/05/23  1712 05/05/23  1657   * 120* 120*  --  120*  --  122*   < > 121*   < > 116*   POTASSIUM 4.5  --  5.0  --  5.1  --  5.2   < > 4.9   < > 5.5*   CHLORIDE 91*  --   88*  --  87*  --  91*   < > 88*   < > 85*   CO2 21*  --  23  --  23  --  21*   < > 23   < > 17*   ANIONGAP 9  --  9  --  10  --  10   < > 10   < > 14   *  --  100* 109* 113*   < > 107*   < > 96   < > 77   BUN 29.8*  --  30.7*  --  27.7*  --  27.5*   < > 24.4*   < > 25.1*   CR 1.08  --  1.20*  --  1.20*  --  1.22*   < > 1.11   < > 1.37*   GFRESTIMATED 85  --  75  --  75  --  74   < > 82   < > 64   JOSE CARLOS 8.0*  --  8.0*  --  8.2*  --  8.4*   < > 8.6   < > 7.3*   MAG  --   --  1.8  --   --   --   --   --  1.8  --  1.9   PHOS  --   --   --   --   --   --   --   --  4.0  --  4.7*   PROTTOTAL 4.4*  --  4.4*  --   --   --   --   --  4.3*  --  3.8*   ALBUMIN 2.4*  --  2.1*  --   --   --   --   --  2.3*  --  1.5*   BILITOTAL 14.3*  --  14.7*  --   --   --   --   --  14.1*  --  11.1*   ALKPHOS 161*  --  225*  --   --   --   --   --  184*  --  176*   *  --  1,918*  --   --   --   --   --  2,040*  --  1,063*   *  --  293*  --   --   --   --   --  307*  --  151*    < > = values in this interval not displayed.     CBC  Recent Labs   Lab 05/08/23  0411 05/07/23  2215 05/07/23  1711 05/07/23  0420 05/06/23  1806   WBC 7.4  --  10.2 18.0* 18.7*   RBC 2.11*  --  2.05* 2.49* 2.53*   HGB 6.7* 8.1* 6.5* 7.8* 8.1*   HCT 19.0*  --  18.5* 21.9* 22.3*   MCV 90  --  90 88 88   MCH 31.8  --  31.7 31.3 32.0   MCHC 35.3  --  35.1 35.6 36.3   RDW 15.6*  --  16.5* 15.9* 15.9*   PLT 38*  --  63* 99* 83*     INR  Recent Labs   Lab 05/08/23  0411 05/07/23  1711 05/07/23  1601 05/06/23  0559   INR 2.82* 4.01* 4.91* 2.04*     Arterial Blood Gas  Recent Labs   Lab 05/07/23  2233 05/07/23  2115 05/05/23  1657   PH 7.42 7.45  --    PCO2 36 34*  --    PO2 68* 45*  --    HCO3 23 23  --    O2PER 28 28 21       All cultures:  No results for input(s): CULT in the last 168 hours.  Recent Results (from the past 24 hour(s))   US Paracentesis with Albumin    Narrative    ULTRASOUND GUIDED PARACENTESIS   5/7/2023 3:20 PM     HISTORY:  Ascites.    PROCEDURE:   Informed consent was obtained from the patient prior to  the procedure with discussion including the possible risks of  bleeding, infection and organ injury . Using 5 mL of 1% lidocaine for  local anesthesia, sterile technique, and sonographic guidance with  permanent image documentation, I placed an 8F paracentesis catheter  into the peritoneal fluid collection. This was used to aspirate 4975  mL of yellow, serous fluid in vacuum bottles, and some of this was  sent for any laboratory studies that had been ordered. There were no  immediate complications.  Intravenous albumen replacement was  performed according to protocol. As requested by patient, stitch was  placed at the catheter entry site using 3-0 Vicryl. This was  supplemented with Dermabond.      Impression    IMPRESSION:  Ultrasound guided paracentesis.    REJI NASCIMENTO MD         SYSTEM ID:  [id number]     DENIS Velázquez MD   05/08/2023   Critical Care Fellow       Billing: This patient is critically ill: Yes. Total critical care time today 35 min.

## 2023-05-09 NOTE — PLAN OF CARE
Levophed titrated down. Patient more alert and more interactive as day progress. Family was at bedside, participated with care. Patient will bed NPO after midnight for possible surgery tomorrow. Right hip hematoma continues to ooze. Rhino rocket was also pulled out this morning by provider. No further sign of significant  bleeding.

## 2023-05-09 NOTE — PROGRESS NOTES
Critical Care Progress Note      05/09/2023    Name: Sp Plasencia MRN#: [id number]   Age: 48 year old YOB: 1975     [facility] Day# 4                   Problem List:   Principal Problem:    Hemorrhage  Cirrhosis  Delirium  Right thigh hematoma         Summary/Hospital Course:     Sp Plasencia is a 48 year old man who transfered to [facility] ICU on 5/5/2023 for acute blood loss anemia. He has a history of chronic liver disease requiring paracentesis s/p tips procedure, alcohol abuse, tobacco use, muscular dystrophy, bilateral calf pain and calf cellulitis, and insomnia. He presented to an outside hospital on 5/4/2023 for increasing jaundice, confusion, weakness/falls and epistaxis x3 days. He had been on lactulose for liver failure with recurrent ascites but had discontinued it due to diarrhea.     He had active nasal bleeding on presentation and a nasal clamp was placed which controlled bleeding. On arrival to OSH he was tachycardic and afebrile. Labs were notable for a hemoglobin of 6.3, platelets 87, hematocrit 17.5, INR of 2.69, sodium 118, lactate 3.9. He received 4 units of blood. For hyponatremia, PTA amlodipine and lisinopril were stopped and he was given midodrine.     Liver panel showed total bili 10.4 with direct bili of 6.7, , ALT 65, and alk phos 260. His alcohol level was 0.13, he states he had not had a drink in a few days. He did not have an abdominal ultrasound or CT at outside hospital. With concerns of hip pain and recent falls, he received chest/abdominal X-rays which were unremarkable. Of note, he was previously on the liver transplant list at [facility] in 2020 with a MELD score of 29 at the highest. At the time he was getting weekly paracentesis; following a TIPS procedure and alcohol cessation he improved enough to get off the liver transplant list. He has no known history of esophageal varices or other GI bleed. He has been drinking again  (reports modestly) since 2021.     Given concerns for bleeding and severe liver disease he was transferred to [facility] on 5/5/2023 for further workup and management. He has been given multiple units of blood products and hemoglobin continues to drop. He received paracentesis 5/7/2023 with drainage of 5L fluid. Large R sided thigh hematoma is persistent.         Assessment and plan :     Sp Plasencia IS a 48 year old man admitted to [facility] from an OSH on 5/5/2023 for acute blood loss anemia. He hs a history of chronic liver disease requiring paracentesis s/p tips procedure, alcohol abuse, tobacco use, muscular dystrophy, and PARAG calf cellulitis.   I have personally reviewed the daily labs, imaging studies, cultures and discussed the case with referring physician and consulting physicians.   My assessment and plan by system for this patient is as follows:  Neurology/Psychiatry:   1. Risk for hepatic encephalopathy  2. Alcohol withdrawal   3. Insomnia  Plan  - monitor neurologic status   - HOLD CIWA and Precedex   - Rifaximin, lactulose   - monitor daily ammonia levels   - consider addiction medicine consult   - lidocaine topical, dilaudid IV for pain    Cardiovascular:   1. Hemorrhagic shock from right thigh hematoma   2. History of hypertension  3. Venous insufficiency  Plan  - goal MAP >60  - continue levo for now  - HOLD PTA lisinopril and amlodipine     Pulmonary/Ventilator Management:   1. Hypoxic respiratory failure  2. Epistaxis, right; resolved   - HFNC 35% and 30 LPM   - wean as tolerated  - hold sedating meds  - IS / pulm toilet     GI and Nutrition :   1. End-stage alcoholic cirrhosis  2. Ascites  Plan  - NPO for possible procedure   - gastroenterology consulted  - TIPSS working well by ultrasound examination   - Famotidine for PUD ppx  - Follow LFTs  - Rifaximin, lactulose    Renal/Fluids/Electrolytes:   1. Hyponatremia  2. Hypomagnesemia  3. Hypervolemia / anasarca    Plan  - replace electrolytes PRN   - kulkarni for strict I/Os  - trial of lasix again today  - s/p ascites removal 5/7 with removal of 5L fluid     Infectious Disease:   1.  Venous stasis dermatitis PARAG lower extremities    Plan  - stop cipro.  Will change to ceftriaxone.  Cell count not sent from ascitic fluid.  Will treat possible SBP for 1 week.  Next time para is done, will send cell count.   - monitor Lower extremities for cellulitis     Endocrine:   1. Stress induced hyperglycemia  Plan  - ICU insulin protocol, goal sugar <180    Hematology/Oncology:   1. Hemorrhagic shock from right thigh hematoma   2. Coagulopathy 2/2 ESLD vs hemorrhage   3. Thrombocytopenia 2/2 ESLD   4. R lateral thigh hematoma  Plan  - Transfuse as necessary for Hgb <7.0, platelets <50  - check CBC and INR q6hr   - consider repeat TEG   - General surgery consult for R hip/thigh hematoma, possible surgical evacuation     MSKL/Rheum:  1. Facioscapulohumeral muscular dystrophy (FSMD)  Plan  -  Slowly progressive disease  -  monitor for decreased respiratory effort     IV/Access:   1. Venous access - PICC line      ICU Prophylaxis:   1. DVT: Hep held due concerns for active bleed  2. VAP: HOB 30 degrees, chlorhexidine rinse  3. Stress Ulcer: famotidine  4. Restraints: None  5. Wound care - per unit routine   6. Feeding - NPO  7. Family Update: family updated at bedside  8. Disposition - ICU    Key goals for next 24 hours:   1. Monitor blood counts  2. Wean Levo   3. [place] Lasix  4. General Surgery eval          Interim History:     Disoriented to situation and place overnight. R hip hematoma was oozing, requiring multiple dressing changes. Had 4 BM overnight, kulkarni patent with orange output. He continues on HFNC at 35%, 27.5L. Norepi is at 0.03. He has been NPO since midnight for possible hematoma evacuation.            Key Medications:       Current Facility-Administered Medications:      ciprofloxacin (CIPRO) infusion 400 mg, 400 mg,  Intravenous, Q12H, Clay Seo MD, 400 mg at 05/09/23 0346     Contraindications to both pharmacological and mechanical prophylaxis (must document contraindications for both in this order), , Does not apply, See Admin Instructions, Clay Seo MD     cyanocobalamin (VITAMIN B-12) sublingual tablet 500 mcg, 500 mcg, Sublingual, Daily, Clay Seo MD, 500 mcg at 05/08/23 1126     dexmedetomidine (PRECEDEX) 4 mcg/mL in NS infusion, 0.1-1.2 mcg/kg/hr, Intravenous, Continuous, Cho, Jose Haque MD, Stopped at 05/08/23 0545     glucose gel 15-30 g, 15-30 g, Oral, Q15 Min PRN **OR** dextrose 50 % injection 25-50 mL, 25-50 mL, Intravenous, Q15 Min PRN, 50 mL at 05/05/23 1839 **OR** glucagon injection 1 mg, 1 mg, Subcutaneous, Q15 Min PRN, Clay Seo MD     famotidine (PEPCID) injection 20 mg, 20 mg, Intravenous, Q12H, Clay Seo MD, 20 mg at 05/08/23 2025     flumazenil (ROMAZICON) injection 0.2 mg, 0.2 mg, Intravenous, q1 min prn, Clay Seo MD     folic acid (FOLVITE) tablet 1 mg, 1 mg, Oral, Daily, Clay Seo MD, 1 mg at 05/08/23 1127     [START ON 5/13/2023] gabapentin (NEURONTIN) capsule 100 mg, 100 mg, Oral, Q8H, Clay Seo MD     [START ON 5/11/2023] gabapentin (NEURONTIN) capsule 300 mg, 300 mg, Oral, Q8H, Clay Seo MD     gabapentin (NEURONTIN) capsule 600 mg, 600 mg, Oral, Q8H, Clay Seo MD, 600 mg at 05/09/23 0206     haloperidol lactate (HALDOL) injection 2.5-5 mg, 2.5-5 mg, Intravenous, Q4H PRN, Clay Seo MD, 5 mg at 05/06/23 1932     HYDROmorphone (PF) (DILAUDID) injection 0.3-0.5 mg, 0.3-0.5 mg, Intravenous, Q1H PRN, Ko Ferguson MD, 0.5 mg at 05/09/23 0647     lactulose (CHRONULAC) solution 20 g, 20 g, Oral, BID, Pema Diez MD, 20 g at 05/08/23 2024     lidocaine (LMX4) cream, , Topical, Q8H PRN, Clay Seo MD, Given at 05/09/23 0357     lidocaine (XYLOCAINE) 2 % external  gel, , Urethral, Q4H PRN, Clay Seo MD, Given at 05/08/23 0314     melatonin tablet 3 mg, 3 mg, Oral, At Bedtime PRN, Kal eD MD     metoprolol (LOPRESSOR) injection 5 mg, 5 mg, Intravenous, Q6H PRN, Clay Seo MD     multivitamin w/minerals (THERA-VIT-M) tablet 1 tablet, 1 tablet, Oral, Daily, Clay Seo MD, 1 tablet at 05/08/23 1126     naloxone (NARCAN) injection 0.2 mg, 0.2 mg, Intravenous, Q2 Min PRN **OR** naloxone (NARCAN) injection 0.4 mg, 0.4 mg, Intravenous, Q2 Min PRN **OR** naloxone (NARCAN) injection 0.2 mg, 0.2 mg, Intramuscular, Q2 Min PRN **OR** naloxone (NARCAN) injection 0.4 mg, 0.4 mg, Intramuscular, Q2 Min PRN, Nedra Short MD     norepinephrine (LEVOPHED) 4 mg in  mL infusion PREMIX, 0.01-0.6 mcg/kg/min, Intravenous, Continuous, Faith Morel MD, Last Rate: 13.5 mL/hr at 05/09/23 0537, 0.03 mcg/kg/min at 05/09/23 0537     phytonadione (MEPHYTON/VITAMIN K) 1 MG/ML oral solution 5 mg, 5 mg, Oral, Daily, BarancPema ballard MD, 5 mg at 05/08/23 1137     rifaximin (XIFAXAN) tablet 550 mg, 550 mg, Oral, BID, Clay Seo MD, 550 mg at 05/08/23 2025     rOPINIRole (REQUIP) tablet 0.5 mg, 0.5 mg, Oral, Daily PRN, Clay Seo MD     [Held by provider] rOPINIRole (REQUIP) tablet 1 mg, 1 mg, Oral, At Bedtime, Clay Seo MD, 1 mg at 05/07/23 2138     sodium chloride (PF) 0.9% PF flush 10-20 mL, 10-20 mL, Intracatheter, q1 min prn, Clay Seo MD     sodium chloride (PF) 0.9% PF flush 10-40 mL, 10-40 mL, Intracatheter, Q7 Days, Clay Seo MD, 10 mL at 05/05/23 2003     sodium chloride (PF) 0.9% PF flush 10-40 mL, 10-40 mL, Intracatheter, Q1H PRN, Clay Seo MD     sodium chloride 0.9% infusion, , Intravenous, Continuous, Nedra Short MD, Last Rate: 10 mL/hr at 05/08/23 1055, New Bag at 05/08/23 1055     thiamine (B-1) tablet 100 mg, 100 mg, Oral, TID, Clay Seo MD, 100 mg at  05/08/23 2219     [START ON 5/13/2023] thiamine (B-1) tablet 100 mg, 100 mg, Oral, Daily, Clay Seo MD            Physical Examination:   Temp:  [97.2  F (36.2  C)-98.6  F (37  C)] 97.7  F (36.5  C)  Pulse:  [] 66  Resp:  [6-43] 19  BP: ()/(35-65) 115/46  FiO2 (%):  [35 %-76 %] 35 %  SpO2:  [91 %-99 %] 92 %  No intake or output data in the 24 hours ending 05/05/23 1514  Wt Readings from Last 4 Encounters:   05/09/23 128.1 kg (282 lb 6.6 oz)   10/19/22 108.9 kg (240 lb 1.6 oz)   09/08/21 99.8 kg (220 lb)   07/05/21 97.5 kg (215 lb)     BP - Mean:  [55-91] 75  FiO2 (%): 35 %  Resp: 19    Recent Labs   Lab 05/08/23  1903 05/07/23  2233 05/07/23  2115 05/05/23  1657   PH  --  7.42 7.45  --    PCO2  --  36 34*  --    PO2  --  68* 45*  --    HCO3  --  23 23  --    O2PER 42 28 28 21       GEN: ill appearing, jaundiced   HEENT: head ncat, scleral icterus  PULM: unlabored breathing, on HFNC   CV/COR: RRR S1S2 no gallop,  No obvious rub, no murmur  ABD: markedly distended, no apparent tenderness   EXT:  Perfused, 3+ edema in bilateral lower extremities and scrotum  NEURO: sedated and lethargic, tremor, weakness consistent with muscular dystrophy  SKIN: skin is jaundiced, venous stasis dermatitis in PARAG lower extremities. Large hematoma R thigh with blistering and skin breakdown, some oozing         Data:   All data and imaging reviewed     ROUTINE ICU LABS (Last four results)  CMP  Recent Labs   Lab 05/09/23  0529 05/09/23  0416 05/08/23  2359 05/08/23  2039 05/08/23  1815 05/08/23  1047 05/08/23  0411 05/07/23  1711 05/07/23  0420 05/06/23  0759 05/06/23  0559 05/05/23  1712 05/05/23  1657   * 116* 122* 122* 123*  --  121*   < > 120*   < > 121*   < > 116*   POTASSIUM  --  4.2  --  4.4 4.4  --  4.5  --  5.0   < > 4.9   < > 5.5*   CHLORIDE  --  86*  --  92* 90*  --  91*  --  88*   < > 88*   < > 85*   CO2  --  22  --  23 47*  --  21*  --  23   < > 23   < > 17*   ANIONGAP  --  8  --  7 <1*  --  9  --   9   < > 10   < > 14   GLC  --  336*  --  131* 97 88 129*  --  100*   < > 96   < > 77   BUN  --  29.9*  --  30.7* 30.2*  --  29.8*  --  30.7*   < > 24.4*   < > 25.1*   CR  --  0.99  --  1.08 1.07  --  1.08  --  1.20*   < > 1.11   < > 1.37*   GFRESTIMATED  --  >90  --  85 86  --  85  --  75   < > 82   < > 64   JOSE CARLOS  --  7.2*  --  7.7* 7.9*  --  8.0*  --  8.0*   < > 8.6   < > 7.3*   MAG  --  1.5*  --   --   --   --   --   --  1.8  --  1.8  --  1.9   PHOS  --  2.8  --   --   --   --   --   --   --   --  4.0  --  4.7*   PROTTOTAL  --  4.4*  --   --   --   --  4.4*  --  4.4*  --  4.3*  --  3.8*   ALBUMIN  --  2.4*  --   --   --   --  2.4*  --  2.1*  --  2.3*  --  1.5*   BILITOTAL  --  16.0*  --   --   --   --  14.3*  --  14.7*  --  14.1*  --  11.1*   ALKPHOS  --  176*  --   --   --   --  161*  --  225*  --  184*  --  176*   AST  --  627*  --   --   --   --  934*  --  1,918*  --  2,040*  --  1,063*   ALT  --  153*  --   --   --   --  183*  --  293*  --  307*  --  151*    < > = values in this interval not displayed.     CBC  Recent Labs   Lab 05/09/23  0416 05/08/23  2359 05/08/23  1815 05/08/23  1318   WBC 10.5 12.0* 10.3 10.6   RBC 2.25* 2.49* 2.62* 2.60*   HGB 7.2* 7.8* 8.2* 8.1*   HCT 20.3* 22.3* 23.3* 23.1*   MCV 90 90 89 89   MCH 32.0 31.3 31.3 31.2   MCHC 35.5 35.0 35.2 35.1   RDW 16.2* 16.2* 15.9* 15.8*   PLT 72* 90* 72* 85*     INR  Recent Labs   Lab 05/09/23  0416 05/08/23  2359 05/08/23  1839 05/08/23  1318   INR 3.44* 3.19* 2.96* 2.61*     Arterial Blood Gas  Recent Labs   Lab 05/08/23  1903 05/07/23  2233 05/07/23  2115 05/05/23  1657   PH  --  7.42 7.45  --    PCO2  --  36 34*  --    PO2  --  68* 45*  --    HCO3  --  23 23  --    O2PER 42 28 28 21       All cultures:  No results for input(s): CULT in the last 168 hours.  Recent Results (from the past 24 hour(s))   XR Chest Port 1 View    Narrative    EXAM: XR CHEST PORT 1 VIEW  LOCATION: [facility]  DATE/TIME: 5/9/2023 5:29 AM  CDT    INDICATION: hypoxia  COMPARISON: CT 05/05/2023      Impression    IMPRESSION: Hypoventilatory exam. Trace bilateral pleural effusions with likely associated compressive atelectasis. Continued attention on follow-up. Heart is normal size. No definite pulmonary vascular congestion. No pneumothorax. No acute osseous   abnormality.     Toshia Richter, MS4    Billing: This patient is critically ill: Yes. Total critical care time today 35 min.    Attestation    I was present with the medical student who participated in the service and in the documentation of the note. I have verified the history and personally performed the physical exam and medical decision making. I agree with the assessment and plan as documented in the note.     I personally examined and evaluated the patient today.  Sp Plasencia was in (or remains in) critical condition today due shock. All physician orders and treatments were placed at my direction.  I personally managed vasoactive medications. Key decisions made today included levophed dosing, antibiotics.    I spent 35 minutes with the patient performing critical care, excluding procedures    Ridge Ching MD

## 2023-05-09 NOTE — PLAN OF CARE
Major Shift Events:  Disoriented to situation and place, reorient prn. R hip hematoma oozing, dressing changed multiple times. Levo titrated to keep MAP <60; HR ST. Had 4  BM overnight; kulkarni patent w/clear orange output. On HFNC@ 35% and 30L w/dim LS.     Plan: ECHO today, has been NPO since midnight for possible hematoma evacuation.   For vital signs and complete assessments, please see documentation flowsheets.       Goal Outcome Evaluation:      Plan of Care Reviewed With: patient    Overall Patient Progress: no changeOverall Patient Progress: no change

## 2023-05-09 NOTE — PROGRESS NOTES
General Surgery Progress Note    Admission Date: 5/5/2023  Today's Date: 5/9/2023         Assessment:      pS Plasencia is a 48 year old male with chronic liver disease, alcohol abuse, muscular dystrophy admitted with acute blood loss anemia and Right thigh hematoma    INR slightly increasing, 3.44 today. Hemoglobin 7.2.         Plan:   - Recommend I&D of thigh hematoma in the OR. Would like INR to improve. Hemoglobin is currently stable. This is not emergent, but patient would benefit from hematoma evacuation to allow for improved wound healing  - Transfuse as needed, continue excellent ICU cares  - Local wound care with dressings changes. Will need to change dressings often for skin oozing.  - OK to feed. We will tentatively add patient to OR schedule for Thursday 5/11. NPO at midnight, will time administration of FFP prior to procedure  - If patient's hemoglobin is unstable, we would consider going to OR sooner  - Plan discussed with Dr. Argueta, ICU fellow, patient, and his mother at bedside        Interval History:   Tmax 98.6 overnight, pulse labile - currently in 110s. On Norepi for blood pressure support. Currently on 25L O2 via HFNC. INR has increased since yesterday, this morning 3.44. NPO since midnight for surgical eval today, otherwise had been tolerating clear liquid diet. Would like to eat solid food. Having BMs, GI following.           Physical Exam:   BP (!) 97/38   Pulse 115   Temp 97.1  F (36.2  C) (Oral)   Resp 14   Ht 1.829 m (6')   Wt 128.1 kg (282 lb 6.6 oz)   SpO2 97%   BMI 38.30 kg/m    I/O last 3 completed shifts:  In: 2816.36 [P.O.:680; I.V.:1886.36]  Out: 1635 [Urine:1635]  General: chronically ill-appearing male, awake, no acute distress, drowsy at times  Head: extraocular movements intact, +scleral icterus  Respiratory: HFNC in place, breathing non-labored  Skin: +jaundice  Extremities: right upper thigh with significant hematoma and ecchymosis throughout, extends laterally and  posteriorly. On the postero-lateral aspect of his upper thigh there are some developing bullae with active oozing onto dressings. Area is slightly tender to palpation     LABS:  INR: 3.44  Recent Labs   Lab Test 05/09/23 0416 05/08/23  2359 05/08/23  1815   WBC 10.5 12.0* 10.3   HGB 7.2* 7.8* 8.2*   MCV 90 90 89   PLT 72* 90* 72*      Recent Labs   Lab Test 05/09/23 0416 05/08/23  2039 05/08/23  1815   POTASSIUM 4.2 4.4 4.4   CHLORIDE 86* 92* 90*   CO2 22 23 47*   BUN 29.9* 30.7* 30.2*   CR 0.99 1.08 1.07   ANIONGAP 8 7 <1*      Recent Labs   Lab Test 05/09/23 0416 05/08/23 0411 05/07/23  0420 05/06/23  0559 05/05/23  1657   ALBUMIN 2.4* 2.4* 2.1*   < > 1.5*   BILITOTAL 16.0* 14.3* 14.7*   < > 11.1*   * 183* 293*   < > 151*   * 934* 1,918*   < > 1,063*   ALKPHOS 176* 161* 225*   < > 176*   AMYLASE  --   --   --   --  96    < > = values in this interval not displayed.     -------------------------------    Aissatou Mccurdy PA-C  [facility]  [phone]

## 2023-05-09 NOTE — PROGRESS NOTES
Shift Note:    Oriented to self, [facility], May but either is disoriented to reason for admission or was joking around, could not get straight answer, GENIE RIVERA.  On HFNC, adequate SpO2.   Sinus tach; on levo to keep MAP >60mmHg.    Rec'd order to hold lactulose this AM d/t multiple stools overnight; did have one small loose stool this afternoon.  Low UOP, orange.  Receiving lasix, also got 2 unit(s) plasma.  No surgery today, diet was ordered, pt tolerating diet.  Possibly surgery Thursday; R hip/thigh area is weeping blood from pinpoint spots, extensive bruising R thigh, hip, buttock, groin, scrotum.    Severe scrotal edema.  Abd distended but pt denies pain.    Mother at bedside, was updated.

## 2023-05-09 NOTE — PROGRESS NOTES
GASTROENTEROLOGY PROGRESS NOTE    CC:     SUBJECTIVE:  Has right thigh pain    OBJECTIVE:  General Appearance:  Brighter today more alert,   BP (!) 99/39   Pulse 113   Temp 97.1  F (36.2  C) (Oral)   Resp 12   Ht 1.829 m (6')   Wt 128.1 kg (282 lb 6.6 oz)   SpO2 96%   BMI 38.30 kg/m    Temp (24hrs), Av.1  F (36.7  C), Min:97.1  F (36.2  C), Max:98.6  F (37  C)    Patient Vitals for the past 72 hrs:   Weight   23 0200 128.1 kg (282 lb 6.6 oz)   23 0400 125 kg (275 lb 9.2 oz)   23 0600 124.1 kg (273 lb 9.5 oz)       Intake/Output Summary (Last 24 hours) at 2023 1120  Last data filed at 2023 1100  Gross per 24 hour   Intake 2458.07 ml   Output 1560 ml   Net 898.07 ml       PHYSICAL EXAM    Cor: tachy RR  Abd: s obese protuberant, NT +bs  Ext: 3+ pitting LE edema        Additional Comments:  ROS, FH, SH: See initial GI consult for details.    I have reviewed the patient's new clinical lab results:    Recent Labs   Lab Test 23  2359 23  1839 23  1815   WBC 10.5 12.0*  --  10.3   HGB 7.2* 7.8*  --  8.2*   MCV 90 90  --  89   PLT 72* 90*  --  72*   INR 3.44* 3.19* 2.96*  --      Recent Labs   Lab Test 23  0416 23  2039 23  1815   POTASSIUM 4.2 4.4 4.4   CHLORIDE 86* 92* 90*   CO2 22 23 47*   BUN 29.9* 30.7* 30.2*   ANIONGAP 8 7 <1*     Recent Labs   Lab Test 23  0416 23  0411 23  0420 23  0559 23  1657 20  0723 20  1351 20  0637 20  1120 20  1057 20  0000 19  1542   ALBUMIN 2.4* 2.4* 2.1*   < > 1.5*   < > 2.5*   < >  --  2.1*  --   --    BILITOTAL 16.0* 14.3* 14.7*   < > 11.1*   < > 24.9*   < >  --  18.9*  --   --    * 183* 293*   < > 151*   < > 65   < >  --  64   < >  --    * 934* 1,918*   < > 1,063*   < > 133*   < >  --  177*   < >  --    PROTEIN  --   --   --   --   --   --   --   --  20*  --   --  Negative   LIPASE  --   --   --   --  171*  --  205   --   --  200  --   --    AMYLASE  --   --   --   --  96  --   --   --   --   --   --   --     < > = values in this interval not displayed.         Principal Problem:  48 yom with decompensated ETOH cirrhosis admitted with hemorrhagic shock due to epistaxis and right thigh hematoma. Per notes no ETOH since 2020  1. Hepatic encephalopathy: improved today, will decrease lactulose to daily dosing. Continue rifaxamin  2. MELD-Na score 34 today portends 65% 90 day mortality risk  3. Diuresis per ICU team  4. Vit K x 3 days  5. AFP normal  6. Check PEth level  7. Will need outpt hepatology follow up    Pema Diez MD  [facility]  Phone [phone] until 5 pm  Office [phone] for after hours on call provider          Pema Diez MD  [facility]  Pager: [phone]  Office: [phone]

## 2023-05-09 NOTE — PROGRESS NOTES
Pt remains on 30L 35% HFNC through out the night. BS diminished . Old dried blood inside nares.Tolerated HFNC. Skin intact. Mepilex under high flow strap. Will continue to monitor.    Elsi Lin, RT

## 2023-05-09 NOTE — PLAN OF CARE
[id number]    Patient is alert and oriented. Can make needs known. Sinus tach with blood pressure supported with low dose levo. On 6L NC with clear but diminished lung sounds. Bo in place for rentention, good urine output. Plan to continue to wean levo as able.   Patient making inappropriate comments to nurse. Nurse tried to redirect. Patient continues to make comments.

## 2023-05-10 NOTE — PROGRESS NOTES
Neuro: Disoriented and speaks nonsense. Sometimes he knows he's in the hospital. Refuses to take lactulose.     CV: Weaned off norepi. SR. Albumin given.     Resp: 2 L nc. Refuses pulmonary hygiene.     GI: No BM     : Bo w/ adequate output.    Skin: R hip hematoma and BLE cellulitis.

## 2023-05-10 NOTE — PLAN OF CARE
AVSS on 2L NC. Pain controlled with IV dilaudid. Right hip unchagned; continued oozing;  LS Dim throughout. Diet regular with fluid restriction. Bedrest maintained. BS active and audible. Bo with adequate urine output.

## 2023-05-10 NOTE — PROGRESS NOTES
General surgery note    Discussed with Mr. Plasencia the potential for incision and drainage of the large hematoma on his right hip/thigh area.  Explained the difficult situation given his significant coagulopathy and after understanding the risk versus benefits as well as the rationale for observation versus surgical intervention he would like to proceed with surgical drainage of this hematoma.  He verbalized understanding of the risk, benefits, hopeful outcome, possible complications of this procedure.  ICU team will continue to attempt to improve his coagulopathy given his dire hepatic failure.    Total encounter time 30 minutes, more than half spent in counseling and review of the data.

## 2023-05-10 NOTE — PROGRESS NOTES
GASTROENTEROLOGY PROGRESS NOTE     CC: Cirrhosis of liver     SUBJECTIVE:  Alert today.     Declines to take lactulose     OBJECTIVE:  General Appearance: Chronically ill appearing.Supplemental oxygen.   /46   Pulse 115   Temp 98.8  F (37.1  C) (Axillary)   Resp (!) 7   Ht 1.829 m (6')   Wt 129 kg (284 lb 6.3 oz)   SpO2 98%   BMI 38.57 kg/m    Temp (24hrs), Av.5  F (36.9  C), Min:97.1  F (36.2  C), Max:99.2  F (37.3  C)    Patient Vitals for the past 72 hrs:   Weight   05/10/23 0602 129 kg (284 lb 6.3 oz)   23 0200 128.1 kg (282 lb 6.6 oz)   23 0400 125 kg (275 lb 9.2 oz)       Intake/Output Summary (Last 24 hours) at 5/10/2023 1006  Last data filed at 5/10/2023 0600  Gross per 24 hour   Intake 2029.94 ml   Output 1700 ml   Net 329.94 ml        PHYSICAL EXAM  General: alert, oriented, NAD  SKIN: Jaundice  EYES: Scleral icterus   RESPIRATORY: Non labored breathing  GASTROINTESTINAL: Active bowel sounds, abdomen distened.   NEURO:Alert No asterixis      Additional Comments:  ROS, FH, SH: See initial GI consult for details.     I have reviewed the patient's new clinical lab results:     Recent Labs   Lab Test 05/10/23  0512 05/10/23  0025 23  1802   WBC 13.5* 12.8* 12.5*   HGB 7.6* 7.6* 6.9*   MCV 90 91 92   PLT 74* 73* 72*   INR 2.72* 2.65* 2.35*     Recent Labs   Lab Test 05/10/23  0512 23  0416 23  2039   POTASSIUM 4.5 4.2 4.4   CHLORIDE 91* 86* 92*   CO2    BUN 37.3* 29.9* 30.7*   ANIONGAP 9 8 7     Recent Labs   Lab Test 05/10/23  0512 23  0416 23  0411 23  0559 23  1657 20  0723 20  1351 20  0637 20  1120 20  1057 20  0000 19  1542   ALBUMIN 2.4* 2.4* 2.4*   < > 1.5*   < > 2.5*   < >  --  2.1*  --   --    BILITOTAL 19.7* 16.0* 14.3*   < > 11.1*   < > 24.9*   < >  --  18.9*  --   --    * 153* 183*   < > 151*   < > 65   < >  --  64   < >  --    * 627* 934*   < > 1,063*   < > 133*    < >  --  177*   < >  --    PROTEIN  --   --   --   --   --   --   --   --  20*  --   --  Negative   LIPASE  --   --   --   --  171*  --  205  --   --  200  --   --    AMYLASE  --   --   --   --  96  --   --   --   --   --   --   --     < > = values in this interval not displayed.        Creatinine   Date Value Ref Range Status   05/10/2023 1.06 0.67 - 1.17 mg/dL Final   02/18/2021 0.60 (L) 0.66 - 1.25 mg/dL Final     Imaging and procedures:  Para on 4/9     0.6 litter removed, fluid analysis negative for SBP    US doppler on 5/6 showed patent TIPS    US para on 5/7 with 4.97 L removal     X-ray 5/9   IMPRESSION: Hypoventilatory exam. Trace bilateral pleural effusions with likely associated compressive atelectasis. Continued attention on follow-up. Heart is normal size. No definite pulmonary vascular congestion. No pneumothorax. No acute osseous   Abnormality.    Problem list pertaining to GI:  Cirrhosis of liver   S/P TIPS  Ascites  Anemia  Thrombocytopenia  Coagulopathy     Assessment: This is a 48 y-o male with decompensated liver disease secondary to alcohol use who is admitted for hemorrhagic shock from epistaxis and right thigh hematoma. S/P TIPS, US doppler showed patent TIPS. Received transfusion on admission.     MELD-Na score: 34 - 65% mortality in 90 days.     Plan:   1. Continue ICU care  2. Continue Rifaxamin and lactulose   3. EGD not indicated given the patent TIPS  4. Follow-up in liver clinic  5. Alcohol cessation  6. Diuresis per ICU team.    7. Hematoma management per surgery     I will discuss with Dr. Diez       Time spent: 25 minutes, greater than 50% of the visit was spent in counseling/coordination of care.     Thais Dykes, CNP  [facility] ([facility])  [phone]

## 2023-05-10 NOTE — PROGRESS NOTES
ICU progress    Hyponatremia correction not as planned, Na increased to 127 with diuresis then back to 122. Will restrict fluids to 1800 ml/day given weight of 128 kg and continue q6 hrs Na checks.       Kal De MD

## 2023-05-10 NOTE — PROGRESS NOTES
Critical Care Progress Note      05/10/2023    Name: Sp Plasencia MRN#: [id number]   Age: 48 year old YOB: 1975     [facility] Day# 5                   Problem List:   Principal Problem:    Hemorrhage  Cirrhosis  Delirium  Right thigh hematoma         Summary/Hospital Course:     Sp Plasencia is a 48 year old man who transfered to [facility] ICU on 5/5/2023 for acute blood loss anemia. He has a history of chronic liver disease requiring paracentesis s/p tips procedure, alcohol abuse, tobacco use, muscular dystrophy, bilateral calf pain and calf cellulitis, and insomnia. He presented to an outside hospital on 5/4/2023 for increasing jaundice, confusion, weakness/falls and epistaxis x3 days. He had been on lactulose for liver failure with recurrent ascites but had discontinued it due to diarrhea.     He had active nasal bleeding on presentation and a nasal clamp was placed which controlled bleeding. On arrival to OSH he was tachycardic and afebrile. Labs were notable for a hemoglobin of 6.3, platelets 87, hematocrit 17.5, INR of 2.69, sodium 118, lactate 3.9. He received 4 units of blood. For hyponatremia, PTA amlodipine and lisinopril were stopped and he was given midodrine.     Liver panel showed total bili 10.4 with direct bili of 6.7, , ALT 65, and alk phos 260. His alcohol level was 0.13, he states he had not had a drink in a few days. He did not have an abdominal ultrasound or CT at outside hospital. With concerns of hip pain and recent falls, he received chest/abdominal X-rays which were unremarkable. Of note, he was previously on the liver transplant list at [facility] in 2020 with a MELD score of 29 at the highest. At the time he was getting weekly paracentesis; following a TIPS procedure and alcohol cessation he improved enough to get off the liver transplant list. He has no known history of esophageal varices or other GI bleed. He has been drinking again  (reports modestly) since 2021.     Given concerns for bleeding and severe liver disease he was transferred to [facility] on 5/5/2023 for further workup and management. He has been given multiple units of blood products and hemoglobin continues to drop. He received paracentesis 5/7/2023 with drainage of 5L fluid. Large R sided thigh hematoma is persistent.         Assessment and plan :     Sp Plasencia IS a 48 year old man admitted to [facility] from an OSH on 5/5/2023 for acute blood loss anemia. He hs a history of chronic liver disease requiring paracentesis s/p tips procedure, alcohol abuse, tobacco use, muscular dystrophy, and PARAG calf cellulitis.     I have personally reviewed the daily labs, imaging studies, cultures and discussed the case with referring physician and consulting physicians.     My assessment and plan by system for this patient is as follows:  Neurology/Psychiatry:   1. Risk for hepatic encephalopathy  2. Alcohol withdrawal   3. Insomnia  Plan  - monitor neurologic status   - Rifaximin, lactulose   - monitor daily ammonia levels   - consider addiction medicine consult   - lidocaine topical, dilaudid IV for pain    Cardiovascular:   1. Hemorrhagic shock from right thigh hematoma   2. History of hypertension  3. Venous insufficiency  Plan  - goal MAP >60  - continue levo for now  - HOLD PTA lisinopril and amlodipine   - echo 5/9/2023 with EF >70%  - monitor pro-BNP   - diuresis as tolerated     Pulmonary/Ventilator Management:   1. Hypoxic respiratory failure  2. Epistaxis, right; resolved   Plan  - on NC 5LPM, wean as tolerated  - hold sedating meds  - IS / pulm toilet   - check CXR     GI and Nutrition :   1. End-stage alcoholic cirrhosis  2. Ascites  Plan  - diet as tolerated   - NPOM for OR tomorrow   - gastroenterology consulted  - TIPSS working well by ultrasound examination   - Famotidine for PUD ppx  - Follow LFTs  - Rifaximin,  lactulose    Renal/Fluids/Electrolytes:   1. Hyponatremia  2. Hypomagnesemia  3. Hypervolemia / anasarca   4. Metabolic acidosis - now improved  Plan  - replace electrolytes PRN   - kulkarni for strict I/Os  - Albumin  / Bumex today  - q6h sodium checks  - s/p ascites removal 5/7 with removal of 5L fluid     Infectious Disease:   1.  Venous stasis dermatitis PARAG lower extremities  2. Open blisters RLE thigh  Plan  - Ceftriaxone, for SBP and skin coverage for now   - Send cell count with next paracentesis   - monitor Lower extremities for cellulitis     Endocrine:   1. Stress induced hyperglycemia  Plan  - ICU insulin protocol, goal sugar <180    Hematology/Oncology:   1. Hemorrhagic shock from right thigh hematoma   2. Coagulopathy 2/2 ESLD vs hemorrhage   3. Thrombocytopenia 2/2 ESLD   4. R lateral thigh hematoma  Plan  - Transfuse as necessary for Hgb <7.0, platelets <50  - check CBC and INR BID today  - Repeat TEG tomorrow 0600, transfuse as indicated to optimize for surgery   - General surgery consult for R hip/thigh hematoma, plan for surgical evacuation 5/11/2023 0915    MSKL/Rheum:  1. Facioscapulohumeral muscular dystrophy (FSMD)  Plan  -  Slowly progressive disease  -  monitor for decreased respiratory effort     IV/Access:   1. Venous access - PICC line      ICU Prophylaxis:   1. DVT: Hep held due concerns for active bleed  2. VAP: HOB 30 degrees   3. Stress Ulcer: famotidine  4. Restraints: None  5. Wound care - per unit routine   6. Feeding - NPO  7. Family Update: family updated at bedside  8. Disposition - ICU    Key goals for next 24 hours:   1. Monitor blood counts  2. Wean Levo   3. Diuresis challenge          Interim History:     NAEO. Making inappropriate comments to nurse yesterday PM and was not redirectable. Kulkarni in place due to urinary retention with adequate urine output. On fluid restriction for hyponatremia. Right hip hematoma with continued oozing. Pain well controlled with IV dilaudid.             Key Medications:       Current Facility-Administered Medications:      cefTRIAXone (ROCEPHIN) 2 g vial to attach to  ml bag for ADULTS or NS 50 ml bag for PEDS, 2 g, Intravenous, Q24H, Faith Morel MD, 2 g at 05/09/23 1306     Contraindications to both pharmacological and mechanical prophylaxis (must document contraindications for both in this order), , Does not apply, See Admin Instructions, Clay Seo MD     cyanocobalamin (VITAMIN B-12) sublingual tablet 500 mcg, 500 mcg, Sublingual, Daily, Clay Seo MD, 500 mcg at 05/09/23 0833     glucose gel 15-30 g, 15-30 g, Oral, Q15 Min PRN **OR** dextrose 50 % injection 25-50 mL, 25-50 mL, Intravenous, Q15 Min PRN, 50 mL at 05/05/23 1839 **OR** glucagon injection 1 mg, 1 mg, Subcutaneous, Q15 Min PRN, Clay Seo MD     famotidine (PEPCID) injection 20 mg, 20 mg, Intravenous, Q12H, Clay Seo MD, 20 mg at 05/09/23 2020     flumazenil (ROMAZICON) injection 0.2 mg, 0.2 mg, Intravenous, q1 min prn, Clay Seo MD     folic acid (FOLVITE) tablet 1 mg, 1 mg, Oral, Daily, Clay Seo MD, 1 mg at 05/09/23 0833     furosemide (LASIX) injection 80 mg, 80 mg, Intravenous, Once, Faith Morel MD     [START ON 5/13/2023] gabapentin (NEURONTIN) capsule 100 mg, 100 mg, Oral, Q8H, Clay Seo MD     [START ON 5/11/2023] gabapentin (NEURONTIN) capsule 300 mg, 300 mg, Oral, Q8H, Clay Seo MD     gabapentin (NEURONTIN) capsule 600 mg, 600 mg, Oral, Q8H, Clay Seo MD, 600 mg at 05/10/23 0053     haloperidol lactate (HALDOL) injection 2.5-5 mg, 2.5-5 mg, Intravenous, Q4H PRN, Clay Seo MD, 5 mg at 05/06/23 1932     HYDROmorphone (PF) (DILAUDID) injection 0.3-0.5 mg, 0.3-0.5 mg, Intravenous, Q1H PRN, Ko Ferguson MD, 0.5 mg at 05/10/23 0602     lactulose (CHRONULAC) solution 20 g, 20 g, Oral, Daily, Pema Diez MD     lidocaine (LMX4) cream, , Topical, Q8H  PRN, Clay Seo MD, Given at 05/09/23 1544     lidocaine (XYLOCAINE) 2 % external gel, , Urethral, Q4H PRN, Clay Seo MD, Given at 05/08/23 0314     melatonin tablet 3 mg, 3 mg, Oral, At Bedtime PRN, Kal De MD     metoprolol (LOPRESSOR) injection 5 mg, 5 mg, Intravenous, Q6H PRN, Clay Seo MD     multivitamin w/minerals (THERA-VIT-M) tablet 1 tablet, 1 tablet, Oral, Daily, Clay Seo MD, 1 tablet at 05/09/23 0833     naloxone (NARCAN) injection 0.2 mg, 0.2 mg, Intravenous, Q2 Min PRN **OR** naloxone (NARCAN) injection 0.4 mg, 0.4 mg, Intravenous, Q2 Min PRN **OR** naloxone (NARCAN) injection 0.2 mg, 0.2 mg, Intramuscular, Q2 Min PRN **OR** naloxone (NARCAN) injection 0.4 mg, 0.4 mg, Intramuscular, Q2 Min PRN, Nedra Short MD     norepinephrine (LEVOPHED) 4 mg in  mL infusion PREMIX, 0.01-0.6 mcg/kg/min, Intravenous, Continuous, Faith Morel MD, Last Rate: 9 mL/hr at 05/10/23 0617, 0.02 mcg/kg/min at 05/10/23 0617     phytonadione (MEPHYTON/VITAMIN K) 1 MG/ML oral solution 5 mg, 5 mg, Oral, Daily, Pema Diez MD, 5 mg at 05/09/23 0913     rifaximin (XIFAXAN) tablet 550 mg, 550 mg, Oral, BID, Clay Seo MD, 550 mg at 05/09/23 2020     rOPINIRole (REQUIP) tablet 0.5 mg, 0.5 mg, Oral, Daily PRN, Clay Seo MD     [Held by provider] rOPINIRole (REQUIP) tablet 1 mg, 1 mg, Oral, At Bedtime, Clay Seo MD, 1 mg at 05/07/23 9378     sodium chloride (PF) 0.9% PF flush 10-20 mL, 10-20 mL, Intracatheter, q1 min prn, Clay Seo MD     sodium chloride (PF) 0.9% PF flush 10-40 mL, 10-40 mL, Intracatheter, Q7 Days, Clay Seo MD, 10 mL at 05/05/23 2003     sodium chloride (PF) 0.9% PF flush 10-40 mL, 10-40 mL, Intracatheter, Q1H PRN, Clay Seo MD     sodium chloride 0.9% infusion, , Intravenous, Continuous, Nedra Short MD, Last Rate: 10 mL/hr at 05/08/23 1055, New Bag at 05/08/23 1055      thiamine (B-1) tablet 100 mg, 100 mg, Oral, TID, Clay Seo MD, 100 mg at 05/09/23 2251     [START ON 5/13/2023] thiamine (B-1) tablet 100 mg, 100 mg, Oral, Daily, Clay Seo MD            Physical Examination:   Temp:  [97.1  F (36.2  C)-99.2  F (37.3  C)] 98.8  F (37.1  C)  Pulse:  [109-125] 115  Resp:  [0-39] 7  BP: ()/(37-85) 114/46  FiO2 (%):  [35 %] 35 %  SpO2:  [76 %-100 %] 98 %  No intake or output data in the 24 hours ending 05/05/23 1514  Wt Readings from Last 4 Encounters:   05/10/23 129 kg (284 lb 6.3 oz)   10/19/22 108.9 kg (240 lb 1.6 oz)   09/08/21 99.8 kg (220 lb)   07/05/21 97.5 kg (215 lb)     BP - Mean:  [52-97] 74  FiO2 (%): 35 %  Resp: (!) 7    Recent Labs   Lab 05/08/23  1903 05/07/23  2233 05/07/23  2115 05/05/23  1657   PH  --  7.42 7.45  --    PCO2  --  36 34*  --    PO2  --  68* 45*  --    HCO3  --  23 23  --    O2PER 42 28 28 21       GEN: ill appearing, jaundiced   HEENT: head ncat, scleral icterus  PULM: unlabored breathing on NC, clear anteriorly   CV/COR: RRR S1S2 no gallop,  No obvious rub, no murmur  ABD: markedly distended, no apparent tenderness   EXT:  Perfused, 3+ edema in bilateral lower extremities and scrotum  NEURO: somnolent, awakens with minimal stimuli, tremor, weakness consistent with muscular dystrophy  SKIN: skin is jaundiced, venous stasis dermatitis in PARAG lower extremities. Large hematoma R thigh with blistering and skin breakdown, oozing         Data:   All data and imaging reviewed     ROUTINE ICU LABS (Last four results)  CMP  Recent Labs   Lab 05/10/23  0512 05/09/23  2120 05/09/23  1359 05/09/23  0529 05/09/23  0416 05/08/23  2359 05/08/23  2039 05/08/23  1815 05/08/23  1047 05/08/23  0411 05/07/23  1711 05/07/23  0420 05/06/23  0759 05/06/23  0559 05/05/23  1712 05/05/23  1657   * 122* 127* 123* 116*   < > 122* 123*  --  121*   < > 120*   < > 121*   < > 116*   POTASSIUM 4.5  --   --   --  4.2  --  4.4 4.4  --  4.5  --  5.0   < >  4.9   < > 5.5*   CHLORIDE 91*  --   --   --  86*  --  92* 90*  --  91*  --  88*   < > 88*   < > 85*   CO2 23  --   --   --  22  --  23 47*  --  21*  --  23   < > 23   < > 17*   ANIONGAP 9  --   --   --  8  --  7 <1*  --  9  --  9   < > 10   < > 14   GLC 96  --   --   --  336*  --  131* 97   < > 129*  --  100*   < > 96   < > 77   BUN 37.3*  --   --   --  29.9*  --  30.7* 30.2*  --  29.8*  --  30.7*   < > 24.4*   < > 25.1*   CR 1.06  --   --   --  0.99  --  1.08 1.07  --  1.08  --  1.20*   < > 1.11   < > 1.37*   GFRESTIMATED 87  --   --   --  >90  --  85 86  --  85  --  75   < > 82   < > 64   JOSE CARLOS 8.2*  --   --   --  7.2*  --  7.7* 7.9*  --  8.0*  --  8.0*   < > 8.6   < > 7.3*   MAG 2.1  --  2.3  --  1.5*  --   --   --   --   --   --  1.8  --  1.8  --  1.9   PHOS 3.3  --   --   --  2.8  --   --   --   --   --   --   --   --  4.0  --  4.7*   PROTTOTAL  --   --   --   --  4.4*  --   --   --   --  4.4*  --  4.4*  --  4.3*  --  3.8*   ALBUMIN 2.4*  --   --   --  2.4*  --   --   --   --  2.4*  --  2.1*  --  2.3*  --  1.5*   BILITOTAL 19.7*  --   --   --  16.0*  --   --   --   --  14.3*  --  14.7*  --  14.1*  --  11.1*   ALKPHOS 185*  --   --   --  176*  --   --   --   --  161*  --  225*  --  184*  --  176*   *  --   --   --  627*  --   --   --   --  934*  --  1,918*  --  2,040*  --  1,063*   *  --   --   --  153*  --   --   --   --  183*  --  293*  --  307*  --  151*    < > = values in this interval not displayed.     CBC  Recent Labs   Lab 05/10/23  0512 05/10/23  0025 05/09/23  1802 05/09/23  1151   WBC 13.5* 12.8* 12.5* 14.9*   RBC 2.41* 2.37* 2.16* 2.42*   HGB 7.6* 7.6* 6.9* 7.7*   HCT 21.7* 21.6* 19.9* 21.9*   MCV 90 91 92 91   MCH 31.5 32.1 31.9 31.8   MCHC 35.0 35.2 34.7 35.2   RDW 17.2* 17.2* 18.0* 17.5*   PLT 74* 73* 72* 91*     INR  Recent Labs   Lab 05/10/23  0512 05/10/23  0025 05/09/23  1802 05/09/23  1151   INR 2.72* 2.65* 2.35* 3.15*     Arterial Blood Gas  Recent Labs   Lab 05/08/23  1903  23  21123  1657   PH  --  7.42 7.45  --    PCO2  --  36 34*  --    PO2  --  68* 45*  --    HCO3  --  23 23  --    O2PER 42 28 28 21       All cultures:  No results for input(s): CULT in the last 168 hours.  Recent Results (from the past 24 hour(s))   Echocardiogram Complete   Result Value    LVEF  >70%    [facility]    [id number]  [id number]  [id number]  [id number]     [facility]  Echocardiography Laboratory  [address] [place] [address]     Name: ADELIA MCMILLAN  MRN: [id number]  : 1975  Study Date: 2023 09:39 AM  Age: 48 yrs  Gender: Male  Patient Location: [facility]  Reason For Study: Shock  Ordering Physician: JENNA CASTANEDA  Referring Physician: ANDREW VALERO  Performed By: SANDOVAL Platt     BSA: 2.5 m2  Height: 72 in  Weight: 282 lb  HR: 112  BP: 100/43 mmHg  ______________________________________________________________________________  Procedure  Complete Portable Echo Adult. Optison (NDC #[id number]) given intravenously.  ______________________________________________________________________________  Interpretation Summary     Normal left ventricular wall thickness.  Hyperdynamic left ventricular function  The visual ejection fraction is >70%.  Hyperdynamic right ventricular function  No significant valve disease.  Normal inferior vena cava.  There is no pericardial effusion.     There is no comparison study available.  ______________________________________________________________________________  Left Ventricle  The left ventricle is normal in size. There is normal left ventricular wall  thickness. Hyperdynamic left ventricular function. The visual ejection  fraction is >70%. Diastolic function not assessed due to tachycardia. No  regional wall motion abnormalities noted.     Right Ventricle  The right ventricle is normal size. Hyperdynamic right ventricular function.     Atria  Normal left atrial size. The left atrium is not  well visualized. Right atrial  size is normal. Right atrium not well visualized. There is no color Doppler  evidence of an atrial shunt.     Mitral Valve  The mitral valve leaflets appear normal. There is no evidence of stenosis,  fluttering, or prolapse. There is trace mitral regurgitation. There is no  mitral valve stenosis.     Tricuspid Valve  Normal tricuspid valve. There is trace tricuspid regurgitation. Right  ventricular systolic pressure could not be approximated due to inadequate  tricuspid regurgitation.     Aortic Valve  The aortic valve is trileaflet. No aortic regurgitation is present. No aortic  stenosis is present.     Pulmonic Valve  The pulmonic valve is not well visualized. There is trace pulmonic valvular  regurgitation.     Vessels  The aortic root is normal size. Normal size ascending aorta. The inferior vena  cava was normal in size with preserved respiratory variability.     Pericardium  There is no pericardial effusion.     Rhythm  The rhythm was sinus tachycardia.  ______________________________________________________________________________  MMode/2D Measurements & Calculations  IVSd: 1.1 cm     LVIDd: 4.8 cm  LVIDs: 3.6 cm  LVPWd: 1.2 cm  FS: 24.5 %  LV mass(C)d: 208.8 grams  LV mass(C)dI: 84.7 grams/m2  Ao root diam: 3.3 cm  LA dimension: 4.5 cm  asc Aorta Diam: 3.2 cm  LA/Ao: 1.4  LVOT diam: 2.1 cm  LVOT area: 3.3 cm2  LA Volume (BP): 67.3 ml  LA Volume Index (BP): 27.2 ml/m2  RV Base: 3.5 cm  RWT: 0.51     TAPSE: 3.7 cm     Doppler Measurements & Calculations  MV E max ascencion: 83.6 cm/sec  MV A max ascencion: 84.4 cm/sec  MV E/A: 0.99  MV dec time: 0.16 sec  PA acc time: 0.16 sec  Pulm Sys Ascencion: 55.3 cm/sec  Pulm Pratt Ascencion: 64.1 cm/sec  Pulm A Revs Ascencion: 36.6 cm/sec  Pulm S/D: 0.86  E/E' av.6  Lateral E/e': 5.4  Medial E/e': 7.7  RV S Ascencion: 22.0 cm/sec     ______________________________________________________________________________  Report approved by: Dr Camryn Scott 2023 12:09  PM           Toshia Richter, MS4    Faith Morel MD   05/10/2023   Critical Care Fellow     Attestation    I was present with the medical student who participated in the service and in the documentation of the note. I have verified the history and personally performed the physical exam and medical decision making. I agree with the assessment and plan as documented in the note.     I personally examined and evaluated the patient today.  Sp Plasencia was in (or remains in) critical condition today due shock. All physician orders and treatments were placed at my direction.  I personally managed vasoactive medications. Key decisions made today included levophed titration.    I spent 35 minutes with the patient performing critical care, excluding procedures    Ridge Ching MD

## 2023-05-10 NOTE — CONSULTS
Care Management Initial Consult    General Information  Assessment completed with: Patient, patient conchita Amaya and Mother Flor at the bedside  Type of CM/SW Visit: CM Role Introduction  PCP is Dr. Vinod Stewart   [facility]  [address] [address]   ~31.1 mi  Phone#[phone]  Fax#[phone]    Primary Care Provider verified and updated as needed: Yes (patient stated Dr. Lucero with [facility])   Readmission within the last 30 days: no previous admission in last 30 days      Reason for Consult: discharge planning  Advance Care Planning: Advance Care Planning Reviewed: other (see comments) (patinet asking for HCD to be completed)          Communication Assessment  Patient's communication style: spoken language (English or Bilingual)    Hearing Difficulty or Deaf: no   Wear Glasses or Blind: no    Cognitive  Cognitive/Neuro/Behavioral: .WDL except  Level of Consciousness: confused  Arousal Level: opens eyes spontaneously  Orientation: disoriented to, time, situation  Mood/Behavior: calm  Best Language: 0 - No aphasia  Speech: slow    Living Environment:   People in home: significant other, other (see comments) (lives with conchita Villavicencio  Current living Arrangements: house      Able to return to prior arrangements: other (see comments) (to be determined)       Family/Social Support:  Care provided by: self, spouse/significant other  Provides care for: no one, unable/limited ability to care for self  Marital Status:  (states working on finalizing divorce)  Significant Other, Parent(s)       Maye  Description of Support System: Supportive, Involved    Support Assessment: Adequate family and caregiver support, Adequate social supports    Current Resources:   Patient receiving home care services: No     Community Resources: Financial/Insurance  Equipment currently used at home: walker, rolling (walker with seated bench)  Supplies currently used at  "home:      Employment/Financial:  Employment Status: disabled        Financial Concerns: other (see comments) (patient stated he will most likely lose his primary insurance through spouse when divorce is finalized)   Referral to Financial Worker: Yes       Does the patient's insurance plan have a 3 day qualifying hospital stay waiver?  No    Lifestyle & Psychosocial Needs:  Social Determinants of Health     Tobacco Use: High Risk (10/19/2022)    Patient History      Smoking Tobacco Use: Some Days      Smokeless Tobacco Use: Never      Passive Exposure: Not on file   Alcohol Use: Not on file   Financial Resource Strain: Not on file   Food Insecurity: Not on file   Transportation Needs: Not on file   Physical Activity: Not on file   Stress: Not on file   Social Connections: Not on file   Intimate Partner Violence: Not on file   Depression: Not at risk (10/19/2022)    PHQ-2      PHQ-2 Score: 0   Housing Stability: Not on file       Functional Status:  Prior to admission patient needed assistance: Per patient's Significant other Maye and Mother Flor at the bedside prior to this admission patient was independent in all cares without the assistance of equipment. Occasionally uses a walker with seated bench due to knee issues.  Patient drives.              Mental Health Status:          Chemical Dependency Status:                Values/Beliefs:  Spiritual, Cultural Beliefs, Congregational Practices, Values that affect care:   Patient declined the need for SHS consult           Additional Information:  Writer attempted to meet with the patient this a.m. A+Ox3 (not situation). Patient informed that writer would return when family was present to gather more information which patient acknowledged.  Writer met with patients significant other Maye and his Mother Flor.  Explained role and scope of safe discharge planning.  Patient was independent with all ADL's prior to this admission. Patient had a \"similar episode\" 3 years ago " "that led to hospitalization.  Patient seemed more alert this afternoon. Writer discussed the following with Patient, Significant other and Mother today:  1. Even though we do not know the discharge plan, we are following the patient for continued planning to prepare for discharge. Patient is not working and is on SSDI. Patient has lived with his Significant other for one year.    Patients current address is:  [address] [place] [id number]  -Zhen Villavicencio 2 steps to enter the home and all needs met on the first level.  2. Patient informs this writer that he is currently on his Spouse's insurance with [employer], he stated they are , however their divorce is not final. He says she will be removing him from the policy when this is final.  Patient would like to talk to a Financial Counselor to see if he would be eligible for Medical Assistance of Gov't programs for insurance and possibly PCA services.  Patient is aware that PCA services would need to be approved through the county and that he would need to qualify for it. Writer will send a message to the Financial Counseling team as the patient prefers a call on Friday.   3. Writer discussed that therapies will most likely begin to see the patient to make discharge recommendations.  Writer briefly discussed different levels of post acute care such as LTACH/TCU/ARU and homecare.  Patient is unsure of what his goal is he stated \"I might be right as rain by Friday.\"  4. Writer does NOT have a healthcare directive and is interested in completing one during this stay. Will put a consult in for Spiritual health regarding this. Mentation seems to fluctuate so will need to be assessed when working with the patient and family.    Patient and family could not think of any questions for the time being.  They are aware that we will continue to follow for further discharge planning needs as identified.    Kim Joyce RN, BSN, [facility]   Care Transitions " [facility]  Care Transitions Specialist  [address] [address]  [email or web address]  Office: [phone] Fax: [phone]  [facility]

## 2023-05-11 NOTE — PLAN OF CARE
Goal Outcome Evaluation:  Pt had paracentesis with 6 L fluid removed, site continues to ooze copious amounts of serous fluid from LLQ puncture site.  Infused 2 units of FFP, responded well to bumex between unit(s).  Pt exhibits periods of anger and hostility, given valium 5 mg before interventions of new picc, paracentesis and blood infusion.  Pt pulled previous picc line early am and requires close watch to protect life saving devices.  Mother and fiance at bedside for majority of the day, updated by nursing and MD.

## 2023-05-11 NOTE — PLAN OF CARE
AVSS on RA. Requires frequent redirection. Levo remains off. Right hip hematoma. LS dim throughout. Diet NPO for OR today. BR. 1x BM. Bo with moderate urine output.

## 2023-05-11 NOTE — PROGRESS NOTES
General surgery    Will cancel plan for OR today, discussed with family, patient and ICU staff.  Worsening coag parameters.  Skin over hematoma remains intact.

## 2023-05-11 NOTE — PROGRESS NOTES
Called to replace PICC as patient has pulled most of catheter out. Patient scheduled to go to OR this am, insufficient time to place new PICC.  Removal of PICC completed by VAT, catheter intact at 51cm.  Heavy bleeding from site noted upon removal, vaseline, thrombix, gauze applied, ICU staff continues holding pressure.

## 2023-05-11 NOTE — PROCEDURES
[facility]    Procedure: Abdominal paracentesis    Date/Time: 5/11/2023 2:43 PM    Performed by: Faith Morel MD  Authorized by: Faith Morel MD      UNIVERSAL PROTOCOL   Site Marked: NA  Prior Images Obtained and Reviewed:  NA  Required items: Required blood products, implants, devices and special equipment available    Patient identity confirmed:  Arm band  Patient was reevaluated immediately before administering moderate or deep sedation or anesthesia  Confirmation Checklist:  Patient's identity using two indicators  Time out: Immediately prior to the procedure a time out was called    Universal Protocol: the Joint Commission Universal Protocol was followed    Preparation: Patient was prepped and draped in usual sterile fashion       ANESTHESIA    Anesthesia: Local infiltration  Local Anesthetic:  Lidocaine 1% without epinephrine      SEDATION    Patient Sedated: No      PROCEDURE  Describe Procedure: Identified a site in the left lower quadrant with the ultrasound. Site was marked. Patient was supine, laying partially on left side. 1% lidocaine infiltrated into skin and subcutaneous tissue down to the peritoneum. Stab incision was made and the needle and angiocatheter were inserted to the abdomen. There was immediate return of clear yellow fluid. We removed about 6L of ascites, a portion of which was sent for analysis and culture. The patient tolerate the procedure well and a bandage was applied over the incision. Dr. Rousseau was present for the entire procedure.   Patient Tolerance:  Patient tolerated the procedure well with no immediate complications  Length of time physician/provider present for 1:1 monitoring during sedation: 25

## 2023-05-11 NOTE — PROGRESS NOTES
Critical Care Progress Note      05/11/2023    Name: Sp Plasencia MRN#: [id number]   Age: 48 year old YOB: 1975     [facility] Hospital Day# 6                   Problem List:   Principal Problem:    Hemorrhage  Cirrhosis  Delirium  Right thigh hematoma         Summary/Hospital Course:     Sp Plasencia is a 48 year old man who transfered to [facility] ICU on 5/5/2023 for acute blood loss anemia. He has a history of chronic liver disease requiring paracentesis s/p tips procedure, alcohol abuse, tobacco use, muscular dystrophy, bilateral calf pain and calf cellulitis, and insomnia. He presented to an outside hospital on 5/4/2023 for increasing jaundice, confusion, weakness/falls and epistaxis x3 days. He had been on lactulose for liver failure with recurrent ascites but had discontinued it due to diarrhea.     He had active nasal bleeding on presentation and a nasal clamp was placed which controlled bleeding. On arrival to OSH he was tachycardic and afebrile. Labs were notable for a hemoglobin of 6.3, platelets 87, hematocrit 17.5, INR of 2.69, sodium 118, lactate 3.9. He received 4 units of blood. For hyponatremia, PTA amlodipine and lisinopril were stopped and he was given midodrine.     Liver panel showed total bili 10.4 with direct bili of 6.7, , ALT 65, and alk phos 260. His alcohol level was 0.13, he states he had not had a drink in a few days. He did not have an abdominal ultrasound or CT at outside hospital. With concerns of hip pain and recent falls, he received chest/abdominal X-rays which were unremarkable. Of note, he was previously on the liver transplant list at [facility] in 2020 with a MELD score of 29 at the highest. At the time he was getting weekly paracentesis; following a TIPS procedure and alcohol cessation he improved enough to get off the liver transplant list. He has no known history of esophageal varices or other GI bleed. He has been drinking again  (reports modestly) since 2021.     Given concerns for bleeding and severe liver disease he was transferred to [facility] on 5/5/2023 for further workup and management. He has been given multiple units of blood products and hemoglobin continues to drop. He received paracentesis 5/7/2023 with drainage of 5L fluid. Large R sided thigh hematoma is persistent.         Assessment and plan :     Sp Plasencia IS a 48 year old man admitted to [facility] from an OSH on 5/5/2023 for acute blood loss anemia. He hs a history of chronic liver disease requiring paracentesis s/p tips procedure, alcohol abuse, tobacco use, muscular dystrophy, and PARAG calf cellulitis.     I have personally reviewed the daily labs, imaging studies, cultures and discussed the case with referring physician and consulting physicians.     My assessment and plan by system for this patient is as follows:  Neurology/Psychiatry:   1. Risk for hepatic encephalopathy  2. Alcohol withdrawal   3. Insomnia  Plan  - monitor neurologic status   - Rifaximin, lactulose   - monitor daily ammonia levels   - valium PRN for pain / agitation  - lidocaine topical, dilaudid IV for pain    Cardiovascular:   1. Hemorrhagic shock from right thigh hematoma   2. History of hypertension  3. Venous insufficiency  Plan  - off pressors   - HOLD PTA lisinopril and amlodipine   - echo 5/9/2023 with EF >70%  - diuresis as tolerated     Pulmonary/Ventilator Management:   1. Hypoxic respiratory failure; resolved   2. Epistaxis, right; resolved   Plan  - on RA this am   - hold sedating meds  - IS / pulm toilet   - diuresis as below     GI and Nutrition :   1. End-stage alcoholic cirrhosis  2. Ascites  Plan  - diet as tolerated   - Paracentesis today; consent obtained   - gastroenterology consulted  - TIPSS working well by ultrasound examination   - Famotidine for PUD ppx  - Follow LFTs  - Rifaximin, lactulose  - Palliative care consult   - family request contact  with Hepatologist; will reach out when able     Renal/Fluids/Electrolytes:   1. Hyponatremia  2. Hypomagnesemia  3. Hypervolemia / anasarca   4. Metabolic acidosis - now improved  Plan  - replace electrolytes PRN   - kulkarni for strict I/Os  - Bumex again today  - q6h sodium checks  - s/p ascites removal 5/7 with removal of 5L fluid   - repeat Paracentesis today    Infectious Disease:   1.  Venous stasis dermatitis PARAG lower extremities  2.  Open blisters RLE thigh  Plan  - Ceftriaxone, for SBP and skin coverage for now   - Send cell count with next paracentesis   - monitor Lower extremities for cellulitis     Endocrine:   1. Stress induced hyperglycemia  Plan  - ICU insulin protocol, goal sugar <180    Hematology/Oncology:   1. Hemorrhagic shock from right thigh hematoma   2. Coagulopathy 2/2 ESLD vs hemorrhage   3. Thrombocytopenia 2/2 ESLD   4. R lateral thigh hematoma  Plan  - Transfuse as necessary for Hgb <7.0, platelets <50  - check CBC and INR BID today  - transfuse 2U FFP today   - General surgery consult for R hip/thigh hematoma  - no operative plans for now     MSKL/Rheum:  1. Facioscapulohumeral muscular dystrophy (FSMD)  Plan  -  Slowly progressive disease  -  monitor for decreased respiratory effort     IV/Access:   1. Venous access - PICC line replaced      ICU Prophylaxis:   1. DVT: Hep held due concerns for active bleed  2. VAP: HOB 30 degrees   3. Stress Ulcer: famotidine  4. Restraints: None  5. Wound care - per unit routine   6. Feeding - regular diet   7. Family Update: family updated at bedside  8. Disposition - ICU    Key goals for next 24 hours:   1. Monitor blood counts  2. Wean Levo   3. Diuresis challenge          Interim History:     Tolerated diuresis with bumex yesterday. Off pressors this am. Patient pulled out PICC line this am. General Surgery canceled OR due to concern for rising INR and risk of bleeding. Will continue to follow.            Key Medications:       Current  Facility-Administered Medications:      bumetanide (BUMEX) injection 1 mg, 1 mg, Intravenous, Once, Faith Morel MD     Contraindications to both pharmacological and mechanical prophylaxis (must document contraindications for both in this order), , Does not apply, See Admin Instructions, Clay Seo MD     cyanocobalamin (VITAMIN B-12) sublingual tablet 500 mcg, 500 mcg, Sublingual, Daily, Clay Seo MD, 500 mcg at 05/11/23 1030     glucose gel 15-30 g, 15-30 g, Oral, Q15 Min PRN **OR** dextrose 50 % injection 25-50 mL, 25-50 mL, Intravenous, Q15 Min PRN, 50 mL at 05/05/23 1839 **OR** glucagon injection 1 mg, 1 mg, Subcutaneous, Q15 Min PRN, Clay Seo MD     diazepam (VALIUM) tablet 5 mg, 5 mg, Oral, Q6H PRN, Faith Morel MD, 5 mg at 05/11/23 1130     famotidine (PEPCID) injection 20 mg, 20 mg, Intravenous, Q12H, Clay Seo MD, 20 mg at 05/11/23 1032     flumazenil (ROMAZICON) injection 0.2 mg, 0.2 mg, Intravenous, q1 min prn, Clay Seo MD     folic acid (FOLVITE) tablet 1 mg, 1 mg, Oral, Daily, Clay Seo MD, 1 mg at 05/11/23 1030     [START ON 5/13/2023] gabapentin (NEURONTIN) capsule 100 mg, 100 mg, Oral, Q8H, Clay Seo MD     gabapentin (NEURONTIN) capsule 300 mg, 300 mg, Oral, Q8H, Clay Seo MD, 300 mg at 05/11/23 1030     haloperidol lactate (HALDOL) injection 2.5-5 mg, 2.5-5 mg, Intravenous, Q4H PRN, Clay Seo MD, 5 mg at 05/06/23 1932     HYDROmorphone (PF) (DILAUDID) injection 0.3-0.5 mg, 0.3-0.5 mg, Intravenous, Q1H PRN, Ko Ferguson MD, 0.5 mg at 05/10/23 0602     lactulose (CHRONULAC) solution 20 g, 20 g, Oral, BID, Pema Diez MD, 20 g at 05/11/23 1116     lidocaine (LMX4) cream, , Topical, Q8H PRN, Clay Seo MD, Given at 05/11/23 1105     lidocaine (XYLOCAINE) 2 % external gel, , Urethral, Q4H PRN, Clay Seo MD, Given at 05/08/23 0314     melatonin tablet 3 mg, 3  mg, Oral, At Bedtime PRN, Kal De MD     metoprolol (LOPRESSOR) injection 5 mg, 5 mg, Intravenous, Q6H PRN, Clay Seo MD     multivitamin w/minerals (THERA-VIT-M) tablet 1 tablet, 1 tablet, Oral, Daily, Clay Seo MD, 1 tablet at 05/11/23 1030     naloxone (NARCAN) injection 0.2 mg, 0.2 mg, Intravenous, Q2 Min PRN **OR** naloxone (NARCAN) injection 0.4 mg, 0.4 mg, Intravenous, Q2 Min PRN **OR** naloxone (NARCAN) injection 0.2 mg, 0.2 mg, Intramuscular, Q2 Min PRN **OR** naloxone (NARCAN) injection 0.4 mg, 0.4 mg, Intramuscular, Q2 Min PRN, Nedra Short MD     norepinephrine (LEVOPHED) 4 mg in  mL infusion PREMIX, 0.01-0.6 mcg/kg/min, Intravenous, Continuous, Faith Morel MD, Stopped at 05/10/23 1529     phytonadione (MEPHYTON/VITAMIN K) 1 MG/ML oral solution 5 mg, 5 mg, Oral, Daily, Faith Morel MD, 5 mg at 05/11/23 1109     rifaximin (XIFAXAN) tablet 550 mg, 550 mg, Oral, BID, Clay Seo MD, 550 mg at 05/11/23 1030     rOPINIRole (REQUIP) tablet 0.5 mg, 0.5 mg, Oral, Daily PRN, Clay Seo MD     [Held by provider] rOPINIRole (REQUIP) tablet 1 mg, 1 mg, Oral, At Bedtime, Clay Seo MD, 1 mg at 05/07/23 2138     sodium chloride (PF) 0.9% PF flush 10-20 mL, 10-20 mL, Intracatheter, q1 min prn, Clay Seo MD     sodium chloride (PF) 0.9% PF flush 10-40 mL, 10-40 mL, Intracatheter, Q7 Days, Clay Seo MD, 10 mL at 05/05/23 2003     sodium chloride (PF) 0.9% PF flush 10-40 mL, 10-40 mL, Intracatheter, Q1H PRN, Clay Seo MD     sodium chloride 0.9% infusion, , Intravenous, Continuous, Nedra Short MD, Stopped at 05/11/23 0830     sulfamethoxazole-trimethoprim (BACTRIM DS) 800-160 MG per tablet 1 tablet, 1 tablet, Oral, Daily, Pema Diez MD     thiamine (B-1) tablet 100 mg, 100 mg, Oral, TID, Clay Seo MD, 100 mg at 05/11/23 1030     [START ON 5/13/2023] thiamine (B-1) tablet 100 mg,  100 mg, Oral, Daily, Clay Seo MD            Physical Examination:   Temp:  [98.5  F (36.9  C)-98.8  F (37.1  C)] 98.5  F (36.9  C)  Pulse:  [108-124] 117  Resp:  [0-44] 33  BP: ()/(40-73) 140/66  SpO2:  [82 %-96 %] 90 %  No intake or output data in the 24 hours ending 05/05/23 1514  Wt Readings from Last 4 Encounters:   05/10/23 129 kg (284 lb 6.3 oz)   10/19/22 108.9 kg (240 lb 1.6 oz)   09/08/21 99.8 kg (220 lb)   07/05/21 97.5 kg (215 lb)     BP - Mean:  [] 82  Resp: (!) 33    Recent Labs   Lab 05/08/23  1903 05/07/23  2233 05/07/23  2115 05/05/23  1657   PH  --  7.42 7.45  --    PCO2  --  36 34*  --    PO2  --  68* 45*  --    HCO3  --  23 23  --    O2PER 42 28 28 21       GEN: ill appearing, jaundiced   HEENT: head ncat, scleral icterus  PULM: unlabored breathing on NC, clear anteriorly   CV/COR: RRR S1S2 no gallop,  No obvious rub, no murmur  ABD: markedly distended, no apparent tenderness   EXT:  Perfused, 3+ edema in bilateral lower extremities and scrotum  NEURO: somnolent, awakens with minimal stimuli, tremor, weakness consistent with muscular dystrophy  SKIN: skin is jaundiced, venous stasis dermatitis in PARAG lower extremities. Large hematoma R thigh with blistering and skin breakdown, oozing         Data:   All data and imaging reviewed     ROUTINE ICU LABS (Last four results)  CMP  Recent Labs   Lab 05/11/23  0440 05/11/23  0035 05/10/23  1752 05/10/23  1227 05/10/23  1226 05/10/23  1004 05/10/23  0512 05/09/23  2120 05/09/23  1359 05/09/23  0529 05/09/23  0416 05/08/23  2359 05/08/23  2039 05/08/23  1047 05/08/23  0411 05/07/23  1711 05/07/23  0420 05/06/23  0759 05/06/23  0559   * 123* 125* 124*  --    < > 123*  123*   < > 127*   < > 116*   < > 122*   < > 121*   < > 120*   < > 121*   POTASSIUM 4.2  --   --   --   --   --  4.5  --   --   --  4.2  --  4.4   < > 4.5  --  5.0   < > 4.9   CHLORIDE 91*  --   --   --   --   --  91*  --   --   --  86*  --  92*   < > 91*  --  88*    < > 88*   CO2 23  --   --   --   --   --  23  --   --   --  22  --  23   < > 21*  --  23   < > 23   ANIONGAP 11  --   --   --   --   --  9  --   --   --  8  --  7   < > 9  --  9   < > 10   GLC 86  --   --   --  115*  --  96  --   --   --  336*  --  131*   < > 129*  --  100*   < > 96   BUN 39.7*  --   --   --   --   --  37.3*  --   --   --  29.9*  --  30.7*   < > 29.8*  --  30.7*   < > 24.4*   CR 0.92  --   --   --   --   --  1.06  --   --   --  0.99  --  1.08   < > 1.08  --  1.20*   < > 1.11   GFRESTIMATED >90  --   --   --   --   --  87  --   --   --  >90  --  85   < > 85  --  75   < > 82   JOSE CARLOS 8.4*  --   --   --   --   --  8.2*  --   --   --  7.2*  --  7.7*   < > 8.0*  --  8.0*   < > 8.6   MAG 1.9  --   --   --   --   --  2.1  --  2.3  --  1.5*  --   --   --   --   --  1.8  --  1.8   PHOS 3.2  --   --   --   --   --  3.3  --   --   --  2.8  --   --   --   --   --   --   --  4.0   PROTTOTAL  --   --   --   --   --   --   --   --   --   --  4.4*  --   --   --  4.4*  --  4.4*  --  4.3*   ALBUMIN 2.8*  --   --   --   --   --  2.4*  --   --   --  2.4*  --   --   --  2.4*  --  2.1*  --  2.3*   BILITOTAL 21.8*  --   --   --   --   --  19.7*  --   --   --  16.0*  --   --   --  14.3*  --  14.7*  --  14.1*   ALKPHOS 185*  --   --   --   --   --  185*  --   --   --  176*  --   --   --  161*  --  225*  --  184*   *  --   --   --   --   --  469*  --   --   --  627*  --   --   --  934*  --  1,918*  --  2,040*   *  --   --   --   --   --  138*  --   --   --  153*  --   --   --  183*  --  293*  --  307*    < > = values in this interval not displayed.     CBC  Recent Labs   Lab 05/11/23  0440 05/11/23  0035 05/10/23  1752 05/10/23  1227   WBC 12.9* 12.4* 10.6 13.9*   RBC 2.50* 2.54* 2.11* 2.34*   HGB 7.8* 8.1* 6.7* 7.4*   HCT 22.0* 22.4* 19.1* 21.4*   MCV 88 88 91 92   MCH 31.2 31.9 31.8 31.6   MCHC 35.5 36.2 35.1 34.6   RDW 18.9* 18.3* 17.7* 18.0*   PLT 66* 58* 60* 88*     INR  Recent Labs   Lab 05/11/23 0440  05/11/23  0035 05/10/23  1752 05/10/23  1227   INR 3.30* 3.26* 3.25* 2.94*     Arterial Blood Gas  Recent Labs   Lab 05/08/23  1903 05/07/23  2233 05/07/23  2115 05/05/23  1657   PH  --  7.42 7.45  --    PCO2  --  36 34*  --    PO2  --  68* 45*  --    HCO3  --  23 23  --    O2PER 42 28 28 21       All cultures:  No results for input(s): CULT in the last 168 hours.  No results found for this or any previous visit (from the past 24 hour(s)).     Faith Morel MD   05/11/2023   Critical Care Fellow

## 2023-05-11 NOTE — PROCEDURES
[facility]    Triple Lumen PICC Placement    Date/Time: 5/11/2023 12:44 PM    Performed by: Kate Shearer RN  Authorized by: Vivek Silver MD  Indications: vascular access      UNIVERSAL PROTOCOL   Site Marked: Yes  Prior Images Obtained and Reviewed:  Yes  Required items: Required blood products, implants, devices and special equipment available    Patient identity confirmed:  Verbally with patient, arm band, provided demographic data and hospital-assigned identification number  NA - No sedation, light sedation, or local anesthesia  Confirmation Checklist:  Patient's identity using two indicators, relevant allergies, procedure was appropriate and matched the consent or emergent situation and correct equipment/implants were available  Time out: Immediately prior to the procedure a time out was called    Universal Protocol: the Joint Commission Universal Protocol was followed    Preparation: Patient was prepped and draped in usual sterile fashion       ANESTHESIA    Local Anesthetic: Lidocaine 1% without epinephrine  Anesthetic Total (mL):  1      SEDATION    Patient Sedated: No        Preparation: skin prepped with 2% chlorhexidine  Skin prep agent: skin prep agent completely dried prior to procedure  Sterile barriers: maximum sterile barriers were used: cap, mask, sterile gown, sterile gloves, and large sterile sheet  Hand hygiene: hand hygiene performed prior to central venous catheter insertion  Type of line used: PICC  Catheter type: triple lumen  Lumen type: power PICC and valved  Lumen Identification: White, Red and Gray  Catheter size: 5 Fr  Lot number: [id number]  Placement method: MST, ultrasound and tip navigation system  Number of attempts: 1  Difficulty threading catheter: no  Successful placement: yes  Orientation: right    Location: brachial vein (lateral)  Arm circumference: adults 10 cm  Extremity circumference: 28  Visible catheter length: 2  Total catheter length:  47  Dressing and securement: chlorhexidine disc applied, subcutaneous anchor securement system, sterile dressing applied and gloves changed prior to final dressing  Post procedure assessment: blood return through all ports and placement verified by x-ray  PROCEDURE   Disposal: sharps and needle count correct at the end of procedure, needles and guidewire disposed in sharps container

## 2023-05-11 NOTE — PROGRESS NOTES
"GASTROENTEROLOGY PROGRESS NOTE    CC:     SUBJECTIVE:  \"not too much\" abdominal pain    OBJECTIVE:  General Appearance:  Jaundiced, less awake today, NAD  /63   Pulse 118   Temp 98.5  F (36.9  C)   Resp (!) 34   Ht 1.829 m (6')   Wt 129 kg (284 lb 6.3 oz)   SpO2 92%   BMI 38.57 kg/m    Temp (24hrs), Av.6  F (37  C), Min:98.5  F (36.9  C), Max:98.8  F (37.1  C)    Patient Vitals for the past 72 hrs:   Weight   05/10/23 0602 129 kg (284 lb 6.3 oz)   23 0200 128.1 kg (282 lb 6.6 oz)       Intake/Output Summary (Last 24 hours) at 2023 09  Last data filed at 2023 0600  Gross per 24 hour   Intake 576.28 ml   Output 1945 ml   Net -1368.72 ml       PHYSICAL EXAM    Cor: tachy RR  Abd: S obese protuberant mildly tender +umbilical hernia        Additional Comments:  ROS, FH, SH: See initial GI consult for details.    I have reviewed the patient's new clinical lab results:    Recent Labs   Lab Test 23  0440 23  0035 05/10/23  1752   WBC 12.9* 12.4* 10.6   HGB 7.8* 8.1* 6.7*   MCV 88 88 91   PLT 66* 58* 60*   INR 3.30* 3.26* 3.25*     Recent Labs   Lab Test 23  0440 05/10/23  0512 23  0416   POTASSIUM 4.2 4.5 4.2   CHLORIDE 91* 91* 86*   CO2    BUN 39.7* 37.3* 29.9*   ANIONGAP 11 9 8     Recent Labs   Lab Test 23  0440 05/10/23  0512 23  0416 23  0559 23  1657 20  0723 20  1351 20  0637 20  1120 20  1057 20  0000 19  1542   ALBUMIN 2.8* 2.4* 2.4*   < > 1.5*   < > 2.5*   < >  --  2.1*  --   --    BILITOTAL 21.8* 19.7* 16.0*   < > 11.1*   < > 24.9*   < >  --  18.9*  --   --    * 138* 153*   < > 151*   < > 65   < >  --  64   < >  --    * 469* 627*   < > 1,063*   < > 133*   < >  --  177*   < >  --    PROTEIN  --   --   --   --   --   --   --   --  20*  --   --  Negative   LIPASE  --   --   --   --  171*  --  205  --   --  200  --   --    AMYLASE  --   --   --   --  96  --   --   --   --  "  --   --   --     < > = values in this interval not displayed.         Principal Problem:  1. Decompensated ETOH cirrhosis with hx of TIPSS. Admitted with hemorrhagic shock due to thigh hematoma and epistaxis. Appears more encephalopathic today even though ammonia level normal.   -Meld Score 35 today  -Change ceftriaxone to daily TMP/SMX for SBP prophylaxis  -Consider paracentesis if abd becomes more tense  -Unable to fully correct INR due to liver disease but can give FFP if surgery needed  -Increase lactulose to BID dosing, continue rifaxamin    Pema Diez MD  [facility]  Phone [phone] until 5 pm  Office [phone] for after hours on call provider

## 2023-05-12 NOTE — PROGRESS NOTES
This note is in addition to the progress note.     I reviewed his case with one of our Hepatologist Dr. Kidd.     He has high MELD. MDF> 32, this is driven by his total bilirunbin level. His total bili is most likely influenced by large thigh hematoma and recent blood transfusions. Given the clinical presentation including BLE cellulitis, questionable SBP, thigh hematoma and epistaxis steroids trial is contraindicated.     Dr. Kidd recommended supportive treatment at this time.     I discussed our recommendation with the patient's sister who in his room and the ICU attending provider.     Thais Dykes CNP   [facility] ([facility])  [phone]  Mobile # [phone]

## 2023-05-12 NOTE — PROGRESS NOTES
05/12/23 1517   Appointment Info   Signing Clinician's Name / Credentials (OT) Jocelynn Quintero OTR/L   Rehab Comments (OT) Initial Evaluatoin   Living Environment   People in Home other (see comments)  (girlfriend (Monique). Per sister pt has 3 kids 15 yo daughter and 2 younger sons)   Current Living Arrangements house   Living Environment Comments Pt unable to provide PLOF. Pt's sister present and hasn't seen Sp lately but heard that he has been falling at home.   Self-Care   Fall history within last six months yes   General Information   Onset of Illness/Injury or Date of Surgery 05/05/23   Referring Physician Faith Morel MD   Patient/Family Therapy Goal Statement (OT) none stated   Additional Occupational Profile Info/Pertinent History of Current Problem Sp Plasencia is a 48 year old man who transfered to [facility] ICU on 5/5/2023 for acute blood loss anemia. He has a history of chronic liver disease requiring paracentesis s/p tips procedure, alcohol abuse, tobacco use, muscular dystrophy, bilateral calf pain and calf cellulitis, and insomnia. He presented to an outside hospital on 5/4/2023 for increasing jaundice, confusion, weakness/falls and epistaxis x3 days. He had been on lactulose for liver failure with recurrent ascites but had discontinued it due to diarrhea.   Existing Precautions/Restrictions fall   General Observations and Info Pt has activity order of bedrest placed on 5/5. Per RN, MD wanted pt up into the chair and was up in comobilzer for 3 hours earlier via   Cognitive Status Examination   Orientation Status person  (Pt with minimal responses, eyes closed most of session. When pt did respond had delayed responses.)   Affect/Mental Status (Cognitive) confused;low arousal/lethargic   Follows Commands 0-24% accuracy;25-49% accuracy;delayed response/completion;increased processing time needed;physical/tactile prompts required;repetition of directions  required;verbal cues/prompting required   Cognitive Status Comments Continue to monitor cognition, minmal response and command following this date. When pt did speak, difficult to understand most of the time.   Pain Assessment   Patient Currently in Pain Yes, see Vital Sign flowsheet  (Per RN, painful B LE's, both LE's with edema and R hip hematoma with bloody drainage. pain/groaning when rolling onto L side.pt rec'd pain med prior to rolling.)   Range of Motion Comprehensive   Comment, General Range of Motion B UE AAORM appears WFL, pt able to shrug B shoulders and squeeze hands with weak grasp.   Strength Comprehensive (MMT)   Comment, General Manual Muscle Testing (MMT) Assessment overall weakness   Coordination   Upper Extremity Coordination Left UE impaired;Right UE impaired   Coordination Comments impaired strengthe and edema and alertness impairing coordination/strength   Bed Mobility   Rolling Left [place] (Bed Mobility) maximum assist (25% patient effort);2 person assist   Transfers   Transfer Comments Requires use of lift to comblizer chair earlier per RN.   Grooming Assessment/Training   [place] Level (Grooming) maximum assist (25% patient effort)   Eating/Self Feeding   [place] Level (Feeding) maximum assist (25% patient effort)   Clinical Impression   Criteria for Skilled Therapeutic Interventions Met (OT) Yes, treatment indicated   OT Diagnosis impaired I with ADL's and functional mobility   OT Problem List-Impairments impacting ADL problems related to;activity tolerance impaired;balance;cognition;communication;strength;pain;sensation;range of motion (ROM);postural control;motor control;coordination   Assessment of Occupational Performance 5 or more Performance Deficits   Identified Performance Deficits impaired I with all ADL/IADL's and mobility, ie dressing, toileting, showering, shopping, laundry, etc   Planned Therapy Interventions (OT) ADL retraining;cognition;transfer training;home  program guidelines;progressive activity/exercise;neuromuscular re-education   Clinical Decision Making Complexity (OT) high complexity   Risk & Benefits of therapy have been explained evaluation/treatment results reviewed;care plan/treatment goals reviewed;risks/benefits reviewed;current/potential barriers reviewed;participants voiced agreement with care plan;participants included;patient;sibling  (sister present)   Clinical Impression Comments Pts sister providing some PLOF, but unsure of recent function.   OT Total Evaluation Time   OT Eval, High Complexity Minutes (27733) 10   OT Goals   Therapy Frequency (OT) 5 times/wk   OT Predicted Duration/Target Date for Goal Attainment 05/19/23   OT Goals Hygiene/Grooming;Upper Body Dressing;OT Goal 1;Cognition;OT Goal 2;Toilet Transfer/Toileting   OT: Hygiene/Grooming minimal assist   OT: Upper Body Dressing Minimal assist   OT: Toilet Transfer/Toileting toilet transfer;cleaning and garment management;using adaptive equipment;Maximum assist  (BSC)   OT: Cognitive Patient/caregiver will verbalize understanding of cognitive assessment results/recommendations as needed for safe discharge planning   OT: Goal 1 Pt will follow 1 step commands during ADL's and functional mobility 100% of the time.   OT: Goal 2 Pt will sit at EOB with MIN A in preparation for ADL's and functional mobility.   Self-Care/Home Management   Self-Care/Home Mgmt/ADL, Compensatory, Meal Prep Minutes (57672) 18   Symptoms Noted During/After Treatment (Meal Preparation/Planning Training) fatigue   Treatment Detail/Skilled Intervention OT: pt needing repetitive cues and max A to grasp wash clothe with R hand and to wash face. wiht increased processing time, pt able to wash his face with MOD A at [place] and upper then pt able to wash face with cues for thoroughness. pt reports he's thirsty with max A to iniate grasp on cup with straw, with cues pt unable to sip from straw, with max A for R UE A at [place] and  hand over hand A for cup pt able to drink water from cup with max cues and initial processing time then able to drink 2-3 x's without straw. Pt's eyes closed much of session and following commands sporadically. pt's sister present and did not notice his sister  until end of session, even after directing pt to sister.   Therapeutic Activities   Therapeutic Activity Minutes (76264) 10   Symptoms noted during/after treatment increased pain   Treatment Detail/Skilled Intervention OT: pt requires maX x 2 and cues to roll onto L side with max A/dependent to bend R knee and A reach of R UE to grasp bedrail on L side of bed. pt then able to hold bed rail with mod/max A to stay on his L side. Per nursing pt required use of lift to transfer to cmobilizer chair earlier today. pt grunting in pain due to B LE edema and R hip hematoma.   OT Discharge Planning   OT Plan consider cotx with PT with use of lift to EOB to work on sitting balance and try AAROM/B UE reaching tasks or g/h as able. command following, oreinetation.   OT Discharge Recommendation (DC Rec) Transitional Care Facility   OT Rationale for DC Rec Per pt's sister, pt was having increased difficulty with mobility and falling prior to admit. Pt currenlty limited due to lethargy, impaired UE and LE AROM, BLE edema an dpain, impaired activity tolerance, balance, cognition, etc. pt will require cont'd therapy at rehab facility to increase ADL and functional mobility I and safety toPLOF. Noted palliative care conference earliy next week.   OT Brief overview of current status max A x 2 to roll onto L side, grooming with MOD/MAX A and to max A to hold up with R hand and impaired processing with incresaed time to drink from up, lift required for transfers.   Total Session Time   Timed Code Treatment Minutes 28   Total Session Time (sum of timed and untimed services) 38

## 2023-05-12 NOTE — PROGRESS NOTES
GASTROENTEROLOGY PROGRESS NOTE     CC: Cirrhosis of liver     SUBJECTIVE: Sitting on the chair and working on breakfast. Falling a sleep.      Appears more encephalopathic today.     Took lactulose today. No BM yesterday per nursing.      OBJECTIVE:  General Appearance: Chronically ill appearing.  /51   Pulse 113   Temp 97.5  F (36.4  C) (Axillary)   Resp 23   Ht 1.829 m (6')   Wt 119.3 kg (263 lb 0.1 oz)   SpO2 94%   BMI 35.67 kg/m    Temp (24hrs), Av.5  F (36.9  C), Min:97.1  F (36.2  C), Max:99.2  F (37.3  C)    Patient Vitals for the past 72 hrs:   Weight   23 0415 119.3 kg (263 lb 0.1 oz)   05/10/23 0602 129 kg (284 lb 6.3 oz)       Intake/Output Summary (Last 24 hours) at 5/10/2023 1006  Last data filed at 5/10/2023 0600  Gross per 24 hour   Intake 2029.94 ml   Output 1700 ml   Net 329.94 ml        PHYSICAL EXAM  General: alert, but slow to respond and falling a sleep   SKIN: Jaundice all over the body  EYES: Scleral icterus   RESPIRATORY: Non labored breathing  GASTROINTESTINAL: Active bowel sounds, abdomen distened.   NEURO: Sleepy, appears more encephalopathic.      Additional Comments:  ROS, FH, SH: See initial GI consult for details.     I have reviewed the patient's new clinical lab results:     Recent Labs   Lab Test 23  0035   WBC 7.5 8.1 12.9* 12.4*   HGB 7.2* 6.3* 7.8* 8.1*   MCV 90 90 88 88   PLT 43* 38* 66* 58*   INR 3.17*  --  3.30* 3.26*     Recent Labs   Lab Test 23   POTASSIUM 4.1 3.8 4.2   CHLORIDE 95* 96* 91*   CO2    BUN 41.5* 37.8* 39.7*   ANIONGAP 9 9 11     Recent Labs   Lab Test 2323  0440 05/10/23  0512 23  0559 23  1657 20  0723 20  1351 20  0637 20  1120 20  1057 20  0000 19  1542   ALBUMIN 3.0*  --  2.8* 2.4*   < > 1.5*   < > 2.5*   < >  --  2.1*  --   --    BILITOTAL 24.0*   --  21.8* 19.7*   < > 11.1*   < > 24.9*   < >  --  18.9*  --   --    *  --  116* 138*   < > 151*   < > 65   < >  --  64   < >  --    *  --  361* 469*   < > 1,063*   < > 133*   < >  --  177*   < >  --    PROTEIN  --  Negative  --   --   --   --   --   --   --  20*  --   --  Negative   LIPASE  --   --   --   --   --  171*  --  205  --   --  200  --   --    AMYLASE  --   --   --   --   --  96  --   --   --   --   --   --   --     < > = values in this interval not displayed.        Creatinine   Date Value Ref Range Status   05/12/2023 0.83 0.67 - 1.17 mg/dL Final   02/18/2021 0.60 (L) 0.66 - 1.25 mg/dL Final     Imaging and procedures:  Para on 4/9     0.6 litter removed, fluid analysis negative for SBP    US doppler on 5/6 showed patent TIPS    US para on 5/7 with 4.97 L removal     X-ray 5/9   IMPRESSION: Hypoventilatory exam. Trace bilateral pleural effusions with likely associated compressive atelectasis. Continued attention on follow-up. Heart is normal size. No definite pulmonary vascular congestion. No pneumothorax. No acute osseous   Abnormality.    Problem list pertaining to GI:  Cirrhosis of liver   S/P TIPS  Ascites  Anemia  Thrombocytopenia  Coagulopathy     Assessment: This is a 48 y-o male with decompensated liver disease secondary to alcohol use who is admitted for hemorrhagic shock from epistaxis and right thigh hematoma. S/P TIPS, US doppler showed patent TIPS. Received transfusion on admission.     MELD-Na score: 33 - 65% mortality in 90 days.     PEth was 44 on 5/9, moderate alcohol consumption.     Plan:   -Continue ICU care  -Continue Rifaxamin and lactulose    -EGD not indicated given the patent TIPS  -On bactrim for SBP prophylaxis   -Alcohol cessation  -Diuresis per ICU team.    -Hematoma management per surgery   -Consider NG place for feeding and lactulose    I will discuss with Dr. Diez     Time spent: 25 minutes, greater than 50% of the visit was spent in  counseling/coordination of care.     Thais Dykes CNP  [facility] ([facility])  [phone]

## 2023-05-12 NOTE — CONSULTS
[facility]  Palliative Care Consultation Note    Patient: Sp Plasencia  Date of Admission:  5/5/2023    Requesting Clinician / Team: SICU  Reason for consult: Goals of care    Recommendations/Discussion:    Per family request, arrange family conference for early next week with primary team's and palliative care.  Our team is unfortunately not present here in the hospital over the weekend.  If any circumstances were to develop that a family meeting were required earlier, ICU, GI and surgery teams are available.    I met with Sp's mother Flor at bedside, and Fields sister Dena called in during the conversation.  Laura in particular is requesting family meeting.  Palliative care teams had been involved at the time of Flor's 's death 3 years ago, and family is open to palliative care team involvement.    Sp is still currently legally  to Fariba, who is the mother of his 3 younger children.  Per family report, Sp has been engaged for approximately a year to Maye, who he lives with.  Sp does not have an ACD identifying a different decision-maker, and since he is legally , involving his spouse in the care process seems important.  She will attend CC. Sp does not currently demonstrate cognitive capacity to have any input into who his legal decision maker might be.  I explained this to family.  Gege Villavicencio is going to look and see if there might be any ACD documents at home.    We discussed the seriousness of Sp's current condition, and ICU team has spoken with family about prognostic implications of his current MELD score, coagulopathy, and ongoing bleeding.      Family's disease understanding is that Sp needs surgery to stop the still active bleeding in his RLE hematoma, but surgery is not possible at this time because of coagulopathy.  They do not have a good sense of whether or not coagulopathy will improve  enough to allow surgery.  They understand that he is experiencing likely multifactorial ICU delirium superimposed on hepatic encephalopathy.    I left a voicemail with ICU RN care coordinator requesting that she begin arrangements for a family care conference early next week, to include surjit Villavicencio, legal spouse Fariba, mother Flor, patient's sister Laura, and any other family members who want to be involved.    I will ask our palliative care clinical  to touch base with Fariba to assess any support needs for Erika's 3 young children.    Arnie Hansen MD  Palliative Medicine Consult Team  Our team can be reached on [employer], or on team pager [phone]  TT: 81 minutes, with > 50% spent in C/C/E patient/family/care teams re: GOC, POC, Sx management.   [id number]    Assessments:  Sp Plasencia is a 48 year old man who transfered to [facility] ICU on 5/5/2023 for acute blood loss anemia.   Hx chronic liver disease requiring paracentesis s/p tips procedure, alcohol abuse, tobacco use, muscular dystrophy, bilateral calf pain and calf cellulitis, and insomnia. He presented to an outside hospital on 5/4/2023 for increasing jaundice, confusion, weakness/falls and epistaxis x3 days.    Today, the patient was seen for:  Liver failure, hematoma, GOC planning, coagulopathy    Prognosis, Goals, & Planning:      Functional Status just prior to hospitalization: 3 (Capable of only limited self-care; needs help with ADLs; in bed/chair >50% of waking hours)      Prognosis, Goals, and/or Advance Care Planning were addressed today: Yes        Summary/Comments: See comments above.      Patient's decision making preferences: unable to assess          Patient has decision-making capacity today for complex decisions: No            I have concerns about the patient/family's health literacy today: No           Patient has a completed Health Care Directive: No.       Code status: Full Code will  "address further at family conference.    Coping, Meaning, & Spirituality:   Mood, coping, and/or meaning in the context of serious illness were addressed today: Yes  Summary/Comments: Spirituality/Jainism terri are not part of patient's support system.    Social:   Per family report he is still currently legally  to Sarah who is the mother of his 3 children (16,14,9), but in the process of eventual \"amicable divorce\". Sp now lives with javier Amaya, his mother Flor is involved, his sister Dena in particular is open to further discussions re: upcoming POC.    History of Present Illness:  Hospitalization so far c/b ongoing bleeding into hematoma site requiring frequent transfusions, hepatic failure/coagulopathy requiring FFP/vit K, tenuous overall status, hypotension (now off pressors) delirium superimposed on hepatic encephalopathy. 6L paracentesis yesterday.     ROS:  Unable to participate in comprehensive ROS 2/2 AMS.     Past Medical History:  Past Medical History:   Diagnosis Date     Acid reflux      Anemia      Ascites      Esophageal varices (H)      FSHD (facioscapulohumeral muscular dystrophy) (H)      Gout      HTN (hypertension)      Jaundice      Liver cirrhosis (H)      Smoker      Thrombocytopenia (H)         Past Surgical History:  Past Surgical History:   Procedure Laterality Date     IR PARACENTESIS  08/26/2020     IR TRANSVEN INTRAHEPATIC PORTOSYST SHUNT  08/26/2020     LAPAROSCOPIC HERNIORRHAPHY INGUINAL Right 10/14/2020    Procedure: HERNIORRHAPHY, RIGHT INGUINAL, LAPAROSCOPIC.;  Surgeon: Chris Parker MD;  Location:  OR         Family History:  Family History   Problem Relation Age of Onset     Hypertension Father      Hypertension Paternal Grandfather          Allergies:  Allergies   Allergen Reactions     Adhesive Tape Blisters     Skin breakdown, open sores        Medications:  I have reviewed this patient's medication profile and medications from this " hospitalization.  Alice 300 q 8  IV dilaudid 0.3-0.5 prn; 1-2 doses daily  PRN IV lorazepam  Lactulose/Rifaxamin  Vit K po    Physical Exam:  Vital Signs: Temp: 97.5  F (36.4  C) Temp src: Axillary BP: 118/51 Pulse: 113   Resp: 23 SpO2: 94 % O2 Device: None (Room air) Oxygen Delivery: 2 LPM  Weight: 263 lbs .14 oz  GEN: Awake, unable to participate in focused conversation 2/2 AMS.  SKIN: Jaundiced  ABD: Full, generalized tenderness  CV: Regular radial pulse 108  EXTR: hematoma R thigh dressed    Data reviewed:  ROUTINE ICU LABS (Last four results)  CMPRecent Labs   Lab 05/12/23  0423 05/11/23  2132 05/11/23  0440 05/11/23  0035 05/10/23  1227 05/10/23  1226 05/10/23  1004 05/10/23  0512 05/09/23  2120 05/09/23  1359 05/09/23  0529 05/09/23  0416 05/08/23  1047 05/08/23  0411 05/07/23  1711 05/07/23  0420 05/06/23  0759 05/06/23  0559   * 128* 125* 123*   < >  --    < > 123*  123*   < > 127*   < > 116*   < > 121*   < > 120*   < > 121*   POTASSIUM 4.1 3.8 4.2  --   --   --   --  4.5  --   --   --  4.2   < > 4.5  --  5.0   < > 4.9   CHLORIDE 95* 96* 91*  --   --   --   --  91*  --   --   --  86*   < > 91*  --  88*   < > 88*   CO2 25 23 23  --   --   --   --  23  --   --   --  22   < > 21*  --  23   < > 23   ANIONGAP 9 9 11  --   --   --   --  9  --   --   --  8   < > 9  --  9   < > 10   GLC 75 79 86  --   --  115*  --  96  --   --   --  336*   < > 129*  --  100*   < > 96   BUN 41.5* 37.8* 39.7*  --   --   --   --  37.3*  --   --   --  29.9*   < > 29.8*  --  30.7*   < > 24.4*   CR 0.83 0.74 0.92  --   --   --   --  1.06  --   --   --  0.99   < > 1.08  --  1.20*   < > 1.11   GFRESTIMATED >90 >90 >90  --   --   --   --  87  --   --   --  >90   < > 85  --  75   < > 82   JOSE CARLOS 8.8 8.0* 8.4*  --   --   --   --  8.2*  --   --   --  7.2*   < > 8.0*  --  8.0*   < > 8.6   MAG 1.8  --  1.9  --   --   --   --  2.1  --  2.3  --  1.5*  --   --   --  1.8  --  1.8   PHOS 3.6  --  3.2  --   --   --   --  3.3  --   --   --  2.8  --    --   --   --   --  4.0   PROTTOTAL  --   --   --   --   --   --   --   --   --   --   --  4.4*  --  4.4*  --  4.4*  --  4.3*   ALBUMIN 3.0*  --  2.8*  --   --   --   --  2.4*  --   --   --  2.4*  --  2.4*  --  2.1*  --  2.3*   BILITOTAL 24.0*  --  21.8*  --   --   --   --  19.7*  --   --   --  16.0*  --  14.3*  --  14.7*  --  14.1*   ALKPHOS 149*  --  185*  --   --   --   --  185*  --   --   --  176*  --  161*  --  225*  --  184*   *  --  361*  --   --   --   --  469*  --   --   --  627*  --  934*  --  1,918*  --  2,040*   *  --  116*  --   --   --   --  138*  --   --   --  153*  --  183*  --  293*  --  307*    < > = values in this interval not displayed.     CBC  Recent Labs   Lab 05/12/23 0423 05/11/23 2132 05/11/23 0440 05/11/23 0035   WBC 7.5 8.1 12.9* 12.4*   RBC 2.28* 2.00* 2.50* 2.54*   HGB 7.2* 6.3* 7.8* 8.1*   HCT 20.5* 18.0* 22.0* 22.4*   MCV 90 90 88 88   MCH 31.6 31.5 31.2 31.9   MCHC 35.1 35.0 35.5 36.2   RDW 19.4* 19.3* 18.9* 18.3*   PLT 43* 38* 66* 58*     INR  Recent Labs   Lab 05/12/23  0423 05/11/23  0440 05/11/23  0035 05/10/23  1752   INR 3.17* 3.30* 3.26* 3.25*     Arterial Blood Gas  Recent Labs   Lab 05/11/23 2132 05/08/23  1903 05/07/23  2233 05/07/23  2115   PH  --   --  7.42 7.45   PCO2  --   --  36 34*   PO2  --   --  68* 45*   HCO3  --   --  23 23   O2PER 38 42 28 28

## 2023-05-12 NOTE — PROGRESS NOTES
Care Management Follow Up    Length of Stay (days): 7    Expected Discharge Date: 05/15/2023     Concerns to be Addressed: discharge planning     Patient plan of care discussed at interdisciplinary rounds: Yes    Anticipated Discharge Disposition:  Unsure    Anticipated Discharge Services: Transportation Services  Anticipated Discharge DME: Other (see comment) (to be determined)    Patient/family educated on Medicare website which has current facility and service quality ratings:    Education Provided on the Discharge Plan: Yes  Patient/Family in Agreement with the Plan: unable to assess    Referrals Placed by CM/SW:    Private pay costs discussed: Not applicable    Additional Information:  Requested by Dr Hansen, Palliative care physician, to coordinate goals of care conference with pt family for early next week.  Pt  from his spouse, Dorene, but still legally .   Spoke with Dorene and able to schedule conference for Monday at 1pm in [place] room on lower level.  Directed Dorene and other family members to check in at nurses station and then be escorted to conference room.    Spoke with pt significant other, Maye, regarding goals of care conference at 1pm Monday and Maye able to attend.     Updated pt sister, Laura, who was present in pt room and Laura will update her mother, Flor, and her other siblings that want to attend.      Jada Head RN, BS  Care Coordinator  [email or web address]

## 2023-05-12 NOTE — PROGRESS NOTES
Pt has been more lethargic as the evenign progressed and is hypotensive.   - Barely arousable on exam.   - Significant anasarca noted (abd/legs)   - Rt buttock area /thigh - large hematoma.   - Murphy shin areas of erythema with healing skin lesions.       -- We will Panculture, start IV Zosyn. Check MRSA nares. Will not check C.diff as pt is on laxative.   -- Check CXR.  -- Will give Albumin 50 g x1, was not given after large volume paracentesis. If hypotension worsens significantly will consider Pan-CT and place melody.  -- Will check CBC/BMP and VBG  -- Ammonia was stable in AM.    Phu Burrows MD.  Pulmonary and Critical Care.  05/11/2023  9:23 PM

## 2023-05-12 NOTE — PROGRESS NOTES
Critical Care Progress Note      05/12/2023    Name: Sp Plasencia MRN#: [id number]   Age: 48 year old YOB: 1975     [facility] Day# 7                   Problem List:   Principal Problem:    Hemorrhage  Cirrhosis  Delirium  Right thigh hematoma         Summary/Hospital Course:     Sp Plasencia is a 48 year old man who transfered to [facility] ICU on 5/5/2023 for acute blood loss anemia. He has a history of chronic liver disease requiring paracentesis s/p tips procedure, alcohol abuse, tobacco use, muscular dystrophy, bilateral calf pain and calf cellulitis, and insomnia. He presented to an outside hospital on 5/4/2023 for increasing jaundice, confusion, weakness/falls and epistaxis x3 days. He had been on lactulose for liver failure with recurrent ascites but had discontinued it due to diarrhea.     He had active nasal bleeding on presentation and a nasal clamp was placed which controlled bleeding. On arrival to OSH he was tachycardic and afebrile. Labs were notable for a hemoglobin of 6.3, platelets 87, hematocrit 17.5, INR of 2.69, sodium 118, lactate 3.9. He received 4 units of blood. For hyponatremia, PTA amlodipine and lisinopril were stopped and he was given midodrine.     Liver panel showed total bili 10.4 with direct bili of 6.7, , ALT 65, and alk phos 260. His alcohol level was 0.13, he states he had not had a drink in a few days. He did not have an abdominal ultrasound or CT at outside hospital. With concerns of hip pain and recent falls, he received chest/abdominal X-rays which were unremarkable. Of note, he was previously on the liver transplant list at [facility] in 2020 with a MELD score of 29 at the highest. At the time he was getting weekly paracentesis; following a TIPS procedure and alcohol cessation he improved enough to get off the liver transplant list. He has no known history of esophageal varices or other GI bleed. He has been drinking again  (reports modestly) since 2021.     Given concerns for bleeding and severe liver disease he was transferred to [facility] on 5/5/2023 for further workup and management. He has been given multiple units of blood products and hemoglobin continues to drop. He received paracentesis 5/7/2023 with drainage of 5L fluid. Large R sided thigh hematoma is persistent.         Assessment and plan :     Sp Plasencia IS a 48 year old man admitted to [facility] from an OSH on 5/5/2023 for acute blood loss anemia. He hs a history of chronic liver disease requiring paracentesis s/p tips procedure, alcohol abuse, tobacco use, muscular dystrophy, and PARAG calf cellulitis.     I have personally reviewed the daily labs, imaging studies, cultures and discussed the case with referring physician and consulting physicians.     My assessment and plan by system for this patient is as follows:  Neurology/Psychiatry:   1. Risk for hepatic encephalopathy  2. Alcohol withdrawal   3. Insomnia  Plan  - monitor neurologic status   - Rifaximin, lactulose TID   - monitor daily ammonia levels   - valium PRN for pain / agitation  - lidocaine topical, dilaudid IV for pain    Cardiovascular:   1. Hemorrhagic shock from right thigh hematoma   2. History of hypertension  3. Venous insufficiency  Plan  - off pressors   - HOLD PTA lisinopril and amlodipine   - echo 5/9/2023 with EF >70%  - diuresis as tolerated     Pulmonary/Ventilator Management:   1. Hypoxic respiratory failure; resolved   2. Epistaxis, right; resolved   Plan  - on RA this am   - IS / pulm toilet   - diuresis as below     GI and Nutrition :   1. End-stage alcoholic cirrhosis  2. Ascites, s/p tap (last on 5/11)   Plan  - diet as tolerated   - protein supplements ordered BID   - gastroenterology consulted  - TIPSS working well by ultrasound examination   - Famotidine for PUD ppx  - Follow LFTs  - Rifaximin, lactulose TID   - Palliative care consult   - family request  contact with Hepatologist; will reach out when able     Renal/Fluids/Electrolytes:   1. Hyponatremia  2. Hypomagnesemia  3. Hypervolemia / anasarca   4. Metabolic acidosis - now improved  Plan  - replace electrolytes PRN   - kulkarni for strict I/Os  - Bumex 1mg today  - q6h sodium checks    Infectious Disease:   1.  Venous stasis dermatitis PARAG lower extremities  2.  Open blisters RLE thigh  Plan  - Ceftriaxone, for SBP and skin coverage for now   - F/u cell count from paracentesis 5/11  - monitor Lower extremities for cellulitis     Endocrine:   1. Stress induced hyperglycemia  Plan  - ICU insulin protocol, goal sugar <180    Hematology/Oncology:   1. Hemorrhagic shock from right thigh hematoma   2. Coagulopathy 2/2 ESLD vs hemorrhage   3. Thrombocytopenia 2/2 ESLD   4. R lateral thigh hematoma  Plan  - Transfuse as necessary for Hgb <7.0, platelets <50  - check CBC and INR daily   - no transfusions unless indicated by further bleeding   - General surgery consult for R hip/thigh hematoma  - no operative plans for now     MSKL/Rheum:  1. Facioscapulohumeral muscular dystrophy (FSMD)  Plan  -  Slowly progressive disease  -  monitor for decreased respiratory effort   - Wrap RLE with kerlex and Ace to aid in compression and edema     IV/Access:   1. Venous access - PICC line placed      ICU Prophylaxis:   1. DVT: Hep held due concerns for active bleed  2. VAP: HOB 30 degrees   3. Stress Ulcer: famotidine  4. Restraints: None  5. Wound care - per unit routine   6. Feeding - regular diet   7. Family Update: family updated at bedside  8. Disposition - ICU    Key goals for next 24 hours:   1. Monitor blood counts  2. Activity challenge   3. Diuresis challenge   4. Increase Lactulose          Interim History:     Underwent bedside paracentesis yesterday, able to remove 6L clear yellow fluid. Received 1U RBC for drop in Hgb and low BP. Responded to blood and 1 dose of albumin. On room air this am, up to chair, eating regular  diet.             Key Medications:       Current Facility-Administered Medications:      Contraindications to both pharmacological and mechanical prophylaxis (must document contraindications for both in this order), , Does not apply, See Admin Instructions, Clay Seo MD     cyanocobalamin (VITAMIN B-12) sublingual tablet 500 mcg, 500 mcg, Sublingual, Daily, Clay Seo MD, 500 mcg at 05/11/23 1030     glucose gel 15-30 g, 15-30 g, Oral, Q15 Min PRN **OR** dextrose 50 % injection 25-50 mL, 25-50 mL, Intravenous, Q15 Min PRN, 50 mL at 05/05/23 1839 **OR** glucagon injection 1 mg, 1 mg, Subcutaneous, Q15 Min PRN, Clay Seo MD     diazepam (VALIUM) tablet 5 mg, 5 mg, Oral, Q6H PRN, Faith Morel MD, 5 mg at 05/11/23 1130     famotidine (PEPCID) injection 20 mg, 20 mg, Intravenous, Q12H, Clay Seo MD, 20 mg at 05/11/23 2123     flumazenil (ROMAZICON) injection 0.2 mg, 0.2 mg, Intravenous, q1 min prn, Clay Seo MD     folic acid (FOLVITE) tablet 1 mg, 1 mg, Oral, Daily, Clay Seo MD, 1 mg at 05/11/23 1030     [START ON 5/13/2023] gabapentin (NEURONTIN) capsule 100 mg, 100 mg, Oral, Q8H, Clay Seo MD     gabapentin (NEURONTIN) capsule 300 mg, 300 mg, Oral, Q8H, Clay Seo MD, 300 mg at 05/11/23 1737     haloperidol lactate (HALDOL) injection 2.5-5 mg, 2.5-5 mg, Intravenous, Q4H PRN, Clay Seo MD, 5 mg at 05/06/23 1932     HYDROmorphone (PF) (DILAUDID) injection 0.3-0.5 mg, 0.3-0.5 mg, Intravenous, Q1H PRN, Ko Ferguson MD, 0.5 mg at 05/11/23 2153     lactulose (CHRONULAC) solution 20 g, 20 g, Oral, BID, Pema Diez MD, 20 g at 05/11/23 1116     lidocaine (LMX4) cream, , Topical, Q8H PRN, Clay Seo MD, Given at 05/11/23 1105     lidocaine (XYLOCAINE) 2 % external gel, , Urethral, Q4H PRN, Clay Seo MD, Given at 05/08/23 0314     lidocaine 1 % 0.1-5 mL, 0.1-5 mL, Other, Q1H PRN, Dastrange,  MD Vivek, 1 mL at 05/11/23 1229     melatonin tablet 3 mg, 3 mg, Oral, At Bedtime PRN, Kal De MD     metoprolol (LOPRESSOR) injection 5 mg, 5 mg, Intravenous, Q6H PRN, Clay Seo MD     multivitamin w/minerals (THERA-VIT-M) tablet 1 tablet, 1 tablet, Oral, Daily, Clay Seo MD, 1 tablet at 05/11/23 1030     naloxone (NARCAN) injection 0.2 mg, 0.2 mg, Intravenous, Q2 Min PRN **OR** naloxone (NARCAN) injection 0.4 mg, 0.4 mg, Intravenous, Q2 Min PRN **OR** naloxone (NARCAN) injection 0.2 mg, 0.2 mg, Intramuscular, Q2 Min PRN **OR** naloxone (NARCAN) injection 0.4 mg, 0.4 mg, Intramuscular, Q2 Min PRN, Nedra Short MD     norepinephrine (LEVOPHED) 4 mg in  mL infusion PREMIX, 0.01-0.6 mcg/kg/min, Intravenous, Continuous, Faith Morel MD, Stopped at 05/10/23 1529     phytonadione (MEPHYTON/VITAMIN K) 1 MG/ML oral solution 5 mg, 5 mg, Oral, Daily, Faith Morel MD, 5 mg at 05/11/23 1109     piperacillin-tazobactam (ZOSYN) 3.375 g vial to attach to  mL bag, 3.375 g, Intravenous, Q6H, Phu Burrows MD, 3.375 g at 05/12/23 0401     rifaximin (XIFAXAN) tablet 550 mg, 550 mg, Oral, BID, Clay Seo MD, 550 mg at 05/11/23 1030     rOPINIRole (REQUIP) tablet 0.5 mg, 0.5 mg, Oral, Daily PRN, Clay Seo MD     [Held by provider] rOPINIRole (REQUIP) tablet 1 mg, 1 mg, Oral, At Bedtime, Clay Seo MD, 1 mg at 05/07/23 2138     sodium chloride (PF) 0.9% PF flush 10-20 mL, 10-20 mL, Intracatheter, q1 min prn, Vivek Silver MD     sodium chloride (PF) 0.9% PF flush 10-20 mL, 10-20 mL, Intracatheter, q1 min prn, Clay Seo MD     sodium chloride (PF) 0.9% PF flush 10-40 mL, 10-40 mL, Intracatheter, Once PRN, Vivek Silver MD     sodium chloride (PF) 0.9% PF flush 10-40 mL, 10-40 mL, Intracatheter, Q7 Days, Vivek Silver MD, 40 mL at 05/11/23 1229     sodium chloride (PF) 0.9% PF flush 10-40 mL, 10-40 mL, Intracatheter, Q1H PRN,  Vivek Silver MD     sodium chloride (PF) 0.9% PF flush 10-40 mL, 10-40 mL, Intracatheter, Q7 Days, Clay Seo MD, 10 mL at 05/05/23 2003     sodium chloride (PF) 0.9% PF flush 10-40 mL, 10-40 mL, Intracatheter, Q1H PRN, Clay eSo MD     sodium chloride 0.9% infusion, , Intravenous, Continuous, Nedra Short MD, Stopped at 05/11/23 0830     sulfamethoxazole-trimethoprim (BACTRIM DS) 800-160 MG per tablet 1 tablet, 1 tablet, Oral, Daily, Pema Diez MD, 1 tablet at 05/11/23 1506     thiamine (B-1) tablet 100 mg, 100 mg, Oral, TID, Clay Seo MD, 100 mg at 05/11/23 1737     [START ON 5/13/2023] thiamine (B-1) tablet 100 mg, 100 mg, Oral, Daily, Clay Seo MD            Physical Examination:   Temp:  [96.7  F (35.9  C)-97.5  F (36.4  C)] 97.5  F (36.4  C)  Pulse:  [109-124] 113  Resp:  [12-35] 23  BP: ()/(36-71) 118/51  SpO2:  [90 %-97 %] 94 %  No intake or output data in the 24 hours ending 05/05/23 1514  Wt Readings from Last 4 Encounters:   05/12/23 119.3 kg (263 lb 0.1 oz)   10/19/22 108.9 kg (240 lb 1.6 oz)   09/08/21 99.8 kg (220 lb)   07/05/21 97.5 kg (215 lb)     BP - Mean:  [55-90] 68  Resp: 23    Recent Labs   Lab 05/11/23 2132 05/08/23  1903 05/07/23  2233 05/07/23  2115   PH  --   --  7.42 7.45   PCO2  --   --  36 34*   PO2  --   --  68* 45*   HCO3  --   --  23 23   O2PER 38 42 28 28       GEN: ill appearing, jaundiced   HEENT: head ncat, scleral icterus  PULM: unlabored breathing on room air, clear anteriorly   CV/COR: sinus tachycardia, S1S2 no gallop,  No obvious rub, no murmur  ABD: markedly distended, no apparent tenderness   EXT:  Perfused, 3+ edema in bilateral lower extremities and scrotum  NEURO: somnolent, awakens with minimal stimuli, tremor, weakness consistent with muscular dystrophy  SKIN: skin is jaundiced, venous stasis dermatitis in PARAG lower extremities. Large hematoma R thigh with blistering and skin breakdown,  oozing         Data:   All data and imaging reviewed     ROUTINE ICU LABS (Last four results)  CMP  Recent Labs   Lab 05/12/23  0423 05/11/23  2132 05/11/23  0440 05/11/23  0035 05/10/23  1227 05/10/23  1226 05/10/23  1004 05/10/23  0512 05/09/23  2120 05/09/23  1359 05/09/23  0529 05/09/23  0416 05/08/23  1047 05/08/23  0411 05/07/23  1711 05/07/23  0420 05/06/23  0759 05/06/23  0559   * 128* 125* 123*   < >  --    < > 123*  123*   < > 127*   < > 116*   < > 121*   < > 120*   < > 121*   POTASSIUM 4.1 3.8 4.2  --   --   --   --  4.5  --   --   --  4.2   < > 4.5  --  5.0   < > 4.9   CHLORIDE 95* 96* 91*  --   --   --   --  91*  --   --   --  86*   < > 91*  --  88*   < > 88*   CO2 25 23 23  --   --   --   --  23  --   --   --  22   < > 21*  --  23   < > 23   ANIONGAP 9 9 11  --   --   --   --  9  --   --   --  8   < > 9  --  9   < > 10   GLC 75 79 86  --   --  115*  --  96  --   --   --  336*   < > 129*  --  100*   < > 96   BUN 41.5* 37.8* 39.7*  --   --   --   --  37.3*  --   --   --  29.9*   < > 29.8*  --  30.7*   < > 24.4*   CR 0.83 0.74 0.92  --   --   --   --  1.06  --   --   --  0.99   < > 1.08  --  1.20*   < > 1.11   GFRESTIMATED >90 >90 >90  --   --   --   --  87  --   --   --  >90   < > 85  --  75   < > 82   JOSE CARLOS 8.8 8.0* 8.4*  --   --   --   --  8.2*  --   --   --  7.2*   < > 8.0*  --  8.0*   < > 8.6   MAG 1.8  --  1.9  --   --   --   --  2.1  --  2.3  --  1.5*  --   --   --  1.8  --  1.8   PHOS 3.6  --  3.2  --   --   --   --  3.3  --   --   --  2.8  --   --   --   --   --  4.0   PROTTOTAL  --   --   --   --   --   --   --   --   --   --   --  4.4*  --  4.4*  --  4.4*  --  4.3*   ALBUMIN 3.0*  --  2.8*  --   --   --   --  2.4*  --   --   --  2.4*  --  2.4*  --  2.1*  --  2.3*   BILITOTAL 24.0*  --  21.8*  --   --   --   --  19.7*  --   --   --  16.0*  --  14.3*  --  14.7*  --  14.1*   ALKPHOS 149*  --  185*  --   --   --   --  185*  --   --   --  176*  --  161*  --  225*  --  184*   *  --  361*   --   --   --   --  469*  --   --   --  627*  --  934*  --  1,918*  --  2,040*   *  --  116*  --   --   --   --  138*  --   --   --  153*  --  183*  --  293*  --  307*    < > = values in this interval not displayed.     CBC  Recent Labs   Lab 05/12/23 0423 05/11/23 2132 05/11/23 0440 05/11/23  0035   WBC 7.5 8.1 12.9* 12.4*   RBC 2.28* 2.00* 2.50* 2.54*   HGB 7.2* 6.3* 7.8* 8.1*   HCT 20.5* 18.0* 22.0* 22.4*   MCV 90 90 88 88   MCH 31.6 31.5 31.2 31.9   MCHC 35.1 35.0 35.5 36.2   RDW 19.4* 19.3* 18.9* 18.3*   PLT 43* 38* 66* 58*     INR  Recent Labs   Lab 05/12/23 0423 05/11/23 0440 05/11/23 0035 05/10/23  1752   INR 3.17* 3.30* 3.26* 3.25*     Arterial Blood Gas  Recent Labs   Lab 05/11/23 2132 05/08/23 1903 05/07/23 2233 05/07/23 2115   PH  --   --  7.42 7.45   PCO2  --   --  36 34*   PO2  --   --  68* 45*   HCO3  --   --  23 23   O2PER 38 42 28 28       All cultures:  No results for input(s): CULT in the last 168 hours.  Recent Results (from the past 24 hour(s))   XR Chest Port 1 View    Narrative    CHEST ONE VIEW  5/11/2023 1:15 PM     HISTORY: RN placed PICC - verify tip placement.    COMPARISON: May 9, 2023      Impression    IMPRESSION: PICC tip projects in the expected location of the low SVC,  cardiac border and landmarks partially obscured by an increase in  infiltrate and effusion on the right compared to previous. Left lung  clear.    JADE STEVENSON MD         SYSTEM ID:  [id number]   XR Chest Port 1 View    Narrative    EXAM: XR CHEST PORT 1 VIEW  LOCATION: [facility]  DATE/TIME: 5/11/2023 11:36 PM CDT    INDICATION: Infectious workup  COMPARISON: 05/11/2023.    FINDINGS: Right PICC. No pneumothorax. The heart size is normal. Right pleural effusion has decreased in size. Bibasilar infiltrates without significant change. Nodule projecting over the left midlung was not evident on previous exams and is likely   artifactual.      Impression    IMPRESSION: Small right  pleural effusion and bibasilar infiltrates.        Faith oMrel MD   05/12/2023   Critical Care Fellow

## 2023-05-12 NOTE — PROGRESS NOTES
AVSS on RA. HR tachy in the 110's. Pt lethargic all day, but occasionally wakes up and is alert to self only. 4+ edema in lower extremities.  R. Hip hematoma continuing to monitor. Dilaudid given PRN for pain. Pt up to chair today and advancing diet as provider goals for the day. PT and OT working with pt as well.

## 2023-05-13 NOTE — PROGRESS NOTES
Notified provider about indwelling kulkarni catheter discussed removal or continued need.    Did provider choose to remove indwelling kulkarni catheter? No    Provider's kulkarni indication for keeping indwelling kulkarni catheter: Strict I&P    Is there an order for indwelling kulkarni catheter? Yes    *If there is a plan to keep kulkarni catheter in place at discharge daily notification with provider is not necessary, but please add a notation in the treatment team sticky note that the patient will be discharging with the catheter.

## 2023-05-13 NOTE — PROGRESS NOTES
AVSS on RA. HR tachy in the 110's. Pt more alert today, but tires quickly with any activity. Pt able to verbalize pain and had more of an appetite today. Continuing to monitor R. Hip hematoma and bruising marked with skin marker. Lactulose dose increased and pt stooling frequently this AM. Rectal tube ordered and placed. No output since placement. Dilauded given PRN for pain. Pt working with PT and OT as tolerated.

## 2023-05-13 NOTE — PROGRESS NOTES
Critical Care Progress Note      05/13/2023    Name: Sp Plasencia MRN#: [id number]   Age: 48 year old YOB: 1975     [facility] Day# 8                   Problem List:   Principal Problem:    Hemorrhage  Cirrhosis  Delirium  Right thigh hematoma         Summary/Hospital Course:     Sp Plasencia is a 48 year old man who transfered to [facility] ICU on 5/5/2023 for acute blood loss anemia. He has a history of chronic liver disease requiring paracentesis s/p tips procedure, alcohol abuse, tobacco use, muscular dystrophy, bilateral calf pain and calf cellulitis, and insomnia. He presented to an outside hospital on 5/4/2023 for increasing jaundice, confusion, weakness/falls and epistaxis x3 days. He had been on lactulose for liver failure with recurrent ascites but had discontinued it due to diarrhea.     He had active nasal bleeding on presentation and a nasal clamp was placed which controlled bleeding. On arrival to OSH he was tachycardic and afebrile. Labs were notable for a hemoglobin of 6.3, platelets 87, hematocrit 17.5, INR of 2.69, sodium 118, lactate 3.9. He received 4 units of blood. For hyponatremia, PTA amlodipine and lisinopril were stopped and he was given midodrine.     Liver panel showed total bili 10.4 with direct bili of 6.7, , ALT 65, and alk phos 260. His alcohol level was 0.13, he states he had not had a drink in a few days. He did not have an abdominal ultrasound or CT at outside hospital. With concerns of hip pain and recent falls, he received chest/abdominal X-rays which were unremarkable. Of note, he was previously on the liver transplant list at [facility] in 2020 with a MELD score of 29 at the highest. At the time he was getting weekly paracentesis; following a TIPS procedure and alcohol cessation he improved enough to get off the liver transplant list. He has no known history of esophageal varices or other GI bleed. He has been drinking again  (reports modestly) since 2021.     Given concerns for bleeding and severe liver disease he was transferred to [facility] on 5/5/2023 for further workup and management. He has been given multiple units of blood products and hemoglobin continues to drop. He received paracentesis 5/7/2023 with drainage of 5L fluid. Large R sided thigh hematoma is persistent.          Assessment and plan :     Sp Plasencia IS a 48 year old man admitted to [facility] from an OSH on 5/5/2023 for acute blood loss anemia. He hs a history of chronic liver disease requiring paracentesis s/p tips procedure, alcohol abuse, tobacco use, muscular dystrophy, and PARAG calf cellulitis.     I have personally reviewed the daily labs, imaging studies, cultures and discussed the case with referring physician and consulting physicians.     My assessment and plan by system for this patient is as follows:  Neurology/Psychiatry:   1. Hepatic encephalopathy    Plan  - monitor neurologic status   - Rifaximin, increase lactulose to q 6 hour  - valium PRN for pain / agitation  - lidocaine topical, dilaudid IV for pain    Cardiovascular:   1. Tachycardia   2. History of hypertension  3. Venous insufficiency  Plan  - off pressors   - HOLD PTA lisinopril and amlodipine   - echo 5/9/2023 with EF >70%    Pulmonary/Ventilator Management:   1. Hypoxic respiratory failure; resolved   2. Epistaxis, right; resolved   Plan  - on RA this am   - IS / pulm toilet     GI and Nutrition :   1. End-stage alcoholic cirrhosis  2. Ascites, s/p tap (last on 5/11)   Plan  - diet as tolerated   - protein supplements ordered BID   - gastroenterology consulted  - TIPSS working well by ultrasound examination   - Famotidine for PUD ppx  - Follow LFTs  - Rifaximin, lactulose q 6 hour  - Palliative care consult     Renal/Fluids/Electrolytes:   1. Hyponatremia  2. Hypomagnesemia  3. Hypervolemia / anasarca   4. Metabolic acidosis - now improved  Plan  - replace  electrolytes PRN   - kulkarni for strict I/Os  - recheck BMP this morning.  If BUN/Cr better, can try gentle diuresis    Infectious Disease:   1.  Venous stasis dermatitis PARAG lower extremities  2.  Open blisters RLE thigh  Plan  - on SBP ppx with bactrim per GI    Endocrine:   1. Stress induced hyperglycemia  Plan  - ICU insulin protocol, goal sugar <180    Hematology/Oncology:   1. Anemia, acute blood loss  2. Coagulopathy 2/2 ESLD vs hemorrhage   3. Thrombocytopenia 2/2 ESLD   4. R lateral thigh hematoma - likely with ongoing slow ooze  Plan  - Transfuse as necessary for Hgb <7.0, platelets <50  - check CBC and INR daily    - General surgery consulted for R hip/thigh hematoma   - no operative plans for now     MSKL/Rheum:  1. Facioscapulohumeral muscular dystrophy (FSMD)  Plan  -  Slowly progressive disease  -  monitor for decreased respiratory effort   - Wrap RLE with kerlex and Ace to aid in compression and edema     IV/Access:   1. Venous access - PICC line placed      ICU Prophylaxis:   1. DVT: Hep held due concerns for active bleed  2. VAP: HOB 30 degrees   3. Stress Ulcer: famotidine  4. Restraints: None  5. Wound care - per unit routine   6. Feeding - regular diet   7. Family Update: family updated at bedside  8. Disposition - ICU    Key goals for next 24 hours:   1. Monitor blood counts  2. Increase lactulose         Interim History:     Bleeding around the PICC line site. Cryo and platelets given.  AM hgb came back < 7 so 1 unit PRBCs given.  2 BM in the last 24 hours.            Key Medications:       Current Facility-Administered Medications:      Contraindications to both pharmacological and mechanical prophylaxis (must document contraindications for both in this order), , Does not apply, See Admin Instructions, Clay Seo MD     cyanocobalamin (VITAMIN B-12) sublingual tablet 500 mcg, 500 mcg, Sublingual, Daily, Clay Seo MD, 500 mcg at 05/12/23 0844     glucose gel 15-30 g, 15-30 g,  Oral, Q15 Min PRN **OR** dextrose 50 % injection 25-50 mL, 25-50 mL, Intravenous, Q15 Min PRN, 50 mL at 05/05/23 1839 **OR** glucagon injection 1 mg, 1 mg, Subcutaneous, Q15 Min PRN, Clay Seo MD     diazepam (VALIUM) tablet 5 mg, 5 mg, Oral, Q6H PRN, Faith Morel MD, 5 mg at 05/11/23 1130     famotidine (PEPCID) injection 20 mg, 20 mg, Intravenous, Q12H, Clay Seo MD, 20 mg at 05/12/23 2129     flumazenil (ROMAZICON) injection 0.2 mg, 0.2 mg, Intravenous, q1 min prn, Clay Seo MD     folic acid (FOLVITE) tablet 1 mg, 1 mg, Oral, Daily, Clay Seo MD, 1 mg at 05/12/23 0845     gabapentin (NEURONTIN) capsule 100 mg, 100 mg, Oral, Q8H, Clay Seo MD, 100 mg at 05/13/23 0052     haloperidol lactate (HALDOL) injection 2.5-5 mg, 2.5-5 mg, Intravenous, Q4H PRN, Clay Seo MD, 5 mg at 05/06/23 1932     HYDROmorphone (PF) (DILAUDID) injection 0.3-0.5 mg, 0.3-0.5 mg, Intravenous, Q1H PRN, Ko Ferguson MD, 0.5 mg at 05/12/23 2050     lactulose (CHRONULAC) solution 20 g, 20 g, Oral, Q6H, Milton Ching MD     lidocaine (LMX4) cream, , Topical, Q8H PRN, Clay Seo MD, Given at 05/11/23 1105     lidocaine (XYLOCAINE) 2 % external gel, , Urethral, Q4H PRN, Clay Seo MD, Given at 05/12/23 2044     lidocaine 1 % 0.1-5 mL, 0.1-5 mL, Other, Q1H PRN, Vivek Silver MD, 1 mL at 05/11/23 1229     melatonin tablet 3 mg, 3 mg, Oral, At Bedtime PRN, Kal De MD     metoprolol (LOPRESSOR) injection 5 mg, 5 mg, Intravenous, Q6H PRN, Clay Seo MD     midodrine (PROAMATINE) tablet 5 mg, 5 mg, Oral, TID w/meals, Allan Loza MD     multivitamin w/minerals (THERA-VIT-M) tablet 1 tablet, 1 tablet, Oral, Daily, Clay Seo MD, 1 tablet at 05/12/23 0845     naloxone (NARCAN) injection 0.2 mg, 0.2 mg, Intravenous, Q2 Min PRN **OR** naloxone (NARCAN) injection 0.4 mg, 0.4 mg, Intravenous, Q2 Min PRN **OR** naloxone (NARCAN)  injection 0.2 mg, 0.2 mg, Intramuscular, Q2 Min PRN **OR** naloxone (NARCAN) injection 0.4 mg, 0.4 mg, Intramuscular, Q2 Min PRN, Nedra Short MD     phytonadione (MEPHYTON/VITAMIN K) 1 MG/ML oral solution 5 mg, 5 mg, Oral, Daily, Faith Morel MD, 5 mg at 05/12/23 0853     piperacillin-tazobactam (ZOSYN) 3.375 g vial to attach to  mL bag, 3.375 g, Intravenous, Q6H, Phu Burrows MD, 3.375 g at 05/13/23 0258     rifaximin (XIFAXAN) tablet 550 mg, 550 mg, Oral, BID, Clay Seo MD, 550 mg at 05/12/23 0845     rOPINIRole (REQUIP) tablet 0.5 mg, 0.5 mg, Oral, Daily PRN, Clay Seo MD     [Held by provider] rOPINIRole (REQUIP) tablet 1 mg, 1 mg, Oral, At Bedtime, Clay Seo MD, 1 mg at 05/07/23 2138     sodium chloride (PF) 0.9% PF flush 10-20 mL, 10-20 mL, Intracatheter, q1 min prn, Vivek Silver MD     sodium chloride (PF) 0.9% PF flush 10-20 mL, 10-20 mL, Intracatheter, q1 min prn, Clay Seo MD     sodium chloride (PF) 0.9% PF flush 10-40 mL, 10-40 mL, Intracatheter, Once PRN, Vivek Silver MD     sodium chloride (PF) 0.9% PF flush 10-40 mL, 10-40 mL, Intracatheter, Q7 Days, Vivek Silver MD, 40 mL at 05/11/23 1229     sodium chloride (PF) 0.9% PF flush 10-40 mL, 10-40 mL, Intracatheter, Q1H PRN, Vivek Silver MD     sodium chloride (PF) 0.9% PF flush 10-40 mL, 10-40 mL, Intracatheter, Q7 Days, Clay Seo MD, 100 mL at 05/12/23 1519     sodium chloride (PF) 0.9% PF flush 10-40 mL, 10-40 mL, Intracatheter, Q1H PRN, Clay Seo MD     sodium chloride 0.9% infusion, , Intravenous, Continuous, Nedra Short MD, Stopped at 05/11/23 0830     sulfamethoxazole-trimethoprim (BACTRIM DS) 800-160 MG per tablet 1 tablet, 1 tablet, Oral, Daily, Pema iDez MD, 1 tablet at 05/12/23 0845     thiamine (B-1) tablet 100 mg, 100 mg, Oral, Daily, Clay Seo MD            Physical Examination:   Temp:  [97  F  (36.1  C)-98.6  F (37  C)] 97.9  F (36.6  C)  Pulse:  [109-115] 110  Resp:  [0-36] 13  BP: ()/(36-58) 98/43  SpO2:  [90 %-97 %] 95 %  No intake or output data in the 24 hours ending 05/05/23 1514  Wt Readings from Last 4 Encounters:   05/13/23 118.8 kg (261 lb 14.5 oz)   10/19/22 108.9 kg (240 lb 1.6 oz)   09/08/21 99.8 kg (220 lb)   07/05/21 97.5 kg (215 lb)     BP - Mean:  [51-81] 65  Resp: 13    Recent Labs   Lab 05/11/23 2132 05/08/23  1903 05/07/23 2233 05/07/23 2115   PH  --   --  7.42 7.45   PCO2  --   --  36 34*   PO2  --   --  68* 45*   HCO3  --   --  23 23   O2PER 38 42 28 28       GEN: ill appearing, jaundiced   HEENT: head ncat, scleral icterus  PULM: unlabored breathing on room air, clear anteriorly   CV/COR: sinus tachycardia, S1S2 no gallop,  No obvious rub, no murmur  ABD: markedly distended, no apparent tenderness   EXT:  Perfused, 3+ edema in bilateral lower extremities and scrotum  NEURO: somnolent, awakens with minimal stimuli, tremor, weakness   SKIN: skin is jaundiced, venous stasis dermatitis in PARAG lower extremities. Large hematoma R thigh with blistering and skin breakdown, oozing. Extends down in scrotom         Data:   All data and imaging reviewed     ROUTINE ICU LABS (Last four results)  CMP  Recent Labs   Lab 05/13/23 0447 05/12/23  2036 05/12/23  0423 05/11/23  2132 05/11/23  0440 05/10/23  1004 05/10/23  0512 05/09/23  0529 05/09/23  0416 05/08/23  1047 05/08/23  0411 05/07/23  1711 05/07/23  0420   *  --  129* 128* 125*   < > 123*  123*   < > 116*   < > 121*   < > 120*   POTASSIUM 4.1  --  4.1 3.8 4.2  --  4.5  --  4.2   < > 4.5  --  5.0   CHLORIDE 97*  --  95* 96* 91*  --  91*  --  86*   < > 91*  --  88*   CO2 25  --  25 23 23  --  23  --  22   < > 21*  --  23   ANIONGAP 11  --  9 9 11  --  9  --  8   < > 9  --  9   GLC 79 96 75 79 86   < > 96  --  336*   < > 129*  --  100*   BUN 48.4*  --  41.5* 37.8* 39.7*  --  37.3*  --  29.9*   < > 29.8*  --  30.7*   CR 1.12  --   0.83 0.74 0.92  --  1.06  --  0.99   < > 1.08  --  1.20*   GFRESTIMATED 82  --  >90 >90 >90  --  87  --  >90   < > 85  --  75   JOSE CARLOS 8.7  --  8.8 8.0* 8.4*  --  8.2*  --  7.2*   < > 8.0*  --  8.0*   MAG 1.8  --  1.8  --  1.9  --  2.1   < > 1.5*  --   --   --  1.8   PHOS 3.8  --  3.6  --  3.2  --  3.3  --  2.8   < >  --   --   --    PROTTOTAL  --   --   --   --   --   --   --   --  4.4*  --  4.4*  --  4.4*   ALBUMIN 2.8*  --  3.0*  --  2.8*  --  2.4*  --  2.4*  --  2.4*  --  2.1*   BILITOTAL 25.1*  --  24.0*  --  21.8*  --  19.7*  --  16.0*  --  14.3*  --  14.7*   ALKPHOS 147*  --  149*  --  185*  --  185*  --  176*  --  161*  --  225*   *  --  309*  --  361*  --  469*  --  627*  --  934*  --  1,918*   *  --  102*  --  116*  --  138*  --  153*  --  183*  --  293*    < > = values in this interval not displayed.     CBC  Recent Labs   Lab 05/13/23  0447 05/12/23 2035 05/12/23 0423 05/11/23  2132   WBC 8.7 11.5* 7.5 8.1   RBC 2.03* 2.25* 2.28* 2.00*   HGB 6.5* 7.3* 7.2* 6.3*   HCT 18.8* 20.7* 20.5* 18.0*   MCV 93 92 90 90   MCH 32.0 32.4 31.6 31.5   MCHC 34.6 35.3 35.1 35.0   RDW 20.8* 20.6* 19.4* 19.3*   PLT 66* 51* 43* 38*     INR  Recent Labs   Lab 05/13/23  0447 05/12/23  0423 05/11/23  0440 05/11/23  0035   INR 3.15* 3.17* 3.30* 3.26*     Arterial Blood Gas  Recent Labs   Lab 05/11/23  2132 05/08/23  1903 05/07/23 2233 05/07/23 2115   PH  --   --  7.42 7.45   PCO2  --   --  36 34*   PO2  --   --  68* 45*   HCO3  --   --  23 23   O2PER 38 42 28 28       All cultures:  No results for input(s): CULT in the last 168 hours.  No results found for this or any previous visit (from the past 24 hour(s)).     Ridge Ching MD

## 2023-05-13 NOTE — PROGRESS NOTES
GASTROENTEROLOGY PROGRESS NOTE     SUBJECTIVE:  He recently received dilaudid, was asleep, did not respond to voice or stimulation.     OBJECTIVE:  /54   Pulse 108   Temp 97.4  F (36.3  C) (Temporal)   Resp 18   Ht 1.829 m (6')   Wt 118.8 kg (261 lb 14.5 oz)   SpO2 93%   BMI 35.52 kg/m    Temp (24hrs), Av.9  F (36.6  C), Min:97  F (36.1  C), Max:98.6  F (37  C)    Patient Vitals for the past 72 hrs:   Weight   23 0400 118.8 kg (261 lb 14.5 oz)   23 0415 119.3 kg (263 lb 0.1 oz)       Intake/Output Summary (Last 24 hours) at 2023 1049  Last data filed at 2023 0800  Gross per 24 hour   Intake 1462 ml   Output 865 ml   Net 597 ml        General Appearance: asleep, no acute distress.  Eyes: sclera icteric.  GI: Soft, NABS, did not respond to abdominal palpation.    Neuro: unable to assess for asterixis.      Labs:  Recent Labs   Lab Test 23  1030 230   WBC 9.5 8.7 11.5* 7.5   < > 12.9*   HGB 7.8* 6.5* 7.3* 7.2*   < > 7.8*   MCV 94 93 92 90   < > 88   PLT 62* 66* 51* 43*   < > 66*   INR  --  3.15*  --  3.17*  --  3.30*    < > = values in this interval not displayed.     Recent Labs   Lab Test 23   POTASSIUM 4.1 4.1 3.8   CHLORIDE 97* 95* 96*   CO2 25  23   BUN 48.4* 41.5* 37.8*   ANIONGAP 11 9 9     Recent Labs   Lab Test 23  0440 23  0559 23  1657 20  0723 20  1351 20  0637 20  1120 20  1057 20  0000 19  1542   ALBUMIN 2.8* 3.0*  --  2.8*   < > 1.5*   < > 2.5*   < >  --  2.1*  --   --    BILITOTAL 25.1* 24.0*  --  21.8*   < > 11.1*   < > 24.9*   < >  --  18.9*  --   --    * 102*  --  116*   < > 151*   < > 65   < >  --  64   < >  --    * 309*  --  361*   < > 1,063*   < > 133*   < >  --  177*   < >  --    PROTEIN  --   --  Negative  --   --   --    --   --   --  20*  --   --  Negative   LIPASE  --   --   --   --   --  171*  --  205  --   --  200  --   --    AMYLASE  --   --   --   --   --  96  --   --   --   --   --   --   --     < > = values in this interval not displayed.        Assessment: This is a 48 y-o male with decompensated liver disease secondary to alcohol use who is admitted for hemorrhagic shock from epistaxis and right thigh hematoma. S/P TIPS, US doppler showed patent TIPS. Received transfusion on admission.      MELD-Na score: 34 - 65% mortality in 90 days.      PEth was 44 on 5/9, moderate alcohol consumption.      Plan:   -Continue ICU care  -Continue Rifaxamin and lactulose    -EGD not indicated given the patent TIPS  -On bactrim for SBP prophylaxis   -Alcohol cessation  -Diuresis per ICU team.    -Hematoma management per surgery   -Consider NG place for feeding and lactulose    His mom and fiance were at the bedside.  I answered their questions, discussed the severity of his illness.  I explained that he has severe liver failure and his liver has continued to decline while in the hospital.  I also explained that we can support the liver, but there is no additional treatment that we can offer.  Reviewed that he is not a transplant candidate.  I explained that his risk of mortality from his current illness is high.    35 minutes of total time was spent providing patient care, including patient evaluation, reviewing documentation/test results, and .          Darnell Samayoa MD  Thank you for the opportunity to participate in the care of this patient.   Please feel free to call with any questions or concerns.  [phone].

## 2023-05-13 NOTE — PROGRESS NOTES
Increased bleeding from PICC line site despite occlusive dressings    Significant coagulopathy and TEG profile shows reduced clot strength    Will go ahead and give some platelets.     Will also give cryo although lysis profile on TEG appears normal.    Has already received FFPs, reaction time is not prolonged.    Allan Loza MD  Pulmonary, Critical Care and Sleep Medicine  [facility]  Pager: [phone]

## 2023-05-13 NOTE — PROGRESS NOTES
"   05/13/23 1400   Appointment Info   Signing Clinician's Name / Credentials (PT) Sarah Garcia PT, DPT   Living Environment   People in Home significant other  (Pt is , but does not live with his wife, he lives with Monique his fiance. He does have 3 children, but they live with their mother. Pt and Monique do have a dog.)   Current Living Arrangements house   Home Accessibility stairs to enter home;stairs within home   Living Environment Comments Per pt's Mother, the home is smaller, pt uses furniure to walk about. She reports the pt told her he sleeps on the floor, that it is more comfortable. Mom asks therapist to clarify with Monique next time she is here.   Self-Care   Usual Activity Tolerance moderate   Current Activity Tolerance fair   Regular Exercise No   Fall history within last six months yes   Number of times patient has fallen within last six months 3   Activity/Exercise/Self-Care Comment Per pt's Mother the pt has some kind of \"braces for the knees to help him walk better.\" States [facility] pt is very private about his health and his MD. She states the pt did tell her recently his MD has effected his diaphragm. States he doesn't get the care he should because \"he doesn't like to go to the doctor.\" The pt doesn't like to use any assistive device, but does use furniture to support himself with walking in the home. Mom states \"He kind of flings his legs out when he walks.\" And that \"He did fall quite a few times on the ice this last year.\"   General Information   Onset of Illness/Injury or Date of Surgery 05/05/23   Referring Physician Faith Morel MD   Patient/Family Therapy Goals Statement (PT) None stated.   Pertinent History of Current Problem (include personal factors and/or comorbidities that impact the POC) 48 year old man admitted to [facility] from an OSH on 5/5/2023 for acute blood loss anemia. He hs a history of chronic liver disease requiring paracentesis s/p tips procedure, " Updated discharge plan of care. Plan is home today with oral antibiotics. No other needs.  POD#2 lap cholecystectomy-MJO "alcohol abuse, tobacco use, muscular dystrophy, and PARAG calf cellulitis.   Existing Precautions/Restrictions fall;oxygen therapy device and L/min  (3L via NC)   General Observations Jaundice   Cognition   Cognitive Status Comments Pt presents encephalopathic like cognition. Perseverates over fruit, wanting to eat fruit. Frustrates easily at times. Uses some choice words. Makes \"jokes,\" girlfriend and Mother at bedside say he is a \"jokester.\"   Pain Assessment   Patient Currently in Pain Yes, see Vital Sign flowsheet   Integumentary/Edema   Integumentary/Edema Comments Jaundice, R flank and R hip/glute are bruised, bloody dressing in place (Rn to change following session), rectal tube, kulkarni, R PICC line-also oozing/dripping, RN to address following session. R LE is signficantly larger in girth with edema vs the L. R LE painful at times to the touch.   Posture    Posture Kyphosis;Protracted shoulders;Forward head position   Range of Motion (ROM)   ROM Comment R LE limited by edema and pain. ACE in place.  L LE WFL. B UEs weak less than full AROM   Strength (Manual Muscle Testing)   Strength Comments Core and proximal weakness evident, needs signifcant time, brings head to spoon to eat. Tremulous with use of theUEs.   Bed Mobility   Comment, (Bed Mobility) Sup>sit Mod A x 2, yells in pain until fully sitting.   Transfers   Comment, (Transfers) Unable to stand with trial x 2, too painful and pt states 'Nope, not doingit.\"   Balance   Balance Comments Sitting balance, close SBA once feet are flat.   Coordination   Coordination Comments Slow processing and yet at times impulsive with movement. Generally slow, with hand to mouth coordinating eating.   Clinical Impression   Criteria for Skilled Therapeutic Intervention Yes, treatment indicated   PT Diagnosis (PT) Unable to ambulate   Influenced by the following impairments Weakness, fatigue, impaired activity tolerance, impaired cognition, decreased balance, pain " "  Functional limitations due to impairments Decreased functional independence with mobility   Clinical Presentation (PT Evaluation Complexity) Stable/Uncomplicated   Clinical Presentation Rationale see MR   Clinical Decision Making (Complexity) low complexity   Planned Therapy Interventions (PT) balance training;bed mobility training;gait training;home exercise program;neuromuscular re-education;patient/family education;ROM (range of motion);stair training;strengthening;stretching;transfer training;progressive activity/exercise;home program guidelines   Risk & Benefits of therapy have been explained evaluation/treatment results reviewed;care plan/treatment goals reviewed;risks/benefits reviewed;current/potential barriers reviewed;participants voiced agreement with care plan;participants included;patient   PT Total Evaluation Time   PT Eval, Low Complexity Minutes (26165) 10   Physical Therapy Goals   PT Frequency 5x/week   PT Predicted Duration/Target Date for Goal Attainment 05/20/23   PT Goals Bed Mobility;Transfers;Gait;PT Goal 1   PT: Bed Mobility Minimal assist;Supine to/from sit   PT: Transfers Minimal assist;Sit to/from stand;Bed to/from chair;Assistive device   PT: Gait Minimal assist;Assistive device;50 feet   PT: Goal 1 Pt will complete 5x sit<> <1 mintue, in demonstration of improved strength and balance.   Interventions   Interventions Quick Adds Therapeutic Activity   Therapeutic Activity   Therapeutic Activities: dynamic activities to improve functional performance Minutes (31778) 40   Treatment Detail/Skilled Intervention Dangled x 20 minutes, provided wash cloth and mouth swabs for oral care. Pt needs verbal cues and hand over hand to initate face wash, mouth scrub with wash cloth (very dry,flaky,extra skin over lips). Teeth sticking to the lips; provided swab and asked pt to use like a toothbrush, pt bites it. Does release with cues to do so, then Mod A to use swab to \"brush teeth.\" Pt eager " to eat once upright, worked to center food, use fork and knife to cut-Max A. Very shaky, requests blanket. Attempts at standing unsuccessful as pt with very wide Sit>supine dependent x 3 people. Pt with nursing staff at PT exit for cares. Considered up to chair; however, pt's sitting stance is quite wide and the current chairs available are not suitable. Requested Rn order from size wise.   PT Discharge Planning   PT Plan Transfers as able   PT Discharge Recommendation (DC Rec) Transitional Care Facility   PT Rationale for DC Rec A x 2 for bed mobility, unable to stand or walk at this time. Will benefit form continued skilled rehab services toprogress independence and safety wtih funcional mobility prior to return home.   Total Session Time   Timed Code Treatment Minutes 40   Total Session Time (sum of timed and untimed services) 50

## 2023-05-13 NOTE — PLAN OF CARE
5/12 -  1900-0730:    2000: Pt. active bleeding from PICC line site despite reinforcing dressing. Intensivist notified-   - Platelet x1 given, Cryo x1 given. Decreased bleeding from PICC site.    2300: MAP <65 (MAP 55-60), Intensivist notified- MAP Goal >55 ordered.    0200: UOP Diminished, MD notified- continue to monitor.    0500: Hgb 6.5, Intensivist notified- 1u pRBC ordered.  0615: 1u pRBC started.    Neuro: Lethargic/Drowsy, Confusion present, Disoriented to place, time, situation. Follows commands.   - BUE: 4/5 strength- tremors at times.   - LLE: 3/5, RLE: 2-3/5 - Increased pain w/ mvmnt.  - Pupils: 3mm, equal, round, reactive.    Cardiac: ST, HR 100s-110s, SBP 90s-100s, MAP >55 (Goal).    Resp: 2L NC, SpO2 >90%. LS: Diminished. Regular/Unlabored.    GI: Renal Diet, Fluid Restriction 1800mL. Abdomen: rounded/distention, semi-firm, hypoactive, BM x1.    : Bo catheter in place, UOP Diminished but increasing- MD aware.    Skin: R) Hip- Hematoma present w/ Ecchymosis through R) Hip/Flank/Leg. Multiple small open areas to R) Hip- Sanguinous/serosanguinous Drainage, Foam Dressing+ABD changed x3.    Pain: R) Hip/Leg pain- Dilaudid 0.5mg given x1. Pain rating asked throughout shift, Pt. experiencing acceptable pain level.

## 2023-05-14 NOTE — PROGRESS NOTES
Patient has been very lethargic all day. He has been slow to respond and is inconsistent with following direction. Lower lungs very diminished. Strong bounding tachycardia. Family has been in the room most of the day. Appetite is very minimal. Ate most of breakfast, but has dwindled with lunch and dinner. Enjoys his strawberry shakes. One is in the fridge with his label on it. Does take pills, but has trouble drinking from a straw. Given dilaudid for pain twice today. Very confused. Gave one unit of plasma earlier this AM due to high INR. Gave albumin and a bolus of NS this afternoon. Still infusing ABX. Scrotum and right hip very large and solid from hematoma. Surgery not willing to operate due to blood loss concerns. Palliative care consult tomorrow with family.

## 2023-05-14 NOTE — PROGRESS NOTES
Critical Care Progress Note      05/14/2023    Name: Sp Plasencia MRN#: [id number]   Age: 48 year old YOB: 1975     [facility] Day# 9                   Problem List:   Principal Problem:    Hemorrhage  Cirrhosis  Delirium  Right thigh hematoma         Summary/Hospital Course:     Sp Plasencia is a 48 year old man who transfered to [facility] ICU on 5/5/2023 for acute blood loss anemia. He has a history of chronic liver disease requiring paracentesis s/p tips procedure, alcohol abuse, tobacco use, muscular dystrophy, bilateral calf pain and calf cellulitis, and insomnia. He presented to an outside hospital on 5/4/2023 for increasing jaundice, confusion, weakness/falls and epistaxis x3 days. He had been on lactulose for liver failure with recurrent ascites but had discontinued it due to diarrhea.     He had active nasal bleeding on presentation and a nasal clamp was placed which controlled bleeding. On arrival to OSH he was tachycardic and afebrile. Labs were notable for a hemoglobin of 6.3, platelets 87, hematocrit 17.5, INR of 2.69, sodium 118, lactate 3.9. He received 4 units of blood. For hyponatremia, PTA amlodipine and lisinopril were stopped and he was given midodrine.     Liver panel showed total bili 10.4 with direct bili of 6.7, , ALT 65, and alk phos 260. His alcohol level was 0.13, he states he had not had a drink in a few days. He did not have an abdominal ultrasound or CT at outside hospital. With concerns of hip pain and recent falls, he received chest/abdominal X-rays which were unremarkable. Of note, he was previously on the liver transplant list at [facility] in 2020 with a MELD score of 29 at the highest. At the time he was getting weekly paracentesis; following a TIPS procedure and alcohol cessation he improved enough to get off the liver transplant list. He has no known history of esophageal varices or other GI bleed. He has been drinking again  (reports modestly) since 2021.     Given concerns for bleeding and severe liver disease he was transferred to [facility] on 5/5/2023 for further workup and management. He has been given multiple units of blood products and hemoglobin continues to drop. He received paracentesis 5/7/2023 with drainage of 5L fluid. Large R sided thigh hematoma is persistent.          Assessment and plan :     Sp Plasencia IS a 48 year old man admitted to [facility] from an OSH on 5/5/2023 for acute blood loss anemia. He hs a history of chronic liver disease requiring paracentesis s/p tips procedure, alcohol abuse, tobacco use, muscular dystrophy, and PARAG calf cellulitis.     I have personally reviewed the daily labs, imaging studies, cultures and discussed the case with referring physician and consulting physicians.     My assessment and plan by system for this patient is as follows:  Neurology/Psychiatry:   1. Hepatic encephalopathy    Plan  - monitor neurologic status   - Rifaximin, increase lactulose to q 6 hour  - valium PRN for pain / agitation  - lidocaine topical, dilaudid IV for pain    Cardiovascular:   1. Tachycardia   2. History of hypertension  3. Venous insufficiency  Plan  - off pressors   - HOLD PTA lisinopril and amlodipine   - echo 5/9/2023 with EF >70%    Pulmonary/Ventilator Management:   1. Hypoxic respiratory failure  2. Epistaxis, right; resolved   Plan  - O2 to keep sats > 90.  - IS / pulm toilet     GI and Nutrition :   1. End-stage alcoholic cirrhosis  2. Ascites, s/p tap (last on 5/11)   Plan  - diet as tolerated   - protein supplements ordered BID   - gastroenterology consulted  - TIPSS working well by ultrasound examination   - Famotidine for PUD ppx  - Follow LFTs  - Rifaximin, lactulose q 6 hour  - Palliative care consulted  - case discussed with GI - not a transplant candidate given recent drinking.       Renal/Fluids/Electrolytes:   1. Hyponatremia  2. Hypervolemia / anasarca    3. Acute Renal Failure  Plan  - replace electrolytes PRN   - kulkarni for strict I/Os  - got albumin/lasix overnight. FFP this AM.  Will check 12 pm BMP.  Depending on BUN/Cr, will give fluids vs diuretics    Infectious Disease:   1.  Venous stasis dermatitis PARAG lower extremities  2.  Open blisters RLE thigh  3. Concern for sepsis  Plan  - on zosyn from episode of hypotension days ago.  Cultures sill negative.  Will continue zosyn for now.  Could consider de-escalating to SBP prophy with bactrim in the next couple days    Endocrine:   1. Stress induced hyperglycemia  Plan  - ICU insulin protocol, goal sugar <180    Hematology/Oncology:   1. Anemia, acute blood loss  2. Coagulopathy 2/2 ESLD vs hemorrhage   3. Thrombocytopenia 2/2 ESLD   4. R lateral thigh hematoma - likely with ongoing slow ooze  Plan  - Transfuse as necessary for Hgb <7.0, platelets <50  - check CBC and INR daily    - General surgery consulted for R hip/thigh hematoma   - no operative plans for now     MSKL/Rheum:  1. Facioscapulohumeral muscular dystrophy (FSMD)  Plan  -  Slowly progressive disease  -  monitor for decreased respiratory effort   -  Wrap RLE with kerlex and Ace to aid in compression and edema     IV/Access:   1. Venous access - PICC line placed      ICU Prophylaxis:   1. DVT: Hep held due concerns for active bleed  2. VAP: HOB 30 degrees   3. Stress Ulcer: famotidine  4. Restraints: None  5. Wound care - per unit routine   6. Feeding - regular diet   7. Family Update: family updated at bedside  8. Disposition - ICU    Key goals for next 24 hours:   1. Monitor blood counts  2. Increase lactulose  3. PT  4. Afternoon BMP to monitor renal function         Interim History:     Given dose of albumin/lasix overnight.  FFP this AM. Remains confused. Worked with PT a little bit yesterday.             Key Medications:       Current Facility-Administered Medications:      acetaminophen (TYLENOL) Suppository 325 mg, 325 mg, Rectal, Q4H PRN,  Clinton Rousseau MD     Contraindications to both pharmacological and mechanical prophylaxis (must document contraindications for both in this order), , Does not apply, See Admin Instructions, Clay Seo MD     cyanocobalamin (VITAMIN B-12) sublingual tablet 500 mcg, 500 mcg, Sublingual, Daily, Clay Seo MD, 500 mcg at 05/14/23 0941     glucose gel 15-30 g, 15-30 g, Oral, Q15 Min PRN **OR** dextrose 50 % injection 25-50 mL, 25-50 mL, Intravenous, Q15 Min PRN, 50 mL at 05/05/23 1839 **OR** glucagon injection 1 mg, 1 mg, Subcutaneous, Q15 Min PRN, Clay Seo MD     diazepam (VALIUM) tablet 5 mg, 5 mg, Oral, Q6H PRN, Faith Morel MD, 5 mg at 05/11/23 1130     famotidine (PEPCID) injection 20 mg, 20 mg, Intravenous, Q12H, Clay Seo MD, 20 mg at 05/14/23 0939     flumazenil (ROMAZICON) injection 0.2 mg, 0.2 mg, Intravenous, q1 min prn, Clay Seo MD     folic acid (FOLVITE) tablet 1 mg, 1 mg, Oral, Daily, Clay Seo MD, 1 mg at 05/14/23 0941     gabapentin (NEURONTIN) capsule 100 mg, 100 mg, Oral, Q8H, Clay Seo MD, 100 mg at 05/14/23 0941     haloperidol lactate (HALDOL) injection 2.5-5 mg, 2.5-5 mg, Intravenous, Q4H PRN, Clay Seo MD, 5 mg at 05/06/23 1932     HYDROmorphone (PF) (DILAUDID) injection 0.3-0.5 mg, 0.3-0.5 mg, Intravenous, Q1H PRN, Ko Ferguson MD, 0.5 mg at 05/14/23 1114     lactulose (CHRONULAC) solution 20 g, 20 g, Oral, Q6H, Milton Ching MD     lidocaine (LMX4) cream, , Topical, Q8H PRN, Clay Seo MD, Given at 05/11/23 1105     lidocaine (XYLOCAINE) 2 % external gel, , Urethral, Q4H PRN, Clay Seo MD, Given at 05/13/23 1539     lidocaine 1 % 0.1-5 mL, 0.1-5 mL, Other, Q1H PRN, Vivek Silver MD, 1 mL at 05/11/23 1229     melatonin tablet 3 mg, 3 mg, Oral, At Bedtime PRN, Kal De MD     metoprolol (LOPRESSOR) injection 5 mg, 5 mg, Intravenous, Q6H PRN, Clay Seo  MD Raymond     midodrine (PROAMATINE) tablet 5 mg, 5 mg, Oral, TID w/meals, Allan Loza MD, 5 mg at 05/14/23 0941     multivitamin w/minerals (THERA-VIT-M) tablet 1 tablet, 1 tablet, Oral, Daily, Clay Seo MD, 1 tablet at 05/14/23 0941     naloxone (NARCAN) injection 0.2 mg, 0.2 mg, Intravenous, Q2 Min PRN **OR** naloxone (NARCAN) injection 0.4 mg, 0.4 mg, Intravenous, Q2 Min PRN **OR** naloxone (NARCAN) injection 0.2 mg, 0.2 mg, Intramuscular, Q2 Min PRN **OR** naloxone (NARCAN) injection 0.4 mg, 0.4 mg, Intramuscular, Q2 Min PRN, Nedra Short MD     piperacillin-tazobactam (ZOSYN) 3.375 g vial to attach to  mL bag, 3.375 g, Intravenous, Q6H, Phu Burrows MD, 3.375 g at 05/14/23 0939     rifaximin (XIFAXAN) tablet 550 mg, 550 mg, Oral, BID, Clay Seo MD, 550 mg at 05/14/23 0941     rOPINIRole (REQUIP) tablet 0.5 mg, 0.5 mg, Oral, Daily PRN, Clay Seo MD     [Held by provider] rOPINIRole (REQUIP) tablet 1 mg, 1 mg, Oral, At Bedtime, Clay Seo MD, 1 mg at 05/07/23 2138     sodium chloride (PF) 0.9% PF flush 10-20 mL, 10-20 mL, Intracatheter, q1 min prn, Vivek Silver MD, 10 mL at 05/14/23 1115     sodium chloride (PF) 0.9% PF flush 10-20 mL, 10-20 mL, Intracatheter, q1 min prn, Clay Seo MD, 10 mL at 05/14/23 0940     sodium chloride (PF) 0.9% PF flush 10-40 mL, 10-40 mL, Intracatheter, Once PRN, Vivek Silver MD     sodium chloride (PF) 0.9% PF flush 10-40 mL, 10-40 mL, Intracatheter, Q7 Days, Vivek Silver MD, 40 mL at 05/11/23 1229     sodium chloride (PF) 0.9% PF flush 10-40 mL, 10-40 mL, Intracatheter, Q1H PRN, Vivek Silver MD     sodium chloride (PF) 0.9% PF flush 10-40 mL, 10-40 mL, Intracatheter, Q7 Days, Clay Seo MD, 100 mL at 05/12/23 1519     sodium chloride (PF) 0.9% PF flush 10-40 mL, 10-40 mL, Intracatheter, Q1H PRN, Clay Seo MD     sodium chloride 0.9% infusion, , Intravenous, Continuous,  Deja, Nedra Downey MD, Last Rate: 10 mL/hr at 05/14/23 0600, Rate Verify at 05/14/23 0600     thiamine (B-1) tablet 100 mg, 100 mg, Oral, Daily, Clay Seo MD, 100 mg at 05/14/23 0940            Physical Examination:   Temp:  [97.1  F (36.2  C)-99.5  F (37.5  C)] 98.4  F (36.9  C)  Pulse:  [107-120] 117  Resp:  [10-26] 18  BP: ()/(45-74) 123/54  SpO2:  [91 %-97 %] 94 %  No intake or output data in the 24 hours ending 05/05/23 1514  Wt Readings from Last 4 Encounters:   05/14/23 120 kg (264 lb 8.8 oz)   10/19/22 108.9 kg (240 lb 1.6 oz)   09/08/21 99.8 kg (220 lb)   07/05/21 97.5 kg (215 lb)     BP - Mean:  [62-93] 75  Resp: 18    Recent Labs   Lab 05/11/23  2132 05/08/23  1903 05/07/23  2233 05/07/23  2115   PH  --   --  7.42 7.45   PCO2  --   --  36 34*   PO2  --   --  68* 45*   HCO3  --   --  23 23   O2PER 38 42 28 28       GEN: ill appearing, jaundiced   HEENT: head ncat, scleral icterus  PULM: slight tachypnea  CV/COR: sinus tachycardia, S1S2 no gallop,  No obvious rub, no murmur  ABD: distended, no apparent tenderness   EXT:  Perfused, 3+ edema in bilateral lower extremities (R>L) and scrotum  NEURO: somnolent, awakens with minimal stimuli. Slow to respond to questions. Avoids orientation questions.   SKIN: skin is jaundiced, venous stasis dermatitis in PARAG lower extremities. Large hematoma R thigh with blistering and skin breakdown, oozing. Extends down in scrotom         Data:   All data and imaging reviewed     ROUTINE ICU LABS (Last four results)  CMP  Recent Labs   Lab 05/14/23  0849 05/14/23  0406 05/14/23  0358 05/14/23  0154 05/13/23  2357 05/13/23  1030 05/13/23 0447 05/12/23 2036 05/12/23  0423 05/11/23  2132 05/11/23 0440 05/09/23  0529 05/09/23  0416 05/08/23  1047 05/08/23  0411   NA  --  135*  --   --   --  132* 133*  --  129*   < > 125*   < > 116*   < > 121*   POTASSIUM  --  4.2  --   --   --  4.2 4.1  --  4.1   < > 4.2   < > 4.2   < > 4.5   CHLORIDE  --  98  --   --   --   97* 97*  --  95*   < > 91*   < > 86*   < > 91*   CO2  --  24  --   --   --  21* 25  --  25   < > 23   < > 22   < > 21*   ANIONGAP  --  13  --   --   --  14 11  --  9   < > 11   < > 8   < > 9   GLC 99 92 87 90   < > 98 79   < > 75   < > 86   < > 336*   < > 129*   BUN  --  54.3*  --   --   --  47.3* 48.4*  --  41.5*   < > 39.7*   < > 29.9*   < > 29.8*   CR  --  1.23*  --   --   --  1.08 1.12  --  0.83   < > 0.92   < > 0.99   < > 1.08   GFRESTIMATED  --  73  --   --   --  85 82  --  >90   < > >90   < > >90   < > 85   JOSE CARLOS  --  9.1  --   --   --  8.8 8.7  --  8.8   < > 8.4*   < > 7.2*   < > 8.0*   MAG  --  1.9  --   --   --   --  1.8  --  1.8  --  1.9   < > 1.5*  --   --    PHOS  --  4.1  --   --   --   --  3.8  --  3.6  --  3.2   < > 2.8  --   --    PROTTOTAL  --   --   --   --   --   --   --   --   --   --   --   --  4.4*  --  4.4*   ALBUMIN  --  3.1*  --   --   --   --  2.8*  --  3.0*  --  2.8*   < > 2.4*  --  2.4*   BILITOTAL  --  29.2*  --   --   --   --  25.1*  --  24.0*  --  21.8*   < > 16.0*  --  14.3*   ALKPHOS  --  152*  --   --   --   --  147*  --  149*  --  185*   < > 176*  --  161*   AST  --  389*  --   --   --   --  337*  --  309*  --  361*   < > 627*  --  934*   ALT  --  119*  --   --   --   --  107*  --  102*  --  116*   < > 153*  --  183*    < > = values in this interval not displayed.     CBC  Recent Labs   Lab 05/14/23  0406 05/13/23  1030 05/13/23 0447 05/12/23 2035   WBC 10.4 9.5 8.7 11.5*   RBC 2.17* 2.45* 2.03* 2.25*   HGB 7.1* 7.8* 6.5* 7.3*   HCT 20.3* 23.0* 18.8* 20.7*   MCV 94 94 93 92   MCH 32.7 31.8 32.0 32.4   MCHC 35.0 33.9 34.6 35.3   RDW 21.1* 20.4* 20.8* 20.6*   PLT 58* 62* 66* 51*     INR  Recent Labs   Lab 05/14/23  0406 05/13/23 0447 05/12/23  0423 05/11/23 0440   INR 3.91* 3.15* 3.17* 3.30*     Arterial Blood Gas  Recent Labs   Lab 05/11/23  2132 05/08/23  1903 05/07/23  2233 05/07/23  2115   PH  --   --  7.42 7.45   PCO2  --   --  36 34*   PO2  --   --  68* 45*   HCO3  --   --   23 23   O2PER 38 42 28 28       All cultures:  No results for input(s): CULT in the last 168 hours.  No results found for this or any previous visit (from the past 24 hour(s)).     Ridge Ching MD

## 2023-05-14 NOTE — PROGRESS NOTES
GASTROENTEROLOGY PROGRESS NOTE     SUBJECTIVE:  He is awake today but confused, oriented to person.  Denies abdominal pain.    OBJECTIVE:  /58   Pulse 114   Temp 97.2  F (36.2  C) (Oral)   Resp 19   Ht 1.829 m (6')   Wt 120 kg (264 lb 8.8 oz)   SpO2 95%   BMI 35.88 kg/m    Temp (24hrs), Av.8  F (36.6  C), Min:97.1  F (36.2  C), Max:99.5  F (37.5  C)    Patient Vitals for the past 72 hrs:   Weight   23 0400 120 kg (264 lb 8.8 oz)   23 0400 118.8 kg (261 lb 14.5 oz)   23 041 119.3 kg (263 lb 0.1 oz)       Intake/Output Summary (Last 24 hours) at 2023 0759  Last data filed at 2023 0600  Gross per 24 hour   Intake 1960 ml   Output 1660 ml   Net 300 ml        General Appearance: alert, oriented x1, no acute distress.  Eyes: sclera icteric.  GI: Soft, NABS, NT/ND.    Neuro: no asterixis on exam today.      Labs:  Recent Labs   Lab Test 23  0406 23  1030 237 23   WBC 10.4 9.5 8.7   < > 7.5   HGB 7.1* 7.8* 6.5*   < > 7.2*   MCV 94 94 93   < > 90   PLT 58* 62* 66*   < > 43*   INR 3.91*  --  3.15*  --  3.17*    < > = values in this interval not displayed.     Recent Labs   Lab Test 23  0406 23  1030 23  044   POTASSIUM 4.2 4.2 4.1   CHLORIDE 98 97* 97*   CO2 24 21* 25   BUN 54.3* 47.3* 48.4*   ANIONGAP 13 14 11     Recent Labs   Lab Test 23  0406 23  0447 23  0423 23  2203 23  0559 23  1657 20  0723 20  1351 20  0637 20  1120 20  1057 20  0000 19  1542   ALBUMIN 3.1* 2.8* 3.0*  --    < > 1.5*   < > 2.5*   < >  --  2.1*  --   --    BILITOTAL 29.2* 25.1* 24.0*  --    < > 11.1*   < > 24.9*   < >  --  18.9*  --   --    * 107* 102*  --    < > 151*   < > 65   < >  --  64   < >  --    * 337* 309*  --    < > 1,063*   < > 133*   < >  --  177*   < >  --    PROTEIN  --   --   --  Negative  --   --   --   --   --  20*  --   --   Negative   LIPASE  --   --   --   --   --  171*  --  205  --   --  200  --   --    AMYLASE  --   --   --   --   --  96  --   --   --   --   --   --   --     < > = values in this interval not displayed.        Assessment: This is a 48 y-o male with decompensated liver disease secondary to alcohol use who is admitted for hemorrhagic shock from epistaxis and right thigh hematoma. S/P TIPS, US doppler showed patent TIPS.  Liver continues to worsen.  Rising INR is a particularly poor prognostic sign.  Cr also rising.     MELD-Na score: 37 - 65% mortality in 90 days.      PEth was 44 on 5/9, moderate alcohol consumption.      Plan:   -Continue ICU care  -Continue Rifaxamin and lactulose    -EGD not indicated given the patent TIPS  -On bactrim for SBP prophylaxis   -Alcohol cessation  -Diuresis per ICU team.    -Hematoma management per surgery   -Consider NG place for feeding and lactulose  -Palliative care meeting set for tomorrow.    18 minutes of total time was spent providing patient care, including patient evaluation, reviewing documentation/test results, and .          Darnell Samayoa MD  Thank you for the opportunity to participate in the care of this patient.   Please feel free to call with any questions or concerns.  [phone].

## 2023-05-14 NOTE — PLAN OF CARE
5/13 -  [id number]:    Neuro: Lethargic/Drowsy, Confusion present, Disoriented to place, time, situation. Follows commands. Speech- slow.  - BUE: 4/5 strength- weakness.   - LLE: 3/5, RLE: 2-3/5 - Increased pain w/ mvmnt.  - Pupils: 3mm, equal, round, reactive.     Cardiac: ST, HR 100s-110s, SBP 90s-100s, MAP >55 (Goal).     Resp: 3L NC, SpO2 >90%. LS: Diminished. Regular/Unlabored.     GI: Renal Diet, Fluid Restriction 1800mL. Abdomen: rounded/distention, semi-firm, active. Liquid BM, RT in place.     : Bo catheter in place, UOP Diminished.     Skin: R) Hip- Hematoma present w/ Ecchymosis through R) Hip/Flank/Leg. Multiple small open areas to R) Hip- Sanguinous/serosanguinous Drainage, Foam Dressing+ABD in place.     Pain: R) Hip/Leg pain- Dilaudid 0.3mg given x1.

## 2023-05-14 NOTE — PROGRESS NOTES
ICU note:  Repeat BMP came back with persistently elevated BUN and Cr.  Bedside ultrasound with a flat IVC.  Will give 25 g 25% albumin and 250 mL normal saline.  Ridge Ching MD

## 2023-05-15 NOTE — PROGRESS NOTES
[facility] Progress Note     IMPRESSION:  Decompensated etoh cirrhosis, MELD 35   -Ascites, neg for SBP    -Encephalopathy   -Acute on chronic hepatitis, poor prognosis, not tx candidate   -Steroids not appropriate   -TIPS patent  Etoh use disorder  Protein malnutrition  Pancytopenia/Anemia - multifactorial including fall/thigh hematoma and etoh BM suppression.  No evidence of GIB.     RECOMMENDATIONS:  -Agree with care conference/defining goals of care  -NJT may be helpful for nutrition  -Unclear if there is ongoing benefit to zosyn, which could be contributing to hepatitis    Total time spent in chart review, direct medical discussion, examination, and documentation was 20 minutes    Jose Cole DO   [facility]  Cell [phone]    ________________________________________________________________________      SUBJECTIVE:  Denies abd pain.  Attempting to get out of bed.  Labile with attention. Tolerating some oral nutrition.      OBJECTIVE:  BP (!) 154/83   Pulse 111   Temp 97.6  F (36.4  C) (Oral)   Resp 24   Ht 1.829 m (6')   Wt 118.3 kg (260 lb 12.9 oz)   SpO2 93%   BMI 35.37 kg/m    Temp (24hrs), Av.6  F (36.4  C), Min:96.7  F (35.9  C), Max:99.5  F (37.5  C)    Patient Vitals for the past 72 hrs:   Weight   05/15/23 0404 118.3 kg (260 lb 12.9 oz)   23 0400 120 kg (264 lb 8.8 oz)   23 0400 118.8 kg (261 lb 14.5 oz)       Intake/Output Summary (Last 24 hours) at 5/15/2023 1332  Last data filed at 5/15/2023 1200  Gross per 24 hour   Intake 2513 ml   Output 2290 ml   Net 223 ml        PHYSICAL EXAM  GEN: Jaundiced, peripheral sarcopenia and edema  ABD: Distended, firm, +BS, no apparent discomfort with palpation, small reducible umbilical hernia.    Additional Data:  I have reviewed the patient's new clinical lab results:     Recent Labs   Lab Test 05/15/23  0450 23  0406 23  1030 23  0447   WBC 12.8* 10.4 9.5 8.7   HGB 6.3* 7.1* 7.8* 6.5*    MCV 97 94 94 93   PLT 49* 58* 62* 66*   INR 3.91* 3.91*  --  3.15*     Recent Labs   Lab Test 05/15/23  0450 05/14/23  1234 05/14/23  0406   POTASSIUM 4.2 4.1 4.2   CHLORIDE 101 100 98   CO2 24 23 24   BUN 60.5* 54.5* 54.3*   ANIONGAP 12 11 13     Recent Labs   Lab Test 05/15/23  0450 05/14/23  0406 05/13/23  0447 05/12/23  0423 05/11/23  2203 05/06/23  0559 05/05/23  1657 04/09/20  0723 04/08/20  1351 02/12/20  0637 02/11/20  1120 02/11/20  1057 01/27/20  0000 01/04/19  1542   ALBUMIN 3.0* 3.1* 2.8*   < >  --    < > 1.5*   < > 2.5*   < >  --  2.1*  --   --    BILITOTAL 31.5* 29.2* 25.1*   < >  --    < > 11.1*   < > 24.9*   < >  --  18.9*  --   --    * 119* 107*   < >  --    < > 151*   < > 65   < >  --  64   < >  --    * 389* 337*   < >  --    < > 1,063*   < > 133*   < >  --  177*   < >  --    PROTEIN  --   --   --   --  Negative  --   --   --   --   --  20*  --   --  Negative   LIPASE  --   --   --   --   --   --  171*  --  205  --   --  200  --   --    AMYLASE  --   --   --   --   --   --  96  --   --   --   --   --   --   --     < > = values in this interval not displayed.

## 2023-05-15 NOTE — PLAN OF CARE
"Shift Summary: assumed care of pt [id number]-    Neuro: lethargic, not answering orientation questions but saying \"yes\" and \"no\" appropriately most of the time. Sleeping between cares, when restless is able to be verbally redirected. 1:1 sitter remains for pt impulsivity/pulling lines  CV: ST. BP WNL w/out intervention  Resp: 3L NC- humidification added.   GI: only half of lactulose dose able to be given as pt began coughing/clearing throat frequently. 0400 dose held as pt not alert enough for PO intake and concern for aspiration- MD Smith notified. Rectal tube with 75ml output.  : kulkarni with adequate UOP.   Labs: Hgb 6.3, INR 3.91, plt 49. Order received for 1 unit PRBCs and 2 units FFP  Lines/Gtts: PICC-SL. Per report, leave reinforced dressing in place d/t bleeding with plan for IV team to change   Family: Pts Mom called and received update from myself  Plan: GOC conference today at 1pm in the [place] room. Pending discussion, possible need for NG placement?   WOC consult placed for R calf and R hip recommendations. Full dressing not removed this shift to prevent excessive bleeding, hip dressing reinforced.     Dyana Solitario RN on 5/15/2023 at 6:18 AM                "

## 2023-05-15 NOTE — PROGRESS NOTES
Morning labs reviewed  Hgb 6.3  INR greater than 3  Will transfuse 1 unit(s) PRBC and 2 units FFP    Sp Smith MD  Critical Care Medicine

## 2023-05-15 NOTE — PROGRESS NOTES
Neuro: Alert to somnolent. O to self and place. Restless at times w/ attempts to get OOB. 1:1 sitter for pulling at lines and monitors. Previously has pulled out a PICC.     CV: HTN, held midodrine at noon. -120s. Pulses palpable. Severe anasarca.     Resp: 3L, RR low 20s. More accessory use when laying flat. Clear upper lobes, diminished lowers.     GI: Rectal tube w/ loose output every 2 hours. Tolerating renal diet w/ nutritional supps.     : Bo w/ great ouput, orange/clear.     Skin: R hip dressing changed per POC from WOCN. BLE cellulitis on shins, dressing changed per POC from WOCN. R leg more edematous than L.    Received 1 unit PRBC and 2 units of FFP. Hgb 7.2 up from 6.3    Care conference today. Changed code status to DNR/DNI.

## 2023-05-15 NOTE — PROGRESS NOTES
[facility]  Palliative Care Progress Note       Recommendations & Counseling       A family conference was held today with a large group of Sp's family, Dr Ferguson, ICU Fellow and palliative care.  We discussed the seriousness of Sp's current condition, and reviewed prognostic implications of his current severe liver failure, coagulopathy, and ongoing bleeding.      Comfort care and inpatient hospice was discussed as an option as prognosis is poor. Family is considering comfort care but would like more time to discuss further and process information. Continue current cares at this time. Palliative care will continue to follow and offer support and ongoing goals of care discussions as needed.    Per my colleague Arnie Hansen: Sp is still currently legally  to Fariba, who is the mother of his 3 younger children.  Per family report, Sp has been engaged for approximately a year to Maye, who he lives with.  Sp does not have an ACD identifying a different decision-maker, and since he is legally , involving his spouse in the care process seems important. Sp does not currently demonstrate cognitive capacity to have any input into who his legal decision maker might be.        Kalyani Pollack, [facility]  Contact information available via [facility] Paging/Directory      Thank you for the opportunity to participate in the care of this patient and family. Our team: will continue to follow.     During the hours of 8AM-4:30PM Monday-Friday, please call the team pager ([phone]) with questions, concerns. For urgent overnight or weekend issues, please call the answering service to reach the on call physician at [phone]. Thank you.     Attestation:  TOTAL TIME SPENT:  90 minutes.  Time spent in today's visit, review of chart/investigations today, communicating with other providers and documentation today.     Assessments          Sp  SANDRA Plasencia is a 48 year old man who transfered to [facility] ICU on 5/5/2023 for acute blood loss anemia. Hx chronic liver disease requiring paracentesis s/p tips procedure, alcohol abuse, tobacco use, muscular dystrophy, bilateral calf pain and calf cellulitis, and insomnia. He presented to an outside hospital on 5/4/2023 for increasing jaundice, confusion, weakness/falls and epistaxis x3 days.  Having issues with ongoing bleeding into hematoma site requiring frequent transfusions, hepatic failure/coagulopathy requiring FFP/vit K, tenuous overall status, hypotension (now off pressors) delirium superimposed on hepatic encephalopathy. 6L paracentesis 5/11.    Today, the patient was seen for:  Family care conference, goals of care.    Prognosis, Goals, or Advance Care Planning was addressed today with: Yes.  Mood, coping, and/or meaning in the context of serious illness were addressed today: Yes.  Summary/Comments: Explained to family regarding emotional support with SW and  from palliative and hospice team.            Interval History:     Chart review/discussion with unit or clinical team members:   Discussed patient case with Dr Ferguson, RN, and RNCC. Currently has sitter precautions with non violent restraint due to pulling on lines/unsafe behavior.     Per patient or family/caregivers today:  Large family care conference today with Fields family members: His fiancée and spouse Fariba, mother Flor, Sister Dena, brother Ino, sister-in-law Jada, brother-in-law Gerry and Sister Sandrine on speaker phone.  Dr. Ferguson, ICU Fellow, and palliative APRN present.     Dr. 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  He has been having intermittent paracentesis.  He had experienced a fall resulting in a large hematoma of right extremity which has been causing slow blood loss into the wound  "due to the liver being decompensated and unable to produce coagulation appropriately.  He has required several units of blood and clotting factor's.  There is concern that he may not be able to recover to a good quality of life if he survives this episode of liver failure.  In a best case scenario he would discharge from hospital but would require many months of rehab and his mental status may remain altered due to his compromised liver function.  In the worst case scenario his condition would worsen and he would pass away.  Quality of life was reviewed and family is considering patient would want in this situation.      The option of comfort focused care was reviewed where aggressive medical cares are discontinued to allow a comfortable end of life.  The inpatient hospice program will be recommended in this case due to the likelihood he would pass away quickly without aggressive medical treatments.  Family is considering this option but not ready to make a decision today.  Patient's sister Sandrine will be coming from out of town in the next several days. She would like to see Sp but would not want him to be kept in a condition of suffering if he was to worsen \"just so she could see him.\" We reviewed the option of not escalating care such as changing code status from full code to No CPR and No intubation. The recommendations would be that changing to DNR/I status be strongly considered. Family is considering this information and would like time to process. They will continue to discuss over the next few days.     Key Palliative Symptoms:  We are not helping to manage these symptoms currently in this patient.           Review of Systems:     Besides above, an additional system ROS was reviewed and is unremarkable          Medications:     I have reviewed this patient's medication profile and medications during this hospitalization.    Noted meds:      - MEDICATION INSTRUCTIONS -   Does not apply See Admin " Instructions     cyanocobalamin  500 mcg Sublingual Daily     famotidine  20 mg Intravenous Q12H     folic acid  1 mg Oral Daily     gabapentin  100 mg Oral Q8H     lactulose  20 g Oral Q6H     midodrine  5 mg Oral TID w/meals     multivitamin w/minerals  1 tablet Oral Daily     piperacillin-tazobactam  3.375 g Intravenous Q6H     rifaximin  550 mg Oral BID     [Held by provider] rOPINIRole  1 mg Oral At Bedtime     sodium chloride (PF)  10-40 mL Intracatheter Q7 Days     sodium chloride (PF)  10-40 mL Intracatheter Q7 Days     thiamine  100 mg Oral Daily     acetaminophen, glucose **OR** dextrose **OR** glucagon, diazepam, flumazenil, haloperidol lactate, HYDROmorphone, lidocaine 4%, lidocaine, melatonin, metoprolol, naloxone **OR** naloxone **OR** naloxone **OR** naloxone, rOPINIRole, sodium chloride (PF), sodium chloride (PF), sodium chloride (PF), sodium chloride (PF)           Physical Exam:   Temp: 97.6  F (36.4  C) Temp src: Oral BP: (!) 150/82 Pulse: 109   Resp: 18 SpO2: 93 % O2 Device: Nasal cannula with humidification Oxygen Delivery: 3 LPM    GENERAL:  Alert, fatigued, confused, jaundiced, appears seriously ill.  HEAD: Normocephalic atraumatic  SCLERA: Anicteric  EXTREMITIES: Warm; bilateral LE edema; right hip and thigh purple, firm to touch.  ABDOMEN:  firm, ascites.  RESPIRATORY: Breathing non labored.  NEUROLOGIC: Alert; delirious.  PSYCH: Confused.             Data Reviewed:     Recent imaging reviewed.  Results for orders placed or performed during the hospital encounter of 05/05/23   CTA Abdomen Pelvis with Contrast    Impression    IMPRESSION:  1.  No evidence of active GI bleed. TIPS appears patent however duplex study would more accurately assess for patency/stenosis.  2.  Small left pleural effusion.  3.  Cirrhotic appearing liver with large amount of ascites.  4.  Large lenticular shaped subcutaneous fluid collection right lateral thigh measuring 19.5 x 7 cm.  5.  Right hydrocele.   US Abd  Hepatic & Portal Vasculature Cmpl    Impression    IMPRESSION:   1.  Patent TIPS shunt. Increased elevation of the velocity at the level of the TIPS at the hepatic vein. This is mildly increased when compared with 2021 when it measured 164 cm/s.   US Paracentesis with Albumin    Impression    IMPRESSION:  Ultrasound guided paracentesis.    REJI NASCIMENTO MD         SYSTEM ID:  [id number]   XR Chest Port 1 View    Impression    IMPRESSION: Hypoventilatory exam. Trace bilateral pleural effusions with likely associated compressive atelectasis. Continued attention on follow-up. Heart is normal size. No definite pulmonary vascular congestion. No pneumothorax. No acute osseous   abnormality.   XR Chest Port 1 View    Impression    IMPRESSION: PICC tip projects in the expected location of the low SVC,  cardiac border and landmarks partially obscured by an increase in  infiltrate and effusion on the right compared to previous. Left lung  clear.    JADE STEVENSON MD         SYSTEM ID:  [id number]   XR Chest Port 1 View    Impression    IMPRESSION: Small right pleural effusion and bibasilar infiltrates.   Echocardiogram Complete   Result Value Ref Range    LVEF  >70%        Recent lab data reviewed.  Lab Results   Component Value Date    WBC 12.8 (H) 05/15/2023    WBC 10.4 05/14/2023    WBC 9.5 05/13/2023    HGB 6.3 (LL) 05/15/2023    HGB 7.1 (L) 05/14/2023    HGB 7.8 (L) 05/13/2023    HCT 18.7 (L) 05/15/2023    HCT 20.3 (L) 05/14/2023    HCT 23.0 (L) 05/13/2023    PLT 49 (LL) 05/15/2023    PLT 58 (L) 05/14/2023    PLT 62 (L) 05/13/2023     05/15/2023     (L) 05/14/2023     (L) 05/14/2023    POTASSIUM 4.2 05/15/2023    POTASSIUM 4.1 05/14/2023    POTASSIUM 4.2 05/14/2023    CHLORIDE 101 05/15/2023    CHLORIDE 100 05/14/2023    CHLORIDE 98 05/14/2023    CO2 24 05/15/2023    CO2 23 05/14/2023    CO2 24 05/14/2023    BUN 60.5 (H) 05/15/2023    BUN 54.5 (H) 05/14/2023    BUN 54.3 (H) 05/14/2023    CR 1.11  05/15/2023    CR 1.22 (H) 05/14/2023    CR 1.23 (H) 05/14/2023    GLC 92 05/15/2023     (H) 05/14/2023     (H) 05/14/2023    DD 0.6 (H) 01/08/2018    NTBNPI 462 (H) 05/12/2023    NTBNPI 358 05/11/2023    NTBNPI 334 05/10/2023    TROPI <0.015 01/09/2018    TROPI <0.015 01/08/2018     (H) 05/15/2023     (H) 05/14/2023     (H) 05/13/2023     (H) 05/15/2023     (H) 05/14/2023     (H) 05/13/2023    ALKPHOS 141 (H) 05/15/2023    ALKPHOS 152 (H) 05/14/2023    ALKPHOS 147 (H) 05/13/2023    BILITOTAL 31.5 (HH) 05/15/2023    BILITOTAL 29.2 (HH) 05/14/2023    BILITOTAL 25.1 (HH) 05/13/2023    NANDO 42 05/12/2023    NANDO 47 05/11/2023    NANDO 43 05/10/2023    INR 3.91 (H) 05/15/2023    INR 3.91 (H) 05/14/2023    INR 3.15 (H) 05/13/2023         Lab Results   Component Value Date    ALBUMIN 3.0 05/15/2023    ALBUMIN 3.6 10/19/2022    ALBUMIN 3.5 02/18/2021         Much or all of the text in this note was generated through the use of Dragon dictation, voice to text software. Errors in spelling or words which appear to be out of context are unintentional and may be present due to having escaped editing.

## 2023-05-15 NOTE — CONSULTS
[facility] Nurse Inpatient Assessment     Consulted for: Right calf and Hip    Summary: Pt with very large hematoma to right hip that surgery is currently following, however unable to debride due to INR. Using protective dressing to manage drainage and infection to Right thigh. Superficial wound to Right calf that should heal with basic topical wound care.     Patient History (according to provider note(s):      Sp Plasencia is a 48 year old man who transfered to [facility] ICU on 5/5/2023 for acute blood loss anemia. He has a history of chronic liver disease requiring paracentesis s/p tips procedure, alcohol abuse, tobacco use, muscular dystrophy, bilateral calf pain and calf cellulitis, and insomnia. He presented to an outside hospital on 5/4/2023 for increasing jaundice, confusion, weakness/falls and epistaxis x3 days. He had been on lactulose for liver failure with recurrent ascites but had discontinued it due to diarrhea.      He had active nasal bleeding on presentation and a nasal clamp was placed which controlled bleeding. On arrival to OSH he was tachycardic and afebrile. Labs were notable for a hemoglobin of 6.3, platelets 87, hematocrit 17.5, INR of 2.69, sodium 118, lactate 3.9. He received 4 units of blood. For hyponatremia, PTA amlodipine and lisinopril were stopped and he was given midodrine.      Liver panel showed total bili 10.4 with direct bili of 6.7, , ALT 65, and alk phos 260. His alcohol level was 0.13, he states he had not had a drink in a few days. He did not have an abdominal ultrasound or CT at outside hospital. With concerns of hip pain and recent falls, he received chest/abdominal X-rays which were unremarkable. Of note, he was previously on the liver transplant list at [facility] in 2020 with a MELD score of 29 at the highest. At the time he was getting weekly paracentesis; following a TIPS procedure and alcohol  cessation he improved enough to get off the liver transplant list. He has no known history of esophageal varices or other GI bleed. He has been drinking again (reports modestly) since 2021.      Given concerns for bleeding and severe liver disease he was transferred to [facility] on 5/5/2023 for further workup and management. He has been given multiple units of blood products and hemoglobin continues to drop. He received paracentesis 5/7/2023 with drainage of 5L fluid. Large R sided thigh hematoma is persistent.        Assessment:      Areas visualized during today's visit: Sacrum/coccyx and Lower extremities     Wound location: Right Lateral thigh    Last photo: 5/15/23      Wound due to: hematoma with blistering and edema  Wound history/plan of care: Pt with large hematoma that surgery has been assessing, no plans of surgery until INR trends down. Area with ruptured blisters, weeping and bleeding  Wound base: 90 % dermis, 10 % blood blisters     Palpation of the wound bed: boggy and firm      Drainage: copious     Description of drainage: serosanguinous and bloody     Measurements (length x width x depth, in cm): 19  x 12  x  0.1 cm      Tunneling: N/A     Undermining: N/A  Periwound skin: Intact and purple/maroon ecchymosis      Color: purple and red      Temperature: warm  Odor: none  Pain: moderate, to palpation  Pain interventions prior to dressing change: no significant pain present  and IV Dilaudid  Treatment goal: Heal , Drainage control, Infection control/prevention, Protection and Promote epidermal migration  STATUS: initial assessment  Supplies ordered: gathered and at bedside     Wound location: Right LE    Last photo: 5/14/23        Wound due to: Venous Ulcer  Wound history/plan of care: unkept legs with several scratches and superficial ulcer to lateral posterior LE, no acute signs or symptoms of infection  Wound base: 100 % dermis     Palpation of the wound bed: normal      Drainage:  scant     Description of drainage: serosanguinous     Measurements (length x width x depth, in cm): 10  x 10.5  Area with opening 2.1 x 2.5 x 0.1 cm      Tunneling: N/A     Undermining: N/A  Periwound skin: Hemosiderin staining, Hyperkeratosis and dermatitis      Color: brown, pink      Temperature: normal   Odor: none  Pain: mild, tender  Pain interventions prior to dressing change: IV dilaudid and slow and gentle cares   Treatment goal: Heal  and Increase moisture   STATUS: initial assessment  Supplies ordered: gathered and at bedside      Treatment Plan:     Right lateral thigh wound(s): Daily    1. Remove old dressing, if it is sticking at all please saturate with wound cleanser  2. Cleanse with microklenz wound cleanser and blot dry. Cleanse open wounds and entire upper leg to decrease bacteria to the area  3. Apply Xeroform (5x9 #[id number]-yellow impregnated gauze) covering area by using 1-2 sheets  4. Tuck Chux pad under right hip to collect drainage. Change this pad 2-3x daily depending on drainage.   *Do not use cover dressing or tape on patient skin     Right Lower calf wound (s): Every other day and PRN  1. Spray BL lower legs with Alexis Cleanse and Protect (this conditions the dry scaly skin and cleanses). Rub into the skin like lotion and then wipe away excess with soft white washcloth  2. Cleanse any open wounds with microklenz wound cleanser  3. Apply Adaptic (#[id number]) to wound bed and cover with gauze. Secure with Kerlix and tape.   4. Time/Date/Initial dressing change        Orders: Written    RECOMMEND PRIMARY TEAM ORDER: None, at this time  Education provided: importance of repositioning, plan of care, Moisture management and Off-loading pressure  Discussed plan of care with: Patient, Nurse and NA  WOC nurse follow-up plan: weekly  Notify WOC if wound(s) deteriorate.  Nursing to notify the Provider(s) and re-consult the WOC Nurse if new skin concern.    DATA:     Current support surface: Standard  Low  air loss (CIRA pump, Isolibrium, Pulsate, skin guard, etc)  Containment of urine/stool: Indwelling catheter and Internal fecal management  BMI: Body mass index is 35.37 kg/m .   Active diet order: Orders Placed This Encounter      Renal Diet (non-dialysis)     Output: I/O last 3 completed shifts:  In: 1710 [P.O.:610; I.V.:780]  Out: 1790 [Urine:1615; Stool:175]     Labs: Recent Labs   Lab 05/15/23  0450   ALBUMIN 3.0*   HGB 6.3*   INR 3.91*   WBC 12.8*     Pressure injury risk assessment:   Sensory Perception: 3-->slightly limited  Moisture: 3-->occasionally moist  Activity: 2-->chairfast  Mobility: 2-->very limited  Nutrition: 1-->very poor  Friction and Shear: 2-->potential problem  Ra Score: 13    Adal Szymanski RN CWOCN  -Securely message with [employer] (more info) - can reach individually by name or search 'WOC Nurse' (Anny) to reach all current WOCs on duty.  WOC Office Phone: [phone]

## 2023-05-15 NOTE — CARE CONFERENCE
Care Conference 5/15/2023:    Discussed Sp's current condition, prognosis, and goals of care with many family members - including his mother Flor, sisters, brother, brother-in-law, wife, and fiance - today. Reviewed course of his liver cirrhosis secondary to alcohol s/p TIPS. Additionally, he has a large hematoma with ongoing bleeding due to a fall which ultimately led to his ICU admission. He requires blood products/transfusions every day or two and continues to have encephalopathy and altered mentation. Ultimately, his prognosis is very poor. Discussed various treatment options, including ongoing full restorative-focused cares vs comfort cares/hospice. Family voices they do not want Sp to suffer but would like time to think about the options and hopefully allow other family to arrive from out of state. Recommended changing code status from Full Code to No CPR/No Intubation. Family later affirmed their wish to change his code status to No CPR and No Intubation. Order was changed to reflect this.     Attending Intensivist note  I fully agree with and support above.    logan weiner  May 16, 2023

## 2023-05-15 NOTE — PROGRESS NOTES
"Critical Care Progress Note      05/15/2023    Name: Sp Plasencia MRN#: [id number]   Age: 48 year old YOB: 1975     Hsptl Day# 10  ICU DAY #    MV DAY #             Problem List:   Principal Problem:    Hemorrhage    1. Cirrhosis, decompensated with possible component of acute alcoholic hepatitis- his LFT's continue to trend upwards which is an ominous sign. He continues on Zosyn though no signs of infection. He had 6 liters removed from paracentesis 4 days ago.   2. S/p TIPS  3. Coagulopathy  4. S/p fall on admission leading to hematoma in right hip which as continued to slowly bleed over time  5. Acute blood loss anemia - he continues to \"ooze\" and 1 RBC and 2 FFP given today.   6. Muscular dystrophy  7. Alcoholism   8. Metabolic encephalopathy, likely hepatic in origin     I had a 30 minute family meeting during which time we discussed his prognosis (dismal) and possible limits of care and end of life issues including potential inpatient hospice.          Summary/Hospital Course:           Assessment and plan :     Sp Plasencia IS a 48 year old male admitted on 5/5/2023 for acute liver failure and ongoing hemorrhagic shock.   I have personally reviewed the daily labs, imaging studies, cultures and discussed the case with referring physician and consulting physicians.     My assessment and plan by system for this patient is as follows:    Neurology/Psychiatry:   1. arouseable and confused; follows only some commands    Cardiovascular:   1.Hemodynamics - Hemodynamics compensated off of support at present; he continues to require blood products    Pulmonary/Ventilator Management:   1. Pulmonary hygiene though requiring only minimal oxygen     GI and Nutrition :   1. Enteral nutrition     Renal/Fluids/Electrolytes:   1. Creatinine 1.1    Infectious Disease:   1. On Zosyn and continues but cultures negative     Endocrine:   1. Glucoses ok    Hematology/Oncology:   1. Continue to replace as needed, " about every 2 days.      IV/Access:   1. Venous access -   2. Arterial access -   3.  Plan  - central access required and necessary      ICU Prophylaxis:   1. DVT: Hep Subq/ LMWH/mechanical  2. VAP: HOB 30 degrees, chlorhexidine rinse  3. Stress Ulcer: PPI/H2 blocker  4. Restraints: Nonviolent soft two point restraints required and necessary for patient safety and continued cares and good effect as patient continues to pull at necessary lines, tubes despite education and distraction. Will readdress daily.   5. IV Access - central access required and necessary for continued patient cares  6. Feeding - enteral   7. Family Update: family meeting later today   8. Disposition - ICU care        Key goals for next 24 hours:   1.  2.  3.               Interim History:              Key Medications:       - MEDICATION INSTRUCTIONS -   Does not apply See Admin Instructions     cyanocobalamin  500 mcg Sublingual Daily     famotidine  20 mg Intravenous Q12H     folic acid  1 mg Oral Daily     gabapentin  100 mg Oral Q8H     lactulose  20 g Oral Q6H     midodrine  5 mg Oral TID w/meals     multivitamin w/minerals  1 tablet Oral Daily     piperacillin-tazobactam  3.375 g Intravenous Q6H     rifaximin  550 mg Oral BID     [Held by provider] rOPINIRole  1 mg Oral At Bedtime     sodium chloride (PF)  10-40 mL Intracatheter Q7 Days     sodium chloride (PF)  10-40 mL Intracatheter Q7 Days     thiamine  100 mg Oral Daily       sodium chloride 10 mL/hr at 05/14/23 2200               Physical Examination:   Temp:  [96.7  F (35.9  C)-99.5  F (37.5  C)] 97.6  F (36.4  C)  Pulse:  [109-128] 109  Resp:  [14-34] 18  BP: (100-150)/(41-97) 150/82  SpO2:  [91 %-96 %] 93 %    Intake/Output Summary (Last 24 hours) at 5/15/2023 1211  Last data filed at 5/15/2023 1100  Gross per 24 hour   Intake 2398 ml   Output 1980 ml   Net 418 ml     Wt Readings from Last 4 Encounters:   05/15/23 118.3 kg (260 lb 12.9 oz)   10/19/22 108.9 kg (240 lb 1.6 oz)    09/08/21 99.8 kg (220 lb)   07/05/21 97.5 kg (215 lb)     BP - Mean:  [] 93  Resp: 18    Recent Labs   Lab 05/11/23  2132 05/08/23  1903   O2PER 38 42       GEN: no acute distress   HEENT: head ncat, sclera anicteric, OP patent, trachea midline very jaundece  PULM: unlabored synchronous with vent, clear anteriorly    CV/COR: RRR S1S2 no gallop,  No rub, no murmur  ABD: soft nontender, obese and distended but non-tender  EXT:  Mod to severe edema   Warm    NEURO: grossly intact; moves extremities to stimulation and colleen\  SKIN: no obvious rash  LINES: clean, dry intact         Data:   All data and imaging reviewed     ROUTINE ICU LABS (Last four results)  CMP  Recent Labs   Lab 05/15/23  0450 05/14/23  2146 05/14/23  1234 05/14/23  0849 05/14/23  0406 05/13/23  2357 05/13/23  1030 05/13/23  0447 05/12/23  2036 05/12/23  0423 05/09/23  0529 05/09/23  0416     --  134*  --  135*  --  132* 133*  --  129*   < > 116*   POTASSIUM 4.2  --  4.1  --  4.2  --  4.2 4.1  --  4.1   < > 4.2   CHLORIDE 101  --  100  --  98  --  97* 97*  --  95*   < > 86*   CO2 24  --  23  --  24  --  21* 25  --  25   < > 22   ANIONGAP 12  --  11  --  13  --  14 11  --  9   < > 8   GLC 92 103* 114* 99 92   < > 98 79   < > 75   < > 336*   BUN 60.5*  --  54.5*  --  54.3*  --  47.3* 48.4*  --  41.5*   < > 29.9*   CR 1.11  --  1.22*  --  1.23*  --  1.08 1.12  --  0.83   < > 0.99   GFRESTIMATED 82  --  74  --  73  --  85 82  --  >90   < > >90   JOSE CARLOS 9.1  --  8.7  --  9.1  --  8.8 8.7  --  8.8   < > 7.2*   MAG 1.8  --   --   --  1.9  --   --  1.8  --  1.8   < > 1.5*   PHOS 4.0  --   --   --  4.1  --   --  3.8  --  3.6   < > 2.8   PROTTOTAL  --   --   --   --   --   --   --   --   --   --   --  4.4*   ALBUMIN 3.0*  --   --   --  3.1*  --   --  2.8*  --  3.0*   < > 2.4*   BILITOTAL 31.5*  --   --   --  29.2*  --   --  25.1*  --  24.0*   < > 16.0*   ALKPHOS 141*  --   --   --  152*  --   --  147*  --  149*   < > 176*   *  --   --   --   389*  --   --  337*  --  309*   < > 627*   *  --   --   --  119*  --   --  107*  --  102*   < > 153*    < > = values in this interval not displayed.     CBC  Recent Labs   Lab 05/15/23  0450 05/14/23  0406 05/13/23  1030 05/13/23  0447   WBC 12.8* 10.4 9.5 8.7   RBC 1.93* 2.17* 2.45* 2.03*   HGB 6.3* 7.1* 7.8* 6.5*   HCT 18.7* 20.3* 23.0* 18.8*   MCV 97 94 94 93   MCH 32.6 32.7 31.8 32.0   MCHC 33.7 35.0 33.9 34.6   RDW 23.9* 21.1* 20.4* 20.8*   PLT 49* 58* 62* 66*     INR  Recent Labs   Lab 05/15/23  0450 05/14/23  0406 05/13/23  0447 05/12/23  0423   INR 3.91* 3.91* 3.15* 3.17*     Arterial Blood Gas  Recent Labs   Lab 05/11/23  2132 05/08/23  1903   O2PER 38 42       All cultures:  No results for input(s): CULT in the last 168 hours.  No results found for this or any previous visit (from the past 24 hour(s)).    MD Aurelio    Billing: This patient is critically ill: Yes. Total critical care time today 45 min.

## 2023-05-16 NOTE — PROGRESS NOTES
"Critical Care Progress Note      05/16/2023    Name: Sp Plasencia MRN#: [id number]   Age: 48 year old YOB: 1975     Hsptl Day# 11  ICU DAY #    MV DAY #             Problem List:   Principal Problem:    Hemorrhage    1. Cirrhosis, decompensated with component of acute alcoholic hepatitis- LFT's continue to trend upwards (BILI, enzymes) which is an ominous sign. He continues on Zosyn though no signs of infection. He had 6 liters removed from paracentesis 5 days ago.   2. S/p TIPS  3. Coagulopathy - INR 3.6; will give platelets with count 48k; 1 unit rbc's today for Hb 6.3  4. S/p fall on admission leading to hematoma in right hip which as continued to slowly bleed over time  5. Acute blood loss anemia - he continues to \"ooze\" and 1 RBC and 1 platelet today.   6. Muscular dystrophy  7. Alcoholism   8. Metabolic encephalopathy, likely hepatic in origin          Summary/Hospital Course:           Assessment and plan :     Sp Plasencia IS a 48 year old male admitted on 5/5/2023 for decompensated cirrhosis and ongoing blood loss.   I have personally reviewed the daily labs, imaging studies, cultures and discussed the case with referring physician and consulting physicians.     My assessment and plan by system for this patient is as follows:    Neurology/Psychiatry:   1. He remains encephalopathic and non-verbal though arouseanble and tracts    Cardiovascular:   1.Hemodynamics - compensated off vasopressors     Pulmonary/Ventilator Management:   1. On NC oxygen     GI and Nutrition :   1. Enteral nutrition     Renal/Fluids/Electrolytes:   1. Creatinine 0.99    Infectious Disease:   1. On Zosyn     Endocrine:   1. Glucoses ok     Hematology/Oncology:   1. Hb continues to drift down slowly      IV/Access:   1. Venous access -   2. Arterial access -   3.  Plan  - central access required and necessary      ICU Prophylaxis:   1. DVT: Hep Subq/ LMWH/mechanical  2. VAP: HOB 30 degrees, chlorhexidine rinse  3. " Stress Ulcer: PPI/H2 blocker  4. Restraints: Nonviolent soft two point restraints required and necessary for patient safety and continued cares and good effect as patient continues to pull at necessary lines, tubes despite education and distraction. Will readdress daily.   5. IV Access - central access required and necessary for continued patient cares  6. Feeding - enteral   7. Family Update: discussed with mother, significant other, and family at bedside   8. Disposition - ICU care         Key goals for next 24 hours:   1.  2.  3.               Interim History:              Key Medications:       - MEDICATION INSTRUCTIONS -   Does not apply See Admin Instructions     cyanocobalamin  500 mcg Sublingual Daily     famotidine  20 mg Intravenous Q12H     folic acid  1 mg Oral Daily     lactulose  20 g Oral Q6H     midodrine  5 mg Oral TID w/meals     multivitamin w/minerals  1 tablet Oral Daily     piperacillin-tazobactam  3.375 g Intravenous Q6H     rifaximin  550 mg Oral BID     [Held by provider] rOPINIRole  1 mg Oral At Bedtime     sodium chloride (PF)  10-40 mL Intracatheter Q7 Days     sodium chloride (PF)  10-40 mL Intracatheter Q7 Days     thiamine  100 mg Oral Daily       sodium chloride 10 mL/hr at 05/14/23 2200               Physical Examination:   Temp:  [96.2  F (35.7  C)-98  F (36.7  C)] 96.3  F (35.7  C)  Pulse:  [109-122] 112  Resp:  [13-84] 21  BP: (119-153)/() 137/60  SpO2:  [86 %-100 %] 92 %    Intake/Output Summary (Last 24 hours) at 5/16/2023 1813  Last data filed at 5/16/2023 1600  Gross per 24 hour   Intake 1600 ml   Output 1595 ml   Net 5 ml     Wt Readings from Last 4 Encounters:   05/16/23 116.6 kg (257 lb 0.9 oz)   10/19/22 108.9 kg (240 lb 1.6 oz)   09/08/21 99.8 kg (220 lb)   07/05/21 97.5 kg (215 lb)     BP - Mean:  [] 78  Resp: 21    Recent Labs   Lab 05/11/23  2132   O2PER 38       GEN: no acute distress; jaundiced    HEENT: head ncat, sclera anicteric, OP patent, trachea  midline   PULM: unlabored  clear anteriorly    CV/COR: RRR S1S2 no gallop,  No rub, no murmur  ABD: soft nontender, distended but soft   EXT:  Mild andra   Warm; changes in R leg unchanged   NEURO: encephalopathic; moves extremities to stimulation; non-verbal   SKIN: no obvious rash; jaundiced   LINES: clean, dry intact         Data:   All data and imaging reviewed     ROUTINE ICU LABS (Last four results)  CMP  Recent Labs   Lab 05/16/23  0418 05/15/23  1409 05/15/23  0450 05/14/23  2146 05/14/23  1234 05/14/23  0849 05/14/23  0406 05/13/23  1030 05/13/23  0447 05/12/23 2036 05/12/23 0423     --  137  --  134*  --  135*   < > 133*  --  129*   POTASSIUM 3.9  --  4.2  --  4.1  --  4.2   < > 4.1  --  4.1   CHLORIDE 104  --  101  --  100  --  98   < > 97*  --  95*   CO2 25  --  24  --  23  --  24   < > 25  --  25   ANIONGAP 12  --  12  --  11  --  13   < > 11  --  9   GLC 93 103* 92 103* 114*   < > 92   < > 79   < > 75   BUN 57.5*  --  60.5*  --  54.5*  --  54.3*   < > 48.4*  --  41.5*   CR 0.99  --  1.11  --  1.22*  --  1.23*   < > 1.12  --  0.83   GFRESTIMATED >90  --  82  --  74  --  73   < > 82  --  >90   JOSE CARLOS 9.5  --  9.1  --  8.7  --  9.1   < > 8.7  --  8.8   MAG 1.9  --  1.8  --   --   --  1.9  --  1.8  --  1.8   PHOS  --   --  4.0  --   --   --  4.1  --  3.8  --  3.6   ALBUMIN 3.0*  --  3.0*  --   --   --  3.1*  --  2.8*  --  3.0*   BILITOTAL 34.8*  --  31.5*  --   --   --  29.2*  --  25.1*  --  24.0*   ALKPHOS 137*  --  141*  --   --   --  152*  --  147*  --  149*   *  --  352*  --   --   --  389*  --  337*  --  309*   *  --  113*  --   --   --  119*  --  107*  --  102*    < > = values in this interval not displayed.     CBC  Recent Labs   Lab 05/16/23  0418 05/15/23  1409 05/15/23  0450 05/14/23  0406   WBC 14.3* 14.9* 12.8* 10.4   RBC 1.92* 2.24* 1.93* 2.17*   HGB 6.4* 7.2* 6.3* 7.1*   HCT 18.8* 21.5* 18.7* 20.3*   MCV 98 96 97 94   MCH 33.3* 32.1 32.6 32.7   MCHC 34.0 33.5 33.7 35.0   RDW  24.2* 22.9* 23.9* 21.1*   PLT 48* 49* 49* 58*     INR  Recent Labs   Lab 05/16/23  0418 05/15/23  0450 05/14/23  0406 05/13/23  0447   INR 3.62* 3.91* 3.91* 3.15*     Arterial Blood Gas  Recent Labs   Lab 05/11/23  2132   O2PER 38       All cultures:  No results for input(s): CULT in the last 168 hours.  No results found for this or any previous visit (from the past 24 hour(s)).    MD Aurelio    Billing: This patient is critically ill: Yes. Total critical care time today 40 min.

## 2023-05-16 NOTE — PROGRESS NOTES
Palliative Care Initial Social Work Note  [place] [facility] [place]    Patient Info:  Sp Plasencia is a 48 year old male with severe etoh cirrhosis, hematoma in right hip which continues to bleed requiring transfusions, jaundice, confusion. Palliative care is following for goals of care and family support.      Brief summary of visit: Visited with Sp's isha Villavicencio, mother Rayna and wife Fariba in room.  Then met with Fariba outside of the room.  Fariba anticipates that she and family will transition Sp to comfort cares later this week.  She is planning to talk to her and Sp's 3 children (ages 15, 14 and 8) tonight.  We discussed methods for supporting them including:     Using open and honest language,   Validating their feelings including feelings of anger, sadness, guilt etc... that have been brought on by their experience with Sp's addiction   Supporting them with decisions regarding coming to the hospital to visit, going to school and activities etc...  Also facilitated processing for her own experience with Sp's substance use.  She is aware of [facility] for herself and children.       Date of Admission: 5/5/2023    Reason for consult: Patient and family support    Sources of information: Family member legal spouse, Dorene (they were in process of divorce.  Sp is engage to his partner Maye)     Recommendations & Plan:  Palliative care will continue to follow for support with goals of care   He is not on comfort cares at this time   Will be available to help talk to Sp and Dorene's 3 children as needed.        Symptoms & Concerns Addressed Today:  Emotional complex family dynamics and complicated grief related to substance abuse    Strengths Identified:    Dorene open to support and very committed to supporting her and Sp's children.     Relationships & Support:  Aspects of relationships and support assessed today:    Identified family members: legal wife, lenora,  mother, siblings     Professional supports:     Family coping: Complex-- see above    Bereavement Risk concerns: young children in the family     Coping, Mental Health & Adjustment to Illness:   Adjustment to Illness/Hospitalization  Coping- see above  Substance Use    Goals, Decision Making & Advance Care Planning:   Prognosis, Goals, and/or Advance Care Planning were assessed today: Yes  If yes, brief summary of discussion: Dorene anticipates transition to comfort cares later this week  Preferred language: English  Patient's decision making preferences: not assessed  I have concerns about the patient/family's health literacy today: No  Patient has a completed Health Care Directive: No.   Code status per chart review: No CPR / No Intubation      Clinical Social Work Interventions:   Assessment of palliative specific issues    Introduction of Palliative clinical social work interventions   Adjustment to illness counseling  Facilitation of processing of thoughts/feelings  Grief counseling   Resource referral diogo  Other: support for children        KASANDRA Saleem, [facility]   Palliative Care Clinical   Ph: [phone]

## 2023-05-16 NOTE — PROGRESS NOTES
Antimicrobial Stewardship Team Note    Antimicrobial Stewardship Program - A joint venture between [facility] and [facility] [employer] to optimize antibiotic management.     Patient: Sp Plasencia  MRN: [id number]  Allergies: Adhesive tape    Brief Summary: Sp Plasencia is a 48 year old male who was admitted on 5/5/23 for severe anemia due to right LE hematoma. Past medical history is significant for ascites, esophageal varices, liver cirrhosis, jaundice, facioscapulohumeral muscular dystrophy, thrombocytopenia, and hypertension. I&D for LE hematoma has been discussed with the patient although this has been delayed due to rising INR and risk of bleeding.     He has been afebrile throughout his admission. He has some tachycardia and hypertension which appear stable. He initially required 2 L of oxygen and is currently up to 4L. Physical exam was notable for multiple spider angiomas on upper chest, jaundiced skin, venous stasis dermatitis in PARAG lower extremities. He was initially on Zosyn 5/5-5/7 for sepsis. He was switched to Cipro then Rocephin then back to Zosyn (since 5/11) for SSTI. (Additionally, he has been receiving his PTA rifaximin for severe alcoholic cirrhosis throughout his admission.) Admission labs were notable for WBC 17.7, procalcitonin 0.47, AST 1063, , Alk Phos 176, and bilirubin 11.1. WBC is 14.3 today. He was noted by critical care team to have no signs of infection.    CT A/P  was notable for cirrhotic liver with large amount of ascites and large lenticular shaped subcutaneous fluid collection right lateral thigh. CXR is notable for bibasilar infiltrates which appear stable and right pleural effusion which has decreased in size. Paracentesis was completed on 5/7 and 5/11 where 4.975 L and 6L of fluid were removed. Ascites fluid culture showed no growth. Blood cultures show ngtd x 4 days.          Active Anti-infective Medications   (From admission, onward)                  Start     Stop    05/11/23 2130  piperacillin-tazobactam  3.375 g,   Intravenous,   EVERY 6 HOURS        Skin and Soft Tissue Infection        --    05/11/23 1100  sulfamethoxazole-trimethoprim  1 tablet,   Oral,   DAILY        SBP prophylaxis        --    05/05/23 2100  rifaximin  550 mg,   Oral,   2 TIMES DAILY        severe alcoholic cirrhosis, risk for SBP        --                  Assessment: Decompensated liver cirrhosis  Patient has been afebrile and vitals appear stable. His WBC remain elevated despite being on day 12 of antibiotics for sepsis/SSTI. Patient does not appear to have signs of infection according to notes from critical care team. Additionally, 12 day course of antibiotics should be sufficient for treating SSTI.    Recommendations:  1) Stop Zosyn.     Discussed with ID Staff Dr. Tiffanie Arzola, PGY-1 Pharmacy Resident    Vital Signs/Clinical Features:  Vitals         05/14 0700  05/15 0659 05/15 0700  05/16 0659 05/16 0700  05/16 1156   Most Recent      Temp ( F) 97 -  99.5    96.7 -  98      96.3     96.3 (35.7) 05/16 0800    Pulse 109 -  128    109 -  122    112 -  121     121 05/16 1000    Resp 14 -  36    16 -  47    13 -  84     32 05/16 1000    /41 -  142/62    102/95 -  160/85    133/100 -  137/82     133/100 05/16 1000    SpO2 (%) 92 -  97    91 -  96    90 -  92     92 05/16 1000            Labs  Estimated Creatinine Clearance: 121.6 mL/min (based on SCr of 0.99 mg/dL).  Recent Labs   Lab Test 05/13/23  0447 05/13/23  1030 05/14/23  0406 05/14/23  1234 05/15/23  0450 05/16/23  0418   CR 1.12 1.08 1.23* 1.22* 1.11 0.99       Recent Labs   Lab Test 01/04/19  1415 01/04/19  1428 02/11/20  1057 02/12/20  0637 04/01/20  1325 04/08/20  1351 04/09/20  0723 04/10/20  0618 08/26/20  0715 09/15/20  1222 05/13/23  0447 05/13/23  1030 05/14/23  0406 05/15/23  0450 05/15/23  1409 05/16/23  0418   WBC 11.1*   < > 11.9* 11.9*   < > 13.0* 13.1*   < > 4.9   < > 8.7 9.5 10.4  12.8* 14.9* 14.3*   ANEU 8.6*  --  9.9* 9.1*  --  9.9* 10.5*  --  3.2  --   --   --   --   --   --   --    ALYM 1.2  --  1.1 1.6  --  1.8 1.0  --  1.2  --   --   --   --   --   --   --    NEDA 0.8  --  0.6 0.7  --  0.9 1.1  --  0.4  --   --   --   --   --   --   --    AEOS 0.2  --  0.2 0.3  --  0.2 0.3  --  0.1  --   --   --   --   --   --   --    HGB 16.1   < > 14.3 12.8*   < > 13.9 13.6   < > 12.7*   < > 6.5* 7.8* 7.1* 6.3* 7.2* 6.4*   HCT 47.0   < > 40.5 36.5*   < > 38.6* 37.7*   < > 38.9*   < > 18.8* 23.0* 20.3* 18.7* 21.5* 18.8*   MCV 91   < > 93 91   < > 90 88   < > 92   < > 93 94 94 97 96 98      < > 205 185   < > 207 217   < > 127*   < > 66* 62* 58* 49* 49* 48*    < > = values in this interval not displayed.       Recent Labs   Lab Test 05/11/23  0440 05/12/23  0423 05/13/23  0447 05/14/23  0406 05/15/23  0450 05/16/23  0418   BILITOTAL 21.8* 24.0* 25.1* 29.2* 31.5* 34.8*   ALKPHOS 185* 149* 147* 152* 141* 137*   ALBUMIN 2.8* 3.0* 2.8* 3.1* 3.0* 3.0*   * 309* 337* 389* 352* 354*   * 102* 107* 119* 113* 119*       Recent Labs   Lab Test 04/08/20  1351 05/05/23  1657 05/05/23 2218 05/06/23  0211 05/06/23  0559 05/07/23 2108   PCAL 0.38 0.47*  --   --   --   --    LACT  --   --  3.3* 1.7 1.7 1.6             Culture Results:  7-Day Micro Results       Procedure Component Value Units Date/Time    Blood Culture Peripheral Blood [[id number]]  (Normal) Collected: 05/13/23 2328    Order Status: Completed Lab Status: Preliminary result Updated: 05/16/23 0047    Specimen: Peripheral Blood      Culture No growth after 2 days    Blood Culture Arm, Left [[id number]]  (Normal) Collected: 05/13/23 2310    Order Status: Completed Lab Status: Preliminary result Updated: 05/16/23 0047    Specimen: Blood, venous from Arm, Left      Culture No growth after 2 days    Blood Culture Line, venous     Order Status: Canceled Lab Status: No result     Specimen: Blood, venous from Line, venous     Blood  Culture Peripheral Blood     Order Status: Canceled Lab Status: No result     Specimen: Peripheral Blood     Blood Culture Line, venous     Order Status: Canceled Lab Status: No result     Specimen: Blood from Line, venous     MRSA MSSA PCR, Nasal Swab [[id number]] Collected: 05/11/23 2200    Order Status: Completed Lab Status: Final result Updated: 05/12/23 0154    Specimen: Swab from Nares, Bilateral      MRSA Target DNA Negative     SA Target DNA Positive    Narrative:      The [employer] software  Xpert SA Nasal Complete assay performed in the 2C2P  Dx System is a qualitative in vitro diagnostic test designed for rapid detection of Staphylococcus aureus (SA) and methicillin-resistant Staphylococcus aureus (MRSA) from nasal swabs in patients at risk for nasal colonization. The test utilizes automated real-time polymerase chain reaction (PCR) to detect MRSA/SA DNA. The Xpert SA Nasal Complete assay is intended to aid in the prevention and control of MRSA/SA infections in healthcare settings. The assay is not intended to diagnose, guide or monitor treatment for MRSA/SA infections, or provide results of susceptibility to methicillin. A negative result does not preclude MRSA/SA nasal colonization.     Blood Culture Arm, Right [[id number]]  (Normal) Collected: 05/11/23 2140    Order Status: Completed Lab Status: Preliminary result Updated: 05/16/23 0031    Specimen: Blood from Arm, Right      Culture No growth after 4 days    Blood Culture Hand, Left [[id number]]  (Normal) Collected: 05/11/23 2140    Order Status: Completed Lab Status: Preliminary result Updated: 05/16/23 0031    Specimen: Blood from Hand, Left      Culture No growth after 4 days    Narrative:      Only an Aerobic Blood Culture Bottle was collected, interpret results with caution.        Ascites Fluid Aerobic Bacterial Culture Routine with Gram Stain [[id number]] Collected: 05/11/23 8128    Order Status: Completed Lab Status: Final result Updated:  05/16/23 0714    Specimen: Ascites Fluid from Abdomen      Culture No Growth     Gram Stain Result No organisms seen      1+ WBC seen            Recent Labs   Lab Test 01/04/19  1542 02/11/20  1120 05/11/23  2203   URINEPH 6.0 6.0 5.5   NITRITE Negative Negative Negative   LEUKEST Negative Negative Trace*   WBCU 1 5 6*                         Imaging: XR Chest Port 1 View    Result Date: 5/11/2023  EXAM: XR CHEST PORT 1 VIEW LOCATION: [facility] DATE/TIME: 5/11/2023 11:36 PM CDT INDICATION: Infectious workup COMPARISON: 05/11/2023. FINDINGS: Right PICC. No pneumothorax. The heart size is normal. Right pleural effusion has decreased in size. Bibasilar infiltrates without significant change. Nodule projecting over the left midlung was not evident on previous exams and is likely artifactual.     IMPRESSION: Small right pleural effusion and bibasilar infiltrates.    XR Chest Port 1 View    Result Date: 5/11/2023  CHEST ONE VIEW  5/11/2023 1:15 PM HISTORY: RN placed PICC - verify tip placement. COMPARISON: May 9, 2023     IMPRESSION: PICC tip projects in the expected location of the low SVC, cardiac border and landmarks partially obscured by an increase in infiltrate and effusion on the right compared to previous. Left lung clear. JADE STEVENSON MD   SYSTEM ID:  [id number]

## 2023-05-16 NOTE — PROVIDER NOTIFICATION
MD Notification    Notified Person: MD    Notified Person Name: Dr. Chacon     Notification Date/Time: 05/16 0500    Notification Interaction: In person    Purpose of Notification:Critical lab notification     Orders Received: None at this time    Comments: Hemoglobin 6.4, Platelet count of 47, Bilirubin of 34.8

## 2023-05-16 NOTE — PLAN OF CARE
No major changes today. 1 unit PRBCs given this afternoon and one unit of platelets ordered this evening. Dilaudid given during repositioning. Pt was restless and impulsive this morning but became somnolent throughout the afternoon. Ate breakfast with assist. Bo with adequate output. Flexiseal in place. ST and SBP WNL. Afebrile. Family at bedside throughout the day

## 2023-05-16 NOTE — PROGRESS NOTES
Neuro: Patient confused, 1:1 sitter needed for impulsiveness. Intermittently answers yes/no questions.   CV: Sinus tachycardia 110s-120s   Respiratory: 2L NC  GI/: Low appetite, making appropriate urine  Skin: Juandice.   Activity: Bedrest, q2 hour repositions.   Diet: Renal Diet, 1800ml fluid restriction.   Drips: N/A  Other: N/A  Plan: Goals of care discussion.

## 2023-05-16 NOTE — PROGRESS NOTES
[facility] Progress Note     IMPRESSION/RECS:  Decompensated etoh cirrhosis, MELD 34-35                 -Ascites, neg for SBP                  -Encephalopathy, hepatic vs iatrogenic                 -Acute on chronic hepatitis, poor prognosis, not tx candidate                 -Steroids not appropriate                 -TIPS patent  Etoh use disorder  Protein malnutrition  Pancytopenia/Anemia - multifactorial including fall/thigh hematoma and etoh BM suppression.  No evidence of GIB.   Ongoing tachycardia, without fever    Will follow peripherally, please call with questions.     Total time spent in chart review, direct medical discussion, examination, and documentation was 15 minutes    Jose Cole DO   [facility]  Cell [phone]    ________________________________________________________________________      SUBJECTIVE:  Minimally responsive today, seated in bed.      OBJECTIVE:  BP (!) 148/69   Pulse 110   Temp (!) 96.3  F (35.7  C) (Axillary)   Resp (!) 34   Ht 1.829 m (6')   Wt 116.6 kg (257 lb 0.9 oz)   SpO2 95%   BMI 34.86 kg/m    Temp (24hrs), Av.1  F (36.2  C), Min:96.3  F (35.7  C), Max:98  F (36.7  C)    Patient Vitals for the past 72 hrs:   Weight   23 0400 116.6 kg (257 lb 0.9 oz)   05/15/23 0404 118.3 kg (260 lb 12.9 oz)   23 0400 120 kg (264 lb 8.8 oz)       Intake/Output Summary (Last 24 hours) at 2023 1248  Last data filed at 2023 1200  Gross per 24 hour   Intake 1516 ml   Output 2210 ml   Net -694 ml        PHYSICAL EXAM  GEN: Icteric, less interactive today after benzo/narc, eyes open, not following commands.    ABD: Distended, appears NTTP    Additional Data:  I have reviewed the patient's new clinical lab results:     Recent Labs   Lab Test 23  0418 05/15/23  1409 05/15/23  0450 23  0406   WBC 14.3* 14.9* 12.8* 10.4   HGB 6.4* 7.2* 6.3* 7.1*   MCV 98 96 97 94   PLT 48* 49* 49* 58*   INR 3.62*  --  3.91* 3.91*     Recent  Labs   Lab Test 05/16/23 0418 05/15/23  0450 05/14/23  1234   POTASSIUM 3.9 4.2 4.1   CHLORIDE 104 101 100   CO2 25 24 23   BUN 57.5* 60.5* 54.5*   ANIONGAP 12 12 11     Recent Labs   Lab Test 05/16/23 0418 05/15/23  0450 05/14/23  0406 05/12/23  0423 05/11/23  2203 05/06/23  0559 05/05/23  1657 04/09/20  0723 04/08/20  1351 02/12/20  0637 02/11/20  1120 02/11/20  1057 01/27/20  0000 01/04/19  1542   ALBUMIN 3.0* 3.0* 3.1*   < >  --    < > 1.5*   < > 2.5*   < >  --  2.1*  --   --    BILITOTAL 34.8* 31.5* 29.2*   < >  --    < > 11.1*   < > 24.9*   < >  --  18.9*  --   --    * 113* 119*   < >  --    < > 151*   < > 65   < >  --  64   < >  --    * 352* 389*   < >  --    < > 1,063*   < > 133*   < >  --  177*   < >  --    PROTEIN  --   --   --   --  Negative  --   --   --   --   --  20*  --   --  Negative   LIPASE  --   --   --   --   --   --  171*  --  205  --   --  200  --   --    AMYLASE  --   --   --   --   --   --  96  --   --   --   --   --   --   --     < > = values in this interval not displayed.

## 2023-05-17 NOTE — PLAN OF CARE
Shift approximately [id number]: VSS ex. tachycardic. Neuro: PAN orientation, does not follow commands. GI/: BS present, no BM; Bo in place: adequate output. Diet: continuous enteral feedings, pt also has diet ordered if alert/able to commands. Access: PICC x 3 lumens, LR for MIVF, NS for TKO. Pain managed w/ PRN IV Dilaudid, scheduled PO Oxycodone. Family updated at bedside throughout day.    AM Shift:  - Les feed placed--TF started  - Critical HGB, INR, and Fibrinogen values--Units of blood and Cryo ordered

## 2023-05-17 NOTE — PROGRESS NOTES
Opens eyes spontaneously, PERRL, occasionally responding with a slurred yes/no to questions but mostly unintelligible.  Restless, at times agitated.  PRN dilaudid for pain and agitation.  Picking at lines, also scratching at skin; sitter for line protection, IV benedryl given for itching with some relief.    On 1L NC SpO2 low 90s, trial on RA SpO2 in high 80s.  LS dim.    Sinus tachy, VSS WNL.    Markedly jaundiced throughout; also R hip/thigh oozing sanguinous and serosang fluid, dressed with Xeroform.  R calf dressing CDI.  Distal pulses present, R foot slightly dusky.      Took pills crushed in applesauce this AM, pill crushed in strawberry yogurt at noon.  Resists oral cares, bites on straws, swabs and styro cup.    Icteric urine, adequate volume per kulkarni.  No BM this AM.    Family here (sister, SO, mother) and they are attentive and were updated.

## 2023-05-17 NOTE — PLAN OF CARE
Goal Outcome Evaluation:      Plan of Care Reviewed With:  (Interdisciplinary bedside rounds)          Outcome Evaluation: TF orders written -- see RD assessment dated 5/17/23 for details.    Janice Goodson RD, LD, CNSC   Clinical Dietitian - [facility]

## 2023-05-17 NOTE — PLAN OF CARE
Occupational and Physical Therapy: Per chart review and discussion w/ RN, anticipate transition to comfort cares later this week, will complete OT/PT orders.     Josseline Cabrera, OTR/L 5/17/23

## 2023-05-17 NOTE — CONSULTS
"CLINICAL NUTRITION SERVICES  -  ASSESSMENT NOTE      Recommendations Ordered by Registered Dietitian (RD): Jevity 1.5 at 65 mL/hr= 2340 kcal (26 kcal/kg), 100 g protein (1.1 g/kg), 337 g CHO, 33 g fiber, 1186 mL  H2O  Flushes 60 mL every 4 hours    Malnutrition: % Weight Loss:  None noted  % Intake:  </= 50% for >/= 5 days (severe malnutrition)  Subcutaneous Fat Loss:  Orbital region mild depletion and Upper arm region moderate depletion  Muscle Loss:  Temporal region moderate depletion and Clavicle bone region moderate-severe depletion  Fluid Retention:  Moderate as above     Malnutrition Diagnosis: Moderate malnutrition  In Context of:  Acute illness or injury  Environmental or social circumstances        REASON FOR ASSESSMENT  Sp Plasencia is a 48 year old male seen by Registered Dietitian for Provider Order - Registered Dietitian to Assess and Order TF per Medical Nutrition Therapy Protocol      NUTRITION HISTORY  - Unable to obtain nutrition history as patient is unable to respond -- moaning and trying to pull at lines (sitter at bed).  Mother has stepped out to make some phone calls per sitter.       CURRENT NUTRITION ORDERS  Diet Order:     Renal + 1800 mL fluid restriction   Also receiving Ensure Max Protein between meals   Asked to see patient for TF initiation     Current Intake/Tolerance:  N/A    Oral intake has been sporadic per flowsheets (pending mental status)  Per NA, patient has taken a [employer] Power shake today  Noted he did take some applesauce and yogurt with meds     NUTRITION FOCUSED PHYSICAL ASSESSMENT FOR DIAGNOSING MALNUTRITION)  Yes              Observed:    Muscle wasting (refer to documentation in Malnutrition section) and Subcutaneous fat loss (refer to documentation in Malnutrition section)    Obtained from Chart/Interdisciplinary Team:  Edema - moderate 3+ (generalized)    ANTHROPOMETRICS  Height: 6' 0\"  Weight: 116.2 kg (256#)(5/17)  Body mass index is 34.74 kg/m   Weight Status:  " Obesity Grade I BMI 30-34.9  IBW: 80.9 kg   % IBW: 144%  Weight History:   Wt Readings from Last 10 Encounters:   05/17/23 116.2 kg (256 lb 2.8 oz)   10/19/22 108.9 kg (240 lb 1.6 oz)   09/08/21 99.8 kg (220 lb)   07/05/21 97.5 kg (215 lb)   12/09/20 92.9 kg (204 lb 14.4 oz)   11/18/20 98.9 kg (218 lb)   11/11/20 98.9 kg (218 lb)   10/14/20 96.3 kg (212 lb 4.9 oz)   10/13/20 95.7 kg (211 lb)   09/16/20 99.9 kg (220 lb 4.8 oz)     Weight up overall (fluids)    LABS  Labs reviewed    MEDICATIONS  MVI, Thiamine, Folic Acid  Vitamin B12  Lactulose       ASSESSED NUTRITION NEEDS PER APPROVED PRACTICE GUIDELINES:    Dosing Weight 90 kg (adjusted)  Estimated Energy Needs: 9033-0179 kcals (25-30 Kcal/Kg)  Justification: overweight  Estimated Protein Needs:  grams protein (1.0-1.5 g pro/Kg)  Justification: hypercatabolism with acute illness  Estimated Fluid Needs: 0786-1444 mL (1 mL/Kcal)  Justification: maintenance    MALNUTRITION:  % Weight Loss:  None noted  % Intake:  </= 50% for >/= 5 days (severe malnutrition)  Subcutaneous Fat Loss:  Orbital region mild depletion and Upper arm region moderate depletion  Muscle Loss:  Temporal region moderate depletion and Clavicle bone region moderate-severe depletion  Fluid Retention:  Moderate as above     Malnutrition Diagnosis: Moderate malnutrition  In Context of:  Acute illness or injury  Environmental or social circumstances    NUTRITION DIAGNOSIS:  Inadequate protein-energy intake related to ETOH encephalopathy as evidenced by taking minimal oral intake, need for supplemental TF       NUTRITION INTERVENTIONS  Recommendations / Nutrition Prescription  Jevity 1.5 at 65 mL/hr= 2340 kcal (26 kcal/kg), 100 g protein (1.1 g/kg), 337 g CHO, 33 g fiber, 1186 mL  H2O  Flushes 60 mL every 4 hours     Implementation  Nutrition education: Not appropriate at this time due to patient condition  EN Composition, EN Schedule, Feeding Tube Flush and Collaboration:  TF orders written --  begin Jevity 1.5 at 20 mL/hr and increase every 12 hours by 15 mL to goal.  Flushes as above   Collaboration and Referral of Nutrition care:  Patient discussed today during interdisciplinary bedside rounds    Nutrition Goals  Goal TF will meet % needs     MONITORING AND EVALUATION:  Progress towards goals will be monitored and evaluated per protocol and Practice Guidelines    Janice Goodson RD, LD, CNSC   Clinical Dietitian - [facility]

## 2023-05-17 NOTE — PROGRESS NOTES
"Critical Care Progress Note      05/17/2023    Name: Sp Plasencia MRN#: [id number]   Age: 48 year old YOB: 1975     Hsptl Day# 12  ICU DAY #    MV DAY #             Problem List:   Principal Problem:    Hemorrhage    1. Cirrhosis, decompensated with component of acute alcoholic hepatitis- LFT's remain elevated (BILI, enzymes) which is an ominous sign. He continues on Zosyn though no signs of infection and will complete course after 7 days tomorrow. He had 6 liters removed from paracentesis 6 days ago. Benadryl for itching and will avoid lactulose at present due to ongoing bleeding  2. S/p TIPS  3. Coagulopathy - platelets 76k; 2 unit rbc's today for Hb 5.8  4. S/p fall on admission leading to hematoma in right hip which continues to slowly bleed over time  5. Acute blood loss anemia - he continues to \"ooze\" and 2 RBC's today.   6. Muscular dystrophy  7. Alcoholism   8. Metabolic encephalopathy, likely hepatic in origin   9. Pain from hematoma- scheduled oxycodone.   10. Nutrition- long discussion with mother today. Though feeding tube carries some (small) risk, he needs nutrition if we are to continue to provide aggressive care. She was accepting of this choice after long conversation.          Summary/Hospital Course:           Assessment and plan :     Sp Plasencia IS a 48 year old male admitted on 5/5/2023 for ongoing acute blood loss anemia and cirrhosis decompensation .   I have personally reviewed the daily labs, imaging studies, cultures and discussed the case with referring physician and consulting physicians.     My assessment and plan by system for this patient is as follows:    Neurology/Psychiatry:   1. Awake; non-verbal; delirious; encephalopathic     Cardiovascular:   1.Hemodynamics - compensated     Pulmonary/Ventilator Management:   1. Oxygenation ok on NC    GI and Nutrition :   1. Enteral nutrition     Renal/Fluids/Electrolytes:   1. Creatinine 1.09; may be dry and will hydrate " for 24 hours    Infectious Disease:   1. Complete zosyn tomorrow     Endocrine:   1. Glucoses ok     Hematology/Oncology:   1. Ongoing blood loss into hematoma; 2 units RBC's today     IV/Access:   1. Venous access -   2. Arterial access -   3.  Plan  - central access required and necessary      ICU Prophylaxis:   1. DVT: Hep Subq/ LMWH/mechanical  2. VAP: HOB 30 degrees, chlorhexidine rinse  3. Stress Ulcer: PPI/H2 blocker  4. Restraints: Nonviolent soft two point restraints required and necessary for patient safety and continued cares and good effect as patient continues to pull at necessary lines, tubes despite education and distraction. Will readdress daily.   5. IV Access - central access required and necessary for continued patient cares  6. Feeding - will start enteral as planned today   7. Family Update: discussed with mother and sister at bedside   8. Disposition - continue ICU care         Key goals for next 24 hours:   1.  2.  3.               Interim History:              Key Medications:       - MEDICATION INSTRUCTIONS -   Does not apply See Admin Instructions     cyanocobalamin  500 mcg Sublingual Daily     famotidine  20 mg Intravenous Q12H     folic acid  1 mg Oral Daily     HYDROmorphone  0.5 mg Intravenous Once     [Held by provider] lactulose  20 g Oral Q6H     lidocaine   Topical Once     metoclopramide  10 mg Intravenous Once     midodrine  5 mg Oral TID w/meals     multivitamin w/minerals  1 tablet Oral Daily     oxyCODONE  5 mg Oral or Feeding Tube Q6H     piperacillin-tazobactam  3.375 g Intravenous Q6H     rifaximin  550 mg Oral BID     [Held by provider] rOPINIRole  1 mg Oral At Bedtime     sodium chloride (PF)  10-40 mL Intracatheter Q7 Days     sodium chloride (PF)  10-40 mL Intracatheter Q7 Days     thiamine  100 mg Oral Daily       sodium chloride 10 mL/hr at 05/14/23 2200               Physical Examination:   Temp:  [96.2  F (35.7  C)-98.6  F (37  C)] 98.6  F (37  C)  Pulse:  [102-112]  107  Resp:  [12-45] 13  BP: (100-137)/(49-83) 130/62  SpO2:  [88 %-100 %] 93 %    Intake/Output Summary (Last 24 hours) at 5/17/2023 1341  Last data filed at 5/17/2023 1200  Gross per 24 hour   Intake 2047.5 ml   Output 1080 ml   Net 967.5 ml     Wt Readings from Last 4 Encounters:   05/17/23 116.2 kg (256 lb 2.8 oz)   10/19/22 108.9 kg (240 lb 1.6 oz)   09/08/21 99.8 kg (220 lb)   07/05/21 97.5 kg (215 lb)     BP - Mean:  [] 72  Resp: 13    Recent Labs   Lab 05/11/23  2132   O2PER 38       GEN: no acute distress; jaundiced    HEENT: head ncat, sclera anicteric, OP patent, trachea midline   PULM: unlabored  clear anteriorly    CV/COR: RRR S1S2 no gallop,  No rub, no murmur  ABD: soft nontender, mild distension though soft  EXT:  Mod edema   Warm; right hip still terribly swollen   NEURO: moves 4 extremities spontaneously and to stimulation; encephalopathic   SKIN: no obvious rash; jaundiced   LINES: clean, dry intact         Data:   All data and imaging reviewed     ROUTINE ICU LABS (Last four results)  CMP  Recent Labs   Lab 05/17/23  0347 05/16/23  0418 05/15/23  1409 05/15/23  0450 05/14/23  2146 05/14/23  1234 05/14/23  0849 05/14/23  0406 05/13/23  1030 05/13/23  0447 05/12/23  2036 05/12/23  0423    141  --  137  --  134*  --  135*   < > 133*  --  129*   POTASSIUM 3.9 3.9  --  4.2  --  4.1  --  4.2   < > 4.1  --  4.1   CHLORIDE 108* 104  --  101  --  100  --  98   < > 97*  --  95*   CO2 25 25  --  24  --  23  --  24   < > 25  --  25   ANIONGAP 11 12  --  12  --  11  --  13   < > 11  --  9   * 93 103* 92   < > 114*   < > 92   < > 79   < > 75   BUN 62.2* 57.5*  --  60.5*  --  54.5*  --  54.3*   < > 48.4*  --  41.5*   CR 1.09 0.99  --  1.11  --  1.22*  --  1.23*   < > 1.12  --  0.83   GFRESTIMATED 84 >90  --  82  --  74  --  73   < > 82  --  >90   JOSE CARLOS 9.3 9.5  --  9.1  --  8.7  --  9.1   < > 8.7  --  8.8   MAG 2.0 1.9  --  1.8  --   --   --  1.9  --  1.8  --  1.8   PHOS  --   --   --  4.0  --    --   --  4.1  --  3.8  --  3.6   ALBUMIN 2.8* 3.0*  --  3.0*  --   --   --  3.1*  --  2.8*  --  3.0*   BILITOTAL 35.5* 34.8*  --  31.5*  --   --   --  29.2*  --  25.1*  --  24.0*   ALKPHOS 163* 137*  --  141*  --   --   --  152*  --  147*  --  149*   * 354*  --  352*  --   --   --  389*  --  337*  --  309*   * 119*  --  113*  --   --   --  119*  --  107*  --  102*    < > = values in this interval not displayed.     CBC  Recent Labs   Lab 05/17/23  0347 05/16/23  2051 05/16/23  0418 05/15/23  1409 05/15/23  0450   WBC 17.0*  --  14.3* 14.9* 12.8*   RBC 2.24*  --  1.92* 2.24* 1.93*   HGB 7.1* 5.8* 6.4* 7.2* 6.3*   HCT 21.1*  --  18.8* 21.5* 18.7*   MCV 94  --  98 96 97   MCH 31.7  --  33.3* 32.1 32.6   MCHC 33.6  --  34.0 33.5 33.7   RDW 19.9*  --  24.2* 22.9* 23.9*   PLT 75*  --  48* 49* 49*     INR  Recent Labs   Lab 05/17/23 0347 05/16/23 0418 05/15/23  0450 05/14/23  0406   INR 4.23* 3.62* 3.91* 3.91*     Arterial Blood Gas  Recent Labs   Lab 05/11/23  2132   O2PER 38       All cultures:  No results for input(s): CULT in the last 168 hours.  No results found for this or any previous visit (from the past 24 hour(s)).    MD Aurelio    Billing: This patient is critically ill: Yes. Total critical care time today 35 min.

## 2023-05-18 NOTE — CONSULTS
[facility]    Consult Note - [facility]    ______________________________________________________________________    [facility] 24/7 Contact Number: [phone]    - Providers: Please contact [facility] with changes in orders or clinical plan of care   - Nursing: Please contact [facility] with significant changes in patient condition    Hospice will notify the care team (including the hospitalist) to confirm date of inpatient hospice (GIP) admission.    New Epic encounter will not be created until hospice completes admission.   ______________________________________________________________________        Hospice Diagnosis: end stage liver failure    Indication for Inpatient Hospice: pain management, agitation    Goals for Hospital Discharge: not stable for discharge at this time, needing IV medication    Plan of Care Discussed with the Following:   - Nurse: Ruben  - Hospitalist/Rounding Provider: Dr. Ferguson  - Sp's Family/Preferred Contact: Wife, Fariba  - Hospice Provider: Dr. Dmitri Garcia    Summary of Visit (includes assessment, medications and any new orders):     Lorazepam 0.5-2mg Q2hrs PRN IV  Hydromorphone0.5-1mg Q30min PRN IV bolus  Haloperidol 0.5-2mg Q2hrs PRN IV    D/C zosyn          Yajaira Verderame

## 2023-05-18 NOTE — PROGRESS NOTES
GI   Aware of change in goals of care.  We remain available if needed.   Will sign off.     Jose Cole,    [facility]  Cell [phone]

## 2023-05-18 NOTE — PROVIDER NOTIFICATION
Dr. Ferguson was notified about the HGB of 6.7 and PLT of 35. The decision was hank to not take any more labs and to forgo replacing blood products.

## 2023-05-18 NOTE — PROGRESS NOTES
Patient confused, garbled speech, restless at times overnight, given dilaudid, benadryl and valium PRN overnight, given scheduled oxy. Patient able to rest some overnight. mepilex placed on weeping area on abdomen, hematoma on hip weeping from blisters, dressing in place, legs firm to touch. Low urine output, MD notified, will continue to monitor. Tolerating tube feeding, rate increased to 35. Sitter in room, patient pulling at monitoring equipment, kulkarni and NG when awake.

## 2023-05-18 NOTE — H&P
"[facility]    History and Physical - Hospitalist Service       Date of Admission:  5/5/2023    Assessment & Plan      Sp Plasencia is a 48 year old male with past medical history significant for alcoholic cirrhosis s/p TIPS, and muscular dystrophy. He was transferred to [facility] ICU on 5/5/2023 for hemorrhagic shocks.     He initially presented to an OSH on 5/4 with increasing jaundice, confusion, weakness/falls, epistaxis. Initial labs were notable for hgb of 6.3, platelets of 87, INR 2.69, T bili 10.4, , ALT 65 and alk phos 260., sodium 118, and lactate of 3.9. EtOH level was 0.13.He received 4 units of blood on admission.     He had a CT abdomen/pelvis on admission here which showed a large subcutaneous fluid collection in the right lateral thigh measuring 19.5 x 7cm consistent with hematoma. Given concern for active bleeding in to the thigh he was transfused additional PRBCs along with FFP, cryoprecipitate and platelets.     Despite aggressive ICU cares, the patient's clinical status has not improved. His hepatic function continues to worsen, with bilirubin now up to 35. He has developed oliguric ASHWINI with creatinine up to 1.5. despite multiple blood product transfusions his hemoglobin remains low at 6.7, Platelets are 36, INR is up to 4.3 and fibrinogen is 83. General surgery and IR were consulted for recommendations regarding the thigh hematoma, but intervention was felt to be too risky given worsening coagulopathy.     Palliative care consulted and goals of care discussion initiated in the ICU given pt's extremely poor prognosis. Ultimately the patient's family elected to pursue comfort cares. Inpatient hospice has been consulted. The hospitalist service has been contacted for transfer of care.     Comfort Care  End of Life Care   - [facility] hospice consulted - per documentation on 5/18 \"hospice will notify the care team to confirm date of inpatient hospice " admission. New Epic encounter will not be created until hospice completes admission.   - DNR/DNI   - Remove feeding tube  - Pain control PRN    - Dilaudid CADD    - Acetaminophen suppository PRN   - Anxiety/agitation management    - Ativan 1mg Q10 minutes PRN   - Haloperidol 2.5-5mg Q4H PRN   - Dyspnea/air hunger   - Ativan 1mg Q10 minutes PRN  - Secretion managemnt    - Atropine sublingual drops q15 minutes PRN    - Glycopyrrolate IV Q4H PRN   - Other comfort medications     - Refresh eye drops PRN for dry eyes    - IV Benadryl PRN for itching or anxiety   - Ondansetron PRN for nausea     Diet: NPO, comfort  DVT Prophylaxis: none - end of life   Bo Catheter: PRESENT, indication: End of Life  Lines: PRESENT      PICC 05/11/23 Triple Lumen Right Brachial vein lateral Ok to use-Site Assessment: WDL      Cardiac Monitoring: ACTIVE order. Indication: ICU  Code Status: No CPR- Do NOT Intubate      Clinically Significant Risk Factors           # Hypercalcemia: corrected calcium is >10.1, will monitor as appropriate    # Hypoalbuminemia: Lowest albumin = 1.5 g/dL at 5/5/2023  4:57 PM, will monitor as appropriate  # Coagulation Defect: INR = 4.30 (Ref range: 0.85 - 1.15) and/or PTT = 65 Seconds (Ref range: 22 - 38 Seconds), will monitor for bleeding  # Thrombocytopenia: Lowest platelets = 36 in last 2 days, will monitor for bleeding  # Acute Kidney Injury, unspecified: based on a >150% or 0.3 mg/dL increase in last creatinine compared to past 90 day average, will monitor renal function  # Hypertension: Noted on problem list        # Obesity: Estimated body mass index is 34.94 kg/m  as calculated from the following:    Height as of this encounter: 1.829 m (6').    Weight as of this encounter: 116.9 kg (257 lb 10.1 oz).   # Moderate Malnutrition: based on nutrition assessment         Disposition Plan         Cherrie Covington MD  Hospitalist Service  [facility]  Securely message with [employer]  (more info)  Text page via [facility] Paging/Directory     ______________________________________________________________________    Chief Complaint   Transfer of care     History is obtained from chart review.     History of Present Illness   See above for summary of hospitalization.     Pt is unable to provide any additional history due to encephalopathy.       Past Medical History    Past Medical History:   Diagnosis Date     Acid reflux      Anemia      Ascites      Esophageal varices (H)      FSHD (facioscapulohumeral muscular dystrophy) (H)      Gout      HTN (hypertension)      Jaundice      Liver cirrhosis (H)      Smoker      Thrombocytopenia (H)        Past Surgical History   Past Surgical History:   Procedure Laterality Date     EVACUATE HEMATOMA HIP Right 5/11/2023    Procedure: Incision and drainage of right upper thigh hematoma;  Surgeon: Dm Argueta MD;  Location:  OR     IR PARACENTESIS  08/26/2020     IR TRANSVEN INTRAHEPATIC PORTOSYST SHUNT  08/26/2020     LAPAROSCOPIC HERNIORRHAPHY INGUINAL Right 10/14/2020    Procedure: HERNIORRHAPHY, RIGHT INGUINAL, LAPAROSCOPIC.;  Surgeon: Chris Parker MD;  Location: [facility] OR       Prior to Admission Medications   Prior to Admission Medications   Prescriptions Last Dose Informant Patient Reported? Taking?   Carboxymethylcellul-Glycerin (CLEAR EYES FOR DRY EYES) 1-0.25 % SOLN Unknown  Yes Yes   Sig: Place 1 drop into both eyes as needed   Lidocaine 0.5 % AERO Unknown  Yes Yes   Sig: Externally apply 1 spray topically every 8 hours as needed (to legs)   Lidocaine 5 % CREA Unknown  Yes Yes   Sig: Externally apply topically every 8 hours as needed (to legs)   Multiple Vitamin (DAILY MULTIVITAMIN PO) 5/3/2023  Yes Yes   Sig: Take 1 tablet by mouth every evening    XIFAXAN 550 MG TABS tablet 5/4/2023  No Yes   Sig: TAKE ONE TABLET BY MOUTH TWICE DAILY    allopurinol (ZYLOPRIM) 300 MG tablet 5/4/2023  No Yes   Sig: TAKE 1 TABLET (300 MG) BY MOUTH DAILY    amLODIPine (NORVASC) 5 MG tablet Past Week  No Yes   Sig: Take 1 tablet (5 mg) by mouth daily   Patient taking differently: Take 5 mg by mouth every other day Alternates daily with Lisinopril   esomeprazole (NEXIUM) 40 MG DR capsule 5/4/2023  No Yes   Sig: Take 1 capsule (40 mg) by mouth every morning (before breakfast) Take 30-60 minutes before eating.   gabapentin (NEURONTIN) 100 MG capsule 5/4/2023  No Yes   Sig: TAKE 2 CAPSULES BY MOUTH 3 TIMES DAILY   hydrOXYzine (ATARAX) 25 MG tablet Past Week  Yes Yes   Sig: Take 25 mg by mouth 2 times daily as needed for itching   lactulose (CHRONULAC) 10 GM/15ML solution Not Taking  Yes No   Sig: Take 20 g by mouth daily   Patient not taking: Reported on 5/5/2023   lisinopril (ZESTRIL) 40 MG tablet Past Week  No Yes   Sig: Take 1 tablet (40 mg) by mouth daily   Patient taking differently: Take 40 mg by mouth every other day Alternates daily with Amlodipine   rOPINIRole (REQUIP) 0.5 MG tablet Unknown  Yes Yes   Sig: Take 0.5 mg by mouth daily as needed (restless legs)   rOPINIRole (REQUIP) 0.5 MG tablet 5/3/2023  Yes Yes   Sig: Take 1 mg by mouth At Bedtime   traMADol (ULTRAM) 50 MG tablet Past Week  No Yes   Sig: TAKE ONE TABLET BY MOUTH TWICE A DAY AS NEEDED FOR SEVERE PAIN   zolpidem (AMBIEN) 5 MG tablet Past Week  No Yes   Sig: Take 1 tablet by mouth nightly as needed, SCHEDULE APPOINTMENT FOR FURTHER REFILLS      Facility-Administered Medications: None        Review of Systems    Review of systems not obtained due to patient factors - mental status     Physical Exam   Vital Signs: Temp: 98.5  F (36.9  C) Temp src: Temporal BP: 104/44 Pulse: 109   Resp: (!) 8 SpO2: 97 % O2 Device: Nasal cannula Oxygen Delivery: 6 LPM  Weight: 257 lbs 10.07 oz    Full examination was deferred due to comfort care status   Pt is resting comfortably and does not appear to be in any acute distress. No outward signs of air hunger, pain or anxiety.     Medical Decision Making       60 MINUTES  SPENT BY ME on the date of service doing chart review, history, exam, documentation & further activities per the note.      Data     I have personally reviewed the following data over the past 24 hrs:    16.6 (H)  \   6.7 (LL)   / 36 (LL)     144 109 (H) 74.9 (H) /  97   4.1 25 1.54 (H) \       ALT: 107 (H) AST: 347 (H) AP: 117 TBILI: 35.0 (HH)   ALB: 2.6 (L) TOT PROTEIN: N/A LIPASE: N/A       INR:  4.30 (H) PTT:  N/A   D-dimer:  N/A Fibrinogen:  N/A       Imaging results reviewed over the past 24 hrs:   No results found for this or any previous visit (from the past 24 hour(s)).

## 2023-05-18 NOTE — PROVIDER NOTIFICATION
Referral Received - [facility]       [facility] would like to thank you for the [facility] referral.    Referral received and initial insurance information sent to  Hospice intake for review.    We are determining your patient's eligibility with a medical director at this time.    Our plan is to visit your patient as soon as possible. We will connect with the primary care team shortly to collect more information on the patient's progression. Thank you for your patience.      Nedra Hadley, ARACELI  [phone]  [facility]  Contact information available via [facility] Paging/Directory     Listed as Hospice [facility] Care in [facility]

## 2023-05-18 NOTE — PROGRESS NOTES
[facility]  Palliative Care Progress Note       Recommendations & Counseling       A family conference was held today with a large group of Sp's family per their request. Reviewed comfort care process, procedures, meds and interventions that would likely be discontinued on comfort care. Family wishes to proceed with comfort care after Sp's 3 children have visited him later today.      Recommend GIP for inpatient hospice. Reviewed benefits of GIP program. Family would like to meet with them.      Sp is still currently legally  to Fariba, who is the mother of his 3 younger children.  Per family report, Sp has been engaged for approximately a year to Maye, whom he lives with.  Sp does not have an ACD identifying a different decision-maker, and since he is legally , spouse Fariba has been involved in decision making. Sp does not currently demonstrate cognitive capacity to have any input into who his legal decision maker might be.      Kalyani Pollack, [facility]  Contact information available via [facility] Paging/Directory      Thank you for the opportunity to participate in the care of this patient and family. Our team: will continue to follow.     During the hours of 8AM-4:30PM Monday-Friday, please call the team pager ([phone]) with questions, concerns. For urgent overnight or weekend issues, please call the answering service to reach the on call physician at [phone]. Thank you.     Attestation:  TOTAL TIME SPENT:  65 minutes.  Time spent in today's visit, review of chart/investigations today, communicating with other providers and documentation today.     Assessments          Sp Plasencia is a 48 year old man who transfered to [facility] ICU on 5/5/2023 for acute blood loss anemia. Hx chronic liver disease requiring paracentesis s/p tips procedure, alcohol abuse, tobacco use, muscular  dystrophy, bilateral calf pain and calf cellulitis, and insomnia. He presented to an outside hospital on 5/4/2023 for increasing jaundice, confusion, weakness/falls and epistaxis x3 days.  Having issues with ongoing bleeding into hematoma site requiring frequent transfusions, hepatic failure/coagulopathy requiring FFP/vit K, tenuous overall status, hypotension (now off pressors) delirium superimposed on hepatic encephalopathy. 6L paracentesis 5/11.    Today, the patient was seen for:  Family care conference, goals of care.    Prognosis, Goals, or Advance Care Planning was addressed today with: Yes.  Mood, coping, and/or meaning in the context of serious illness were addressed today: Yes.  Summary/Comments: Explained to family regarding emotional support with SW and  from palliative and hospice team.            Interval History:     Chart review/discussion with unit or clinical team members:   Reviewed patient case with Dr. Ferguson, patient's RN and RNCC    Per patient or family/caregivers today:  I spoke with patient's mother Flor and she requested a meeting me and the family group today after they speak with Dr. Hamilton.  They are considering comfort care and would like a thorough discussion this goals so they can all make a decision together.    I later met with the larger group of family: Wife Fariba, Sister Laura, brother and his wife, mother Flor.  They had met with Dr. Ferguson and are fairly sure that they would like to move forward with comfort care but they are wondering what this involves. It was explained to patient and family that comfort care is a good option when the burdens of aggressive treatments outweigh the benefits. Comfort care focuses on optimizing quality of life and relief from distressing symptoms such as pain, shortness of breath, nausea, and anxiety and allowing a natural dying process. When comfort care is initiated, restorative focused care and all artificial means to sustain  life are discontinued, including further diagnostic testing, lab draws, blood glucose testing/insulin, tube feedings, IV fluids, antibiotics, medications not for comfort, and vital signs.  I went in patient's chart and reviewed some of the occasions that would be likely discontinued.     We reviewed how it is likely he would qualify for the inpatient hospice due to his symptoms and severity of illness and I recommend this would be a good support for patient and family here in the hospital.  I explained how he may transfer to the eighth floor where most comfort care is provided at some point.  Also reviewed that on comfort care patient will be followed by a hospitalist and have the usual hospital nursing care..  Family would like to meet with inpatient hospice service and I explained I would let Dr. Ferguson know about their wishes and order a consult.    Key Palliative Symptoms:  We are not helping to manage these symptoms currently in this patient.           Review of Systems:     Besides above, an additional system ROS was reviewed and is unremarkable          Medications:     I have reviewed this patient's medication profile and medications during this hospitalization.    Noted meds:      - MEDICATION INSTRUCTIONS -   Does not apply See Admin Instructions     oxyCODONE  5 mg Oral or Feeding Tube Q6H     [Held by provider] rOPINIRole  1 mg Oral At Bedtime     sodium chloride (PF)  10-40 mL Intracatheter Q7 Days     sodium chloride (PF)  10-40 mL Intracatheter Q7 Days     acetaminophen, dextrose, glucose **OR** dextrose **OR** glucagon, diazepam, diphenhydrAMINE, flumazenil, haloperidol lactate, lidocaine 4%, lidocaine, melatonin, naloxone **OR** naloxone **OR** naloxone **OR** naloxone, rOPINIRole, sodium chloride (PF), sodium chloride (PF), sodium chloride (PF), sodium chloride (PF)           Physical Exam:   Temp: 98.5  F (36.9  C) Temp src: Temporal BP: 117/53 Pulse: 110   Resp: 13 SpO2: 95 % O2 Device: Nasal  cannula Oxygen Delivery: 3 LPM    GENERAL:  Delirious, fatigued, jaundiced, appears seriously ill, restless.  HEAD: Normocephalic atraumatic  SCLERA: Anicteric  EXTREMITIES: Warm; bilateral LE edema; right hip and thigh purple, firm to touch.  ABDOMEN:  firm, ascites.  RESPIRATORY: Breathing non labored.  NEUROLOGIC: Lethargic; delirious.  PSYCH: Confused.             Data Reviewed:     Recent imaging reviewed.  Results for orders placed or performed during the hospital encounter of 05/05/23   CTA Abdomen Pelvis with Contrast    Impression    IMPRESSION:  1.  No evidence of active GI bleed. TIPS appears patent however duplex study would more accurately assess for patency/stenosis.  2.  Small left pleural effusion.  3.  Cirrhotic appearing liver with large amount of ascites.  4.  Large lenticular shaped subcutaneous fluid collection right lateral thigh measuring 19.5 x 7 cm.  5.  Right hydrocele.   US Abd Hepatic & Portal Vasculature Cmpl    Impression    IMPRESSION:   1.  Patent TIPS shunt. Increased elevation of the velocity at the level of the TIPS at the hepatic vein. This is mildly increased when compared with 2021 when it measured 164 cm/s.   US Paracentesis with Albumin    Impression    IMPRESSION:  Ultrasound guided paracentesis.    REJI NASCIMENTO MD         SYSTEM ID:  [id number]   XR Chest Port 1 View    Impression    IMPRESSION: Hypoventilatory exam. Trace bilateral pleural effusions with likely associated compressive atelectasis. Continued attention on follow-up. Heart is normal size. No definite pulmonary vascular congestion. No pneumothorax. No acute osseous   abnormality.   XR Chest Port 1 View    Impression    IMPRESSION: PICC tip projects in the expected location of the low SVC,  cardiac border and landmarks partially obscured by an increase in  infiltrate and effusion on the right compared to previous. Left lung  clear.    JADE STEVENSON MD         SYSTEM ID:  [id number]   XR Chest Port 1 View     Impression    IMPRESSION: Small right pleural effusion and bibasilar infiltrates.   Echocardiogram Complete   Result Value Ref Range    LVEF  >70%      Recent lab data reviewed.  Lab Results   Component Value Date    WBC 16.6 (H) 05/18/2023    WBC 17.1 (H) 05/18/2023    WBC 17.0 (H) 05/17/2023    HGB 6.7 (LL) 05/18/2023    HGB 6.9 (LL) 05/18/2023    HGB 6.0 (LL) 05/17/2023    HCT 19.9 (L) 05/18/2023    HCT 20.1 (L) 05/18/2023    HCT 21.1 (L) 05/17/2023    PLT 36 (LL) 05/18/2023    PLT 50 (L) 05/18/2023    PLT 75 (L) 05/17/2023     05/18/2023     05/17/2023     05/16/2023    POTASSIUM 4.1 05/18/2023    POTASSIUM 3.9 05/17/2023    POTASSIUM 3.9 05/17/2023    CHLORIDE 109 (H) 05/18/2023    CHLORIDE 108 (H) 05/17/2023    CHLORIDE 104 05/16/2023    CO2 25 05/18/2023    CO2 25 05/17/2023    CO2 25 05/16/2023    BUN 74.9 (H) 05/18/2023    BUN 62.2 (H) 05/17/2023    BUN 57.5 (H) 05/16/2023    CR 1.54 (H) 05/18/2023    CR 1.09 05/17/2023    CR 0.99 05/16/2023    GLC 97 05/18/2023     (H) 05/18/2023    GLC 88 05/17/2023    DD 0.6 (H) 01/08/2018    NTBNPI 462 (H) 05/12/2023    NTBNPI 358 05/11/2023    NTBNPI 334 05/10/2023    TROPI <0.015 01/09/2018    TROPI <0.015 01/08/2018     (H) 05/18/2023     (H) 05/17/2023     (H) 05/16/2023     (H) 05/18/2023     (H) 05/17/2023     (H) 05/16/2023    ALKPHOS 117 05/18/2023    ALKPHOS 163 (H) 05/17/2023    ALKPHOS 137 (H) 05/16/2023    BILITOTAL 35.0 (HH) 05/18/2023    BILITOTAL 35.5 (HH) 05/17/2023    BILITOTAL 34.8 (HH) 05/16/2023    NANDO 42 05/12/2023    NANDO 47 05/11/2023    NANDO 43 05/10/2023    INR 4.30 (H) 05/18/2023    INR 5.08 (HH) 05/17/2023    INR 4.23 (H) 05/17/2023         Lab Results   Component Value Date    ALBUMIN 3.0 05/15/2023    ALBUMIN 3.6 10/19/2022    ALBUMIN 3.5 02/18/2021         Much or all of the text in this note was generated through the use of Dragon dictation, voice to text software.  Errors in spelling or words which appear to be out of context are unintentional and may be present due to having escaped editing.

## 2023-05-18 NOTE — PROGRESS NOTES
Patient's family has confirmed that they are wanting Sp to be placed in hospice care at this time.

## 2023-05-18 NOTE — PROGRESS NOTES
Per lab call back--1524 phosphorus level unable to be resulted due to pt's critical bilirubin level interference

## 2023-05-18 NOTE — PROGRESS NOTES
"Intensivist note  Patient little changed overnight but continues to have persistent ongoing blood loss, and creatinine has taken a significant jump today consistent with acute renal failure.     I spoke with mother, wife, significant other, and other family members several times today about current status and issues. Finally they felt that comfort care was most consistent with his wishes and have requested inpatient hospice care (requested).     They were also concerned that he seemed to be more uncomfortable and in pain, and IV dilaudid infusion started.     His prognosis appears terminal and imminent now.     I spent 35 minutes of critical care time discussing current condition with family and helping them work through their decision to move to comfort care at this point.     List of diagnoses  1. Cirrhosis, decompensated with component of acute alcoholic hepatitis  2. S/p TIPS  3. Coagulopathy - platelets 76k; 2 unit rbc's today for Hb 5.8  4. S/p fall on admission leading to hematoma in right hip which continues to slowly bleed over time  5. Acute blood loss anemia - he continues to \"ooze\" and 2 RBC's today.   6. Muscular dystrophy  7. Alcoholism   8. Metabolic encephalopathy, likely hepatic in origin   9. Pain from hematoma- scheduled oxycodone.   10. Nutrition - severe protein calorie malnutrition      logan weiner  May 18, 2023  "

## 2023-05-18 NOTE — PROGRESS NOTES
Patient has been placed on hospice care. He will be transferred off of the unit at some point. Family is at bedside and at least one member will be staying the night. Comfort care orders have been placed. He has a dilaudid drip that can be increased to 1mg an hour, and then he has bolus doses available, as well as, haldol and valium orders. Patient is making minimal urine, no stool, and is very jaundiced.

## 2023-05-18 NOTE — PROGRESS NOTES
Referral Received - [facility]       [facility] would like to thank you for the [facility] referral.    Referral received and initial insurance information sent to  Hospice intake for review.    We are determining your patient's eligibility with a medical director at this time.    Our plan is to visit your patient as soon as possible. We will connect with the primary care team shortly to collect more information on the patient's progression. Thank you for your patience.      Nedra Hadley, ARACELI  [phone]  [facility]  Contact information available via [facility] Paging/Directory     Listed as Hospice [facility] Care in [facility]

## 2023-05-19 NOTE — TELEPHONE ENCOUNTER
Patient advised and agrees to plan.     Thank you for choosing [facility] / Foot & Ankle Surgery!    DR. ARAGON'S CLINIC LOCATIONS:     [facility] (Friday) TRIAGE LINE: [phone] [address]  APPOINTMENTS: [phone]   [place] [address] RADIOLOGY: [phone]    PHYSICAL THERAPY: [phone]    SET UP SURGERY: [phone]   [place] (Mon-Tues AM-Thurs) BILLING QUESTIONS: [phone]   [address] FAX: [phone]   [place] [address]        Tips for treating painful calluses:    1.  Pumice stone/ann board therapy multiple times weekly to remove callus tissue as it builds up    2.  Good supportive shoes and minimizing barefoot ambulation can slow down callus formation, and can decrease pain levels    3.  Gel inserts can also provide padding to the bottom of the foot, to prevent pain and slow recurrence.    4.  Applying lotion daily/twice daily to the callus tissue can keep it softer and less painful.  Lotions with lactic acid or urea work well, but most lotions are sufficient

## 2023-05-19 NOTE — DEATH PRONOUNCEMENT
NP DEATH PRONOUNCEMENT    Called to pronounce Sp Plasencia dead.    Physical Exam: Unresponsive to noxious stimuli, Spontaneous respirations absent, Breath sounds absent and Carotid pulse absent    Patient was pronounced dead at 7:54 AM, May 19, 2023.    Preliminary Cause of Death: Hemorrhagic shock secondary right thigh hematoma in the setting of coagulapathy due to acute alcoholic hepatitis    Principal Problem:    Hemorrhage       Infectious disease present?: NO    Communicable disease present? (examples: HIV, chicken pox, TB, Ebola, CJD) :  NO    Multi-drug resistant organism present? (example: MRSA): NO    Please consider an autopsy if any of the following exist:  NO Unexpected or unexplained death during or following any dental, medical, or surgical diagnostic treatment procedures.   NO Death of mother at or up to seven days after delivery.     NO All  and pediatric deaths.     NO Death where the cause is sufficiently obscure to delay completion of the death certificate.   NO Deaths in which autopsy would confirm a suspected illness/condition that would affect surviving family members or recipients of transplanted organs.     The following deaths must be reported to the 's Office:  NO A death that may be due entirely or in part to any factors other than natural disease (recent surgery, recent trauma, suspected abuse/neglect).   NO A death that may be an accident, suicide, or homicide.     NO Any sudden, unexpected death in which there is no prior history of significant heart disease or any other condition associated with sudden death.   NO A death under suspicious, unusual, or unexpected circumstances.    NO Any death which is apparently due to natural causes but in which the  does not have a personal physician familiar with the patient s medical history, social, or environmental situation or the circumstances of the terminal event.   NO Any death apparently due to Sudden Infant Death  Syndrome.     NO Deaths that occur during, in association with, or as consequences of a diagnostic, therapeutic, or anesthetic procedure.   NO Any death in which a fracture of a major bone has occurred within the past (6) six months.   NO A death of persons note seen by their physician within 120 days of demise.     NO Any death in which the  was an inmate of a public institution or was in the custody of Law Enforcement personnel.   NO  All unexpected deaths of children   NO Solid organ donors   NO Unidentified bodies   YES Deaths of persons whose bodies are to be cremated or otherwise disposed of so that the bodies will later be unavailable for examination;   NO Deaths unattended by a physician outside of a licensed healthcare facility or licensed residential hospice program   NO Deaths occurring within 24 hours of arrival to a health care facility if death is unexpected.    NO Deaths associated with the decedent s employment.   NO Deaths attributed to acts of terrorism.   NO Any death in which there is uncertainty as to whether it is a medical examiner s care should be discussed with the medical investigator.        Body disposition: Autopsy was discussed with family member:  Family members in person.  Permission for autopsy was declined.    Adal Garcia NP  [facility]  Securely message with the Vocera Web Console (learn more here)  Text page via [employer] Paging/Directory

## 2023-05-19 NOTE — DISCHARGE SUMMARY
[facility]    Death Summary - Hospitalist Service     Date of Admission:  5/5/2023  Date of Death: 5/19/2023  Discharging Provider: Shoaib Hancock,     Discharge Diagnoses   Right thigh hematoma causing acute blood loss anemia  Cirrhosis from alcoholism with acute alcoholic hepatitis (MELD of 40)  Acute renal failure  Metabolic encephalopathy  Muscular dystrophy  Coagulopathy from cirrrhosis      Cause of death: cirrhosis with coagulaopathy causing right thigh hematoma, acute blood loss anemia, metabolic encephalopathy    Hospital Course   Sp Plasencia is a 48 year old male with past medical history significant for alcoholic cirrhosis s/p TIPS, and muscular dystrophy. He was transferred to [facility] ICU on 5/5/2023 for hemorrhagic shocks.      He initially presented to an OSH on 5/4 with increasing jaundice, confusion, weakness/falls, epistaxis. Initial labs were notable for hgb of 6.3, platelets of 87, INR 2.69, T bili 10.4, , ALT 65 and alk phos 260., sodium 118, and lactate of 3.9. EtOH level was 0.13.He received 4 units of blood on admission.      He had a CT abdomen/pelvis on admission here which showed a large subcutaneous fluid collection in the right lateral thigh measuring 19.5 x 7cm consistent with hematoma. Given concern for active bleeding in to the thigh he was transfused additional PRBCs along with FFP, cryoprecipitate and platelets.      Despite aggressive ICU cares, the patient's clinical status has not improved. His hepatic function continues to worsen, with bilirubin now up to 35. He has developed oliguric ASHWINI with creatinine up to 1.5. despite multiple blood product transfusions his hemoglobin remains low at 6.7, Platelets are 36, INR is up to 4.3 and fibrinogen is 83. General surgery and IR were consulted for recommendations regarding the thigh hematoma, but intervention was felt to be too risky given worsening coagulopathy.      Palliative  care consulted and goals of care discussion initiated in the ICU given pt's extremely poor prognosis. Ultimately the patient's family elected to pursue comfort cares. Inpatient hospice has been consulted. The hospitalist service has been contacted for transfer of care.          Shoaib Hancock DO  [facility]  ______________________________________________________________________      Significant Results and Procedures   Most Recent 3 CBC's:Recent Labs   Lab Test 05/18/23  0949 05/18/23 0407 05/17/23 1955 05/17/23 0347   WBC 16.6* 17.1*  --  17.0*   HGB 6.7* 6.9* 6.0* 7.1*   MCV 93 93  --  94   PLT 36* 50*  --  75*     Most Recent 3 BMP's:Recent Labs   Lab Test 05/18/23  0745 05/18/23  0407 05/17/23 1954 05/17/23  1524 05/17/23 0347 05/16/23  0418   NA  --  144  --   --  144 141   POTASSIUM  --  4.1  --  3.9 3.9 3.9   CHLORIDE  --  109*  --   --  108* 104   CO2  --  25  --   --  25 25   BUN  --  74.9*  --   --  62.2* 57.5*   CR  --  1.54*  --   --  1.09 0.99   ANIONGAP  --  10  --   --  11 12   JOSE CARLOS  --  9.2  --   --  9.3 9.5   GLC 97 113* 88 106* 108* 93     Most Recent 2 LFT's:Recent Labs   Lab Test 05/18/23 0407 05/17/23 0347   * 362*   * 112*   ALKPHOS 117 163*   BILITOTAL 35.0* 35.5*   ,   Results for orders placed or performed during the hospital encounter of 05/05/23   CTA Abdomen Pelvis with Contrast    Narrative    EXAM: CTA ABDOMEN PELVIS WITH CONTRAST  LOCATION: [facility]  DATE/TIME: 5/5/2023 6:06 PM CDT    INDICATION: GI bleed  COMPARISON: CT scan dated 08/20/2020  TECHNIQUE: CT angiogram abdomen pelvis during arterial phase of injection of IV contrast. 2D and 3D MIP reconstructions were performed by the CT technologist. Dose reduction techniques were used.  CONTRAST: 135 mL Isovue 370    FINDINGS:  ANGIOGRAM ABDOMEN/PELVIS: The celiac artery, superior mesenteric artery and inferior mesenteric artery are patent. No active  extravasation identified. Single renal arteries with no evidence of renal artery stenosis. Minor calcified plaque infrarenal   abdominal aorta. No evidence of abdominal aortic aneurysm. Common iliac, external iliac and internal iliac arteries are patent bilaterally. Common femoral, proximal profunda femoral and proximal superficial femoral arteries are patent bilaterally.    Portal vein is patent. TIPS appears patent however duplex study would more accurately assess for patency/stenosis.    LOWER CHEST: Small left pleural effusion with associated atelectasis.    HEPATOBILIARY: Cirrhotic appearing liver with no focal mass identified. Large amount of ascites.    PANCREAS: Normal.    SPLEEN: Normal.    ADRENAL GLANDS: Normal.    KIDNEYS/BLADDER: Normal.    BOWEL: Radiopaque material in the gastric fundus. Stool throughout the colon.    LYMPH NODES: Normal.    PELVIC ORGANS: Large right hydrocele.    MUSCULOSKELETAL: Lenticular shaped fluid collection right lateral thigh subcutaneous tissue measuring 19.5 x 7 cm. Subcutaneous edema lower abdominal abdomen/pelvis and proximal thighs.      Impression    IMPRESSION:  1.  No evidence of active GI bleed. TIPS appears patent however duplex study would more accurately assess for patency/stenosis.  2.  Small left pleural effusion.  3.  Cirrhotic appearing liver with large amount of ascites.  4.  Large lenticular shaped subcutaneous fluid collection right lateral thigh measuring 19.5 x 7 cm.  5.  Right hydrocele.   US Abd Hepatic & Portal Vasculature [facility]  5/6/2023 11:58 AM CDT    EXAM: TIPS DUPLEX    INDICATION: Portal hypertension, evaluate for TIPS patency    TECHNIQUE: Duplex imaging was performed utilizing gray-scale, two-dimensional images, and color-flow imaging. Doppler waveform analysis and spectral Doppler imaging are also performed.    COMPARISON: CT 05/05/2023, ultrasound 02/18/2021    FINDINGS:  ASCITES: Small volume  perihepatic ascites    The TIPS shunt is patent without evidence of stenosis.    Velocities are as follows (cm/s):  Main portal vein: 24  TIPS portal vein end: 181  TIPS mid stent: 138  TIPS HEPATIC VENOUS end: 241  distal to TIPS: 60      Impression    IMPRESSION:   1.  Patent TIPS shunt. Increased elevation of the velocity at the level of the TIPS at the hepatic vein. This is mildly increased when compared with 2021 when it measured 164 cm/s.   US Paracentesis with Albumin    Narrative    ULTRASOUND GUIDED PARACENTESIS   5/7/2023 3:20 PM     HISTORY: Ascites.    PROCEDURE:   Informed consent was obtained from the patient prior to  the procedure with discussion including the possible risks of  bleeding, infection and organ injury . Using 5 mL of 1% lidocaine for  local anesthesia, sterile technique, and sonographic guidance with  permanent image documentation, I placed an 8F paracentesis catheter  into the peritoneal fluid collection. This was used to aspirate 4975  mL of yellow, serous fluid in vacuum bottles, and some of this was  sent for any laboratory studies that had been ordered. There were no  immediate complications.  Intravenous albumen replacement was  performed according to protocol. As requested by patient, stitch was  placed at the catheter entry site using 3-0 Vicryl. This was  supplemented with Dermabond.      Impression    IMPRESSION:  Ultrasound guided paracentesis.    REJI NASCIMENTO MD         SYSTEM ID:  [id number]   XR Chest Port 1 View    Narrative    EXAM: XR CHEST PORT 1 VIEW  LOCATION: [facility]  DATE/TIME: 5/9/2023 5:29 AM CDT    INDICATION: hypoxia  COMPARISON: CT 05/05/2023      Impression    IMPRESSION: Hypoventilatory exam. Trace bilateral pleural effusions with likely associated compressive atelectasis. Continued attention on follow-up. Heart is normal size. No definite pulmonary vascular congestion. No pneumothorax. No acute osseous   abnormality.   XR  Chest Port 1 View    Narrative    CHEST ONE VIEW  2023 1:15 PM     HISTORY: RN placed PICC - verify tip placement.    COMPARISON: May 9, 2023      Impression    IMPRESSION: PICC tip projects in the expected location of the low SVC,  cardiac border and landmarks partially obscured by an increase in  infiltrate and effusion on the right compared to previous. Left lung  clear.    JADE STEVENSON MD         SYSTEM ID:  [id number]   XR Chest Port 1 View    Narrative    EXAM: XR CHEST PORT 1 VIEW  LOCATION: [facility]  DATE/TIME: 2023 11:36 PM CDT    INDICATION: Infectious workup  COMPARISON: 2023.    FINDINGS: Right PICC. No pneumothorax. The heart size is normal. Right pleural effusion has decreased in size. Bibasilar infiltrates without significant change. Nodule projecting over the left midlung was not evident on previous exams and is likely   artifactual.      Impression    IMPRESSION: Small right pleural effusion and bibasilar infiltrates.   XR Abdomen Port 1 View    Narrative    ABDOMEN ONE VIEW PORTABLE  2023 2:47 PM     HISTORY: NJ placement verification.    COMPARISON: None.       Impression    IMPRESSION: Feeding tube tip in the distal duodenum.    JADE STEVENSON MD         SYSTEM ID:  [id number]   Echocardiogram Complete     Value    LVEF  >70%    Narrative    [id number]  [id number]  [id number]  [id number]^JENNA     [facility]  Echocardiography Laboratory  [address] [place] [address]     Name: ADELIA MCMILLAN  MRN: [id number]  : 1975  Study Date: 2023 09:39 AM  Age: 48 yrs  Gender: Male  Patient Location: [facility]  Reason For Study: Shock  Ordering Physician: JENNA CASTANEDA  Referring Physician: ANDREW VALERO  Performed By: ITA Platt     BSA: 2.5 m2  Height: 72 in  Weight: 282 lb  HR: 112  BP: 100/43 mmHg  ______________________________________________________________________________  Procedure  Complete Portable Echo  Adult. Optison (NDC #[id number]) given intravenously.  ______________________________________________________________________________  Interpretation Summary     Normal left ventricular wall thickness.  Hyperdynamic left ventricular function  The visual ejection fraction is >70%.  Hyperdynamic right ventricular function  No significant valve disease.  Normal inferior vena cava.  There is no pericardial effusion.     There is no comparison study available.  ______________________________________________________________________________  Left Ventricle  The left ventricle is normal in size. There is normal left ventricular wall  thickness. Hyperdynamic left ventricular function. The visual ejection  fraction is >70%. Diastolic function not assessed due to tachycardia. No  regional wall motion abnormalities noted.     Right Ventricle  The right ventricle is normal size. Hyperdynamic right ventricular function.     Atria  Normal left atrial size. The left atrium is not well visualized. Right atrial  size is normal. Right atrium not well visualized. There is no color Doppler  evidence of an atrial shunt.     Mitral Valve  The mitral valve leaflets appear normal. There is no evidence of stenosis,  fluttering, or prolapse. There is trace mitral regurgitation. There is no  mitral valve stenosis.     Tricuspid Valve  Normal tricuspid valve. There is trace tricuspid regurgitation. Right  ventricular systolic pressure could not be approximated due to inadequate  tricuspid regurgitation.     Aortic Valve  The aortic valve is trileaflet. No aortic regurgitation is present. No aortic  stenosis is present.     Pulmonic Valve  The pulmonic valve is not well visualized. There is trace pulmonic valvular  regurgitation.     Vessels  The aortic root is normal size. Normal size ascending aorta. The inferior vena  cava was normal in size with preserved respiratory variability.     Pericardium  There is no pericardial effusion.      Rhythm  The rhythm was sinus tachycardia.  ______________________________________________________________________________  MMode/2D Measurements & Calculations  IVSd: 1.1 cm     LVIDd: 4.8 cm  LVIDs: 3.6 cm  LVPWd: 1.2 cm  FS: 24.5 %  LV mass(C)d: 208.8 grams  LV mass(C)dI: 84.7 grams/m2  Ao root diam: 3.3 cm  LA dimension: 4.5 cm  asc Aorta Diam: 3.2 cm  LA/Ao: 1.4  LVOT diam: 2.1 cm  LVOT area: 3.3 cm2  LA Volume (BP): 67.3 ml  LA Volume Index (BP): 27.2 ml/m2  RV Base: 3.5 cm  RWT: 0.51     TAPSE: 3.7 cm     Doppler Measurements & Calculations  MV E max ascencion: 83.6 cm/sec  MV A max ascencion: 84.4 cm/sec  MV E/A: 0.99  MV dec time: 0.16 sec  PA acc time: 0.16 sec  Pulm Sys Ascencion: 55.3 cm/sec  Pulm Pratt Ascencion: 64.1 cm/sec  Pulm A Revs Ascencion: 36.6 cm/sec  Pulm S/D: 0.86  E/E' av.6  Lateral E/e': 5.4  Medial E/e': 7.7  RV S Ascencion: 22.0 cm/sec     ______________________________________________________________________________  Report approved by: Dr Camryn Scott 2023 12:09 PM               Consultations This Hospital Stay   VASCULAR ACCESS ADULT IP CONSULT  VASCULAR ACCESS ADULT IP CONSULT  VASCULAR ACCESS ADULT IP CONSULT  GASTROENTEROLOGY IP CONSULT  VASCULAR ACCESS ADULT IP CONSULT  INTERVENTIONAL RADIOLOGY ADULT/PEDS IP CONSULT  VASCULAR ACCESS ADULT IP CONSULT  SURGERY GENERAL IP CONSULT  CARE MANAGEMENT / SOCIAL WORK IP CONSULT  SPIRITUAL HEALTH SERVICES IP CONSULT  VASCULAR ACCESS ADULT IP CONSULT  VASCULAR ACCESS ADULT IP CONSULT  VASCULAR ACCESS ADULT IP CONSULT  VASCULAR ACCESS ADULT IP CONSULT  PALLIATIVE CARE ADULT IP CONSULT  PHYSICAL THERAPY ADULT IP CONSULT  OCCUPATIONAL THERAPY ADULT IP CONSULT  VASCULAR ACCESS ADULT IP CONSULT  VASCULAR ACCESS ADULT IP CONSULT  WOUND OSTOMY CONTINENCE NURSE  IP CONSULT  VASCULAR ACCESS ADULT IP CONSULT  NUTRITION SERVICES ADULT IP CONSULT  PHARMACY IP CONSULT  GIP INPATIENT HOSPICE ADULT CONSULT  GIP INPATIENT HOSPICE ADULT CONSULT  WOUND OSTOMY CONTINENCE NURSE  IP  CONSULT    Primary Care Physician   Physician No Ref-Primary    Time Spent on this Encounter   I, Shoaib Hancock, , discharged this patient today but I did not personally see the patient today and will not be billing for the patient's discharge.    The patient  before I was able to round on the patient

## 2023-05-19 NOTE — PROGRESS NOTES
MD Notification    Notified Person: MD    Notified Person Name: SHANON Brown    Notification Date/Time: 5/18/23 2053H    Notification Interaction: Am[employer]    Purpose of Notification: No current feeding/diet orders. Please clarify if need to resume tube feeds or not. Thanks    Orders Received:    Comments:

## 2023-05-19 NOTE — PROGRESS NOTES
Pt passed away at 0754. Family present at bedside. House NP notified. Donor line contacted. Belongings sent with family. Security contacted to transport pt off unit.

## 2023-05-19 NOTE — PLAN OF CARE
2030H-0730H    Pt was transferred from ICU. VS and full skin assessments deferred, on comfort cares. Sedated, noted slight agitation with movement. Appears jaundiced in color. Family at bedside refused repositioning at this time due to some discomfort noted. On NPO. With orders to remove NJ tube, but family refused at this time. On 02 at 6LPM via nasal cannula for comfort. R PICC infusing to Dilaudid drip at 0.8mg/hr. Bo in place with minimal UO. No BM noted this shift. Ativan IV given 1x due to agitation and Atropine SL given 1x due to significant secretions. Oral cares done.

## 2023-05-25 ENCOUNTER — MEDICAL CORRESPONDENCE (OUTPATIENT)
Dept: HEALTH INFORMATION MANAGEMENT | Facility: CLINIC | Age: 48
End: 2023-05-25
Payer: COMMERCIAL

## 2023-09-14 NOTE — PROVIDER NOTIFICATION
Contacted MD scott because patient will not keep Gown or tele leads on. Given 2x dose of 0.25mg ativan PRN for agitation.    Detail Level: Zone Detail Level: Simple Sunscreen Recommendations: Use a mineral sunscreen daily to sun exposed areas. Reapply every 80 minutes or after sweating or swimming. I counseled the patient regarding the following:\\n Detail Level: Generalized Detail Level: Detailed

## 2023-11-29 NOTE — PROGRESS NOTES
Chest pain with cardiac hx. Recent hospital stay. Pt wears 2L NC at home. Pt given 1 SL Nitro by EMS in route. Some relief from nitro.   Shahram for pt about his apt. Called twice no safety concerns reported that I could see. Will also send a mychart message.

## 2024-11-17 NOTE — ED AVS SNAPSHOT
[facility] Emergency Department  [address]  [facility] [address]  Phone:  [phone]  Fax:  [phone]                                    Sp Plasencia   MRN: [id number]    Department:  [facility] Emergency Department   Date of Visit:  2/16/2020           After Visit Summary Signature Page    I have received my discharge instructions, and my questions have been answered. I have discussed any challenges I see with this plan with the nurse or doctor.    ..........................................................................................................................................  Patient/Patient Representative Signature      ..........................................................................................................................................  Patient Representative Print Name and Relationship to Patient    ..................................................               ................................................  Date                                   Time    ..........................................................................................................................................  Reviewed by Signature/Title    ...................................................              ..............................................  Date                                               Time          22EPIC Rev 08/18        Pt cleared for discharge home by provider. SIS. Discharge and follow up instructions reviewed, pt verbalized understanding. No further questions at this time. Patient AOx4 and ambulatory out of ED.

## (undated) DEVICE — DEVICE SUTURE PASSER 14GA WECK EFX EFXSP2

## (undated) DEVICE — ESU GROUND PAD ADULT W/CORD E7507

## (undated) DEVICE — CLIP APPLIER ENDO 5MM M/L LIGAMAX EL5ML

## (undated) DEVICE — LINEN TOWEL PACK X30 5481

## (undated) DEVICE — ANTIFOG SOLUTION W/FOAM PAD 31142527

## (undated) DEVICE — ESU HARMONIC ACE LAPAROSCOPIC SHEARS 5MMX36CM CVD HAR36

## (undated) DEVICE — DEVICE FIXATION ABSORBLE TACK 5MM SINGLE ABSTACK30X

## (undated) DEVICE — Device

## (undated) DEVICE — SYR 10ML FINGER CONTROL W/O NDL 309695

## (undated) DEVICE — LINEN TOWEL PACK X5 5464

## (undated) DEVICE — WIPES FOLEY CARE SURESTEP PROVON DFC100

## (undated) DEVICE — ENDO TROCAR SLEEVE KII ADV FIXATION 05X100MM CFS02

## (undated) DEVICE — DISSECTOR BALLOON SPACEMAKER PRO BTT & OVAL SMBTTOVLX

## (undated) DEVICE — SOL NACL 0.9% IRRIG 1000ML BOTTLE 2F7124

## (undated) DEVICE — PREP CHLORAPREP 26ML TINTED HI-LITE ORANGE 930815

## (undated) DEVICE — SOL WATER IRRIG 1000ML BOTTLE 2F7114

## (undated) DEVICE — SU ENDO POLYSORB 0 3" LOOP 21" EL-21-L

## (undated) DEVICE — SU VICRYL 0 UR-6 27" J603H

## (undated) DEVICE — GLOVE BIOGEL PI MICRO SZ 7.5 48575

## (undated) DEVICE — ENDO TROCAR FIRST ENTRY KII FIOS Z-THRD 05X100MM CTF03

## (undated) DEVICE — ESU PENCIL W/COATED BLADE E2450H

## (undated) DEVICE — ESU PENCIL W/SMOKE EVAC NEPTUNE STRYKER 0703-046-000

## (undated) DEVICE — NDL INSUFFLATION 13GA 120MM C2201

## (undated) DEVICE — ENDO DISSECTOR BLUNT 05MM  BTD05

## (undated) DEVICE — DRSG GAUZE 4X4" TRAY 6939

## (undated) DEVICE — PREP CHLORAPREP 26ML TINTED ORANGE  260815

## (undated) DEVICE — SPONGE RAY-TEC 4X8" 7318

## (undated) DEVICE — DRAPE EXTREMITY W/ARMBOARD 29405

## (undated) DEVICE — LINEN TOWEL PACK X6 WHITE 5487

## (undated) DEVICE — ENDO TROCAR BLUNT TIP KII BALLOON 12X100MM C0R47

## (undated) DEVICE — GLOVE BIOGEL PI MICRO INDICATOR UNDERGLOVE SZ 7.5 48975

## (undated) DEVICE — SUCTION IRR STRYKERFLOW II W/TIP 250-070-520

## (undated) DEVICE — CATH TRAY FOLEY SURESTEP 16FR W/URNE MTR STLK LATEX A303316A

## (undated) DEVICE — ADH SKIN CLOSURE PREMIERPRO EXOFIN 1.0ML 3470

## (undated) DEVICE — SU MONOCRYL 4-0 PS-2 27" UND Y426H

## (undated) DEVICE — ESU GROUND PAD UNIVERSAL W/O CORD

## (undated) DEVICE — ENDO TROCAR FIRST ENTRY KII FIOS ADV FIX 05X100MM CFF03

## (undated) DEVICE — PACK MINOR SBA15MIFSE

## (undated) DEVICE — SU VICRYL 2-0 UR-6 27" J602H

## (undated) DEVICE — DECANTER VIAL 2006S

## (undated) DEVICE — SUCTION MANIFOLD DORNOCH ULTRA CART UL-CL500

## (undated) RX ORDER — FENTANYL CITRATE 50 UG/ML
INJECTION, SOLUTION INTRAMUSCULAR; INTRAVENOUS
Status: DISPENSED
Start: 2020-08-26

## (undated) RX ORDER — BUPIVACAINE HYDROCHLORIDE 5 MG/ML
INJECTION, SOLUTION EPIDURAL; INTRACAUDAL
Status: DISPENSED
Start: 2020-10-14

## (undated) RX ORDER — PROPOFOL 10 MG/ML
INJECTION, EMULSION INTRAVENOUS
Status: DISPENSED
Start: 2020-08-26

## (undated) RX ORDER — METOCLOPRAMIDE HYDROCHLORIDE 5 MG/ML
INJECTION INTRAMUSCULAR; INTRAVENOUS
Status: DISPENSED
Start: 2020-08-26

## (undated) RX ORDER — HYDROMORPHONE HYDROCHLORIDE 1 MG/ML
INJECTION, SOLUTION INTRAMUSCULAR; INTRAVENOUS; SUBCUTANEOUS
Status: DISPENSED
Start: 2020-10-14

## (undated) RX ORDER — DEXAMETHASONE SODIUM PHOSPHATE 4 MG/ML
INJECTION, SOLUTION INTRA-ARTICULAR; INTRALESIONAL; INTRAMUSCULAR; INTRAVENOUS; SOFT TISSUE
Status: DISPENSED
Start: 2020-10-14

## (undated) RX ORDER — ALBUMIN, HUMAN INJ 5% 5 %
SOLUTION INTRAVENOUS
Status: DISPENSED
Start: 2020-08-26

## (undated) RX ORDER — ONDANSETRON 2 MG/ML
INJECTION INTRAMUSCULAR; INTRAVENOUS
Status: DISPENSED
Start: 2020-10-14

## (undated) RX ORDER — BUPIVACAINE HYDROCHLORIDE AND EPINEPHRINE 5; 5 MG/ML; UG/ML
INJECTION, SOLUTION EPIDURAL; INTRACAUDAL; PERINEURAL
Status: DISPENSED
Start: 2020-10-14

## (undated) RX ORDER — ACETAMINOPHEN 325 MG/1
TABLET ORAL
Status: DISPENSED
Start: 2020-10-14

## (undated) RX ORDER — FENTANYL CITRATE-0.9 % NACL/PF 10 MCG/ML
PLASTIC BAG, INJECTION (ML) INTRAVENOUS
Status: DISPENSED
Start: 2020-08-26

## (undated) RX ORDER — FENTANYL CITRATE-0.9 % NACL/PF 10 MCG/ML
PLASTIC BAG, INJECTION (ML) INTRAVENOUS
Status: DISPENSED
Start: 2020-10-14

## (undated) RX ORDER — AMPICILLIN AND SULBACTAM 2; 1 G/1; G/1
INJECTION, POWDER, FOR SOLUTION INTRAMUSCULAR; INTRAVENOUS
Status: DISPENSED
Start: 2020-08-26

## (undated) RX ORDER — GLYCOPYRROLATE 0.2 MG/ML
INJECTION, SOLUTION INTRAMUSCULAR; INTRAVENOUS
Status: DISPENSED
Start: 2020-10-14

## (undated) RX ORDER — LIDOCAINE HYDROCHLORIDE 10 MG/ML
INJECTION, SOLUTION EPIDURAL; INFILTRATION; INTRACAUDAL; PERINEURAL
Status: DISPENSED
Start: 2020-10-14

## (undated) RX ORDER — GABAPENTIN 300 MG/1
CAPSULE ORAL
Status: DISPENSED
Start: 2020-10-14

## (undated) RX ORDER — LIDOCAINE HYDROCHLORIDE 20 MG/ML
INJECTION, SOLUTION EPIDURAL; INFILTRATION; INTRACAUDAL; PERINEURAL
Status: DISPENSED
Start: 2020-10-14

## (undated) RX ORDER — LIDOCAINE HYDROCHLORIDE AND EPINEPHRINE 10; 10 MG/ML; UG/ML
INJECTION, SOLUTION INFILTRATION; PERINEURAL
Status: DISPENSED
Start: 2020-10-14

## (undated) RX ORDER — LIDOCAINE HYDROCHLORIDE 20 MG/ML
INJECTION, SOLUTION EPIDURAL; INFILTRATION; INTRACAUDAL; PERINEURAL
Status: DISPENSED
Start: 2020-08-26

## (undated) RX ORDER — FENTANYL CITRATE 50 UG/ML
INJECTION, SOLUTION INTRAMUSCULAR; INTRAVENOUS
Status: DISPENSED
Start: 2020-10-14

## (undated) RX ORDER — ALBUMIN (HUMAN) 12.5 G/50ML
SOLUTION INTRAVENOUS
Status: DISPENSED
Start: 2020-04-17

## (undated) RX ORDER — HYDROMORPHONE HYDROCHLORIDE 1 MG/ML
INJECTION, SOLUTION INTRAMUSCULAR; INTRAVENOUS; SUBCUTANEOUS
Status: DISPENSED
Start: 2020-08-26

## (undated) RX ORDER — EPHEDRINE SULFATE 50 MG/ML
INJECTION, SOLUTION INTRAMUSCULAR; INTRAVENOUS; SUBCUTANEOUS
Status: DISPENSED
Start: 2020-10-14

## (undated) RX ORDER — CEFAZOLIN SODIUM 2 G/100ML
INJECTION, SOLUTION INTRAVENOUS
Status: DISPENSED
Start: 2020-10-14

## (undated) RX ORDER — ONDANSETRON 2 MG/ML
INJECTION INTRAMUSCULAR; INTRAVENOUS
Status: DISPENSED
Start: 2020-08-26

## (undated) RX ORDER — ALBUMIN (HUMAN) 12.5 G/50ML
SOLUTION INTRAVENOUS
Status: DISPENSED
Start: 2020-04-01

## (undated) RX ORDER — PROPOFOL 10 MG/ML
INJECTION, EMULSION INTRAVENOUS
Status: DISPENSED
Start: 2020-10-14